# Patient Record
Sex: FEMALE | Race: WHITE | Employment: OTHER | URBAN - NONMETROPOLITAN AREA
[De-identification: names, ages, dates, MRNs, and addresses within clinical notes are randomized per-mention and may not be internally consistent; named-entity substitution may affect disease eponyms.]

---

## 2017-01-24 ENCOUNTER — HOSPITAL ENCOUNTER (OUTPATIENT)
Dept: CT IMAGING | Facility: MEDICAL CENTER | Age: 82
Discharge: HOME | End: 2017-01-24
Attending: FAMILY MEDICINE | Admitting: FAMILY MEDICINE

## 2017-01-24 DIAGNOSIS — R10.9 ABDOMINAL PAIN: ICD-10-CM

## 2017-01-24 DIAGNOSIS — R10.9 ABDOMINAL PAIN, UNSPECIFIED SITE: ICD-10-CM

## 2017-07-12 ENCOUNTER — HOSPITAL ENCOUNTER (OUTPATIENT)
Dept: SURGERY | Age: 82
Discharge: HOME | End: 2017-07-12
Attending: INTERNAL MEDICINE | Admitting: INTERNAL MEDICINE

## 2017-07-12 VITALS
RESPIRATION RATE: 16 BRPM | TEMPERATURE: 97 F | HEART RATE: 70 BPM | OXYGEN SATURATION: 99 % | DIASTOLIC BLOOD PRESSURE: 94 MMHG | SYSTOLIC BLOOD PRESSURE: 128 MMHG

## 2017-07-12 LAB — C DIFFICILE TOXIN, EIA: NORMAL

## 2017-07-12 RX ORDER — LIDOCAINE HYDROCHLORIDE 10 MG/ML
1 INJECTION, SOLUTION EPIDURAL; INFILTRATION; INTRACAUDAL; PERINEURAL ONCE
Status: DISCONTINUED | OUTPATIENT
Start: 2017-07-12 | End: 2017-07-13 | Stop reason: HOSPADM

## 2017-07-12 RX ORDER — OMEPRAZOLE 20 MG/1
20 CAPSULE, DELAYED RELEASE ORAL DAILY
COMMUNITY
End: 2020-02-19

## 2017-07-12 RX ORDER — SODIUM CHLORIDE, SODIUM LACTATE, POTASSIUM CHLORIDE, CALCIUM CHLORIDE 600; 310; 30; 20 MG/100ML; MG/100ML; MG/100ML; MG/100ML
INJECTION, SOLUTION INTRAVENOUS CONTINUOUS
Status: DISCONTINUED | OUTPATIENT
Start: 2017-07-12 | End: 2017-07-13 | Stop reason: HOSPADM

## 2017-07-12 RX ADMIN — SODIUM CHLORIDE, SODIUM LACTATE, POTASSIUM CHLORIDE, CALCIUM CHLORIDE: 600; 310; 30; 20 INJECTION, SOLUTION INTRAVENOUS at 09:03

## 2017-07-12 ASSESSMENT — PAIN SCALES - GENERAL: PAINLEVEL_OUTOF10: 0

## 2017-07-13 LAB
CRYPTOSPORIDIUM ANTIGEN STOOL: NORMAL
GI BACTERIAL PATHOGENS BY PCR: NORMAL
GIARDIA ANTIGEN STOOL: NORMAL

## 2017-08-31 ENCOUNTER — HOSPITAL ENCOUNTER (OUTPATIENT)
Dept: OTHER | Age: 82
Discharge: HOME | End: 2017-08-31
Attending: INTERNAL MEDICINE | Admitting: INTERNAL MEDICINE

## 2017-08-31 DIAGNOSIS — R10.84 GENERALIZED ABDOMINAL PAIN: ICD-10-CM

## 2017-09-15 ENCOUNTER — HOSPITAL ENCOUNTER (OUTPATIENT)
Dept: SURGERY | Age: 82
Discharge: HOME | End: 2017-09-15
Attending: INTERNAL MEDICINE | Admitting: INTERNAL MEDICINE

## 2017-09-15 VITALS
TEMPERATURE: 98 F | HEIGHT: 68 IN | HEART RATE: 69 BPM | WEIGHT: 175 LBS | SYSTOLIC BLOOD PRESSURE: 158 MMHG | DIASTOLIC BLOOD PRESSURE: 74 MMHG | BODY MASS INDEX: 26.52 KG/M2 | RESPIRATION RATE: 18 BRPM | OXYGEN SATURATION: 99 %

## 2017-09-15 RX ORDER — LIDOCAINE HYDROCHLORIDE 10 MG/ML
0.1 INJECTION, SOLUTION EPIDURAL; INFILTRATION; INTRACAUDAL; PERINEURAL ONCE
Status: DISCONTINUED | OUTPATIENT
Start: 2017-09-15 | End: 2017-09-16 | Stop reason: HOSPADM

## 2017-09-15 RX ORDER — SODIUM CHLORIDE, SODIUM LACTATE, POTASSIUM CHLORIDE, CALCIUM CHLORIDE 600; 310; 30; 20 MG/100ML; MG/100ML; MG/100ML; MG/100ML
INJECTION, SOLUTION INTRAVENOUS CONTINUOUS
Status: DISCONTINUED | OUTPATIENT
Start: 2017-09-15 | End: 2017-09-16 | Stop reason: HOSPADM

## 2017-09-15 RX ADMIN — SODIUM CHLORIDE, SODIUM LACTATE, POTASSIUM CHLORIDE, CALCIUM CHLORIDE: 600; 310; 30; 20 INJECTION, SOLUTION INTRAVENOUS at 09:05

## 2017-09-15 ASSESSMENT — PAIN - FUNCTIONAL ASSESSMENT: PAIN_FUNCTIONAL_ASSESSMENT: 0-10

## 2019-04-01 ENCOUNTER — APPOINTMENT (OUTPATIENT)
Dept: CT IMAGING | Age: 84
End: 2019-04-01
Payer: MEDICARE

## 2019-04-01 ENCOUNTER — APPOINTMENT (OUTPATIENT)
Dept: GENERAL RADIOLOGY | Age: 84
End: 2019-04-01
Payer: MEDICARE

## 2019-04-01 ENCOUNTER — HOSPITAL ENCOUNTER (EMERGENCY)
Age: 84
Discharge: HOME | End: 2019-04-01
Payer: MEDICARE

## 2019-04-01 VITALS
HEART RATE: 72 BPM | SYSTOLIC BLOOD PRESSURE: 150 MMHG | BODY MASS INDEX: 27.15 KG/M2 | HEIGHT: 67 IN | RESPIRATION RATE: 14 BRPM | DIASTOLIC BLOOD PRESSURE: 90 MMHG | OXYGEN SATURATION: 99 % | TEMPERATURE: 98.9 F | WEIGHT: 173 LBS

## 2019-04-01 DIAGNOSIS — S16.1XXA CERVICAL STRAIN, ACUTE, INITIAL ENCOUNTER: ICD-10-CM
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
## 2019-04-01 DIAGNOSIS — S09.90XA CLOSED HEAD INJURY, INITIAL ENCOUNTER: ICD-10-CM

## 2019-04-01 DIAGNOSIS — S43.492A OTHER SPRAIN OF LEFT SHOULDER JOINT, INITIAL ENCOUNTER: ICD-10-CM

## 2019-04-01 PROCEDURE — 99284 EMERGENCY DEPT VISIT MOD MDM: CPT

## 2019-04-01 PROCEDURE — 72125 CT NECK SPINE W/O DYE: CPT

## 2019-04-01 PROCEDURE — 70450 CT HEAD/BRAIN W/O DYE: CPT

## 2019-04-01 PROCEDURE — 6370000000 HC RX 637 (ALT 250 FOR IP): Performed by: PHYSICIAN ASSISTANT

## 2019-04-01 PROCEDURE — 73030 X-RAY EXAM OF SHOULDER: CPT

## 2019-04-01 RX ORDER — ACETAMINOPHEN 500 MG
500 TABLET ORAL ONCE
Status: COMPLETED | OUTPATIENT
Start: 2019-04-01 | End: 2019-04-01

## 2019-04-01 RX ORDER — CITALOPRAM 10 MG/1
10 TABLET ORAL DAILY
COMMUNITY

## 2019-04-01 RX ADMIN — ACETAMINOPHEN 500 MG: 500 TABLET ORAL at 19:14

## 2019-04-01 ASSESSMENT — PAIN SCALES - GENERAL
PAINLEVEL_OUTOF10: 8
PAINLEVEL_OUTOF10: 8

## 2019-04-01 ASSESSMENT — ENCOUNTER SYMPTOMS
ABDOMINAL PAIN: 0
BACK PAIN: 0
SHORTNESS OF BREATH: 0
VOMITING: 0
VISUAL CHANGE: 0

## 2019-04-01 NOTE — ED PROVIDER NOTES
Evaluated by SAMIR supervising physician available for consult    Patient states she was bent over and lost her balance and fell in the floor at [facility] on thursday, 4 days ago. She hit her left shoulder and having pain and some pain at her neck. She also hit her head in the fall states had a headache but not bothering her now. No loss of consciousness. No vomiting. The history is provided by the patient and a relative (here with daughter). Fall   The fall occurred while standing. She landed on a hard floor. There was no blood loss. The point of impact was the head and left shoulder. The pain is present in the left shoulder and neck. She was ambulatory at the scene. There was no entrapment after the fall. Pertinent negatives include no visual change, no fever, no numbness, no abdominal pain, no vomiting and no loss of consciousness. Review of Systems   Constitutional: Negative for fever. Respiratory: Negative for shortness of breath. Cardiovascular: Negative for chest pain. Gastrointestinal: Negative for abdominal pain and vomiting. Musculoskeletal: Positive for neck pain. Negative for back pain. Left shoulder pain   Skin: Negative for wound. Neurological: Negative for dizziness, loss of consciousness, syncope, weakness and numbness. Psychiatric/Behavioral: Negative for confusion. PAST MEDICAL HISTORY   has a past medical history of A-fib ([address] Acid reflux, and Hypertension. PAST SURGICAL HISTORY   has a past surgical history that includes Vulva surgery; Appendectomy; Gallbladder surgery; Ankle fracture surgery; Hysterectomy; other surgical history (Left, 4/20/2016); Colonoscopy (07/12/2017); and Upper gastrointestinal endoscopy (09/15/2017). FAMILY HISTORY  family history is not on file. SOCIAL HISTORY   reports that she has quit smoking. She has never used smokeless tobacco. She reports that she does not drink alcohol or use drugs.     HOME MEDICATIONS Prior to Admission medications    Medication Sig Start Date End Date Taking? Authorizing Provider   citalopram (CELEXA) 10 MG tablet Take 10 mg by mouth daily   Yes Historical Provider, MD   omeprazole (PRILOSEC) 20 MG delayed release capsule Take 20 mg by mouth daily    Historical Provider, MD   polyethylene glycol (GLYCOLAX) powder Take 17 g by mouth daily    Historical Provider, MD   ondansetron (ZOFRAN) 4 MG tablet Take 4 mg by mouth every 8 hours as needed for Nausea or Vomiting    Historical Provider, MD   warfarin (COUMADIN) 5 MG tablet Take 5 mg by mouth    Historical Provider, MD   b complex vitamins capsule Take 1 capsule by mouth daily    Historical Provider, MD   metoprolol (LOPRESSOR) 25 MG tablet Take 25 mg by mouth 2 times daily    Historical Provider, MD   docusate sodium (COLACE) 100 MG capsule Take 100 mg by mouth 2 times daily    Historical Provider, MD   multivitamin plus iron (ANIMAL SHAPES) 15 MG chewable tablet Take 1 tablet by mouth daily. Historical Provider, MD        ALLERGIES  is allergic to morphine. BP (!) 150/90   Pulse 72   Temp 98.9 °F (37.2 °C) (Oral)   Resp 14   Ht 5' 7\" (1.702 m)   Wt 173 lb (78.5 kg)   SpO2 99%   BMI 27.10 kg/m²     Physical Exam   Constitutional: She is oriented to person, place, and time. She appears well-developed and well-nourished. Non-toxic appearance. She does not have a sickly appearance. HENT:   Head: Normocephalic and atraumatic. Right Ear: Tympanic membrane and ear canal normal. No hemotympanum. Left Ear: Tympanic membrane and ear canal normal. No hemotympanum. Mouth/Throat: Uvula is midline, oropharynx is clear and moist and mucous membranes are normal. No posterior oropharyngeal edema or posterior oropharyngeal erythema. Eyes: Pupils are equal, round, and reactive to light. Conjunctivae and EOM are normal.   Neck: Normal range of motion. Neck supple. Cardiovascular: Normal rate, regular rhythm and intact distal pulses. reviewed    Patient Progress  Patient progress: stable           Ct Head Wo Contrast    Result Date: 4/1/2019  EXAMINATION: CT OF THE HEAD WITHOUT CONTRAST  4/1/2019 3:46 pm TECHNIQUE: CT of the head was performed without the administration of intravenous contrast. Dose modulation, iterative reconstruction, and/or weight based adjustment of the mA/kV was utilized to reduce the radiation dose to as low as reasonably achievable. COMPARISON: 08/24/2015 HISTORY: ORDERING SYSTEM PROVIDED HISTORY: HEAD INJURY MILD OR MODERATE ACUTE, NO NEUROLOGICAL DEFICIT TECHNOLOGIST PROVIDED HISTORY: Has a \"code stroke\" or \"stroke alert\" been called? ->No Ordering Physician Provided Reason for Exam: Fall (Pt states she fell trying to put a leash on her dog last Thursday; c/o pain in left shoulder, neck, reports hitting her head during fall but denies LOC. ) Acuity: Acute Type of Exam: Initial FINDINGS: BRAIN/VENTRICLES: No acute loss of the gray-white matter differentiation is identified to suggest acute or subacute infarct. No masses or hemorrhages within the brain parenchyma are found. The ventricles are midline. Mild periventricular low-attenuation is identified, compatible with chronic small vessel ischemic disease. Intracranial vasculature is unremarkable. ORBITS: The visualized portion of the orbits demonstrate no acute abnormality. SINUSES: The visualized paranasal sinuses and mastoid air cells demonstrate no acute abnormality. SOFT TISSUES/SKULL:  No acute abnormality of the visualized skull or soft tissues. No acute intracranial abnormality. Ct Cervical Spine Wo Contrast    Result Date: 4/1/2019  EXAMINATION: CT OF THE CERVICAL SPINE WITHOUT CONTRAST 4/1/2019 6:46 pm TECHNIQUE: CT of the cervical spine was performed without the administration of intravenous contrast. Multiplanar reformatted images are provided for review.  Dose modulation, iterative reconstruction, and/or weight based adjustment of the mA/kV was utilized to reduce the radiation dose to as low as reasonably achievable. COMPARISON: None. HISTORY: ORDERING SYSTEM PROVIDED HISTORY: NECK PAIN, CERVICAL SPINE TECHNOLOGIST PROVIDED HISTORY: Ordering Physician Provided Reason for Exam: Fall (Pt states she fell trying to put a leash on her dog last Thursday; c/o pain in left shoulder, neck, reports hitting her head during fall but denies LOC. ) Acuity: Acute Type of Exam: Initial FINDINGS: BONES/ALIGNMENT: There is no evidence of an acute cervical spine fracture. There is normal alignment of the cervical spine. DEGENERATIVE CHANGES: Multilevel degenerative changes. SOFT TISSUES: There is no prevertebral soft tissue swelling. No acute abnormality of the cervical spine. Xr Shoulder Left (min 2 Views)    Result Date: 4/1/2019  EXAMINATION: 3 XRAY VIEWS OF THE LEFT SHOULDER 4/1/2019 3:46 pm COMPARISON: None. HISTORY: ORDERING SYSTEM PROVIDED HISTORY: Trauma, R/O fx TECHNOLOGIST PROVIDED HISTORY: Reason for exam:->Trauma, R/O fx Ordering Physician Provided Reason for Exam: Fall (Pt states she fell trying to put a leash on her dog last Thursday; c/o pain in left shoulder, neck, reports hitting her head during fall but denies LOC. ) Acuity: Acute Type of Exam: Initial FINDINGS: Moderate acromioclavicular degenerative hypertrophy noted. No acute fracture or dislocation is seen involving the left shoulder. Visualized left lung is clear. No acute abnormality detected. 7:37 PM  Imaging was obtained. CT scan brain no acute intracranial abnormality no hemorrhage or fracture. Cervical spine multilevel degenerative changes no fracture. left shoulder degenerative changes no fracture or dislocation. We discussed Tylenol as needed for pain. Ice pack. To follow-up with her doctor within 1 week for evaluation also referred to orthopedics as needed. Head injury instructions given. Discussed having her use her walker.   Advised returning to the ER for any worsening symptoms patient and daughter understand and agree. I estimate there is LOW risk for CAUDA EQUINA or CENTRAL CORD SYNDROME, COMPARTMENT SYNDROME, CORD COMPRESSION,  INTRACRANIAL HEMORRHAGE OR EDEMA, INTRA-ABDOMINAL INJURY, PERFORATED BOWEL, SUBDURAL OR EPIDURAL HEMATOMA, TENDON or NEUROVASCULAR INJURY, PNEUMOTHORAX, HEMOTHORAX, PERICARDIAL TAMPONADE, CARDIAC CONTUSION, or a THORACIC AORTIC DISSECTION, thus I consider the discharge disposition reasonable. Also, there is no evidence or peritonitis, sepsis, or toxicity. Please note that this chart was generated using Dragon dictation software.  Although every effort was made to ensure the accuracy of this automated transcription, some errors in transcription may have occurred       Madalyn Samuels PA-C  04/01/19 1952

## 2019-04-01 NOTE — ED NOTES
Pt ambulated out with steady gait. VSS. Discharge instructions reviewed. No further needs at this time.         Joss Galicia RN  04/01/19 [id number]

## 2020-02-19 ENCOUNTER — HOSPITAL ENCOUNTER (OUTPATIENT)
Dept: ENDOSCOPY | Age: 85
Setting detail: OUTPATIENT SURGERY
Discharge: HOME | End: 2020-02-19
Payer: MEDICARE

## 2020-02-19 VITALS
DIASTOLIC BLOOD PRESSURE: 80 MMHG | BODY MASS INDEX: 27.28 KG/M2 | HEIGHT: 68 IN | RESPIRATION RATE: 16 BRPM | WEIGHT: 180 LBS | SYSTOLIC BLOOD PRESSURE: 174 MMHG | HEART RATE: 57 BPM

## 2020-02-19 PROCEDURE — 6370000000 HC RX 637 (ALT 250 FOR IP): Performed by: OPHTHALMOLOGY

## 2020-02-19 PROCEDURE — 66821 AFTER CATARACT LASER SURGERY: CPT

## 2020-02-19 PROCEDURE — 2500000003 HC RX 250 WO HCPCS: Performed by: OPHTHALMOLOGY

## 2020-02-19 RX ORDER — APRACLONIDINE HYDROCHLORIDE 5 MG/ML
1 SOLUTION/ DROPS OPHTHALMIC 2 TIMES DAILY PRN
Status: COMPLETED | OUTPATIENT
Start: 2020-02-19 | End: 2020-02-19

## 2020-02-19 RX ORDER — PROPARACAINE HYDROCHLORIDE 5 MG/ML
1 SOLUTION/ DROPS OPHTHALMIC ONCE
Status: COMPLETED | OUTPATIENT
Start: 2020-02-19 | End: 2020-02-19

## 2020-02-19 RX ORDER — TROPICAMIDE 10 MG/ML
1 SOLUTION/ DROPS OPHTHALMIC ONCE
Status: COMPLETED | OUTPATIENT
Start: 2020-02-19 | End: 2020-02-19

## 2020-02-19 RX ORDER — SOFT LENS RINSE,STORE SOLUTION
SOLUTION, NON-ORAL MISCELLANEOUS ONCE
Status: COMPLETED | OUTPATIENT
Start: 2020-02-19 | End: 2020-02-19

## 2020-02-19 RX ORDER — PHENYLEPHRINE HCL 2.5 %
1 DROPS OPHTHALMIC (EYE) ONCE
Status: COMPLETED | OUTPATIENT
Start: 2020-02-19 | End: 2020-02-19

## 2020-02-19 RX ADMIN — PHENYLEPHRINE HYDROCHLORIDE 1 DROP: 25 SOLUTION/ DROPS OPHTHALMIC at 11:33

## 2020-02-19 RX ADMIN — APRACLONIDINE 1 DROP: 5.75 SOLUTION OPHTHALMIC at 11:40

## 2020-02-19 RX ADMIN — Medication: at 12:18

## 2020-02-19 RX ADMIN — TROPICAMIDE 1 DROP: 10 SOLUTION/ DROPS OPHTHALMIC at 11:27

## 2020-02-19 RX ADMIN — APRACLONIDINE 1 DROP: 5.75 SOLUTION OPHTHALMIC at 12:21

## 2020-02-19 RX ADMIN — PROPARACAINE HYDROCHLORIDE 1 DROP: 5 SOLUTION/ DROPS OPHTHALMIC at 12:07

## 2020-02-19 ASSESSMENT — PAIN - FUNCTIONAL ASSESSMENT
PAIN_FUNCTIONAL_ASSESSMENT: 0-10
PAIN_FUNCTIONAL_ASSESSMENT: 0-10

## 2020-02-19 NOTE — PROGRESS NOTES
Pt here for laser eye procedure with Dr. Licha Conte  Procedure explained to pt and consent obtained  Laser eye procedure completed  See Dr. Efe Horan procedural note for details  Pt dorothy well  No c/o's voiced   Discharge instructions reviewed with pt and copy given  Understanding verbalized  Pt discharged to home in stable condition

## 2020-02-26 ENCOUNTER — HOSPITAL ENCOUNTER (OUTPATIENT)
Dept: ENDOSCOPY | Age: 85
Setting detail: OUTPATIENT SURGERY
Discharge: HOME | End: 2020-02-26
Payer: MEDICARE

## 2020-02-26 VITALS
SYSTOLIC BLOOD PRESSURE: 138 MMHG | DIASTOLIC BLOOD PRESSURE: 85 MMHG | WEIGHT: 180 LBS | BODY MASS INDEX: 27.28 KG/M2 | HEIGHT: 68 IN | HEART RATE: 58 BPM | RESPIRATION RATE: 16 BRPM

## 2020-02-26 PROCEDURE — 66821 AFTER CATARACT LASER SURGERY: CPT

## 2020-02-26 PROCEDURE — 2500000003 HC RX 250 WO HCPCS: Performed by: OPHTHALMOLOGY

## 2020-02-26 PROCEDURE — 6370000000 HC RX 637 (ALT 250 FOR IP): Performed by: OPHTHALMOLOGY

## 2020-02-26 RX ORDER — APRACLONIDINE HYDROCHLORIDE 5 MG/ML
1 SOLUTION/ DROPS OPHTHALMIC 2 TIMES DAILY PRN
Status: DISCONTINUED | OUTPATIENT
Start: 2020-02-26 | End: 2020-02-27 | Stop reason: HOSPADM

## 2020-02-26 RX ORDER — TROPICAMIDE 10 MG/ML
1 SOLUTION/ DROPS OPHTHALMIC ONCE
Status: COMPLETED | OUTPATIENT
Start: 2020-02-26 | End: 2020-02-26

## 2020-02-26 RX ORDER — SOFT LENS RINSE,STORE SOLUTION
SOLUTION, NON-ORAL MISCELLANEOUS ONCE
Status: COMPLETED | OUTPATIENT
Start: 2020-02-26 | End: 2020-02-26

## 2020-02-26 RX ORDER — PHENYLEPHRINE HCL 2.5 %
1 DROPS OPHTHALMIC (EYE) ONCE
Status: COMPLETED | OUTPATIENT
Start: 2020-02-26 | End: 2020-02-26

## 2020-02-26 RX ORDER — PROPARACAINE HYDROCHLORIDE 5 MG/ML
1 SOLUTION/ DROPS OPHTHALMIC ONCE
Status: COMPLETED | OUTPATIENT
Start: 2020-02-26 | End: 2020-02-26

## 2020-02-26 RX ADMIN — TROPICAMIDE 1 DROP: 10 SOLUTION/ DROPS OPHTHALMIC at 11:48

## 2020-02-26 RX ADMIN — Medication: at 12:40

## 2020-02-26 RX ADMIN — PROPARACAINE HYDROCHLORIDE 1 DROP: 5 SOLUTION/ DROPS OPHTHALMIC at 12:25

## 2020-02-26 RX ADMIN — APRACLONIDINE 1 DROP: 5.75 SOLUTION OPHTHALMIC at 11:53

## 2020-02-26 RX ADMIN — APRACLONIDINE 1 DROP: 5.75 SOLUTION OPHTHALMIC at 12:44

## 2020-02-26 RX ADMIN — PHENYLEPHRINE HYDROCHLORIDE 1 DROP: 25 SOLUTION/ DROPS OPHTHALMIC at 11:51

## 2020-02-26 ASSESSMENT — PAIN - FUNCTIONAL ASSESSMENT
PAIN_FUNCTIONAL_ASSESSMENT: 0-10
PAIN_FUNCTIONAL_ASSESSMENT: 0-10

## 2020-02-26 NOTE — OP NOTE
Eli Brizuela    Pre-operative diagnosis:  Opacified posterior capsule of the the left eye    Post-operative diagnosis:  Same    Procedure Performed:  YAG laser capsulotomy the left eye                                      Anesthesia:  Topical anesthesia     Complications:  None    Procedure:  Informed consent was obtained from the patient. Dilation drops were then applied to induce adequate mydriasis. The patient was then taken to the laser room and  positioned in front of the YAG laser. Topical Proparacaine drops were then applied. The patient then received 31 applications of 1.8 millijoules to the densely opacified posterior capsule. Once this was accomplished, a nice capsulotomy was performed. The eye was then rinsed using sterile saline. Iopidine 0.5% was then applied. The patient's pressure will be measured closely and conservatively as an outpatient in my office. Patient tolerated the procedure well without any complications.

## 2020-07-28 ENCOUNTER — INITIAL CONSULT (OUTPATIENT)
Dept: SURGERY | Age: 85
End: 2020-07-28
Payer: MEDICARE

## 2020-07-28 VITALS
HEIGHT: 68 IN | WEIGHT: 183 LBS | SYSTOLIC BLOOD PRESSURE: 122 MMHG | TEMPERATURE: 98.2 F | BODY MASS INDEX: 27.74 KG/M2 | DIASTOLIC BLOOD PRESSURE: 74 MMHG

## 2020-07-28 PROCEDURE — 99202 OFFICE O/P NEW SF 15 MIN: CPT | Performed by: SURGERY

## 2020-07-28 RX ORDER — SODIUM CHLORIDE 0.9 % (FLUSH) 0.9 %
10 SYRINGE (ML) INJECTION PRN
Status: CANCELLED | OUTPATIENT
Start: 2020-07-28

## 2020-07-28 RX ORDER — SODIUM CHLORIDE 0.9 % (FLUSH) 0.9 %
10 SYRINGE (ML) INJECTION EVERY 12 HOURS SCHEDULED
Status: CANCELLED | OUTPATIENT
Start: 2020-07-28

## 2020-07-28 NOTE — PROGRESS NOTES
New Patient Via Maverick Falcon MD    [address], [address] [facility]  [place], [facility]  Emilia Da Silva   YOB: 1932    Date of Visit:  7/28/2020      Shey Dubon MD    Chief Complaint: Right arm skin lesion    HPI: Patient presents for evaluation of a skin lesion on her right arm. She states she has had it for a few months. It has slowly grown and now has a pointy hard tip. It has not been infected or drained. It does itch occasionally. Allergies   Allergen Reactions    Morphine Nausea And Vomiting     Outpatient Medications Marked as Taking for the 7/28/20 encounter (Initial consult) with Saturnino Hdz MD   Medication Sig Dispense Refill    citalopram (CELEXA) 10 MG tablet Take 10 mg by mouth daily      warfarin (COUMADIN) 5 MG tablet Take 5 mg by mouth every evening       metoprolol (LOPRESSOR) 25 MG tablet Take 25 mg by mouth once       multivitamin plus iron (ANIMAL SHAPES) 15 MG chewable tablet Take 1 tablet by mouth daily. Past Medical History:   Diagnosis Date    A-fib ([address]     Acid reflux     Hypertension      Past Surgical History:   Procedure Laterality Date    ANKLE FRACTURE SURGERY      LEFT ANKLE    APPENDECTOMY      COLONOSCOPY  07/12/2017    cecal polyp; random colon biopsies    GALLBLADDER SURGERY      HYSTERECTOMY      OTHER SURGICAL HISTORY Left 4/20/2016    EXCISION LESION LEFT UPPER EXTREMITY LEFT BACK AND RIGHT SHOULDER    UPPER GASTROINTESTINAL ENDOSCOPY  09/15/2017    dilated, biopsies    VULVA SURGERY      x 2      History reviewed. No pertinent family history.   Social History     Socioeconomic History    Marital status: Single     Spouse name: Not on file    Number of children: Not on file    Years of education: Not on file    Highest education level: Not on file   Occupational History    Not on file   Social Needs    Financial resource strain: Not on file    Food insecurity     Worry: Not on file     Inability: Not on file    Transportation needs     Medical: Not on file     Non-medical: Not on file   Tobacco Use    Smoking status: Former Smoker    Smokeless tobacco: Never Used    Tobacco comment: smoked 7 yrs 1ppd x 10 years   Substance and Sexual Activity    Alcohol use: No    Drug use: No    Sexual activity: Not on file   Lifestyle    Physical activity     Days per week: Not on file     Minutes per session: Not on file    Stress: Not on file   Relationships    Social connections     Talks on phone: Not on file     Gets together: Not on file     Attends Mandaeism service: Not on file     Active member of club or organization: Not on file     Attends meetings of clubs or organizations: Not on file     Relationship status: Not on file    Intimate partner violence     Fear of current or ex partner: Not on file     Emotionally abused: Not on file     Physically abused: Not on file     Forced sexual activity: Not on file   Other Topics Concern    Not on file   Social History Narrative    Not on file          Vitals:    07/28/20 0909   BP: 122/74   Site: Left Upper Arm   Position: Sitting   Cuff Size: Large Adult   Temp: 98.2 °F (36.8 °C)   TempSrc: Temporal   Weight: 183 lb (83 kg)   Height: 5' 8\" (1.727 m)     Body mass index is 27.83 kg/m².      Wt Readings from Last 3 Encounters:   07/28/20 183 lb (83 kg)   02/26/20 180 lb (81.6 kg)   02/19/20 180 lb (81.6 kg)     BP Readings from Last 3 Encounters:   07/28/20 122/74   02/26/20 138/85   02/19/20 (!) 174/80        ROS:  As per HPI, otherwise reviewed ×10 systems and negative    PE:  Constitutional:  Well developed, well nourished, no acute distress, non-toxic appearance   Eyes:  PERRL, conjunctiva normal   HENT:  Atraumatic, external ears normal, nose normal. Neck- normal range of motion, no tenderness, supple   Respiratory:  No respiratory distress, normal breath sounds, no rales, no wheezing   Cardiovascular:  Normal rate, normal rhythm  Integument: Right forearm with an indurated 1.6 mm lesion that is raised to several centimeters with a central keratin horn  Lymphatic:  No lymphadenopathy noted   Neurologic:  Alert & oriented x 3, no focal deficits noted   Psychiatric:  Speech and behavior appropriate       DATA:  N/A      Assessment:  1. Neoplasm of uncertain behavior of skin        Plan: The lesion is suspicious for skin cancer. We will plan for excision of right arm skin lesion. I explained the procedure including risks, benefits, and alternatives. Questions were answered and the patient agrees to proceed. The patient was counseled at length about the risks of dmitriy Covid-19 during their perioperative period and any recovery window from their procedure. The patient was made aware that dmitriy Covid-19  may worsen their prognosis for recovering from their procedure  and lend to a higher morbidity and/or mortality risk. All material risks, benefits, and reasonable alternatives including postponing the procedure were discussed. The patient does wish to proceed with the procedure at this time.

## 2020-07-29 ENCOUNTER — ANESTHESIA EVENT (OUTPATIENT)
Dept: OPERATING ROOM | Age: 85
End: 2020-07-29
Payer: MEDICARE

## 2020-07-30 NOTE — PROGRESS NOTES
1.  Do not eat or drink anything after 12 midnight prior to surgery. This includes no water, chewing gum or mints. 2.  Take the following pills with a small sip of water on the morning of surgery   3. Aspirin, Ibuprofen, Advil, Naproxen, Vitamin E and other Anti-inflammatory products should be stopped for 5 days before surgery or as directed by your physician. 4.  Check with your doctor regarding stopping Plavix, Coumadin, Lovenox, Fragmin or other blood thinners. 5.  Do not smoke and do not drink alcoholic beverages 24 hours prior to surgery. This includes NA Beer. 6.  You may brush your teeth and gargle the morning of surgery. DO NOT SWALLOW WATER.  7.  You MUST make arrangements for a responsible adult to take you home after your surgery. You will not be allowed to leave alone or drive yourself home. It is strongly suggested someone stay with you the first 24 hours. Your surgery will be cancelled if you do not have a ride home. 8.  A parent/legal guardian must accompany a child scheduled for surgery and plan to stay at the hospital until the child is discharged. Please do not bring other children with you. 9.  Please wear simple, loose fitting clothing to the hospital.  Daniella Greenhouse not bring valuables ( money, credit cards, checkbooks, etc.)  Do not wear any makeup (including no eye makeup) or nail polish on your fingers or toes. 10.  Do not wear any jewelry or piercing on the day of surgery. All body piercing jewelry must be removed. 11.  If you have dentures, they will be removed before going to the OR; we will provide you a container. If you wear contact lenses or glasses, they will be removed; please bring a case for them. 12.  Please see your family doctor/pediatrician for a history & physical and/or concerning medications. Bring any test results/reports from your physician's office the day of surgery. 15.  Remember to bring Blood Bank Bracelet to the hospital on the day of surgery.   14.  If you have a Living Will and Durable Power of  for Healthcare, please bring in a copy. 13.  Notify your Surgeon if you develop any illness between now and surgery time; cough, cold, fever, sore throat, nausea, vomiting, etc.  Please notify your surgeon if you experience dizziness, shortness of breath or blurred vision between now and the time of your surgery. 16.  DO NOT shave your operative site 96 hours (4 days) prior to surgery. For face and neck surgery, men may use an electric razor 48 hours (2 days) prior to surgery. 17. Shower the night before surgery and the morning of surgery with  an antibacterial soap   or  Chlorhexidine gluconate (for total joint replacement). To provide excellent care, visitors will be limited to two in a room at any given time. Please no children under the age of 15 in the surgical department.

## 2020-07-31 ENCOUNTER — HOSPITAL ENCOUNTER (OUTPATIENT)
Age: 85
Discharge: HOME | End: 2020-07-31
Payer: MEDICARE

## 2020-07-31 PROCEDURE — U0003 INFECTIOUS AGENT DETECTION BY NUCLEIC ACID (DNA OR RNA); SEVERE ACUTE RESPIRATORY SYNDROME CORONAVIRUS 2 (SARS-COV-2) (CORONAVIRUS DISEASE [COVID-19]), AMPLIFIED PROBE TECHNIQUE, MAKING USE OF HIGH THROUGHPUT TECHNOLOGIES AS DESCRIBED BY CMS-2020-01-R: HCPCS

## 2020-08-02 LAB — SARS-COV-2, NAA: NOT DETECTED

## 2020-08-04 ENCOUNTER — ANESTHESIA (OUTPATIENT)
Dept: OPERATING ROOM | Age: 85
End: 2020-08-04
Payer: MEDICARE

## 2020-08-04 ENCOUNTER — HOSPITAL ENCOUNTER (OUTPATIENT)
Age: 85
Setting detail: OUTPATIENT SURGERY
Discharge: HOME | End: 2020-08-04
Attending: SURGERY | Admitting: SURGERY
Payer: MEDICARE

## 2020-08-04 VITALS — SYSTOLIC BLOOD PRESSURE: 103 MMHG | DIASTOLIC BLOOD PRESSURE: 55 MMHG | OXYGEN SATURATION: 96 %

## 2020-08-04 VITALS
RESPIRATION RATE: 14 BRPM | HEART RATE: 60 BPM | SYSTOLIC BLOOD PRESSURE: 128 MMHG | OXYGEN SATURATION: 96 % | TEMPERATURE: 98.4 F | WEIGHT: 179 LBS | DIASTOLIC BLOOD PRESSURE: 74 MMHG | BODY MASS INDEX: 28.09 KG/M2 | HEIGHT: 67 IN

## 2020-08-04 LAB
ANION GAP SERPL CALCULATED.3IONS-SCNC: 11 MMOL/L (ref 3–16)
APTT: 31.9 SEC (ref 24.2–36.2)
BUN BLDV-MCNC: 12 MG/DL (ref 7–20)
CALCIUM SERPL-MCNC: 9.9 MG/DL (ref 8.3–10.6)
CHLORIDE BLD-SCNC: 105 MMOL/L (ref 99–110)
CO2: 25 MMOL/L (ref 21–32)
CREAT SERPL-MCNC: 0.8 MG/DL (ref 0.6–1.2)
EKG ATRIAL RATE: 76 BPM
EKG DIAGNOSIS: NORMAL
EKG Q-T INTERVAL: 422 MS
EKG QRS DURATION: 84 MS
EKG QTC CALCULATION (BAZETT): 452 MS
EKG R AXIS: -38 DEGREES
EKG T AXIS: 23 DEGREES
EKG VENTRICULAR RATE: 69 BPM
GFR AFRICAN AMERICAN: >60
GFR NON-AFRICAN AMERICAN: >60
GLUCOSE BLD-MCNC: 105 MG/DL (ref 70–99)
HCT VFR BLD CALC: 42 % (ref 36–48)
HEMOGLOBIN: 13.3 G/DL (ref 12–16)
INR BLD: 1.05 (ref 0.86–1.14)
MCH RBC QN AUTO: 26.4 PG (ref 26–34)
MCHC RBC AUTO-ENTMCNC: 31.8 G/DL (ref 31–36)
MCV RBC AUTO: 83 FL (ref 80–100)
PDW BLD-RTO: 14.7 % (ref 12.4–15.4)
PLATELET # BLD: 242 K/UL (ref 135–450)
PMV BLD AUTO: 6.7 FL (ref 5–10.5)
POTASSIUM REFLEX MAGNESIUM: 4.2 MMOL/L (ref 3.5–5.1)
PROTHROMBIN TIME: 12.2 SEC (ref 10–13.2)
RBC # BLD: 5.05 M/UL (ref 4–5.2)
SODIUM BLD-SCNC: 141 MMOL/L (ref 136–145)
WBC # BLD: 4.3 K/UL (ref 4–11)

## 2020-08-04 PROCEDURE — 6360000002 HC RX W HCPCS: Performed by: SURGERY

## 2020-08-04 PROCEDURE — 2500000003 HC RX 250 WO HCPCS: Performed by: SURGERY

## 2020-08-04 PROCEDURE — 7100000010 HC PHASE II RECOVERY - FIRST 15 MIN: Performed by: SURGERY

## 2020-08-04 PROCEDURE — 93010 ELECTROCARDIOGRAM REPORT: CPT | Performed by: INTERNAL MEDICINE

## 2020-08-04 PROCEDURE — 7100000011 HC PHASE II RECOVERY - ADDTL 15 MIN: Performed by: SURGERY

## 2020-08-04 PROCEDURE — 2580000003 HC RX 258: Performed by: SURGERY

## 2020-08-04 PROCEDURE — 12032 INTMD RPR S/A/T/EXT 2.6-7.5: CPT | Performed by: SURGERY

## 2020-08-04 PROCEDURE — 93005 ELECTROCARDIOGRAM TRACING: CPT | Performed by: ANESTHESIOLOGY

## 2020-08-04 PROCEDURE — 3700000001 HC ADD 15 MINUTES (ANESTHESIA): Performed by: SURGERY

## 2020-08-04 PROCEDURE — 2580000003 HC RX 258: Performed by: ANESTHESIOLOGY

## 2020-08-04 PROCEDURE — 88305 TISSUE EXAM BY PATHOLOGIST: CPT

## 2020-08-04 PROCEDURE — 3700000000 HC ANESTHESIA ATTENDED CARE: Performed by: SURGERY

## 2020-08-04 PROCEDURE — 11602 EXC TR-EXT MAL+MARG 1.1-2 CM: CPT | Performed by: SURGERY

## 2020-08-04 PROCEDURE — 2500000003 HC RX 250 WO HCPCS: Performed by: ANESTHESIOLOGY

## 2020-08-04 PROCEDURE — 85027 COMPLETE CBC AUTOMATED: CPT

## 2020-08-04 PROCEDURE — 3600000002 HC SURGERY LEVEL 2 BASE: Performed by: SURGERY

## 2020-08-04 PROCEDURE — 85730 THROMBOPLASTIN TIME PARTIAL: CPT

## 2020-08-04 PROCEDURE — 80048 BASIC METABOLIC PNL TOTAL CA: CPT

## 2020-08-04 PROCEDURE — 2500000003 HC RX 250 WO HCPCS: Performed by: NURSE ANESTHETIST, CERTIFIED REGISTERED

## 2020-08-04 PROCEDURE — 85610 PROTHROMBIN TIME: CPT

## 2020-08-04 PROCEDURE — 3600000012 HC SURGERY LEVEL 2 ADDTL 15MIN: Performed by: SURGERY

## 2020-08-04 PROCEDURE — 6360000002 HC RX W HCPCS: Performed by: NURSE ANESTHETIST, CERTIFIED REGISTERED

## 2020-08-04 PROCEDURE — 2709999900 HC NON-CHARGEABLE SUPPLY: Performed by: SURGERY

## 2020-08-04 PROCEDURE — 36415 COLL VENOUS BLD VENIPUNCTURE: CPT

## 2020-08-04 RX ORDER — SODIUM CHLORIDE 0.9 % (FLUSH) 0.9 %
10 SYRINGE (ML) INJECTION PRN
Status: DISCONTINUED | OUTPATIENT
Start: 2020-08-04 | End: 2020-08-04 | Stop reason: HOSPADM

## 2020-08-04 RX ORDER — HYDROCODONE BITARTRATE AND ACETAMINOPHEN 5; 325 MG/1; MG/1
1 TABLET ORAL EVERY 6 HOURS PRN
Qty: 12 TABLET | Refills: 0 | Status: SHIPPED | OUTPATIENT
Start: 2020-08-04 | End: 2020-08-07

## 2020-08-04 RX ORDER — SODIUM CHLORIDE, SODIUM LACTATE, POTASSIUM CHLORIDE, CALCIUM CHLORIDE 600; 310; 30; 20 MG/100ML; MG/100ML; MG/100ML; MG/100ML
INJECTION, SOLUTION INTRAVENOUS CONTINUOUS
Status: DISCONTINUED | OUTPATIENT
Start: 2020-08-04 | End: 2020-08-04 | Stop reason: HOSPADM

## 2020-08-04 RX ORDER — PROPOFOL 10 MG/ML
INJECTION, EMULSION INTRAVENOUS PRN
Status: DISCONTINUED | OUTPATIENT
Start: 2020-08-04 | End: 2020-08-04 | Stop reason: SDUPTHER

## 2020-08-04 RX ORDER — MAGNESIUM HYDROXIDE 1200 MG/15ML
LIQUID ORAL CONTINUOUS PRN
Status: COMPLETED | OUTPATIENT
Start: 2020-08-04 | End: 2020-08-04

## 2020-08-04 RX ORDER — SODIUM CHLORIDE 0.9 % (FLUSH) 0.9 %
10 SYRINGE (ML) INJECTION EVERY 12 HOURS SCHEDULED
Status: DISCONTINUED | OUTPATIENT
Start: 2020-08-04 | End: 2020-08-04 | Stop reason: HOSPADM

## 2020-08-04 RX ORDER — LIDOCAINE HYDROCHLORIDE 20 MG/ML
INJECTION, SOLUTION EPIDURAL; INFILTRATION; INTRACAUDAL; PERINEURAL PRN
Status: DISCONTINUED | OUTPATIENT
Start: 2020-08-04 | End: 2020-08-04 | Stop reason: SDUPTHER

## 2020-08-04 RX ADMIN — PROPOFOL 50 MG: 10 INJECTION, EMULSION INTRAVENOUS at 13:15

## 2020-08-04 RX ADMIN — Medication 1.5 G: at 12:19

## 2020-08-04 RX ADMIN — FAMOTIDINE 20 MG: 10 INJECTION, SOLUTION INTRAVENOUS at 12:19

## 2020-08-04 RX ADMIN — SODIUM CHLORIDE, POTASSIUM CHLORIDE, SODIUM LACTATE AND CALCIUM CHLORIDE: 600; 310; 30; 20 INJECTION, SOLUTION INTRAVENOUS at 12:19

## 2020-08-04 RX ADMIN — SODIUM CHLORIDE, POTASSIUM CHLORIDE, SODIUM LACTATE AND CALCIUM CHLORIDE: 600; 310; 30; 20 INJECTION, SOLUTION INTRAVENOUS at 13:11

## 2020-08-04 RX ADMIN — LIDOCAINE HYDROCHLORIDE 60 MG: 20 INJECTION, SOLUTION EPIDURAL; INFILTRATION; INTRACAUDAL; PERINEURAL at 13:15

## 2020-08-04 ASSESSMENT — PAIN SCALES - GENERAL
PAINLEVEL_OUTOF10: 0
PAINLEVEL_OUTOF10: 0

## 2020-08-04 ASSESSMENT — PULMONARY FUNCTION TESTS
PIF_VALUE: 0

## 2020-08-04 ASSESSMENT — PAIN - FUNCTIONAL ASSESSMENT: PAIN_FUNCTIONAL_ASSESSMENT: 0-10

## 2020-08-04 NOTE — ANESTHESIA POSTPROCEDURE EVALUATION
Department of Anesthesiology  Postprocedure Note    Patient: Clementina Mathews  MRN: [id number]  YOB: 1932  Date of evaluation: 8/4/2020    Procedure Summary     Date:  08/04/20 Room / Location:  [place] [address] / [facility]    Anesthesia Start:  1311 Anesthesia Stop:  1339    Procedure:  EXCISION OF SKIN CANCER RIGHT ARM (Right Arm Lower) Diagnosis:  (SKIN CANCER RIGHT ARM)    Surgeon:  Gail Romero MD Responsible Provider:  Dorota Schilling MD    Anesthesia Type:  general, MAC, TIVA ASA Status:  2          Anesthesia Type: general, MAC, TIVA    Tiffanie Phase I: Tiffanie Score: 10    Tiffanie Phase II: Tiffanie Score: 10    Last vitals: Reviewed and per EMR flowsheets.        Anesthesia Post Evaluation   Anesthetic Problems: no   Cardiovascular System Stable: yes  Respiratory Function: Airway Patent yes  ETT no  Ventilator no  Level of consciousness: awake, alert and oriented  Post-op pain: adequate analgesia  Hydration Adequate: yes  Nausea/Vomiting:no  Other Issues:     Elva Hager MD

## 2020-08-04 NOTE — BRIEF OP NOTE
Brief Postoperative Note      Patient: Elizabeth Messina  YOB: 1932  MRN: [id number]    Date of Procedure: 8/4/2020    Pre-Op Diagnosis: SKIN CANCER RIGHT ARM    Post-Op Diagnosis: Same       Procedure(s):  EXCISION OF SKIN CANCER RIGHT ARM    Surgeon(s):  Nathaniel Julio MD    Assistant:  Surgical Assistant: Heri Martínez    Anesthesia: Monitor Anesthesia Care    Estimated Blood Loss (mL): Minimal    Complications: None    Specimens:   * No specimens in log *    Implants:  * No implants in log *      Drains: * No LDAs found *    Findings: as above    Electronically signed by Gretchen Hoffmann MD on 8/4/2020 at 1:28 PM

## 2020-08-04 NOTE — ANESTHESIA PRE PROCEDURE
Department of Anesthesiology  Preprocedure Note       Name:  Antoine Cole   Age:  80 y.o.  :  4/3/1932                                          MRN:  [id number]         Date:  2020      Surgeon:  Steven Erwin MD    Procedure:  EXCISION OF SKIN CANCER RIGHT ARM    HPI:  This is an 79 y/o female with a skin lesion on her right arm. She states she has had it for a few months. It has slowly grown and now has a pointy hard tip. It has not been infected or drained. It does itch occasionally. Medications prior to admission:    citalopram (CELEXA) 10 MG tablet Take 10 mg by mouth daily   metoprolol (LOPRESSOR) 25 MG tablet Take 25 mg by mouth once    warfarin (COUMADIN) 5 MG tablet  LD: 5 days ago Take 5 mg by mouth every evening    multivitamin plus iron (ANIMAL SHAPES) 15 MG  Take 1 tablet by mouth daily. 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    Chronic diarrhea of unknown origin K52.9    Abdominal pain, RLQ R10.31    Abdominal fullness in right lower quadrant R19.8    Neoplasm of uncertain behavior of skin D48.5     Past Medical History:     A-fib ([address]     Acid reflux     Hypertension      Past Surgical History:     ANKLE FRACTURE SURGERY      LEFT ANKLE    APPENDECTOMY      COLONOSCOPY  2017    cecal polyp; random colon biopsies    GALLBLADDER SURGERY      HYSTERECTOMY      OTHER SURGICAL HISTORY Left 2016    EXCISION LESION LEFT UPPER EXTREMITY LEFT BACK AND RIGHT SHOULDER    UPPER GASTROINTESTINAL ENDOSCOPY  09/15/2017    dilated, biopsies    VULVA SURGERY      x 2      Social History:    Tobacco Use    Smoking status: Former Smoker    Smokeless tobacco: Never Used    Tobacco comment: smoked 7 yrs 1ppd x 10 years   Substance Use Topics    Alcohol use:  No                                Counseling given: Not Answered  Comment: smoked 7 yrs 1ppd x 10 intravenous. MIPS: Prophylactic antiemetics administered. Anesthetic plan and risks discussed with patient. Plan discussed with CRNA.             Robert Short MD

## 2020-08-04 NOTE — OP NOTE
[address]                 [address] [place] [address]                                OPERATIVE REPORT    PATIENT NAME: Thom Gross                   :        1932  MED REC NO:   [id number]                          ROOM:  ACCOUNT NO:   [de-identified]                           ADMIT DATE: 2020  PROVIDER:     Viviane White. MD Shereen    DATE OF PROCEDURE:  2020    PREOPERATIVE DIAGNOSIS:  Skin cancer, right arm. POSTOPERATIVE DIAGNOSIS:  Skin cancer, right arm. PROCEDURE:  Excision of skin cancer, right arm. SURGEON:  Thelma Regan MD    ANESTHESIA:  Local with MAC. ESTIMATED BLOOD LOSS:  Less than 50 mL. INDICATIONS:  The patient is an 25-year-old woman who presented with an  enlarging skin lesion on her right arm consistent with a squamous cell  carcinoma. It has rolled edges and a keratin horn. OPERATIVE SUMMARY:  After preoperative evaluation, the patient was  brought in the operating suite and placed in a comfortable supine  position on the operating room table. Monitoring equipment was attached  and she was given intravenous sedation per Anesthesia. Her right arm  was sterilely prepped and anesthetized with local anesthetic. The 1.4  cm lesion was excised via a 2 x 5 cm ellipse of skin. Bleeding was  controlled with electrocautery and the incisions were closed with  interrupted subcutaneous sutures of 3-0 Vicryl and a running  subcuticular suture of 4-0 Vicryl. Benzoin, Steri-Strips, and dry  sterile dressings. All sponge, needle, and instrument counts were  correct at the end of the case. The patient tolerated the procedure  well and was taken to the recovery area in stable condition.         Cesar Villatoro MD    D: 2020 13:43:11       T: 2020 13:48:40     [email or web address]  Job#: [id number]     Doc#: [id number]    CC:  Wong Roa DO

## 2020-08-06 ENCOUNTER — TELEPHONE (OUTPATIENT)
Dept: SURGERY | Age: 85
End: 2020-08-06

## 2020-08-06 NOTE — TELEPHONE ENCOUNTER
Called, and spoke to pt's daughter Mariel Gómez, who is on pt's Lucy Robles for results, advised this was a skin cancer but this was completely removed, may have sutures that need to be removed in 10 to 14 days, I will check and get her scheduled.

## 2020-08-06 NOTE — TELEPHONE ENCOUNTER
----- Message from Ivory Castillo MD sent at 8/6/2020 10:03 AM EDT -----  Please call with path-squamous cell carcinoma, as expected.   The lesion was completely excised

## 2020-08-18 ENCOUNTER — OFFICE VISIT (OUTPATIENT)
Dept: SURGERY | Age: 85
End: 2020-08-18

## 2020-08-18 VITALS
HEIGHT: 67 IN | DIASTOLIC BLOOD PRESSURE: 70 MMHG | SYSTOLIC BLOOD PRESSURE: 124 MMHG | WEIGHT: 179 LBS | TEMPERATURE: 97.3 F | BODY MASS INDEX: 28.09 KG/M2

## 2020-08-18 PROCEDURE — 99024 POSTOP FOLLOW-UP VISIT: CPT | Performed by: SURGERY

## 2020-08-18 NOTE — PROGRESS NOTES
[facility] GENERAL SURGERY      S:   Patient presents s/p excision of skin cancer right arm. She reports doing well. O:   Comfortable         Incision site healing well. Stitches removed       Pathology showed a squamous cell carcinoma, completely excised       A:   S/P excision of skin cancer right arm    P:   Follow up as needed.

## 2022-11-08 NOTE — PROGRESS NOTES
1.  Do not eat or drink anything after 12 midnight prior to surgery. This includes no water, chewing gum or mints. 2.  Take the following pills with a small sip of water on the morning of surgery metoprolo. 3.  Aspirin, Ibuprofen, Advil, Naproxen, Vitamin E and other Anti-inflammatory products should be stopped for 5 days before surgery or as directed by your physician. 4.  Check with your doctor regarding stopping Plavix, Coumadin, Lovenox, Fragmin or other blood thinners. 5.  Do not smoke and do not drink alcoholic beverages 24 hours prior to surgery. This includes NA Beer. 6.  You may brush your teeth and gargle the morning of surgery. DO NOT SWALLOW WATER.  7.  You MUST make arrangements for a responsible adult to take you home after your surgery. You will not be allowed to leave alone or drive yourself home. It is strongly suggested someone stay with you the first 24 hours. Your surgery will be cancelled if you do not have a ride home. 8.  A parent/legal guardian must accompany a child scheduled for surgery and plan to stay at the hospital until the child is discharged. Please do not bring other children with you. 9.  Please wear simple, loose fitting clothing to the hospital.  [employer] D[employer] not bring valuables ( money, credit cards, checkbooks, etc.)  Do not wear any makeup (including no eye makeup) or nail polish on your fingers or toes. 10.  Do not wear any jewelry or piercing on the day of surgery. All body piercing jewelry must be removed. 11.  If you have dentures, they will be removed before going to the OR; we will provide you a container. If you wear contact lenses or glasses, they will be removed; please bring a case for them. 12.  Please see your family doctor/pediatrician for a history & physical and/or concerning medications. Bring any test results/reports from your physician's office the day of surgery. PCP   Phone     H&P appt date   15.   Remember to bring Blood Bank Bracelet to the hospital on the day of surgery. 14.  If you have a Living Will and Durable Power of  for Healthcare, please bring in a copy. 13.  Notify your Surgeon if you develop any illness between now and surgery time; cough, cold, fever, sore throat, nausea, vomiting, etc.  Please notify your surgeon if you experience dizziness, shortness of breath or blurred vision between now and the time of your surgery. 16.  DO NOT shave your operative site 96 hours (4 days) prior to surgery. For face and neck surgery, men may use an electric razor 48 hours (2 days) prior to surgery. 17. Shower the night before surgery and the morning of surgery with   Antibacterial soap   Hibiclens or  Chlorhexidine gluconate. To provide excellent care, visitors will be limited to two in a room at any given time. Please no children under the age of 15 in the surgical department. metopr

## 2022-11-09 ENCOUNTER — HOSPITAL ENCOUNTER (OUTPATIENT)
Age: 87
Setting detail: OUTPATIENT SURGERY
Discharge: HOME | End: 2022-11-09
Attending: PODIATRIST | Admitting: PODIATRIST
Payer: MEDICARE

## 2022-11-09 ENCOUNTER — ANESTHESIA (OUTPATIENT)
Dept: OPERATING ROOM | Age: 87
End: 2022-11-09
Payer: MEDICARE

## 2022-11-09 ENCOUNTER — ANESTHESIA EVENT (OUTPATIENT)
Dept: OPERATING ROOM | Age: 87
End: 2022-11-09
Payer: MEDICARE

## 2022-11-09 VITALS
TEMPERATURE: 98 F | RESPIRATION RATE: 16 BRPM | BODY MASS INDEX: 26.68 KG/M2 | HEART RATE: 59 BPM | WEIGHT: 170 LBS | HEIGHT: 67 IN | SYSTOLIC BLOOD PRESSURE: 155 MMHG | DIASTOLIC BLOOD PRESSURE: 91 MMHG | OXYGEN SATURATION: 100 %

## 2022-11-09 DIAGNOSIS — C43.71 MELANOMA OF FOOT, RIGHT (HCC): ICD-10-CM

## 2022-11-09 PROCEDURE — 6360000002 HC RX W HCPCS: Performed by: PODIATRIST

## 2022-11-09 PROCEDURE — 3600000002 HC SURGERY LEVEL 2 BASE: Performed by: PODIATRIST

## 2022-11-09 PROCEDURE — 2500000003 HC RX 250 WO HCPCS: Performed by: NURSE ANESTHETIST, CERTIFIED REGISTERED

## 2022-11-09 PROCEDURE — 2500000003 HC RX 250 WO HCPCS: Performed by: PODIATRIST

## 2022-11-09 PROCEDURE — 88305 TISSUE EXAM BY PATHOLOGIST: CPT

## 2022-11-09 PROCEDURE — 7100000011 HC PHASE II RECOVERY - ADDTL 15 MIN: Performed by: PODIATRIST

## 2022-11-09 PROCEDURE — 2709999900 HC NON-CHARGEABLE SUPPLY: Performed by: PODIATRIST

## 2022-11-09 PROCEDURE — 2580000003 HC RX 258: Performed by: ANESTHESIOLOGY

## 2022-11-09 PROCEDURE — 3600000012 HC SURGERY LEVEL 2 ADDTL 15MIN: Performed by: PODIATRIST

## 2022-11-09 PROCEDURE — 6360000002 HC RX W HCPCS: Performed by: NURSE ANESTHETIST, CERTIFIED REGISTERED

## 2022-11-09 PROCEDURE — 3700000000 HC ANESTHESIA ATTENDED CARE: Performed by: PODIATRIST

## 2022-11-09 PROCEDURE — 3700000001 HC ADD 15 MINUTES (ANESTHESIA): Performed by: PODIATRIST

## 2022-11-09 PROCEDURE — 7100000010 HC PHASE II RECOVERY - FIRST 15 MIN: Performed by: PODIATRIST

## 2022-11-09 PROCEDURE — 2580000003 HC RX 258: Performed by: PODIATRIST

## 2022-11-09 RX ORDER — LIDOCAINE HYDROCHLORIDE 20 MG/ML
INJECTION, SOLUTION INFILTRATION; PERINEURAL PRN
Status: DISCONTINUED | OUTPATIENT
Start: 2022-11-09 | End: 2022-11-09 | Stop reason: SDUPTHER

## 2022-11-09 RX ORDER — WARFARIN SODIUM 5 MG/1
1 TABLET ORAL
COMMUNITY
Start: 2020-04-23

## 2022-11-09 RX ORDER — SODIUM CHLORIDE 0.9 % (FLUSH) 0.9 %
5-40 SYRINGE (ML) INJECTION PRN
Status: DISCONTINUED | OUTPATIENT
Start: 2022-11-09 | End: 2022-11-09 | Stop reason: HOSPADM

## 2022-11-09 RX ORDER — MEPERIDINE HYDROCHLORIDE 25 MG/ML
12.5 INJECTION INTRAMUSCULAR; INTRAVENOUS; SUBCUTANEOUS EVERY 5 MIN PRN
Status: DISCONTINUED | OUTPATIENT
Start: 2022-11-09 | End: 2022-11-09 | Stop reason: HOSPADM

## 2022-11-09 RX ORDER — DIPHENHYDRAMINE HYDROCHLORIDE 50 MG/ML
12.5 INJECTION INTRAMUSCULAR; INTRAVENOUS
Status: DISCONTINUED | OUTPATIENT
Start: 2022-11-09 | End: 2022-11-09 | Stop reason: HOSPADM

## 2022-11-09 RX ORDER — LABETALOL HYDROCHLORIDE 5 MG/ML
5 INJECTION, SOLUTION INTRAVENOUS EVERY 10 MIN PRN
Status: DISCONTINUED | OUTPATIENT
Start: 2022-11-09 | End: 2022-11-09 | Stop reason: HOSPADM

## 2022-11-09 RX ORDER — OXYCODONE HYDROCHLORIDE 5 MG/1
10 TABLET ORAL PRN
Status: DISCONTINUED | OUTPATIENT
Start: 2022-11-09 | End: 2022-11-09 | Stop reason: HOSPADM

## 2022-11-09 RX ORDER — VANCOMYCIN HYDROCHLORIDE 1 G/20ML
INJECTION, POWDER, LYOPHILIZED, FOR SOLUTION INTRAVENOUS
Status: DISCONTINUED
Start: 2022-11-09 | End: 2022-11-09 | Stop reason: HOSPADM

## 2022-11-09 RX ORDER — LIDOCAINE HYDROCHLORIDE 10 MG/ML
INJECTION, SOLUTION INFILTRATION; PERINEURAL
Status: DISCONTINUED
Start: 2022-11-09 | End: 2022-11-09 | Stop reason: HOSPADM

## 2022-11-09 RX ORDER — BUPIVACAINE HYDROCHLORIDE 5 MG/ML
INJECTION, SOLUTION EPIDURAL; INTRACAUDAL
Status: DISCONTINUED
Start: 2022-11-09 | End: 2022-11-09 | Stop reason: HOSPADM

## 2022-11-09 RX ORDER — PROPOFOL 10 MG/ML
INJECTION, EMULSION INTRAVENOUS PRN
Status: DISCONTINUED | OUTPATIENT
Start: 2022-11-09 | End: 2022-11-09 | Stop reason: SDUPTHER

## 2022-11-09 RX ORDER — SODIUM CHLORIDE, SODIUM LACTATE, POTASSIUM CHLORIDE, CALCIUM CHLORIDE 600; 310; 30; 20 MG/100ML; MG/100ML; MG/100ML; MG/100ML
INJECTION, SOLUTION INTRAVENOUS CONTINUOUS
Status: DISCONTINUED | OUTPATIENT
Start: 2022-11-09 | End: 2022-11-09 | Stop reason: HOSPADM

## 2022-11-09 RX ORDER — SODIUM CHLORIDE 9 MG/ML
INJECTION, SOLUTION INTRAVENOUS PRN
Status: DISCONTINUED | OUTPATIENT
Start: 2022-11-09 | End: 2022-11-09 | Stop reason: HOSPADM

## 2022-11-09 RX ORDER — OXYCODONE HYDROCHLORIDE 5 MG/1
5 TABLET ORAL PRN
Status: DISCONTINUED | OUTPATIENT
Start: 2022-11-09 | End: 2022-11-09 | Stop reason: HOSPADM

## 2022-11-09 RX ORDER — ACETAMINOPHEN 325 MG/1
TABLET ORAL
COMMUNITY
Start: 2022-11-02

## 2022-11-09 RX ORDER — SODIUM CHLORIDE 0.9 % (FLUSH) 0.9 %
5-40 SYRINGE (ML) INJECTION EVERY 12 HOURS SCHEDULED
Status: DISCONTINUED | OUTPATIENT
Start: 2022-11-09 | End: 2022-11-09 | Stop reason: HOSPADM

## 2022-11-09 RX ORDER — ONDANSETRON 2 MG/ML
4 INJECTION INTRAMUSCULAR; INTRAVENOUS
Status: DISCONTINUED | OUTPATIENT
Start: 2022-11-09 | End: 2022-11-09 | Stop reason: HOSPADM

## 2022-11-09 RX ORDER — LIDOCAINE HYDROCHLORIDE 10 MG/ML
0.3 INJECTION, SOLUTION EPIDURAL; INFILTRATION; INTRACAUDAL; PERINEURAL
Status: DISCONTINUED | OUTPATIENT
Start: 2022-11-09 | End: 2022-11-09 | Stop reason: HOSPADM

## 2022-11-09 RX ADMIN — SODIUM CHLORIDE, POTASSIUM CHLORIDE, SODIUM LACTATE AND CALCIUM CHLORIDE: 600; 310; 30; 20 INJECTION, SOLUTION INTRAVENOUS at 07:30

## 2022-11-09 RX ADMIN — VANCOMYCIN HYDROCHLORIDE 1000 MG: 1 INJECTION, POWDER, LYOPHILIZED, FOR SOLUTION INTRAVENOUS at 07:55

## 2022-11-09 RX ADMIN — PROPOFOL 25 MG: 10 INJECTION, EMULSION INTRAVENOUS at 08:23

## 2022-11-09 RX ADMIN — PROPOFOL 25 MG: 10 INJECTION, EMULSION INTRAVENOUS at 08:25

## 2022-11-09 RX ADMIN — LIDOCAINE HYDROCHLORIDE 100 MG: 20 INJECTION, SOLUTION INFILTRATION; PERINEURAL at 08:22

## 2022-11-09 RX ADMIN — PROPOFOL 25 MG: 10 INJECTION, EMULSION INTRAVENOUS at 08:32

## 2022-11-09 RX ADMIN — SODIUM CHLORIDE, POTASSIUM CHLORIDE, SODIUM LACTATE AND CALCIUM CHLORIDE: 600; 310; 30; 20 INJECTION, SOLUTION INTRAVENOUS at 08:16

## 2022-11-09 RX ADMIN — PROPOFOL 25 MG: 10 INJECTION, EMULSION INTRAVENOUS at 08:29

## 2022-11-09 ASSESSMENT — PAIN - FUNCTIONAL ASSESSMENT: PAIN_FUNCTIONAL_ASSESSMENT: NONE - DENIES PAIN

## 2022-11-09 NOTE — OP NOTE
amounts of sterile saline. Then using the suture materials previously described, the site was closed in anatomic layers and the skin was well approximated under minimal tension. The surgical site was then dressed with Xeroform 4 x 4 Taurus and Ace bandage. The ankle pneumatic tourniquet was then deflated after 14 minutes with immediate vascular return noted to digits of the operative foot. The patient tolerated both the procedure and anesthesia well with vital signs stable throughout. The patient was transferred from the OR to recovery under the discretion of anesthesia with vital signs stable and neurovascular status intact to the operative limb.   Electronically signed by Andrew Sahu DPM on 11/9/2022 at 6:47 PM

## 2022-11-09 NOTE — PROGRESS NOTES
Pt. checked in for procedure. Assessment complete. IV started. Safety check list complete. Family at patient's side. POC fingerstick completed in preop.   PT 12.8  INR 1.1

## 2022-11-09 NOTE — PROGRESS NOTES
Pt resting in bed, A&O, no pain, vancomycin is infusing, daughter at bedside Date Of Previous Biopsy (Optional): 03/06/2019

## 2022-11-09 NOTE — ANESTHESIA POSTPROCEDURE EVALUATION
Department of Anesthesiology  Postprocedure Note    Patient: Dakota Oliveira  MRN: [id number]  YOB: 1932  Date of evaluation: 11/9/2022      Procedure Summary     Date: 11/09/22 Room / Location: [facility] EG OR 03 / [facility]    Anesthesia Start: 5679 Anesthesia Stop: 7309    Procedure: AMPUTATION OF SECOND TOE RIGHT FOOT (Right: Foot) Diagnosis:       Melanoma of foot, right ([address]      (Melanoma of foot, right ([address] [C43.71])    Surgeons: Dianna Jorge DPM Responsible Provider: Carmen Miranda MD    Anesthesia Type: general ASA Status: 3          Anesthesia Type: No value filed.     Tiffanie Phase I: Tiffanie Score: 10    Tiffanie Phase II: Tiffanie Score: 10      Anesthesia Post Evaluation    Comments: Postoperative Anesthesia Note    Name:    Dakota Oliveira  MRN:      [id number]    Patient Vitals in the past 12 hrs:  11/09/22 0855, BP:122/62, Temp:98 °F (36.7 °C), Temp src:Temporal, Pulse:60, Resp:12, SpO2:99 %  11/09/22 0653, BP:(!) 152/88, Temp:97 °F (36.1 °C), Temp src:Temporal, Pulse:72, Resp:17, SpO2:92 %, Height:5' 7\" (1.702 m), Weight:170 lb (77.1 kg)     LABS:    CBC  Lab Results       Component                Value               Date/Time                  WBC                      4.3                 08/04/2020 12:22 PM        HGB                      13.3                08/04/2020 12:22 PM        HCT                      42.0                08/04/2020 12:22 PM        PLT                      242                 08/04/2020 12:22 PM   RENAL  Lab Results       Component                Value               Date/Time                  NA                       141                 08/04/2020 12:22 PM        K                        4.2                 08/04/2020 12:22 PM        CL                       105                 08/04/2020 12:22 PM        CO2                      25                  08/04/2020 12:22 PM        BUN                      12                  08/04/2020 12:22 PM CREATININE               0.8                 08/04/2020 12:22 PM        GLUCOSE                  105 (H)             08/04/2020 12:22 PM   COAGS  Lab Results       Component                Value               Date/Time                  PROTIME                  12.2                08/04/2020 12:22 PM        INR                      1.05                08/04/2020 12:22 PM        APTT                     31.9                08/04/2020 12:22 PM     Intake & Output:  @23LYND@    Nausea & Vomiting:  No    Level of Consciousness:  Awake    Pain Assessment:  Adequate analgesia    Anesthesia Complications:  No apparent anesthetic complications    SUMMARY      Vital signs stable  OK to discharge from Stage I post anesthesia care.   Care transferred from Anesthesiology department on discharge from perioperative area

## 2022-11-09 NOTE — ANESTHESIA PRE PROCEDURE
Department of Anesthesiology  Preprocedure Note       Name:  Lalitha Cramer   Age:  80 y.o.  :  4/3/1932                                          MRN:  [id number]         Date:  2022      Surgeon: Alis Pena):  Briana Fraga DPM    Procedure: Procedure(s):  AMPUTATION OF SECOND TOE RIGHT FOOT    Medications prior to admission:   Prior to Admission medications    Medication Sig Start Date End Date Taking? Authorizing Provider   warfarin (COUMADIN) 5 MG tablet Take 1 tablet by mouth 20  Yes Historical Provider, MD   acetaminophen (TYLENOL) 325 MG tablet  22   Historical Provider, MD   metoprolol (LOPRESSOR) 25 MG tablet Take 25 mg by mouth once     Historical Provider, MD       Current medications:    Current Facility-Administered Medications   Medication Dose Route Frequency Provider Last Rate Last Admin    vancomycin 1000 mg IVPB in 250 mL D5W addavial  1,000 mg IntraVENous Once Briana Fraga DPM        lidocaine PF 1 % injection 0.3 mL  0.3 mL IntraDERmal Once PRN [place] Patient, MD        lactated ringers infusion   IntraVENous Continuous [place] Patient, MD        sodium chloride flush 0.9 % injection 5-40 mL  5-40 mL IntraVENous 2 times per day [place] Patient, MD        sodium chloride flush 0.9 % injection 5-40 mL  5-40 mL IntraVENous PRN [place] Patient, MD        0.9 % sodium chloride infusion   IntraVENous PRN Taran Gupta MD           Allergies:     Allergies   Allergen Reactions    Morphine Nausea And Vomiting       Problem List:    Patient Active Problem List   Diagnosis Code    Pelvic mass R19.00    Uterine fibroid D25.9    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    Chronic diarrhea of unknown origin K52.9    Abdominal pain, RLQ R10.31    Abdominal fullness in right lower quadrant R19.8    Neoplasm of uncertain behavior of skin D48.5       Past Medical History:        Diagnosis Date    [facility] [address]     Acid reflux     Hypertension        Past Surgical History:        Procedure Laterality Date    ANKLE FRACTURE SURGERY      LEFT ANKLE    APPENDECTOMY      ARM SURGERY Right 8/4/2020    EXCISION OF SKIN CANCER RIGHT ARM performed by Callie Gutiérrez MD at [address]  07/12/2017    cecal polyp; random colon biopsies    GALLBLADDER SURGERY      HYSTERECTOMY (CERVIX STATUS UNKNOWN)      OTHER SURGICAL HISTORY Left 4/20/2016    EXCISION LESION LEFT UPPER EXTREMITY LEFT BACK AND RIGHT SHOULDER    UPPER GASTROINTESTINAL ENDOSCOPY  09/15/2017    dilated, biopsies    VULVA SURGERY      x 2        Social History:    Social History     Tobacco Use    Smoking status: Former    Smokeless tobacco: Never    Tobacco comments:     smoked 7 yrs 1ppd x 10 years   Substance Use Topics    Alcohol use: No                                Counseling given: Not Answered  Tobacco comments: smoked 7 yrs 1ppd x 10 years      Vital Signs (Current):   Vitals:    11/08/22 1253 11/09/22 0653   BP:  (!) 152/88   Pulse:  72   Resp:  17   Temp:  97 °F (36.1 °C)   TempSrc:  Temporal   SpO2:  92%   Weight: 170 lb (77.1 kg) 170 lb (77.1 kg)   Height: 5' 7\" (1.702 m) 5' 7\" (1.702 m)                                              BP Readings from Last 3 Encounters:   11/09/22 (!) 152/88   08/18/20 124/70   08/04/20 (!) 103/55       NPO Status: Time of last liquid consumption: 2000 (sip of water with med this AM)                        Time of last solid consumption: 2000                        Date of last liquid consumption: 11/08/22                        Date of last solid food consumption: 11/08/22    BMI:   Wt Readings from Last 3 Encounters:   11/09/22 170 lb (77.1 kg)   08/18/20 179 lb (81.2 kg)   08/04/20 179 lb (81.2 kg)     Body mass index is 26.63 kg/m².     CBC:   Lab Results   Component Value Date/Time    WBC 4.3 08/04/2020 12:22 PM    RBC 5.05 08/04/2020 12:22 PM    HGB 13.3 08/04/2020 12:22 PM    HCT 42.0 08/04/2020 12:22 PM    MCV 83.0 08/04/2020 12:22 PM    RDW 14.7 08/04/2020 12:22 PM     08/04/2020 12:22 PM       CMP:   Lab Results   Component Value Date/Time     08/04/2020 12:22 PM    K 4.2 08/04/2020 12:22 PM     08/04/2020 12:22 PM    CO2 25 08/04/2020 12:22 PM    BUN 12 08/04/2020 12:22 PM    CREATININE 0.8 08/04/2020 12:22 PM    GFRAA >60 08/04/2020 12:22 PM    GFRAA >60 01/20/2013 12:26 PM    AGRATIO 1.2 07/01/2017 11:00 PM    LABGLOM >60 08/04/2020 12:22 PM    GLUCOSE 105 08/04/2020 12:22 PM    PROT 6.6 07/01/2017 11:00 PM    PROT 7.5 01/20/2013 12:26 PM    CALCIUM 9.9 08/04/2020 12:22 PM    BILITOT <0.2 07/01/2017 11:00 PM    ALKPHOS 85 07/01/2017 11:00 PM    AST 22 07/01/2017 11:00 PM    ALT 20 07/01/2017 11:00 PM       POC Tests: No results for input(s): POCGLU, POCNA, POCK, POCCL, POCBUN, POCHEMO, POCHCT in the last 72 hours.     Coags:   Lab Results   Component Value Date/Time    PROTIME 12.2 08/04/2020 12:22 PM    INR 1.05 08/04/2020 12:22 PM    APTT 31.9 08/04/2020 12:22 PM       HCG (If Applicable): No results found for: PREGTESTUR, PREGSERUM, HCG, HCGQUANT     ABGs: No results found for: PHART, PO2ART, KKW4HWZ, TIJ4GFO, BEART, Y3HBGNVC     Type & Screen (If Applicable):  Lab Results   Component Value Date    LABABO A 07/06/2010    [address] Positive 07/06/2010       Drug/Infectious Status (If Applicable):  No results found for: HIV, HEPCAB    COVID-19 Screening (If Applicable):   Lab Results   Component Value Date/Time    COVID19 NOT DETECTED 07/31/2020 08:24 AM           Anesthesia Evaluation  Patient summary reviewed no history of anesthetic complications:   Airway: Mallampati: II  TM distance: >3 FB   Neck ROM: full  Mouth opening: > = 3 FB   Dental: normal exam         Pulmonary:normal exam  breath sounds clear to auscultation      (-) COPD, asthma and sleep apnea                           Cardiovascular:  Exercise tolerance: good (>4 METS),   (+) hypertension:, dysrhythmias: atrial fibrillation,     (-) CAD,  angina and  DAUGHERTY      Rhythm: regular  Rate: normal Neuro/Psych:      (-) seizures and TIA           GI/Hepatic/Renal:   (+) GERD:,      (-) liver disease and no renal disease       Endo/Other:        (-) diabetes mellitus               Abdominal:             Vascular: negative vascular ROS. Other Findings:           Anesthesia Plan      general     ASA 3     (I discussed with the patient the risks and benefits of PIV, anesthesia, IV Narcotics, PACU. All questions were answered the patient agrees with the plan and wishes to proceed)  Induction: intravenous. Pt had recent fall with LOC. No bleeding or spine injury on w/u. Metoprolol was reduced and has not had any further LOC or presyncopal episodes. Was seen by PCP prior to surgery and found to be low risk.         Randee Reyes MD   11/9/2022

## 2022-11-09 NOTE — DISCHARGE INSTRUCTIONS
Podiatry D/C:   - No Medication   - Pt is to stay WBAT in post op shoe until F/U with Dr. Magaly Cuba in clinic in about 5 days   - Pt should call  office to make sure his appointment is set for the correct date about 5 days after today date. - Keep dressing clean dry and intact. - Pt is to elevate for 48 hours as much as possible   - Ice behind knee to help with pain and swelling   - Place pillow under calf muscle to take pressure off the heel to prevent pressure wound.   - Ok to remove ace bandage if pain is not being controlled. Apply the ace bandage back on looser. ANESTHESIA DISCHARGE INSTRUCTIONS    You are under the influence of drugs- do not drink alcohol, drive a car, operate machinery(such as power tools, kitchen appliances, etc), sign legal documents, or make any important decisions for 24 hours (or while on pain medications). Children should not ride bikes or [place] or play on gym sets  for 24 hours after surgery. A responsible adult should be with you for 24 hours. Rest at home today- increase activity as tolerated. Progress slowly to a regular diet unless your physician has instructed you otherwise. Drink plenty of water. CALL YOUR DOCTOR IF YOU:  Have moderate to severe nausea or vomiting AND are unable to hold down fluids or prescribed medications. Have bright red bloody drainage from your dressing that won't stop oozing.   Do not get relief with your pain medication    NORMAL (POSSIBLE) SIDE EFFECTS FROM ANESTHESIA:     Confusion, temporary memory loss, delayed reaction times in the first 24 hours  Lightheadedness, dizziness, difficulty focusing, blurred vision  Nausea/vomiting can happen  Shivering, feeling cold, sore throat, cough and muscle aches should stop within 24-48 hours  Trouble urinating - call your surgeon if it has been more than 8 hrs  Bruising or soreness at the IV site - call if it remains red, firm or there is drainage             FEMALES OF CHILDBEARING AGE WHO ARE TAKING BIRTH CONTROL PILLS:  You may have received a medication during your procedure that interferes with the   actions of birth control pills (Bridion or Emend). Use some other kind of birth control in addition to your pills, like a condom, for 1 month after your procedure to prevent unwanted pregnancy. The following instructions are to be followed if you have a known history or diagnosis of sleep apnea: For all sleep apnea patients:  ? Sleep on your side or sitting up in a chair whenever possible, especially the first 24 hours after surgery. ? Use only medicines prescribed by your doctor. ? Do not drink alcohol. ? If you have a dental device to assist you while at rest, use it at all times for the first 24 hours. For patients using CPAP machines:  ? Use your CPAP machine during all periods of sleep as usual.  ? Use your CPAP machine during all periods of daytime rest while on pain medicines. ** Follow up with your primary care doctor for continued care. IF YOU DO NOT TAKE ALL OF YOUR NARCOTIC PAIN MEDICATION, please dispose of them responsibly. There are drop off boxes in the Emergency Departments 24/7 at [facility]. If these locations are not convenient, other options for discarding them can be found at:  [email or web address]    Hospital or office staff may NOT accept any medications to drop off in the cabinet for you.

## 2022-11-16 ENCOUNTER — OFFICE VISIT (OUTPATIENT)
Dept: ENT CLINIC | Age: 87
End: 2022-11-16
Payer: MEDICARE

## 2022-11-16 VITALS — HEIGHT: 67 IN | WEIGHT: 165 LBS | BODY MASS INDEX: 25.9 KG/M2 | TEMPERATURE: 97.1 F

## 2022-11-16 DIAGNOSIS — S02.85XD CLOSED FRACTURE OF ORBITAL WALL WITH ROUTINE HEALING, SUBSEQUENT ENCOUNTER: ICD-10-CM

## 2022-11-16 DIAGNOSIS — H53.8 BLURRY VISION: ICD-10-CM

## 2022-11-16 DIAGNOSIS — S02.401D CLOSED FRACTURE OF MAXILLARY SINUS WITH ROUTINE HEALING, SUBSEQUENT ENCOUNTER: ICD-10-CM

## 2022-11-16 DIAGNOSIS — S02.2XXD CLOSED FRACTURE OF NASAL BONE WITH ROUTINE HEALING, SUBSEQUENT ENCOUNTER: Primary | ICD-10-CM

## 2022-11-16 PROCEDURE — 99203 OFFICE O/P NEW LOW 30 MIN: CPT | Performed by: OTOLARYNGOLOGY

## 2022-11-16 PROCEDURE — 1123F ACP DISCUSS/DSCN MKR DOCD: CPT | Performed by: OTOLARYNGOLOGY

## 2022-11-16 RX ORDER — CITALOPRAM 20 MG/1
TABLET ORAL
COMMUNITY
Start: 2022-09-26

## 2022-11-16 ASSESSMENT — ENCOUNTER SYMPTOMS
COUGH: 0
FACIAL SWELLING: 1
APNEA: 0
VOICE CHANGE: 0
SHORTNESS OF BREATH: 0
EYE ITCHING: 0
SORE THROAT: 0
SINUS PRESSURE: 0
TROUBLE SWALLOWING: 0

## 2022-11-16 NOTE — PROGRESS NOTES
[facility], [address], [address], [address] [place] [address]  P: [phone]       Patient     Sami Raymond  4/3/1932    ChiefComplaint     Chief Complaint   Patient presents with    Kandy Abbott on 10/31/2022, had imaging (CT) completed [facility] in [place]. History of Present Illness     Roselyn Escobedo is a 49-year-old female here today for follow-up regarding facial fractures. On 10/31/2022 fell striking her face on the hardwood floor on [place]. Was air flighted to [facility] diagnosed with comminuted fracture of the lateral left orbital wall, nasal bones, left maxillary sinus and left inferior orbital floor. Was evaluated by OMFS and plastic surgery. No surgical intervention performed. 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 Daughter states she has been following all precautions here today for routine follow-up. Does not note significant change in the appearance of her mom's nose. Patient is concerned about blurry vision of left eye.     Past Medical History     Past Medical History:   Diagnosis Date    A-fib ([address]     Acid reflux     Hypertension        Past Surgical History     Past Surgical History:   Procedure Laterality Date    ANKLE FRACTURE SURGERY      LEFT ANKLE    APPENDECTOMY      ARM SURGERY Right 8/4/2020    EXCISION OF SKIN CANCER RIGHT ARM performed by Genevieve Centeno MD at [id number] [place] [address]  07/12/2017    cecal polyp; random colon biopsies    FOOT AMPUTATION Right 11/9/2022    AMPUTATION OF SECOND TOE RIGHT FOOT performed by Rowan Treviño DPM at [address] [place] [address] (CERVIX STATUS UNKNOWN)      OTHER SURGICAL HISTORY Left 4/20/2016    EXCISION LESION LEFT UPPER EXTREMITY LEFT BACK AND RIGHT SHOULDER    UPPER GASTROINTESTINAL ENDOSCOPY  09/15/2017    dilated, biopsies    VULVA SURGERY      x 2        Family History History reviewed. No pertinent family history. Social History     Social History     Tobacco Use    Smoking status: Former     Types: Cigarettes     Quit date:      Years since quittin.8    Smokeless tobacco: Never    Tobacco comments:     smoked 7 yrs 1ppd x 10 years   Vaping Use    Vaping Use: Never used   Substance Use Topics    Alcohol use: Yes     Comment: Special occasions    Drug use: No        Allergies     Allergies   Allergen Reactions    Morphine Nausea And Vomiting       Medications     Current Outpatient Medications   Medication Sig Dispense Refill    citalopram (CELEXA) 20 MG tablet TAKE 1 AND 1/2 TABLETS BY MOUTH EVERY DAY      warfarin (COUMADIN) 5 MG tablet Take 1 tablet by mouth      metoprolol (LOPRESSOR) 25 MG tablet Take 25 mg by mouth once       acetaminophen (TYLENOL) 325 MG tablet  (Patient not taking: Reported on 2022)       No current facility-administered medications for this visit. Review of Systems     Review of Systems   Constitutional:  Negative for appetite change, chills, fatigue, fever and unexpected weight change. HENT:  Positive for facial swelling. Negative for congestion, ear discharge, ear pain, hearing loss, nosebleeds, postnasal drip, sinus pressure, sneezing, sore throat, tinnitus, trouble swallowing and voice change. Eyes:  Negative for itching. Respiratory:  Negative for apnea, cough and shortness of breath. Endocrine: Negative for cold intolerance and heat intolerance. Musculoskeletal:  Negative for myalgias and neck pain. Skin:  Negative for rash. Allergic/Immunologic: Negative for environmental allergies. Neurological:  Negative for dizziness and headaches. Psychiatric/Behavioral:  Negative for confusion, decreased concentration and sleep disturbance.         PhysicalExam     Vitals:    22 0907   Temp: 97.1 °F (36.2 °C)   TempSrc: Infrared   Weight: 165 lb (74.8 kg)   Height: 5' 7\" (1.702 m)       Physical Exam  Constitutional:       General: She is not in acute distress. Appearance: She is well-developed. HENT:      Head: Normocephalic and atraumatic. Comments: Bilateral facial ecchymosis onto anterior neck     Right Ear: Tympanic membrane, ear canal and external ear normal. No drainage. No middle ear effusion. Tympanic membrane is not bulging. Tympanic membrane has normal mobility. Left Ear: Tympanic membrane, ear canal and external ear normal. No drainage. No middle ear effusion. Tympanic membrane is not bulging. Tympanic membrane has normal mobility. Nose: Signs of injury (Palpable fracture lines bilaterally) present. No mucosal edema or rhinorrhea. Right Nostril: No septal hematoma. Left Nostril: No septal hematoma. Mouth/Throat:      Lips: Pink. Mouth: Mucous membranes are moist.      Tongue: No lesions. Palate: No mass. Pharynx: Uvula midline. Eyes:      Extraocular Movements: Extraocular movements intact. Pupils: Pupils are equal, round, and reactive to light. Comments: No double vision on superior gaze   Neck:      Thyroid: No thyroid mass or thyromegaly. Trachea: Trachea and phonation normal.   Cardiovascular:      Pulses: Normal pulses. Pulmonary:      Effort: Pulmonary effort is normal. No accessory muscle usage or respiratory distress. Breath sounds: No stridor. Musculoskeletal:      Cervical back: Full passive range of motion without pain. Lymphadenopathy:      Head:      Right side of head: No submental or submandibular adenopathy. Left side of head: No submental or submandibular adenopathy. Cervical: No cervical adenopathy. Right cervical: No superficial, deep or posterior cervical adenopathy. Left cervical: No superficial, deep or posterior cervical adenopathy. Skin:     General: Skin is warm and dry. Neurological:      Mental Status: She is alert and oriented to person, place, and time.       Cranial Nerves: No cranial nerve deficit. Coordination: Coordination normal.      Gait: Gait normal.   Psychiatric:         Thought Content: Thought content normal.       Assessment and Plan     1. Closed fracture of nasal bone with routine healing, subsequent encounter    2. Closed fracture of orbital wall with routine healing, subsequent encounter    3. Closed fracture of maxillary sinus with routine healing, subsequent encounter      S/p fall 10/31/2022. Initially evaluated by plastic surgery and OMFS at [facility]. No surgical intervention performed. Healing routine no issues or complaints. No indications for surgical intervention. 4. Blurry vision  Diagnosed with traumatic optic neuropathy per ophthalmology at [facility], has follow-up with retinal specialist through [facility]      Return as needed      Rocco Vazquez,   11/16/22      Portions of this note were dictated using Dragon.  There may be linguistic errors secondary to the use of this program.

## 2022-12-03 ENCOUNTER — APPOINTMENT (OUTPATIENT)
Dept: CT IMAGING | Age: 87
End: 2022-12-03
Payer: MEDICARE

## 2022-12-03 ENCOUNTER — HOSPITAL ENCOUNTER (EMERGENCY)
Age: 87
Discharge: HOME | End: 2022-12-03
Attending: EMERGENCY MEDICINE
Payer: MEDICARE

## 2022-12-03 ENCOUNTER — APPOINTMENT (OUTPATIENT)
Dept: GENERAL RADIOLOGY | Age: 87
End: 2022-12-03
Payer: MEDICARE

## 2022-12-03 VITALS
WEIGHT: 165 LBS | DIASTOLIC BLOOD PRESSURE: 71 MMHG | SYSTOLIC BLOOD PRESSURE: 144 MMHG | OXYGEN SATURATION: 97 % | RESPIRATION RATE: 16 BRPM | HEART RATE: 60 BPM | HEIGHT: 67 IN | TEMPERATURE: 98.5 F | BODY MASS INDEX: 25.9 KG/M2

## 2022-12-03 DIAGNOSIS — R44.3 HALLUCINATIONS: Primary | ICD-10-CM

## 2022-12-03 LAB
A/G RATIO: 1.3 (ref 1.1–2.2)
ALBUMIN SERPL-MCNC: 3.5 G/DL (ref 3.4–5)
ALP BLD-CCNC: 106 U/L (ref 40–129)
ALT SERPL-CCNC: 12 U/L (ref 10–40)
ANION GAP SERPL CALCULATED.3IONS-SCNC: 7 MMOL/L (ref 3–16)
AST SERPL-CCNC: 19 U/L (ref 15–37)
BACTERIA: ABNORMAL /HPF
BASOPHILS ABSOLUTE: 0 K/UL (ref 0–0.2)
BASOPHILS RELATIVE PERCENT: 0.5 %
BILIRUB SERPL-MCNC: 0.4 MG/DL (ref 0–1)
BILIRUBIN URINE: NEGATIVE
BLOOD, URINE: ABNORMAL
BUN BLDV-MCNC: 14 MG/DL (ref 7–20)
CALCIUM SERPL-MCNC: 9.7 MG/DL (ref 8.3–10.6)
CHLORIDE BLD-SCNC: 107 MMOL/L (ref 99–110)
CLARITY: CLEAR
CO2: 27 MMOL/L (ref 21–32)
COLOR: YELLOW
CREAT SERPL-MCNC: 0.9 MG/DL (ref 0.6–1.2)
EKG ATRIAL RATE: 62 BPM
EKG DIAGNOSIS: NORMAL
EKG P AXIS: 89 DEGREES
EKG P-R INTERVAL: 174 MS
EKG Q-T INTERVAL: 404 MS
EKG QRS DURATION: 88 MS
EKG QTC CALCULATION (BAZETT): 410 MS
EKG R AXIS: -13 DEGREES
EKG T AXIS: 0 DEGREES
EKG VENTRICULAR RATE: 62 BPM
EOSINOPHILS ABSOLUTE: 0.1 K/UL (ref 0–0.6)
EOSINOPHILS RELATIVE PERCENT: 3.1 %
EPITHELIAL CELLS, UA: ABNORMAL /HPF (ref 0–5)
GFR SERPL CREATININE-BSD FRML MDRD: >60 ML/MIN/{1.73_M2}
GLUCOSE BLD-MCNC: 84 MG/DL (ref 70–99)
GLUCOSE BLD-MCNC: 94 MG/DL
GLUCOSE BLD-MCNC: 94 MG/DL (ref 70–99)
GLUCOSE URINE: NEGATIVE MG/DL
HCT VFR BLD CALC: 33.5 % (ref 36–48)
HEMOGLOBIN: 10.9 G/DL (ref 12–16)
INR BLD: 1.21 (ref 0.87–1.14)
KETONES, URINE: NEGATIVE MG/DL
LACTIC ACID: 0.8 MMOL/L (ref 0.4–2)
LEUKOCYTE ESTERASE, URINE: ABNORMAL
LYMPHOCYTES ABSOLUTE: 0.5 K/UL (ref 1–5.1)
LYMPHOCYTES RELATIVE PERCENT: 9.9 %
MCH RBC QN AUTO: 27.8 PG (ref 26–34)
MCHC RBC AUTO-ENTMCNC: 32.6 G/DL (ref 31–36)
MCV RBC AUTO: 85.3 FL (ref 80–100)
MICROSCOPIC EXAMINATION: YES
MONOCYTES ABSOLUTE: 0.4 K/UL (ref 0–1.3)
MONOCYTES RELATIVE PERCENT: 9.4 %
NEUTROPHILS ABSOLUTE: 3.7 K/UL (ref 1.7–7.7)
NEUTROPHILS RELATIVE PERCENT: 77.1 %
NITRITE, URINE: NEGATIVE
PDW BLD-RTO: 15.2 % (ref 12.4–15.4)
PERFORMED ON: NORMAL
PH UA: 7 (ref 5–8)
PLATELET # BLD: 212 K/UL (ref 135–450)
PMV BLD AUTO: 7 FL (ref 5–10.5)
POTASSIUM REFLEX MAGNESIUM: 4.3 MMOL/L (ref 3.5–5.1)
PROTEIN UA: NEGATIVE MG/DL
PROTHROMBIN TIME: 15.2 SEC (ref 11.7–14.5)
RBC # BLD: 3.93 M/UL (ref 4–5.2)
RBC UA: ABNORMAL /HPF (ref 0–4)
SODIUM BLD-SCNC: 141 MMOL/L (ref 136–145)
SPECIFIC GRAVITY UA: <=1.005 (ref 1–1.03)
TOTAL PROTEIN: 6.1 G/DL (ref 6.4–8.2)
URINE TYPE: ABNORMAL
UROBILINOGEN, URINE: 0.2 E.U./DL
WBC # BLD: 4.8 K/UL (ref 4–11)
WBC UA: ABNORMAL /HPF (ref 0–5)

## 2022-12-03 PROCEDURE — 99285 EMERGENCY DEPT VISIT HI MDM: CPT

## 2022-12-03 PROCEDURE — 36415 COLL VENOUS BLD VENIPUNCTURE: CPT

## 2022-12-03 PROCEDURE — 93010 ELECTROCARDIOGRAM REPORT: CPT | Performed by: INTERNAL MEDICINE

## 2022-12-03 PROCEDURE — 85610 PROTHROMBIN TIME: CPT

## 2022-12-03 PROCEDURE — 70450 CT HEAD/BRAIN W/O DYE: CPT

## 2022-12-03 PROCEDURE — 83605 ASSAY OF LACTIC ACID: CPT

## 2022-12-03 PROCEDURE — 93005 ELECTROCARDIOGRAM TRACING: CPT | Performed by: EMERGENCY MEDICINE

## 2022-12-03 PROCEDURE — 71045 X-RAY EXAM CHEST 1 VIEW: CPT

## 2022-12-03 PROCEDURE — 85025 COMPLETE CBC W/AUTO DIFF WBC: CPT

## 2022-12-03 PROCEDURE — 81001 URINALYSIS AUTO W/SCOPE: CPT

## 2022-12-03 PROCEDURE — 80053 COMPREHEN METABOLIC PANEL: CPT

## 2022-12-03 RX ORDER — PHYTONADIONE 5 MG/1
TABLET ORAL
COMMUNITY
Start: 2022-11-29

## 2022-12-03 ASSESSMENT — PAIN - FUNCTIONAL ASSESSMENT: PAIN_FUNCTIONAL_ASSESSMENT: NONE - DENIES PAIN

## 2022-12-03 NOTE — ED NOTES
Discharge instructions and medications reviewed with family. Family verbalized understanding of medications and follow up. All questions answered at this time. IV removed, dressing applied, and bleeding controlled. Skin warm, pink, and dry. Patient alert and oriented x4. Pt assisted to lobby via wheelchair by dtr. Patient discharged home with 0 prescriptions.        Addie Storm RN  12/03/22 0828

## 2022-12-03 NOTE — ED PROVIDER NOTES
CHIEF COMPLAINT  Altered Mental Status (Pt dtr reports patient seeing things and hearing things starting 3 days ago, denies any event that could be causing symptoms. Dtr states she did have a bad fall on Oct causing her to loose vision in left eye, home health nurse requested pt to have another scan on her head. )      [facility]  Brando Zimmer is a 80 y.o. female presents to the ED with periods of confusion. The patient's history is provided by the patient's daughter. She is concerned that the patient is having hallucinations. She seems to be hearing things, worse at night. She did have a fall and is on Coumadin although the fall was about 5 weeks ago. She has had no vomiting, has been eating normally. She has not been coughing and has not had any fevers. She has had only changes to her Coumadin, no other medications recently. No diarrhea or constipation. She did have a recent amputation of her toe, and the sutures were removed just yesterday. She has had no red streaks to her foot. No other complaints, modifying factors or associated symptoms. I have reviewed the following from the nursing documentation.     Past Medical History:   Diagnosis Date    A-fib ([facility] [address]     Acid reflux     Hypertension      Past Surgical History:   Procedure Laterality Date    ANKLE FRACTURE SURGERY      LEFT ANKLE    APPENDECTOMY      ARM SURGERY Right 8/4/2020    EXCISION OF SKIN CANCER RIGHT ARM performed by Erika Patricia MD at [place] [address]  07/12/2017    cecal polyp; random colon biopsies    FOOT AMPUTATION Right 11/9/2022    AMPUTATION OF SECOND TOE RIGHT FOOT performed by Cherelle Celaya DPM at [address] [place] [address] (CERVIX STATUS UNKNOWN)      OTHER SURGICAL HISTORY Left 4/20/2016    EXCISION LESION LEFT UPPER EXTREMITY LEFT BACK AND RIGHT SHOULDER    UPPER GASTROINTESTINAL ENDOSCOPY  09/15/2017    dilated, biopsies    VULVA SURGERY      x 2      History reviewed. No pertinent family history. Social History     Socioeconomic History    Marital status: Single     Spouse name: Not on file    Number of children: Not on file    Years of education: Not on file    Highest education level: Not on file   Occupational History    Not on file   Tobacco Use    Smoking status: Former     Types: Cigarettes     Quit date:      Years since quittin.9    Smokeless tobacco: Never    Tobacco comments:     smoked 7 yrs 1ppd x 10 years   Vaping Use    Vaping Use: Never used   Substance and Sexual Activity    Alcohol use: Yes     Comment: Special occasions    Drug use: No    Sexual activity: Not on file   Other Topics Concern    Not on file   Social History Narrative    Not on file     Social Determinants of Health     Financial Resource Strain: Not on file   Food Insecurity: Not on file   Transportation Needs: Not on file   Physical Activity: Not on file   Stress: Not on file   Social Connections: Not on file   Intimate Partner Violence: Not on file   Housing Stability: Not on file     No current facility-administered medications for this encounter. Current Outpatient Medications   Medication Sig Dispense Refill    phytonadione (VITAMIN K) 5 MG tablet TAKE 1 TABLET BY ORAL ROUTE X1 DOSE MAY BE REPEATED IN 12 TO 48 HOURS AFTER THE FIRST DOSE      citalopram (CELEXA) 20 MG tablet TAKE 1 AND 1/2 TABLETS BY MOUTH EVERY DAY      warfarin (COUMADIN) 5 MG tablet Take 1 tablet by mouth      metoprolol (LOPRESSOR) 25 MG tablet Take 25 mg by mouth once        Allergies   Allergen Reactions    Morphine Nausea And Vomiting       REVIEW OF SYSTEMS  10 systems reviewed, pertinent positives per HPI otherwise noted to be negative. PHYSICAL EXAM  BP (!) 142/80   Pulse 73   Temp 98.5 °F (36.9 °C) (Oral)   Resp 18   Ht 5' 7\" (1.702 m)   Wt 165 lb (74.8 kg)   SpO2 98%   BMI 25.84 kg/m²   GENERAL APPEARANCE: Awake and alert. Cooperative. No distress  HEAD: Normocephalic. Atraumatic. EYES: PERRL. EOM's grossly intact. ENT: Mucous membranes are moist.   NECK: Supple. No JVD  HEART: RRR. No murmurs  LUNGS: Respirations unlabored. Lungs are clear. ABDOMEN: Soft. Non-distended. Non-tender. No guarding or rebound. Normal bowel sounds. EXTREMITIES: No peripheral edema. Moves all extremities equally. All extremities neurovascularly intact. Scar from right second toe amputation is healing good with no evidence of infection. SKIN: Warm and dry. No acute rashes. NEUROLOGICAL: Alert and oriented to person and place, not as much to situation. Cranial nerves II through XII are intact. NIHHS is 0, TOS negative. Strength 5/5, sensation intact. No truncal ataxia. PSYCHIATRIC: Normal mood and affect. LABS  I have reviewed all labs for this visit.    Results for orders placed or performed during the hospital encounter of 12/03/22   CBC with Auto Differential   Result Value Ref Range    WBC 4.8 4.0 - 11.0 K/uL    RBC 3.93 (L) 4.00 - 5.20 M/uL    Hemoglobin 10.9 (L) 12.0 - 16.0 g/dL    Hematocrit 33.5 (L) 36.0 - 48.0 %    MCV 85.3 80.0 - 100.0 fL    MCH 27.8 26.0 - 34.0 pg    MCHC 32.6 31.0 - 36.0 g/dL    RDW 15.2 12.4 - 15.4 %    Platelets 016 515 - 521 K/uL    MPV 7.0 5.0 - 10.5 fL    Neutrophils % 77.1 %    Lymphocytes % 9.9 %    Monocytes % 9.4 %    Eosinophils % 3.1 %    Basophils % 0.5 %    Neutrophils Absolute 3.7 1.7 - 7.7 K/uL    Lymphocytes Absolute 0.5 (L) 1.0 - 5.1 K/uL    Monocytes Absolute 0.4 0.0 - 1.3 K/uL    Eosinophils Absolute 0.1 0.0 - 0.6 K/uL    Basophils Absolute 0.0 0.0 - 0.2 K/uL   CMP w/ Reflex to MG   Result Value Ref Range    Sodium 141 136 - 145 mmol/L    Potassium reflex Magnesium 4.3 3.5 - 5.1 mmol/L    Chloride 107 99 - 110 mmol/L    CO2 27 21 - 32 mmol/L    Anion Gap 7 3 - 16    Glucose 84 70 - 99 mg/dL    BUN 14 7 - 20 mg/dL    Creatinine 0.9 0.6 - 1.2 mg/dL    Est, Glom Filt Rate >60 >60    Calcium 9.7 8.3 - 10.6 mg/dL    Total Protein 6.1 (L) 6.4 - 8.2 g/dL    Albumin 3.5 3.4 - 5.0 g/dL    Albumin/Globulin Ratio 1.3 1.1 - 2.2    Total Bilirubin 0.4 0.0 - 1.0 mg/dL    Alkaline Phosphatase 106 40 - 129 U/L    ALT 12 10 - 40 U/L    AST 19 15 - 37 U/L   Lactic Acid Now and in 2 Hours   Result Value Ref Range    Lactic Acid 0.8 0.4 - 2.0 mmol/L   Urinalysis   Result Value Ref Range    Color, UA Yellow Straw/Yellow    Clarity, UA Clear Clear    Glucose, Ur Negative Negative mg/dL    Bilirubin Urine Negative Negative    Ketones, Urine Negative Negative mg/dL    Specific Gravity, UA <=1.005 1.005 - 1.030    Blood, Urine TRACE-INTACT (A) Negative    pH, UA 7.0 5.0 - 8.0    Protein, UA Negative Negative mg/dL    Urobilinogen, Urine 0.2 <2.0 E.U./dL    Nitrite, Urine Negative Negative    Leukocyte Esterase, Urine TRACE (A) Negative    Microscopic Examination YES     Urine Type NotGiven    Protime-INR   Result Value Ref Range    Protime 15.2 (H) 11.7 - 14.5 sec    INR 1.21 (H) 0.87 - 1.14   Microscopic Urinalysis   Result Value Ref Range    WBC, UA 0-2 0 - 5 /HPF    RBC, UA 3-4 0 - 4 /HPF    Epithelial Cells, UA 0-1 0 - 5 /HPF    Bacteria, UA Rare (A) None Seen /HPF   POCT Glucose   Result Value Ref Range    POC Glucose 94 70 - 99 mg/dl    Performed on ACCU-CHEK    POCT Glucose   Result Value Ref Range    Glucose 94 mg/dL   EKG 12 Lead   Result Value Ref Range    Ventricular Rate 62 BPM    Atrial Rate 62 BPM    P-R Interval 174 ms    QRS Duration 88 ms    Q-T Interval 404 ms    QTc Calculation (Bazett) 410 ms    P Axis 89 degrees    R Axis -13 degrees    T Axis 0 degrees    Diagnosis       Sinus rhythm with Premature atrial complexesNonspecific ST and T wave abnormalityAbnormal ECGWhen compared with ECG of 04-AUG-2020 11:39,Previous ECG has undetermined rhythm, needs review       EKG  The Ekg interpreted by me shows  normal sinus rhythm with a rate of 90  Axis is   Normal  QTc is  normal  Intervals and Durations are unremarkable.       ST Segments: normal  No prior [facility]'s        RADIOLOGY  CT HEAD WO CONTRAST    Result Date: 12/3/2022  EXAMINATION: CT OF THE HEAD WITHOUT CONTRAST  12/3/2022 11:41 am TECHNIQUE: CT of the head was performed without the administration of intravenous contrast. Automated exposure control, iterative reconstruction, and/or weight based adjustment of the mA/kV was utilized to reduce the radiation dose to as low as reasonably achievable. COMPARISON: 04/01/2019 HISTORY: ORDERING SYSTEM PROVIDED HISTORY: confusion TECHNOLOGIST PROVIDED HISTORY: Reason for exam:->confusion Has a \"code stroke\" or \"stroke alert\" been called? ->No Decision Support Exception - unselect if not a suspected or confirmed emergency medical condition->Emergency Medical Condition (MA) Reason for Exam: ams FINDINGS: BRAIN/VENTRICLES: There is no acute intracranial hemorrhage, mass effect or midline shift. No abnormal extra-axial fluid collection. The gray-white differentiation is maintained without evidence of an acute infarct. There is no evidence of hydrocephalus. Chronic atrophic changes are consistent with the patient's age. ORBITS: The visualized portion of the orbits demonstrate no acute abnormality. SINUSES: There is maxillary sinus mucosal thickening. There are no air-fluid levels. Irregularity of the left orbital floor may indicate previous injury. SOFT TISSUES/SKULL:  No acute abnormality of the visualized skull or soft tissues. No acute intracranial abnormality. XR CHEST PORTABLE    Result Date: 12/3/2022  EXAMINATION: ONE XRAY VIEW OF THE CHEST 12/3/2022 11:41 am COMPARISON: None. HISTORY: ORDERING SYSTEM PROVIDED HISTORY: Altered Mental Status TECHNOLOGIST PROVIDED HISTORY: Reason for exam:->Altered Mental Status Reason for Exam: ams FINDINGS: A few patchy opacities in the lower lung zone bilaterally. Normal cardiomediastinal silhouette. No pleural effusion or pneumothorax. No acute osseous abnormality. Aortic knob calcifications.      Patchy opacity overlying the lower lung zones bilaterally which likely represents atelectasis with infection not excluded. ED COURSE/MDM  Patient seen and evaluated. Old records reviewed. Patient has been having some hallucinations, she lives at home with her daughter, no changes in medication, we evaluated for intracranial bleed, infection and did not find any clear evidence of infection. There is atelectasis on her chest x-ray, but she has a 99% oxygenation on room air, no cough, no fever and normal white blood cell count. Unlikely to be pneumonia. Her urinalysis is clear as well. I talked at length with the daughter reviewed all of her labs and x-rays, and she will be discharged home, follow-up with her primary care early next week for recheck. CLINICAL IMPRESSION  1. Hallucinations        Blood pressure (!) 142/80, pulse 73, temperature 98.5 °F (36.9 °C), temperature source Oral, resp. rate 18, height 5' 7\" (1.702 m), weight 165 lb (74.8 kg), SpO2 98 %, not currently breastfeeding. DISPOSITION  Marcela Sweet was discharged to home in stable condition.                   Huy Andre MD  12/03/22 [id number]

## 2022-12-22 ENCOUNTER — HOSPITAL ENCOUNTER (OUTPATIENT)
Dept: PET IMAGING | Age: 87
Discharge: HOME | End: 2022-12-22
Payer: MEDICARE

## 2022-12-22 DIAGNOSIS — C43.9 MALIGNANT MELANOMA, UNSPECIFIED SITE (HCC): ICD-10-CM

## 2022-12-22 PROCEDURE — 78816 PET IMAGE W/CT FULL BODY: CPT

## 2022-12-22 PROCEDURE — 3430000000 HC RX DIAGNOSTIC RADIOPHARMACEUTICAL: Performed by: INTERNAL MEDICINE

## 2022-12-22 PROCEDURE — A9552 F18 FDG: HCPCS | Performed by: INTERNAL MEDICINE

## 2022-12-22 RX ORDER — FLUDEOXYGLUCOSE F 18 200 MCI/ML
11.01 INJECTION, SOLUTION INTRAVENOUS
Status: COMPLETED | OUTPATIENT
Start: 2022-12-22 | End: 2022-12-22

## 2022-12-22 RX ADMIN — FLUDEOXYGLUCOSE F 18 11.01 MILLICURIE: 200 INJECTION, SOLUTION INTRAVENOUS at 09:59

## 2022-12-27 ENCOUNTER — HOSPITAL ENCOUNTER (OUTPATIENT)
Dept: GENERAL RADIOLOGY | Age: 87
Discharge: HOME | End: 2022-12-27
Payer: MEDICARE

## 2022-12-27 ENCOUNTER — HOSPITAL ENCOUNTER (OUTPATIENT)
Age: 87
Discharge: HOME | End: 2022-12-27
Payer: MEDICARE

## 2022-12-27 DIAGNOSIS — C43.71 MALIGNANT MELANOMA OF SKIN OF LOWER LIMB, INCLUDING HIP, RIGHT (HCC): ICD-10-CM

## 2022-12-27 PROCEDURE — 71046 X-RAY EXAM CHEST 2 VIEWS: CPT

## 2023-01-06 ENCOUNTER — HOSPITAL ENCOUNTER (INPATIENT)
Age: 88
LOS: 19 days | Discharge: NURSING, REHAB OR LONG-TERM CARE | DRG: 853 | End: 2023-01-26
Attending: EMERGENCY MEDICINE | Admitting: INTERNAL MEDICINE
Payer: MEDICARE

## 2023-01-06 ENCOUNTER — APPOINTMENT (OUTPATIENT)
Dept: CT IMAGING | Age: 88
DRG: 853 | End: 2023-01-06
Payer: MEDICARE

## 2023-01-06 DIAGNOSIS — R19.8 PERFORATED VISCUS: ICD-10-CM

## 2023-01-06 DIAGNOSIS — K63.1 BOWEL PERFORATION (HCC): ICD-10-CM

## 2023-01-06 DIAGNOSIS — R94.31 PROLONGED Q-T INTERVAL ON ECG: ICD-10-CM

## 2023-01-06 DIAGNOSIS — K57.32 DIVERTICULITIS OF COLON: Primary | ICD-10-CM

## 2023-01-06 LAB
A/G RATIO: 1.1 (ref 1.1–2.2)
ALBUMIN SERPL-MCNC: 3.6 G/DL (ref 3.4–5)
ALP BLD-CCNC: 148 U/L (ref 40–129)
ALT SERPL-CCNC: 13 U/L (ref 10–40)
ANION GAP SERPL CALCULATED.3IONS-SCNC: 12 MMOL/L (ref 3–16)
APTT: 40.2 SEC (ref 23–34.3)
AST SERPL-CCNC: 22 U/L (ref 15–37)
BACTERIA: ABNORMAL /HPF
BASOPHILS ABSOLUTE: 0 K/UL (ref 0–0.2)
BASOPHILS RELATIVE PERCENT: 0.8 %
BILIRUB SERPL-MCNC: <0.2 MG/DL (ref 0–1)
BILIRUBIN URINE: ABNORMAL
BLOOD, URINE: ABNORMAL
BUN BLDV-MCNC: 15 MG/DL (ref 7–20)
CALCIUM SERPL-MCNC: 10.3 MG/DL (ref 8.3–10.6)
CHLORIDE BLD-SCNC: 106 MMOL/L (ref 99–110)
CLARITY: ABNORMAL
CO2: 25 MMOL/L (ref 21–32)
COLOR: ABNORMAL
CREAT SERPL-MCNC: 0.9 MG/DL (ref 0.6–1.2)
CRYSTALS, UA: ABNORMAL /HPF
EOSINOPHILS ABSOLUTE: 0.1 K/UL (ref 0–0.6)
EOSINOPHILS RELATIVE PERCENT: 2.5 %
EPITHELIAL CELLS, UA: ABNORMAL /HPF (ref 0–5)
GFR SERPL CREATININE-BSD FRML MDRD: >60 ML/MIN/{1.73_M2}
GLUCOSE BLD-MCNC: 121 MG/DL (ref 70–99)
GLUCOSE URINE: NEGATIVE MG/DL
HCT VFR BLD CALC: 39 % (ref 36–48)
HEMOGLOBIN: 12.9 G/DL (ref 12–16)
INR BLD: 2.92 (ref 0.87–1.14)
KETONES, URINE: ABNORMAL MG/DL
LACTIC ACID, SEPSIS: 2.3 MMOL/L (ref 0.4–1.9)
LEUKOCYTE ESTERASE, URINE: ABNORMAL
LIPASE: 33 U/L (ref 13–60)
LYMPHOCYTES ABSOLUTE: 0.8 K/UL (ref 1–5.1)
LYMPHOCYTES RELATIVE PERCENT: 31.4 %
MCH RBC QN AUTO: 27.4 PG (ref 26–34)
MCHC RBC AUTO-ENTMCNC: 33.1 G/DL (ref 31–36)
MCV RBC AUTO: 82.6 FL (ref 80–100)
MICROSCOPIC EXAMINATION: YES
MONOCYTES ABSOLUTE: 0.3 K/UL (ref 0–1.3)
MONOCYTES RELATIVE PERCENT: 10.5 %
NEUTROPHILS ABSOLUTE: 1.4 K/UL (ref 1.7–7.7)
NEUTROPHILS RELATIVE PERCENT: 54.8 %
NITRITE, URINE: NEGATIVE
PDW BLD-RTO: 15 % (ref 12.4–15.4)
PH UA: 5.5 (ref 5–8)
PLATELET # BLD: 338 K/UL (ref 135–450)
PMV BLD AUTO: 6.5 FL (ref 5–10.5)
POTASSIUM REFLEX MAGNESIUM: 3.8 MMOL/L (ref 3.5–5.1)
PROTEIN UA: NEGATIVE MG/DL
PROTHROMBIN TIME: 30.6 SEC (ref 11.7–14.5)
RBC # BLD: 4.73 M/UL (ref 4–5.2)
RBC UA: ABNORMAL /HPF (ref 0–4)
SARS-COV-2, NAAT: DETECTED
SODIUM BLD-SCNC: 143 MMOL/L (ref 136–145)
SPECIFIC GRAVITY UA: >=1.03 (ref 1–1.03)
TOTAL PROTEIN: 6.8 G/DL (ref 6.4–8.2)
URINE REFLEX TO CULTURE: YES
URINE TYPE: ABNORMAL
UROBILINOGEN, URINE: 0.2 E.U./DL
WBC # BLD: 2.6 K/UL (ref 4–11)
WBC UA: ABNORMAL /HPF (ref 0–5)

## 2023-01-06 PROCEDURE — 93005 ELECTROCARDIOGRAM TRACING: CPT | Performed by: EMERGENCY MEDICINE

## 2023-01-06 PROCEDURE — 87150 DNA/RNA AMPLIFIED PROBE: CPT

## 2023-01-06 PROCEDURE — 96365 THER/PROPH/DIAG IV INF INIT: CPT

## 2023-01-06 PROCEDURE — 85730 THROMBOPLASTIN TIME PARTIAL: CPT

## 2023-01-06 PROCEDURE — 96375 TX/PRO/DX INJ NEW DRUG ADDON: CPT

## 2023-01-06 PROCEDURE — 36556 INSERT NON-TUNNEL CV CATH: CPT

## 2023-01-06 PROCEDURE — 83690 ASSAY OF LIPASE: CPT

## 2023-01-06 PROCEDURE — 2580000003 HC RX 258: Performed by: EMERGENCY MEDICINE

## 2023-01-06 PROCEDURE — 74177 CT ABD & PELVIS W/CONTRAST: CPT

## 2023-01-06 PROCEDURE — 87077 CULTURE AEROBIC IDENTIFY: CPT

## 2023-01-06 PROCEDURE — 99285 EMERGENCY DEPT VISIT HI MDM: CPT

## 2023-01-06 PROCEDURE — 81001 URINALYSIS AUTO W/SCOPE: CPT

## 2023-01-06 PROCEDURE — 84145 PROCALCITONIN (PCT): CPT

## 2023-01-06 PROCEDURE — 87076 CULTURE ANAEROBE IDENT EACH: CPT

## 2023-01-06 PROCEDURE — 85025 COMPLETE CBC W/AUTO DIFF WBC: CPT

## 2023-01-06 PROCEDURE — 96367 TX/PROPH/DG ADDL SEQ IV INF: CPT

## 2023-01-06 PROCEDURE — 36415 COLL VENOUS BLD VENIPUNCTURE: CPT

## 2023-01-06 PROCEDURE — 6360000004 HC RX CONTRAST MEDICATION: Performed by: EMERGENCY MEDICINE

## 2023-01-06 PROCEDURE — 83605 ASSAY OF LACTIC ACID: CPT

## 2023-01-06 PROCEDURE — 80053 COMPREHEN METABOLIC PANEL: CPT

## 2023-01-06 PROCEDURE — 87040 BLOOD CULTURE FOR BACTERIA: CPT

## 2023-01-06 PROCEDURE — 87086 URINE CULTURE/COLONY COUNT: CPT

## 2023-01-06 PROCEDURE — 6360000002 HC RX W HCPCS

## 2023-01-06 PROCEDURE — 87635 SARS-COV-2 COVID-19 AMP PRB: CPT

## 2023-01-06 PROCEDURE — 6360000002 HC RX W HCPCS: Performed by: EMERGENCY MEDICINE

## 2023-01-06 PROCEDURE — 87186 SC STD MICRODIL/AGAR DIL: CPT

## 2023-01-06 PROCEDURE — 85610 PROTHROMBIN TIME: CPT

## 2023-01-06 RX ORDER — SODIUM CHLORIDE, SODIUM LACTATE, POTASSIUM CHLORIDE, CALCIUM CHLORIDE 600; 310; 30; 20 MG/100ML; MG/100ML; MG/100ML; MG/100ML
INJECTION, SOLUTION INTRAVENOUS CONTINUOUS
Status: DISCONTINUED | OUTPATIENT
Start: 2023-01-06 | End: 2023-01-11

## 2023-01-06 RX ORDER — FENTANYL CITRATE 50 UG/ML
25 INJECTION, SOLUTION INTRAMUSCULAR; INTRAVENOUS ONCE
Status: COMPLETED | OUTPATIENT
Start: 2023-01-06 | End: 2023-01-06

## 2023-01-06 RX ORDER — SODIUM CHLORIDE 9 MG/ML
INJECTION, SOLUTION INTRAVENOUS PRN
Status: DISCONTINUED | OUTPATIENT
Start: 2023-01-06 | End: 2023-01-16

## 2023-01-06 RX ORDER — MAGNESIUM SULFATE IN WATER 40 MG/ML
2000 INJECTION, SOLUTION INTRAVENOUS ONCE
Status: COMPLETED | OUTPATIENT
Start: 2023-01-06 | End: 2023-01-07

## 2023-01-06 RX ORDER — ONDANSETRON 2 MG/ML
INJECTION INTRAMUSCULAR; INTRAVENOUS
Status: COMPLETED
Start: 2023-01-06 | End: 2023-01-06

## 2023-01-06 RX ORDER — ONDANSETRON 2 MG/ML
4 INJECTION INTRAMUSCULAR; INTRAVENOUS ONCE
Status: COMPLETED | OUTPATIENT
Start: 2023-01-06 | End: 2023-01-06

## 2023-01-06 RX ADMIN — IOPAMIDOL 75 ML: 755 INJECTION, SOLUTION INTRAVENOUS at 21:50

## 2023-01-06 RX ADMIN — MAGNESIUM SULFATE HEPTAHYDRATE 2000 MG: 40 INJECTION, SOLUTION INTRAVENOUS at 23:01

## 2023-01-06 RX ADMIN — ONDANSETRON 4 MG: 2 INJECTION INTRAMUSCULAR; INTRAVENOUS at 21:05

## 2023-01-06 RX ADMIN — ONDANSETRON HYDROCHLORIDE 4 MG: 2 INJECTION, SOLUTION INTRAMUSCULAR; INTRAVENOUS at 21:05

## 2023-01-06 RX ADMIN — SODIUM CHLORIDE, POTASSIUM CHLORIDE, SODIUM LACTATE AND CALCIUM CHLORIDE: 600; 310; 30; 20 INJECTION, SOLUTION INTRAVENOUS at 23:28

## 2023-01-06 RX ADMIN — PIPERACILLIN AND TAZOBACTAM 3375 MG: 3; .375 INJECTION, POWDER, FOR SOLUTION INTRAVENOUS at 22:47

## 2023-01-06 RX ADMIN — FENTANYL CITRATE 25 MCG: 50 INJECTION, SOLUTION INTRAMUSCULAR; INTRAVENOUS at 21:46

## 2023-01-06 ASSESSMENT — PAIN SCALES - GENERAL
PAINLEVEL_OUTOF10: 8
PAINLEVEL_OUTOF10: 7

## 2023-01-06 ASSESSMENT — PAIN DESCRIPTION - ORIENTATION: ORIENTATION: RIGHT;LOWER

## 2023-01-06 ASSESSMENT — PAIN DESCRIPTION - FREQUENCY: FREQUENCY: CONTINUOUS

## 2023-01-06 ASSESSMENT — PAIN DESCRIPTION - LOCATION: LOCATION: ABDOMEN

## 2023-01-06 ASSESSMENT — PAIN DESCRIPTION - DESCRIPTORS: DESCRIPTORS: DISCOMFORT

## 2023-01-06 ASSESSMENT — PAIN DESCRIPTION - PAIN TYPE: TYPE: ACUTE PAIN

## 2023-01-06 ASSESSMENT — PAIN - FUNCTIONAL ASSESSMENT: PAIN_FUNCTIONAL_ASSESSMENT: 0-10

## 2023-01-06 NOTE — Clinical Note
Discharge Plan[de-identified] Other/[facility])   Telemetry/Cardiac Monitoring Required?: Yes

## 2023-01-07 ENCOUNTER — APPOINTMENT (OUTPATIENT)
Dept: GENERAL RADIOLOGY | Age: 88
DRG: 853 | End: 2023-01-07
Payer: MEDICARE

## 2023-01-07 ENCOUNTER — ANESTHESIA EVENT (OUTPATIENT)
Dept: OPERATING ROOM | Age: 88
End: 2023-01-07
Payer: MEDICARE

## 2023-01-07 ENCOUNTER — ANESTHESIA (OUTPATIENT)
Dept: OPERATING ROOM | Age: 88
End: 2023-01-07
Payer: MEDICARE

## 2023-01-07 PROBLEM — J96.01 ACUTE HYPOXEMIC RESPIRATORY FAILURE (HCC): Status: ACTIVE | Noted: 2023-01-07

## 2023-01-07 PROBLEM — K57.32 DIVERTICULITIS OF COLON: Status: ACTIVE | Noted: 2023-01-07

## 2023-01-07 PROBLEM — D68.9 COAGULOPATHY (HCC): Status: ACTIVE | Noted: 2023-01-07

## 2023-01-07 PROBLEM — R65.21 SEPTIC SHOCK (HCC): Status: ACTIVE | Noted: 2023-01-07

## 2023-01-07 PROBLEM — A41.9 SEPTIC SHOCK (HCC): Status: ACTIVE | Noted: 2023-01-07

## 2023-01-07 PROBLEM — U07.1 COVID-19: Status: ACTIVE | Noted: 2023-01-07

## 2023-01-07 PROBLEM — K57.80 PERFORATED DIVERTICULUM: Status: ACTIVE | Noted: 2023-01-07

## 2023-01-07 LAB
A/G RATIO: 1.3 (ref 1.1–2.2)
ABO/RH: NORMAL
ALBUMIN SERPL-MCNC: 2 G/DL (ref 3.4–5)
ALBUMIN SERPL-MCNC: 2.4 G/DL (ref 3.4–5)
ALP BLD-CCNC: 72 U/L (ref 40–129)
ALP BLD-CCNC: 77 U/L (ref 40–129)
ALT SERPL-CCNC: 14 U/L (ref 10–40)
ALT SERPL-CCNC: 15 U/L (ref 10–40)
ANION GAP SERPL CALCULATED.3IONS-SCNC: 10 MMOL/L (ref 3–16)
ANION GAP SERPL CALCULATED.3IONS-SCNC: 15 MMOL/L (ref 3–16)
ANTIBODY SCREEN: NORMAL
ANTIBODY SCREEN: NORMAL
APTT: 30.6 SEC (ref 23–34.3)
AST SERPL-CCNC: 30 U/L (ref 15–37)
AST SERPL-CCNC: 36 U/L (ref 15–37)
ATYPICAL LYMPHOCYTE RELATIVE PERCENT: 3 % (ref 0–6)
BANDED NEUTROPHILS RELATIVE PERCENT: 10 % (ref 0–7)
BASE EXCESS ARTERIAL: -5.9 MMOL/L (ref -3–3)
BASE EXCESS ARTERIAL: -9 (ref -3–3)
BASOPHILS ABSOLUTE: 0 K/UL (ref 0–0.2)
BASOPHILS ABSOLUTE: 0 K/UL (ref 0–0.2)
BASOPHILS RELATIVE PERCENT: 0.2 %
BASOPHILS RELATIVE PERCENT: 0.3 %
BILIRUB SERPL-MCNC: 0.6 MG/DL (ref 0–1)
BILIRUB SERPL-MCNC: 0.7 MG/DL (ref 0–1)
BILIRUBIN DIRECT: 0.4 MG/DL (ref 0–0.3)
BILIRUBIN URINE: NEGATIVE
BILIRUBIN, INDIRECT: 0.3 MG/DL (ref 0–1)
BLOOD BANK DISPENSE STATUS: NORMAL
BLOOD BANK PRODUCT CODE: NORMAL
BLOOD, URINE: ABNORMAL
BPU ID: NORMAL
BUN BLDV-MCNC: 14 MG/DL (ref 7–20)
BUN BLDV-MCNC: 15 MG/DL (ref 7–20)
BURR CELLS: ABNORMAL
CALCIUM SERPL-MCNC: 7.3 MG/DL (ref 8.3–10.6)
CALCIUM SERPL-MCNC: 7.8 MG/DL (ref 8.3–10.6)
CARBOXYHEMOGLOBIN ARTERIAL: 0.4 % (ref 0–1.5)
CHLORIDE BLD-SCNC: 110 MMOL/L (ref 99–110)
CHLORIDE BLD-SCNC: 111 MMOL/L (ref 99–110)
CLARITY: CLEAR
CO2: 17 MMOL/L (ref 21–32)
CO2: 17 MMOL/L (ref 21–32)
COLOR: YELLOW
CREAT SERPL-MCNC: 0.7 MG/DL (ref 0.6–1.2)
CREAT SERPL-MCNC: 0.8 MG/DL (ref 0.6–1.2)
DESCRIPTION BLOOD BANK: NORMAL
EKG ATRIAL RATE: 73 BPM
EKG DIAGNOSIS: NORMAL
EKG P AXIS: 76 DEGREES
EKG Q-T INTERVAL: 432 MS
EKG QRS DURATION: 88 MS
EKG QTC CALCULATION (BAZETT): 525 MS
EKG R AXIS: -41 DEGREES
EKG T AXIS: 122 DEGREES
EKG VENTRICULAR RATE: 89 BPM
EOSINOPHILS ABSOLUTE: 0 K/UL (ref 0–0.6)
EOSINOPHILS ABSOLUTE: 0 K/UL (ref 0–0.6)
EOSINOPHILS RELATIVE PERCENT: 0.2 %
EOSINOPHILS RELATIVE PERCENT: 0.3 %
EPITHELIAL CELLS, UA: NORMAL /HPF (ref 0–5)
GFR SERPL CREATININE-BSD FRML MDRD: >60 ML/MIN/{1.73_M2}
GFR SERPL CREATININE-BSD FRML MDRD: >60 ML/MIN/{1.73_M2}
GLUCOSE BLD-MCNC: 181 MG/DL (ref 70–99)
GLUCOSE BLD-MCNC: 197 MG/DL (ref 70–99)
GLUCOSE BLD-MCNC: 243 MG/DL (ref 70–99)
GLUCOSE URINE: NEGATIVE MG/DL
HCO3 ARTERIAL: 16.6 MMOL/L (ref 21–29)
HCO3 ARTERIAL: 17.6 MMOL/L (ref 21–29)
HCT VFR BLD CALC: 33.1 % (ref 36–48)
HCT VFR BLD CALC: 38.5 % (ref 36–48)
HCT VFR BLD CALC: 41.5 % (ref 36–48)
HEMATOLOGY PATH CONSULT: YES
HEMOGLOBIN, ART, EXTENDED: 12.8 G/DL (ref 12–16)
HEMOGLOBIN: 10.7 G/DL (ref 12–16)
HEMOGLOBIN: 12.4 G/DL (ref 12–16)
HEMOGLOBIN: 13.2 G/DL (ref 12–16)
INR BLD: 1.32 (ref 0.87–1.14)
INR BLD: 1.45 (ref 0.87–1.14)
INR BLD: 1.95 (ref 0.87–1.14)
KETONES, URINE: NEGATIVE MG/DL
LACTATE: 5.76 MMOL/L (ref 0.4–2)
LACTIC ACID: 7.3 MMOL/L (ref 0.4–2)
LACTIC ACID: 8.1 MMOL/L (ref 0.4–2)
LEUKOCYTE ESTERASE, URINE: NEGATIVE
LYMPHOCYTES ABSOLUTE: 0.3 K/UL (ref 1–5.1)
LYMPHOCYTES ABSOLUTE: 0.4 K/UL (ref 1–5.1)
LYMPHOCYTES RELATIVE PERCENT: 13.1 %
LYMPHOCYTES RELATIVE PERCENT: 36.5 %
MAGNESIUM: 1.8 MG/DL (ref 1.8–2.4)
MCH RBC QN AUTO: 27.2 PG (ref 26–34)
MCH RBC QN AUTO: 27.5 PG (ref 26–34)
MCH RBC QN AUTO: 27.6 PG (ref 26–34)
MCHC RBC AUTO-ENTMCNC: 31.9 G/DL (ref 31–36)
MCHC RBC AUTO-ENTMCNC: 32.1 G/DL (ref 31–36)
MCHC RBC AUTO-ENTMCNC: 32.3 G/DL (ref 31–36)
MCV RBC AUTO: 84.1 FL (ref 80–100)
MCV RBC AUTO: 85.5 FL (ref 80–100)
MCV RBC AUTO: 86.4 FL (ref 80–100)
METHEMOGLOBIN ARTERIAL: 0.3 %
MICROSCOPIC EXAMINATION: YES
MONOCYTES ABSOLUTE: 0.1 K/UL (ref 0–1.3)
MONOCYTES ABSOLUTE: 0.1 K/UL (ref 0–1.3)
MONOCYTES RELATIVE PERCENT: 3.7 %
MONOCYTES RELATIVE PERCENT: 9.1 %
NEUTROPHILS ABSOLUTE: 0.6 K/UL (ref 1.7–7.7)
NEUTROPHILS ABSOLUTE: 1.9 K/UL (ref 1.7–7.7)
NEUTROPHILS RELATIVE PERCENT: 53.9 %
NEUTROPHILS RELATIVE PERCENT: 82.7 %
NITRITE, URINE: NEGATIVE
O2 SAT, ARTERIAL: 96 % (ref 93–100)
O2 SAT, ARTERIAL: 97.8 %
O2 THERAPY: ABNORMAL
OVALOCYTES: ABNORMAL
PCO2 ARTERIAL: 29.1 MMHG (ref 35–45)
PCO2 ARTERIAL: 30.7 MM HG (ref 35–45)
PDW BLD-RTO: 15.2 % (ref 12.4–15.4)
PDW BLD-RTO: 15.3 % (ref 12.4–15.4)
PDW BLD-RTO: 15.4 % (ref 12.4–15.4)
PERFORMED ON: ABNORMAL
PERFORMED ON: ABNORMAL
PH ARTERIAL: 7.34 (ref 7.35–7.45)
PH ARTERIAL: 7.4 (ref 7.35–7.45)
PH UA: 6 (ref 5–8)
PHOSPHORUS: 3 MG/DL (ref 2.5–4.9)
PLATELET # BLD: 265 K/UL (ref 135–450)
PLATELET # BLD: 295 K/UL (ref 135–450)
PLATELET # BLD: 317 K/UL (ref 135–450)
PLATELET SLIDE REVIEW: ADEQUATE
PMV BLD AUTO: 6.7 FL (ref 5–10.5)
PMV BLD AUTO: 6.8 FL (ref 5–10.5)
PMV BLD AUTO: 7.1 FL (ref 5–10.5)
PO2 ARTERIAL: 103.2 MMHG (ref 75–108)
PO2 ARTERIAL: 83 MM HG (ref 75–108)
POC SAMPLE TYPE: ABNORMAL
POIKILOCYTES: ABNORMAL
POTASSIUM REFLEX MAGNESIUM: 4.7 MMOL/L (ref 3.5–5.1)
POTASSIUM SERPL-SCNC: 3.4 MMOL/L (ref 3.5–5.1)
PROCALCITONIN: 0.04 NG/ML (ref 0–0.15)
PROTEIN UA: NEGATIVE MG/DL
PROTHROMBIN TIME: 16.2 SEC (ref 11.7–14.5)
PROTHROMBIN TIME: 17.5 SEC (ref 11.7–14.5)
PROTHROMBIN TIME: 22.1 SEC (ref 11.7–14.5)
RBC # BLD: 3.94 M/UL (ref 4–5.2)
RBC # BLD: 4.5 M/UL (ref 4–5.2)
RBC # BLD: 4.8 M/UL (ref 4–5.2)
RBC UA: NORMAL /HPF (ref 0–4)
REPORT: NORMAL
SLIDE REVIEW: ABNORMAL
SODIUM BLD-SCNC: 138 MMOL/L (ref 136–145)
SODIUM BLD-SCNC: 142 MMOL/L (ref 136–145)
SPECIFIC GRAVITY UA: 1.01 (ref 1–1.03)
TCO2 ARTERIAL: 18.5 MMOL/L
TEAR DROP CELLS: ABNORMAL
TOTAL PROTEIN: 3.9 G/DL (ref 6.4–8.2)
TOTAL PROTEIN: 4.3 G/DL (ref 6.4–8.2)
URINE REFLEX TO CULTURE: ABNORMAL
URINE TYPE: ABNORMAL
UROBILINOGEN, URINE: 0.2 E.U./DL
WBC # BLD: 0.4 K/UL (ref 4–11)
WBC # BLD: 1 K/UL (ref 4–11)
WBC # BLD: 2.3 K/UL (ref 4–11)
WBC UA: NORMAL /HPF (ref 0–5)

## 2023-01-07 PROCEDURE — 85025 COMPLETE CBC W/AUTO DIFF WBC: CPT

## 2023-01-07 PROCEDURE — P9017 PLASMA 1 DONOR FRZ W/IN 8 HR: HCPCS

## 2023-01-07 PROCEDURE — 6360000002 HC RX W HCPCS

## 2023-01-07 PROCEDURE — 0BH17EZ INSERTION OF ENDOTRACHEAL AIRWAY INTO TRACHEA, VIA NATURAL OR ARTIFICIAL OPENING: ICD-10-PCS | Performed by: INTERNAL MEDICINE

## 2023-01-07 PROCEDURE — 6360000002 HC RX W HCPCS: Performed by: INTERNAL MEDICINE

## 2023-01-07 PROCEDURE — 96375 TX/PRO/DX INJ NEW DRUG ADDON: CPT

## 2023-01-07 PROCEDURE — 2000000000 HC ICU R&B

## 2023-01-07 PROCEDURE — 36430 TRANSFUSION BLD/BLD COMPNT: CPT

## 2023-01-07 PROCEDURE — 36620 INSERTION CATHETER ARTERY: CPT

## 2023-01-07 PROCEDURE — 36415 COLL VENOUS BLD VENIPUNCTURE: CPT

## 2023-01-07 PROCEDURE — 2700000000 HC OXYGEN THERAPY PER DAY

## 2023-01-07 PROCEDURE — 2720000010 HC SURG SUPPLY STERILE: Performed by: SURGERY

## 2023-01-07 PROCEDURE — 3700000001 HC ADD 15 MINUTES (ANESTHESIA): Performed by: SURGERY

## 2023-01-07 PROCEDURE — 2580000003 HC RX 258: Performed by: INTERNAL MEDICINE

## 2023-01-07 PROCEDURE — 0W9G0ZZ DRAINAGE OF PERITONEAL CAVITY, OPEN APPROACH: ICD-10-PCS | Performed by: SURGERY

## 2023-01-07 PROCEDURE — 93010 ELECTROCARDIOGRAM REPORT: CPT | Performed by: INTERNAL MEDICINE

## 2023-01-07 PROCEDURE — 0DB80ZZ EXCISION OF SMALL INTESTINE, OPEN APPROACH: ICD-10-PCS | Performed by: SURGERY

## 2023-01-07 PROCEDURE — 94761 N-INVAS EAR/PLS OXIMETRY MLT: CPT

## 2023-01-07 PROCEDURE — C2626 INFUSION PUMP, NON-PROG,TEMP: HCPCS | Performed by: SURGERY

## 2023-01-07 PROCEDURE — 0D1N0Z4 BYPASS SIGMOID COLON TO CUTANEOUS, OPEN APPROACH: ICD-10-PCS | Performed by: SURGERY

## 2023-01-07 PROCEDURE — 2580000003 HC RX 258: Performed by: SURGERY

## 2023-01-07 PROCEDURE — 6360000002 HC RX W HCPCS: Performed by: SURGERY

## 2023-01-07 PROCEDURE — 02HV33Z INSERTION OF INFUSION DEVICE INTO SUPERIOR VENA CAVA, PERCUTANEOUS APPROACH: ICD-10-PCS | Performed by: INTERNAL MEDICINE

## 2023-01-07 PROCEDURE — 0DTN0ZZ RESECTION OF SIGMOID COLON, OPEN APPROACH: ICD-10-PCS | Performed by: SURGERY

## 2023-01-07 PROCEDURE — 99291 CRITICAL CARE FIRST HOUR: CPT | Performed by: INTERNAL MEDICINE

## 2023-01-07 PROCEDURE — 2500000003 HC RX 250 WO HCPCS: Performed by: ANESTHESIOLOGY

## 2023-01-07 PROCEDURE — 86901 BLOOD TYPING SEROLOGIC RH(D): CPT

## 2023-01-07 PROCEDURE — 6370000000 HC RX 637 (ALT 250 FOR IP): Performed by: INTERNAL MEDICINE

## 2023-01-07 PROCEDURE — 6360000002 HC RX W HCPCS: Performed by: ANESTHESIOLOGY

## 2023-01-07 PROCEDURE — 83605 ASSAY OF LACTIC ACID: CPT

## 2023-01-07 PROCEDURE — 36556 INSERT NON-TUNNEL CV CATH: CPT

## 2023-01-07 PROCEDURE — 6360000002 HC RX W HCPCS: Performed by: EMERGENCY MEDICINE

## 2023-01-07 PROCEDURE — 85730 THROMBOPLASTIN TIME PARTIAL: CPT

## 2023-01-07 PROCEDURE — 2580000003 HC RX 258: Performed by: ANESTHESIOLOGY

## 2023-01-07 PROCEDURE — 3600000014 HC SURGERY LEVEL 4 ADDTL 15MIN: Performed by: SURGERY

## 2023-01-07 PROCEDURE — 81001 URINALYSIS AUTO W/SCOPE: CPT

## 2023-01-07 PROCEDURE — 51702 INSERT TEMP BLADDER CATH: CPT

## 2023-01-07 PROCEDURE — 88307 TISSUE EXAM BY PATHOLOGIST: CPT

## 2023-01-07 PROCEDURE — 84478 ASSAY OF TRIGLYCERIDES: CPT

## 2023-01-07 PROCEDURE — 5A1955Z RESPIRATORY VENTILATION, GREATER THAN 96 CONSECUTIVE HOURS: ICD-10-PCS | Performed by: INTERNAL MEDICINE

## 2023-01-07 PROCEDURE — 86850 RBC ANTIBODY SCREEN: CPT

## 2023-01-07 PROCEDURE — C1892 INTRO/SHEATH,FIXED,PEEL-AWAY: HCPCS | Performed by: SURGERY

## 2023-01-07 PROCEDURE — 83735 ASSAY OF MAGNESIUM: CPT

## 2023-01-07 PROCEDURE — 80053 COMPREHEN METABOLIC PANEL: CPT

## 2023-01-07 PROCEDURE — 3700000000 HC ANESTHESIA ATTENDED CARE: Performed by: SURGERY

## 2023-01-07 PROCEDURE — 2500000003 HC RX 250 WO HCPCS: Performed by: SURGERY

## 2023-01-07 PROCEDURE — 86900 BLOOD TYPING SEROLOGIC ABO: CPT

## 2023-01-07 PROCEDURE — C9113 INJ PANTOPRAZOLE SODIUM, VIA: HCPCS | Performed by: SURGERY

## 2023-01-07 PROCEDURE — 2500000003 HC RX 250 WO HCPCS: Performed by: INTERNAL MEDICINE

## 2023-01-07 PROCEDURE — 94002 VENT MGMT INPAT INIT DAY: CPT

## 2023-01-07 PROCEDURE — 6370000000 HC RX 637 (ALT 250 FOR IP): Performed by: SURGERY

## 2023-01-07 PROCEDURE — 71045 X-RAY EXAM CHEST 1 VIEW: CPT

## 2023-01-07 PROCEDURE — 82803 BLOOD GASES ANY COMBINATION: CPT

## 2023-01-07 PROCEDURE — 85610 PROTHROMBIN TIME: CPT

## 2023-01-07 PROCEDURE — 3600000004 HC SURGERY LEVEL 4 BASE: Performed by: SURGERY

## 2023-01-07 PROCEDURE — 2709999900 HC NON-CHARGEABLE SUPPLY: Performed by: SURGERY

## 2023-01-07 RX ORDER — SODIUM CHLORIDE 9 MG/ML
INJECTION, SOLUTION INTRAVENOUS CONTINUOUS
Status: ACTIVE | OUTPATIENT
Start: 2023-01-07 | End: 2023-01-07

## 2023-01-07 RX ORDER — SODIUM CHLORIDE 9 MG/ML
INJECTION, SOLUTION INTRAVENOUS CONTINUOUS
Status: DISCONTINUED | OUTPATIENT
Start: 2023-01-07 | End: 2023-01-07

## 2023-01-07 RX ORDER — MIDAZOLAM HYDROCHLORIDE 1 MG/ML
2 INJECTION INTRAMUSCULAR; INTRAVENOUS
Status: DISCONTINUED | OUTPATIENT
Start: 2023-01-07 | End: 2023-01-16

## 2023-01-07 RX ORDER — ACETAMINOPHEN 650 MG/1
650 SUPPOSITORY RECTAL EVERY 6 HOURS PRN
Status: DISCONTINUED | OUTPATIENT
Start: 2023-01-07 | End: 2023-01-26 | Stop reason: HOSPADM

## 2023-01-07 RX ORDER — ACETAMINOPHEN 325 MG/1
650 TABLET ORAL EVERY 6 HOURS PRN
Status: DISCONTINUED | OUTPATIENT
Start: 2023-01-07 | End: 2023-01-26 | Stop reason: HOSPADM

## 2023-01-07 RX ORDER — SODIUM CHLORIDE 0.9 % (FLUSH) 0.9 %
5-40 SYRINGE (ML) INJECTION PRN
Status: DISCONTINUED | OUTPATIENT
Start: 2023-01-07 | End: 2023-01-26 | Stop reason: HOSPADM

## 2023-01-07 RX ORDER — 0.9 % SODIUM CHLORIDE 0.9 %
1000 INTRAVENOUS SOLUTION INTRAVENOUS ONCE
Status: COMPLETED | OUTPATIENT
Start: 2023-01-07 | End: 2023-01-07

## 2023-01-07 RX ORDER — ENOXAPARIN SODIUM 100 MG/ML
40 INJECTION SUBCUTANEOUS DAILY
Status: DISCONTINUED | OUTPATIENT
Start: 2023-01-07 | End: 2023-01-07

## 2023-01-07 RX ORDER — SODIUM CHLORIDE 9 MG/ML
50 INJECTION, SOLUTION INTRAVENOUS ONCE
Status: COMPLETED | OUTPATIENT
Start: 2023-01-07 | End: 2023-01-07

## 2023-01-07 RX ORDER — FENTANYL CITRATE 50 UG/ML
25 INJECTION, SOLUTION INTRAMUSCULAR; INTRAVENOUS ONCE
Status: COMPLETED | OUTPATIENT
Start: 2023-01-07 | End: 2023-01-07

## 2023-01-07 RX ORDER — SODIUM CHLORIDE 9 MG/ML
INJECTION, SOLUTION INTRAVENOUS ONCE
Status: COMPLETED | OUTPATIENT
Start: 2023-01-07 | End: 2023-01-07

## 2023-01-07 RX ORDER — ONDANSETRON 2 MG/ML
INJECTION INTRAMUSCULAR; INTRAVENOUS PRN
Status: DISCONTINUED | OUTPATIENT
Start: 2023-01-07 | End: 2023-01-07 | Stop reason: SDUPTHER

## 2023-01-07 RX ORDER — SODIUM CHLORIDE 0.9 % (FLUSH) 0.9 %
5-40 SYRINGE (ML) INJECTION EVERY 12 HOURS SCHEDULED
Status: DISCONTINUED | OUTPATIENT
Start: 2023-01-07 | End: 2023-01-26 | Stop reason: HOSPADM

## 2023-01-07 RX ORDER — POTASSIUM CHLORIDE 7.45 MG/ML
10 INJECTION INTRAVENOUS ONCE
Status: COMPLETED | OUTPATIENT
Start: 2023-01-07 | End: 2023-01-07

## 2023-01-07 RX ORDER — PHYTONADIONE 10 MG/ML
10 INJECTION, EMULSION INTRAMUSCULAR; INTRAVENOUS; SUBCUTANEOUS ONCE
Status: COMPLETED | OUTPATIENT
Start: 2023-01-07 | End: 2023-01-07

## 2023-01-07 RX ORDER — PROCHLORPERAZINE EDISYLATE 5 MG/ML
10 INJECTION INTRAMUSCULAR; INTRAVENOUS EVERY 6 HOURS PRN
Status: DISCONTINUED | OUTPATIENT
Start: 2023-01-07 | End: 2023-01-26 | Stop reason: HOSPADM

## 2023-01-07 RX ORDER — ALBUTEROL SULFATE 90 UG/1
4 AEROSOL, METERED RESPIRATORY (INHALATION) EVERY 4 HOURS PRN
Status: DISCONTINUED | OUTPATIENT
Start: 2023-01-07 | End: 2023-01-23

## 2023-01-07 RX ORDER — DEXAMETHASONE SODIUM PHOSPHATE 4 MG/ML
INJECTION, SOLUTION INTRA-ARTICULAR; INTRALESIONAL; INTRAMUSCULAR; INTRAVENOUS; SOFT TISSUE PRN
Status: DISCONTINUED | OUTPATIENT
Start: 2023-01-07 | End: 2023-01-07 | Stop reason: SDUPTHER

## 2023-01-07 RX ORDER — PROCHLORPERAZINE EDISYLATE 5 MG/ML
10 INJECTION INTRAMUSCULAR; INTRAVENOUS ONCE
Status: DISCONTINUED | OUTPATIENT
Start: 2023-01-07 | End: 2023-01-18

## 2023-01-07 RX ORDER — SODIUM CHLORIDE, SODIUM LACTATE, POTASSIUM CHLORIDE, CALCIUM CHLORIDE 600; 310; 30; 20 MG/100ML; MG/100ML; MG/100ML; MG/100ML
INJECTION, SOLUTION INTRAVENOUS CONTINUOUS PRN
Status: DISCONTINUED | OUTPATIENT
Start: 2023-01-07 | End: 2023-01-07 | Stop reason: SDUPTHER

## 2023-01-07 RX ORDER — PROPOFOL 10 MG/ML
5-50 INJECTION, EMULSION INTRAVENOUS CONTINUOUS
Status: DISCONTINUED | OUTPATIENT
Start: 2023-01-07 | End: 2023-01-10

## 2023-01-07 RX ORDER — ENOXAPARIN SODIUM 100 MG/ML
40 INJECTION SUBCUTANEOUS DAILY
Status: DISCONTINUED | OUTPATIENT
Start: 2023-01-08 | End: 2023-01-18

## 2023-01-07 RX ORDER — POTASSIUM CHLORIDE 750 MG/1
40 TABLET, EXTENDED RELEASE ORAL PRN
Status: DISCONTINUED | OUTPATIENT
Start: 2023-01-07 | End: 2023-01-11

## 2023-01-07 RX ORDER — SODIUM CHLORIDE 9 MG/ML
INJECTION, SOLUTION INTRAVENOUS PRN
Status: DISCONTINUED | OUTPATIENT
Start: 2023-01-07 | End: 2023-01-26 | Stop reason: HOSPADM

## 2023-01-07 RX ORDER — CHLORHEXIDINE GLUCONATE 0.12 MG/ML
15 RINSE ORAL 2 TIMES DAILY
Status: DISCONTINUED | OUTPATIENT
Start: 2023-01-07 | End: 2023-01-12

## 2023-01-07 RX ORDER — FENTANYL CITRATE 50 UG/ML
INJECTION, SOLUTION INTRAMUSCULAR; INTRAVENOUS
Status: COMPLETED
Start: 2023-01-07 | End: 2023-01-07

## 2023-01-07 RX ORDER — FENTANYL CITRATE 50 UG/ML
25 INJECTION, SOLUTION INTRAMUSCULAR; INTRAVENOUS
Status: DISCONTINUED | OUTPATIENT
Start: 2023-01-07 | End: 2023-01-16

## 2023-01-07 RX ORDER — PROPOFOL 10 MG/ML
INJECTION, EMULSION INTRAVENOUS
Status: COMPLETED
Start: 2023-01-07 | End: 2023-01-07

## 2023-01-07 RX ORDER — FLUCONAZOLE 2 MG/ML
200 INJECTION, SOLUTION INTRAVENOUS EVERY 24 HOURS
Status: DISCONTINUED | OUTPATIENT
Start: 2023-01-07 | End: 2023-01-17

## 2023-01-07 RX ORDER — POTASSIUM CHLORIDE 7.45 MG/ML
10 INJECTION INTRAVENOUS PRN
Status: DISCONTINUED | OUTPATIENT
Start: 2023-01-07 | End: 2023-01-11

## 2023-01-07 RX ORDER — FENTANYL CITRATE 50 UG/ML
25 INJECTION, SOLUTION INTRAMUSCULAR; INTRAVENOUS
Status: DISCONTINUED | OUTPATIENT
Start: 2023-01-07 | End: 2023-01-07

## 2023-01-07 RX ORDER — PANTOPRAZOLE SODIUM 40 MG/10ML
40 INJECTION, POWDER, LYOPHILIZED, FOR SOLUTION INTRAVENOUS DAILY
Status: DISCONTINUED | OUTPATIENT
Start: 2023-01-07 | End: 2023-01-26 | Stop reason: HOSPADM

## 2023-01-07 RX ORDER — ROCURONIUM BROMIDE 10 MG/ML
INJECTION, SOLUTION INTRAVENOUS PRN
Status: DISCONTINUED | OUTPATIENT
Start: 2023-01-07 | End: 2023-01-07 | Stop reason: SDUPTHER

## 2023-01-07 RX ADMIN — SODIUM CHLORIDE, POTASSIUM CHLORIDE, SODIUM LACTATE AND CALCIUM CHLORIDE: 600; 310; 30; 20 INJECTION, SOLUTION INTRAVENOUS at 07:30

## 2023-01-07 RX ADMIN — MUPIROCIN: 20 OINTMENT TOPICAL at 11:37

## 2023-01-07 RX ADMIN — PROPOFOL 30 MCG/KG/MIN: 10 INJECTION, EMULSION INTRAVENOUS at 23:32

## 2023-01-07 RX ADMIN — NOREPINEPHRINE BITARTRATE 10 MCG/MIN: 1 INJECTION, SOLUTION, CONCENTRATE INTRAVENOUS at 03:08

## 2023-01-07 RX ADMIN — PIPERACILLIN SODIUM,TAZOBACTAM SODIUM 3375 MG: 3; .375 INJECTION, POWDER, FOR SOLUTION INTRAVENOUS at 23:27

## 2023-01-07 RX ADMIN — SODIUM CHLORIDE 50 ML: 0.9 INJECTION, SOLUTION INTRAVENOUS at 10:00

## 2023-01-07 RX ADMIN — SODIUM CHLORIDE, POTASSIUM CHLORIDE, SODIUM LACTATE AND CALCIUM CHLORIDE: 600; 310; 30; 20 INJECTION, SOLUTION INTRAVENOUS at 23:36

## 2023-01-07 RX ADMIN — PROPOFOL 45 MCG/KG/MIN: 10 INJECTION, EMULSION INTRAVENOUS at 13:33

## 2023-01-07 RX ADMIN — ROCURONIUM BROMIDE 50 MG: 10 INJECTION, SOLUTION INTRAVENOUS at 07:36

## 2023-01-07 RX ADMIN — POTASSIUM CHLORIDE 10 MEQ: 7.46 INJECTION, SOLUTION INTRAVENOUS at 14:18

## 2023-01-07 RX ADMIN — FLUCONAZOLE 200 MG: 2 INJECTION, SOLUTION INTRAVENOUS at 11:36

## 2023-01-07 RX ADMIN — MIDAZOLAM 2 MG: 1 INJECTION INTRAMUSCULAR; INTRAVENOUS at 04:31

## 2023-01-07 RX ADMIN — CHLORHEXIDINE GLUCONATE 0.12% ORAL RINSE 15 ML: 1.2 LIQUID ORAL at 20:54

## 2023-01-07 RX ADMIN — PHYTONADIONE 10 MG: 10 INJECTION, EMULSION INTRAMUSCULAR; INTRAVENOUS; SUBCUTANEOUS at 00:45

## 2023-01-07 RX ADMIN — NOREPINEPHRINE BITARTRATE 15 MCG/MIN: 1 INJECTION, SOLUTION, CONCENTRATE INTRAVENOUS at 23:30

## 2023-01-07 RX ADMIN — SODIUM CHLORIDE: 9 INJECTION, SOLUTION INTRAVENOUS at 21:15

## 2023-01-07 RX ADMIN — ROCURONIUM BROMIDE 25 MG: 10 INJECTION, SOLUTION INTRAVENOUS at 08:38

## 2023-01-07 RX ADMIN — FENTANYL CITRATE 25 MCG: 50 INJECTION, SOLUTION INTRAMUSCULAR; INTRAVENOUS at 00:18

## 2023-01-07 RX ADMIN — SODIUM CHLORIDE: 9 INJECTION, SOLUTION INTRAVENOUS at 03:29

## 2023-01-07 RX ADMIN — PIPERACILLIN SODIUM,TAZOBACTAM SODIUM 3375 MG: 3; .375 INJECTION, POWDER, FOR SOLUTION INTRAVENOUS at 15:22

## 2023-01-07 RX ADMIN — PANTOPRAZOLE SODIUM 40 MG: 40 INJECTION, POWDER, FOR SOLUTION INTRAVENOUS at 11:37

## 2023-01-07 RX ADMIN — VASOPRESSIN 0.03 UNITS/MIN: 20 INJECTION PARENTERAL at 08:22

## 2023-01-07 RX ADMIN — SODIUM CHLORIDE, POTASSIUM CHLORIDE, SODIUM LACTATE AND CALCIUM CHLORIDE: 600; 310; 30; 20 INJECTION, SOLUTION INTRAVENOUS at 17:03

## 2023-01-07 RX ADMIN — SODIUM CHLORIDE 1000 ML: 9 INJECTION, SOLUTION INTRAVENOUS at 13:42

## 2023-01-07 RX ADMIN — PROPOFOL 10 MCG/KG/MIN: 10 INJECTION, EMULSION INTRAVENOUS at 03:32

## 2023-01-07 RX ADMIN — DEXAMETHASONE SODIUM PHOSPHATE 8 MG: 4 INJECTION, SOLUTION INTRAMUSCULAR; INTRAVENOUS at 07:36

## 2023-01-07 RX ADMIN — PIPERACILLIN SODIUM,TAZOBACTAM SODIUM 3375 MG: 3; .375 INJECTION, POWDER, FOR SOLUTION INTRAVENOUS at 07:14

## 2023-01-07 RX ADMIN — FENTANYL CITRATE 25 MCG: 50 INJECTION, SOLUTION INTRAMUSCULAR; INTRAVENOUS at 01:22

## 2023-01-07 RX ADMIN — SODIUM CHLORIDE: 9 INJECTION, SOLUTION INTRAVENOUS at 11:18

## 2023-01-07 RX ADMIN — PROCHLORPERAZINE EDISYLATE 10 MG: 5 INJECTION INTRAMUSCULAR; INTRAVENOUS at 01:02

## 2023-01-07 RX ADMIN — ONDANSETRON HYDROCHLORIDE 4 MG: 2 INJECTION, SOLUTION INTRAMUSCULAR; INTRAVENOUS at 07:36

## 2023-01-07 RX ADMIN — MUPIROCIN: 20 OINTMENT TOPICAL at 20:53

## 2023-01-07 RX ADMIN — SODIUM CHLORIDE 1000 ML: 9 INJECTION, SOLUTION INTRAVENOUS at 17:03

## 2023-01-07 RX ADMIN — SODIUM CHLORIDE, PRESERVATIVE FREE 10 ML: 5 INJECTION INTRAVENOUS at 20:54

## 2023-01-07 ASSESSMENT — PULMONARY FUNCTION TESTS
PIF_VALUE: 20
PIF_VALUE: 16
PIF_VALUE: 17
PIF_VALUE: 17
PIF_VALUE: 20

## 2023-01-07 ASSESSMENT — PAIN SCALES - GENERAL
PAINLEVEL_OUTOF10: 0
PAINLEVEL_OUTOF10: 10
PAINLEVEL_OUTOF10: 10

## 2023-01-07 NOTE — ED NOTES
B/P hypotensive 88/73, readjusted cuff, repositioned pt and monitored fluids recheck 77/66, moved pt from 7 to room 2 and MD present to discuss POC with caregiver at bedside. Fluid bolus increased B/P 95/66 at this time. Pt had one episode of emesis and was given compazine.       Blas Islas RN  01/07/23 [id number]

## 2023-01-07 NOTE — H&P
Hospital Medicine History & Physical      PCP: Veda Gutierrez MD    Date of Service: Pt seen/examined on 1/7/23 and admitted on 1/7/23 to Inpatient    Chief Complaint   Patient presents with    Abdominal Pain     RLQ pain starting approx 2 hrs ago, denies n/v/d. Pt took 1g tylenol for pain approx 1 hr ago. History Of Present Illness: The patient is a 80 y.o. female with PMH below, presented to [facility] w/ lower abd pain. Pt arrived via helicopter. She is unable to give me any Hx during the brief time that I saw her priior to sedating her so a central line could be placed. She appeared to be very uncomfortable despite multiple doses of pain medicine including 100 mg She was subsequently preemptively intubated 2/2 hypoxia and increased WOB. She was found to have a perforated diverticulitis w/ small extraluminal stool at [facility]. She is on Coumadin and INR is ~3. She was given 10 SQ Vit K at [facility]. Dr. Les Tobino is planning on OR as soon as we can get her INR down. She is now on levophed. She was also found to be COVID +.       Past Medical History:        Diagnosis Date    A-fib ([address]     Acid reflux     Hypertension        Past Surgical History:        Procedure Laterality Date    ANKLE FRACTURE SURGERY      LEFT ANKLE    APPENDECTOMY      ARM SURGERY Right 08/04/2020    EXCISION OF SKIN CANCER RIGHT ARM performed by Danielito North MD at [address] [place] [address]  07/12/2017    cecal polyp; random colon biopsies    FOOT AMPUTATION Right 11/09/2022    AMPUTATION OF SECOND TOE RIGHT FOOT performed by Alicia Hanna DPM at [address] [place] [address] (CERVIX STATUS UNKNOWN)      OTHER SURGICAL HISTORY Left 04/20/2016    EXCISION LESION LEFT UPPER EXTREMITY LEFT BACK AND RIGHT SHOULDER    UPPER GASTROINTESTINAL ENDOSCOPY  09/15/2017    dilated, biopsies    VULVA SURGERY      x 2        Medications Prior to Admission:    Prior to Admission medications Medication Sig Start Date End Date Taking? Authorizing Provider   citalopram (CELEXA) 20 MG tablet TAKE 1 AND 1/2 TABLETS BY MOUTH EVERY DAY 9/26/22   Historical Provider, MD   warfarin (COUMADIN) 5 MG tablet Take 1 tablet by mouth 4/23/20   Historical Provider, MD   metoprolol (LOPRESSOR) 25 MG tablet Take 25 mg by mouth once     Historical Provider, MD       Allergies:  Morphine    Social History:    TOBACCO:   reports that she quit smoking about 21 years ago. Her smoking use included cigarettes. She has never used smokeless tobacco.  ETOH:   reports current alcohol use. Family History:  Reviewed in detail and negative for DM, Early CAD, Cancer (except as below). Positive as follows:    History reviewed. No pertinent family history. REVIEW OF SYSTEMS:   Unable to obtain 2/2 pt condition. PHYSICAL EXAM PERFORMED:  /74   Pulse 88   Temp 97.7 °F (36.5 °C) (Oral)   Resp 18   Ht 5' 7\" (1.702 m)   Wt 165 lb (74.8 kg)   SpO2 100%   BMI 25.84 kg/m²   GEN:  Awake and alert, appears very uncomfortable and in significant distress. Repeatedly grunting. HEENT:  NC/AT,EOMI, dry MM, no erythema/exudates or visible masses. CVS:  Normal S1,S2. RRR. Without M/G/R.   LUNG:   CTA-B. No wheezes, rales or rhonchi. ABD:  Soft, ND, marked ttp lower abd, BS+ x4. Without G/R.  EXT: 2+ pulses, no c/c/e. Brisk cap refill. PSY:  Thought process confused, affect confused. JENNA:  Not following commands. CN III-XII grossly intact except pt is very [place]. Moves all 4 spontaneously. Sensory grossly intact. SKIN: No rash or lesions on visible skin.     Chart review shows recent radiographs:  XR CHEST (2 VW)    Result Date: 12/28/2022  EXAMINATION: TWO XRAY VIEWS OF THE CHEST 12/27/2022 1:16 pm COMPARISON: 12/03/2022 HISTORY: ORDERING SYSTEM PROVIDED HISTORY: Malignant melanoma of skin of lower limb, including hip, right [facility]) TECHNOLOGIST PROVIDED HISTORY: Reason for Exam: FALL ON 12/25/22, RIGHT LOWER RIB AND RIGHT SIDED BACK PAIN FINDINGS: The lungs are hyperexpanded without acute focal process. There is no effusion or pneumothorax. The cardiomediastinal silhouette is stable. The osseous structures are stable. Stable COPD. CT ABDOMEN PELVIS W IV CONTRAST Additional Contrast? None    Result Date: 1/6/2023  EXAMINATION: CT OF THE ABDOMEN AND PELVIS WITH CONTRAST 1/6/2023 9:39 pm TECHNIQUE: CT of the abdomen and pelvis was performed with the administration of intravenous contrast. Multiplanar reformatted images are provided for review. Automated exposure control, iterative reconstruction, and/or weight based adjustment of the mA/kV was utilized to reduce the radiation dose to as low as reasonably achievable. COMPARISON: PET-CT 12/22/2022 HISTORY: ORDERING SYSTEM PROVIDED HISTORY: abd pain TECHNOLOGIST PROVIDED HISTORY: Reason for exam:->abd pain Additional Contrast?->None Decision Support Exception - unselect if not a suspected or confirmed emergency medical condition->Emergency Medical Condition ([place]) Reason for Exam: abd pain FINDINGS: Lower Chest: No acute findings. Subsegmental atelectasis or scarring in the medial right lung base. Organs: Cholecystectomy. The liver, pancreas, spleen, adrenals and kidneys reveal no acute findings. Benign-appearing subcentimeter hypoattenuating liver lesions are noted, favoring cysts. GI/Bowel: Mild inflammatory change in the root of the mesentery is noted with extraluminal gas and fecal material noted. There is also a moderate amount of gas and fecal material immediately adjacent to the sigmoid colon, which is not clearly contain by the bowel wall. Diverticulosis is present in this area however there is no discrete wall thickening or definable mass identified. No evidence for bowel obstruction. Small volume abdominopelvic free fluid without loculated fluid collection identified. Normal appendix. Pelvis: Contracted bladder.  Peritoneum/Retroperitoneum: Trace amount of free air in the anterior abdomen and central mesentery, as described above. Small volume abdominopelvic ascites without loculated fluid collection. No enlarged or suspicious-appearing lymph nodes. The aorta is normal in caliber. The visceral branches are appropriately opacified. Bones/Soft Tissues: No abnormality identified. *Unless otherwise specified, incidental findings do not require dedicated imaging follow-up. 1.  Small volume pneumoperitoneum and extraluminal fecal material appears to arise from the sigmoid colon, presumably as a complication of diverticulitis. 2.  Small volume abdominopelvic ascites. No loculated fluid collection identified. Findings were discussed with Patricia Collins at 10:25 pm on 1/6/2023. PET CT WHOLE BODY    Result Date: 12/23/2022  EXAMINATION: WHOLE BODY PET/CT WITH LOWER EXTREMITIES. 12/22/2022 TECHNIQUE: Following intravenous administration of 11.0 mCi of F18-FDG, PET imaging was acquired from the top of the head to the toes. Computed tomography was used for purposes of attenuation correction and anatomic localization. Fusion imaging was utilized for interpretation. Uptake time 50 mins. Glucose level 100 mg/dl. COMPARISON: No relevant recent imaging available. HISTORY: Melanoma of the right 2nd toe diagnosed last month. Prior history of right arm squamous cell skin cancer. FINDINGS: HEAD/NECK: No suspicious FDG uptake. CHEST: No abnormal uptake within the lungs and breasts. No hypermetabolic intrathoracic lymph nodes. ABDOMEN/PELVIS: No abnormal uptake within the solid abdominal organs. No hypermetabolic lymph nodes. No abnormal pelvic uptake. BONES/SOFT TISSUE: No suspicious FDG uptake. INCIDENTAL CT FINDINGS: And coronary atherosclerotic disease. Cholecystectomy. Left renal cyst.  Colonic diverticulosis. Hysterectomy. Postsurgical changes of the left ankle. No metabolically active disease.        EKG:    EKG 12 Lead [[id number]]    Collected: 01/06/23 1232 Updated: 01/06/23 2117     Ventricular Rate 89 BPM    Atrial Rate 73 BPM    QRS Duration 88 ms    Q-T Interval 432 ms    QTc Calculation (Bazett) 525 ms    P Axis 76 degrees    R Axis -41 degrees    T Axis 122 degrees    Diagnosis --    Undetermined rhythmLeft axis deviationNonspecific ST and T wave abnormalityAbnormal ECGWhen compared with ECG of 03-DEC-2022 12:17,Current undetermined rhythm precludes rhythm comparison, needs reviewNonspecific T wave abnormality no longer evident in Inferior leadsQT has lengthened       CBC:  Recent Labs     01/06/23 2108 01/07/23  0210   WBC 2.6* 2.3*   HGB 12.9 13.2   HCT 39.0 41.5    317      RENAL  Recent Labs     01/06/23 2108      K 3.8      CO2 25   BUN 15   CREATININE 0.9   GLUCOSE 121*     LFT'S:  Recent Labs     01/06/23 2108   AST 22   ALT 13   BILITOT <0.2   ALKPHOS 148*     COAG:  Recent Labs     01/06/23 2108   INR 2.92*     LACTIC ACID:  Recent Labs     01/06/23 2245 01/07/23 0210   LACTA  --  8.1*   LACTSEPSIS 2.3*  --      U/A:  Recent Labs     01/06/23 2137   LEUKOCYTESUR TRACE*   BACTERIA 2+*   WBCUA 10-20*   COLORU DARK YELLOW*   RBCUA 5-10*   CLARITYU SL CLOUDY*   SPECGRAV >=1.030   BLOODU TRACE-INTACT*   GLUCOSEU Negative     PHYSICIAN CERTIFICATION  I certify that [facility] is expected to be hospitalized for 2 midnights based on the following assessment and plan:    ASSESSMENT/PLAN:  Septic Shock (HR, low WBC; lact)  - IV Zosyn  - IV levophed  - IVF  - f/u Cx. Acute resp fail w/ hypoxia  - Intubated 2/2 increased WOB and preemptively for OR.    - Vent ans sedation per my orders until seen by intensivist.      Perforated diverticulitis w/ small extraluminal stool and pneumoperitoneum  - Dr. Randal Garcia on OR once we get INR reversed. - PRN Dilaudid, compazine.  - Placed OG, NPO  - Gs c/s    UTI  - ABX as above, f/u Cx.       Leukopenia w/ mild neutropenia  - likely 2/2 COVID, may blunt her ability to fight infection  - WBC 2.6, ANC 1400    Lactic Acidosis  - worsening, 2.3, 8.1 at arrival.    - IVF and repeats ordered. COVID +, Dx  1/6, ASX per ED  - droplet +. Prolonged QTc, 525 ms.   - Avoid QT prolonging agents as able. Anticoagulated on Coumadin  - INR 2.92.    - 3 u FFP when arrives (no FFP at [facility]). Ordered 10 mg vit K sq to be given at [facility]. Pior to arrival:  ~0100. BP dropping. In 70's at ~1am.  Changed to ICU admission. Dr. Javier Caceres to give IVF and possibly place CVC. Think it is imperative to get pt on site to get FFP so she can go to OR ASAP. [facility] to arrange for emergent air transport. Transfer center, ICU charge and clinical updated. 0120: after ~3/4 L IVF bp 122/82. [employer] ETA arrival to [facility] ~10 min. Do not want to delay her arrival so I will place CVC once she gets here. DVT Prophylaxis: SCD  Diet: NPO  Code Status: Full Code   PT/OT Eval Status: Will order if needed and as patient condition allows  Dispo - Admit to inpatient ICU    Total critical care time caring for this patient with life threatening, unstable organ failure, including direct patient contact, management of life support systems, review of data including imaging and labs, discussions with other team members and physicians is 50 minutes so far today, excluding procedures. Kana Armstrong MD    Thank you Sher Vázquez MD for the opportunity to be involved in this patient's care. If you have any questions or concerns please feel free to contact me via the [employer] at [phone]. This chart was generated using the [address] [place] [address] [employer] system. I created this record but it may contain dictation errors given the limitations of this technology.

## 2023-01-07 NOTE — PROGRESS NOTES
Pt remains sedated, pupils 3-4 brisk and reactive. Pt will not awaken at this time, suctioned gag present. Will slowly titrate propofol down.

## 2023-01-07 NOTE — PROGRESS NOTES
Comprehensive Nutrition Assessment    Type and Reason for Visit:  Initial, Consult (TF recs)    Nutrition Recommendations/Plan:   Continue NPO   RD will review status in rounds on Monday to determine nutritional interventions     Malnutrition Assessment:  Malnutrition Status: At risk for malnutrition (Comment) (01/07/23 6034)    Context:  Acute Illness     Findings of the 6 clinical characteristics of malnutrition:  Energy Intake:  Unable to assess  Weight Loss:  No significant weight loss     Body Fat Loss:  Unable to assess     Muscle Mass Loss:  Unable to assess    Fluid Accumulation:  Unable to assess     Strength:  Not Performed    Nutrition Assessment:    Pt. nutritionally compromised AEB pt was admitted for abdominal pain last > 2 week and pain became more severe in 8 hours prior to admission found to have a perforated  diverticula and required surgery was intubated prior d./t ARF and dx with COVID 19; consult was for TF recs and management at time of intubation but at his time TF is not appropriate  At risk for further nutrition compromise r/t NPO and post op for sigmoid colectomy . Will continue to NPO and monitor GI function . Nutrition Related Findings:    pt postosp forLAPAROTOMY EXPLORATORY, SIGMOID COLECTOMY, SMALL BOWEL RESECTION,OSTOMY ealier today ; was intubated prior to emergent surgery Wound Type: Surgical Incision       Current Nutrition Intake & Therapies:    Average Meal Intake: NPO  Average Supplements Intake: NPO  Diet NPO    Anthropometric Measures:  Height: 5' 7\" (170.2 cm)  Ideal Body Weight (IBW): 135 lbs (61 kg)    Admission Body Weight: 165 lb (74.8 kg)  Current Body Weight: 165 lb (74.8 kg), 122.2 % IBW. Weight Source: Bed Scale  Current BMI (kg/m2): 25.8  Usual Body Weight: 170 lb (77.1 kg)  % Weight Change (Calculated): -2.9                    BMI Categories: Overweight (BMI 25.0-29. 9)    Estimated Daily Nutrient Needs:  Energy Requirements Based On: Kcal/kg  Weight Used for Energy Requirements: Current  Energy (kcal/day): 3529-2645 based ~ 20-23 kcal/kg cbw  Weight Used for Protein Requirements: Ideal  Protein (g/day): 73-85 based ~ 1.2-1.4 gr/kg  Method Used for Fluid Requirements: 1 ml/kcal  Fluid (ml/day): 7351-6740    Nutrition Diagnosis:   Inadequate oral intake related to catabolic illness, altered GI structure, altered GI function, impaired respiratory function, inadequate protein-energy intake as evidenced by NPO or clear liquid status due to medical condition, GI abnormality    Nutrition Interventions:   Food and/or Nutrient Delivery: Continue NPO  Nutrition Education/Counseling: No recommendation at this time          Goals:     Goals: Initiate nutrition support, by next RD assessment       Nutrition Monitoring and Evaluation:   Behavioral-Environmental Outcomes: None Identified  Food/Nutrient Intake Outcomes: Diet Advancement/Tolerance  Physical Signs/Symptoms Outcomes: Biochemical Data, Nutrition Focused Physical Findings, Weight, GI Status    Discharge Planning:     Too soon to determine     Alvino Nissen, [address],   Contact: [phone]

## 2023-01-07 NOTE — ED NOTES
Pt beginning to dry heave, per Dr. Linda Pollen verbal order for 4mg zofran IV     Jazmyne Holland, RENE  01/06/23 2111       Jazmyne Holland RN  01/06/23 2111

## 2023-01-07 NOTE — PROGRESS NOTES
Seen post operatively. Now on Levo 16 and vaopressin. Will challenge with additional 1 L saline bolus to see if fluid responsive. F/U ABG for vent.

## 2023-01-07 NOTE — PROCEDURES
A time-out was completed verifying correct patient, procedure, site, positioning, and special equipment if applicable. Pt was given ketamine 100 mg IV prior to this procedure as she was in a lot af pain and could not hold still. This seemed to help significantly. The patient was placed in a dependent position appropriate for central line placement based on the vein to be cannulated. The patients right neck was prepped and draped in sterile fashion. 1% Lidocaine was used to anesthetize the surrounding skin area. A 16 cm triple lumen catheter was introduced into the the internal jugular using the Seldinger technique with ultrasound guidance. The catheter was threaded smoothly over the guide wire and appropriate dark blood return was obtained. Each lumen of the catheter was evacuated of air and flushed with sterile saline. The catheter was then sutured in place to the skin and a sterile dressing applied. I was present for the entire procedure. Estimated Blood Loss: <10cc    The patient tolerated the procedure well and there were no complications. Post procedure CXR was ordered and is pending. Image will be reviewed prior to use of the line.         Julisa Elias M.D.

## 2023-01-07 NOTE — ANESTHESIA PROCEDURE NOTES
Arterial Line:    An arterial line was placed using surface landmarks, in the OR for the following indication(s): continuous blood pressure monitoring and blood sampling needed. A 20 gauge (size), 1 and 3/8 inch (length), Angiocath (type) catheter was placed, Seldinger technique not used, into the left radial artery, secured by tape and Tegaderm. Anesthesia type: Local    Events:  patient tolerated procedure well with no complications and EBL 0mL. 1/7/2023 7:58 AM1/7/2023 7:59 AM  Anesthesiologist: Areli Khan MD  Performed: Anesthesiologist   Preanesthetic Checklist  Completed: patient identified, IV checked, site marked, risks and benefits discussed, surgical/procedural consents, equipment checked, pre-op evaluation, timeout performed, anesthesia consent given, oxygen available and monitors applied/VS acknowledged

## 2023-01-07 NOTE — PROGRESS NOTES
01/07/23 0502   RT Protocol   History Pulmonary Disease 1   Respiratory pattern 0   Breath sounds 2   Cough 0   Indications for Bronchodilator Therapy None   Bronchodilator Assessment Score 3   RT Inhaler-Nebulizer Bronchodilator Protocol Note    There is a bronchodilator order in the chart from a provider indicating to follow the RT Bronchodilator Protocol and there is an Initiate RT Inhaler-Nebulizer Bronchodilator Protocol order as well (see protocol at bottom of note). CXR Findings:  XR CHEST PORTABLE    Result Date: 1/7/2023  1. Endotracheal tube terminates in appropriate position above the kori. 2.  The enteric tube courses off the field of view in the upper abdomen. 3.  Right internal jugular line terminates at the level of the mid SVC. The findings from the last RT Protocol Assessment were as follows:   History Pulmonary Disease: Smoker 15 pack years or more  Respiratory Pattern: Regular pattern and RR 12-20 bpm  Breath Sounds: Slightly diminished and/or crackles  Cough: Strong, spontaneous, non-productive  Indication for Bronchodilator Therapy: None  Bronchodilator Assessment Score: 3    Aerosolized bronchodilator medication orders have been revised according to the RT Inhaler-Nebulizer Bronchodilator Protocol below. Respiratory Therapist to perform RT Therapy Protocol Assessment initially then follow the protocol. Repeat RT Therapy Protocol Assessment PRN for score 0-3 or on second treatment, BID, and PRN for scores above 3. No Indications - adjust the frequency to every 6 hours PRN wheezing or bronchospasm, if no treatments needed after 48 hours then discontinue using Per Protocol order mode. If indication present, adjust the RT bronchodilator orders based on the Bronchodilator Assessment Score as indicated below.   Use Inhaler orders unless patient has one or more of the following: on home nebulizer, not able to hold breath for 10 seconds, is not alert and oriented, cannot activate and use MDI correctly, or respiratory rate 25 breaths per minute or more, then use the equivalent nebulizer order(s) with same Frequency and PRN reasons based on the score. If a patient is on this medication at home then do not decrease Frequency below that used at home. 0-3 - enter or revise RT bronchodilator order(s) to equivalent RT Bronchodilator order with Frequency of every 4 hours PRN for wheezing or increased work of breathing using Per Protocol order mode. 4-6 - enter or revise RT Bronchodilator order(s) to two equivalent RT bronchodilator orders with one order with BID Frequency and one order with Frequency of every 4 hours PRN wheezing or increased work of breathing using Per Protocol order mode. 7-10 - enter or revise RT Bronchodilator order(s) to two equivalent RT bronchodilator orders with one order with TID Frequency and one order with Frequency of every 4 hours PRN wheezing or increased work of breathing using Per Protocol order mode. 11-13 - enter or revise RT Bronchodilator order(s) to one equivalent RT bronchodilator order with QID Frequency and an Albuterol order with Frequency of every 4 hours PRN wheezing or increased work of breathing using Per Protocol order mode. Greater than 13 - enter or revise RT Bronchodilator order(s) to one equivalent RT bronchodilator order with every 4 hours Frequency and an Albuterol order with Frequency of every 2 hours PRN wheezing or increased work of breathing using Per Protocol order mode.      Electronically signed by Marisol Johnson RCP on 1/7/2023 at 5:02 AM

## 2023-01-07 NOTE — PROGRESS NOTES
01/07/23 1600   NICU Vent Information   Vent Type 980   Vent Mode AC/VC   Vt (Set, mL) 370 mL   Vt (Measured) 376 mL   Resp Rate (Set) 18 bmp   Rate Measured 18 br/min   Minute Volume (L/min) 6.8 Liters   Peak Flow 60 L/min   FiO2  55 %   POTTS Level 5 cmH2O/uV   Peak Inspiratory Pressure (cmH2O) 17 cmH2O   I:E Ratio 1:3   Sensitivity 3   PEEP/CPAP (cmH2O) 5   Mean Airway Pressure (cmH2O) 8 cmH20   Cough/Sputum   Sputum How Obtained Endotracheal   Breath Sounds   Right Upper Lobe Diminished   Right Middle Lobe Diminished   Right Lower Lobe Diminished   Left Upper Lobe Diminished   Left Lower Lobe Diminished   Additional Respiratory Assessments   Heart Rate 100   Resp 18   SpO2 96 %   Vent Alarm Settings   High Pressure (cmH2O) 40 cmH2O   Low Minute Volume (lpm) 3 L/min   Low Exhaled Vt (ml) 250 mL   RR High (bpm) 40 br/min   Apnea (secs) 20 secs   ETT    Placement Date/Time: 01/07/23 7916   Placed By: Licensed provider  Placement Verified By: Chest X-ray;Colorimetric ETCO2 device  Preoxygenation: Yes  Airway Tube Size: 8 mm  Location: Oral  Secured At: 24 cm  Measured From: Lips   Secured At 24 cm   Measured From Lips   Secured By Commercial tube arteaga   Site Assessment Dry

## 2023-01-07 NOTE — PROGRESS NOTES
D:    Latest Reference Range & Units 1/7/23 11:07   Potassium 3.5 - 5.1 mmol/L 3.4 (L)   (L): Data is abnormally low  A: MD notified   R: awaiting sliding scale K order.

## 2023-01-07 NOTE — CONSULTS
Pulmonary & Critical Care Medicine ICU Consultation Note  Patient is being seen at the request of Dr. Halina Hernandez for a consultation for Acute Respiratory Failure     HISTORY OF PRESENT ILLNESS: This is a 17-year-old female who presented to the [address] emergency department on 1/6/2023 with a 2-hour history of severe lower abdominal pain, preceded by approximately 2 weeks of more mild lower abdominal complaints. She was found to have a perforated diverticulum. Femoral IV access could not be obtained by the emergency department physician. A central line was placed and she was intubated in the ICU for work of breathing. She has received 3 units of FFP, 2 prior to the repeat value of 1.95. PAST MEDICAL HISTORY:  Past Medical History:   Diagnosis Date    A-fib ([facility] [address]     Acid reflux     Hypertension      PAST SURGICAL HISTORY:  Past Surgical History:   Procedure Laterality Date    ANKLE FRACTURE SURGERY      LEFT ANKLE    APPENDECTOMY      ARM SURGERY Right 08/04/2020    EXCISION OF SKIN CANCER RIGHT ARM performed by Kalyani Bonner MD at [address] [place] [address]  07/12/2017    cecal polyp; random colon biopsies    FOOT AMPUTATION Right 11/09/2022    AMPUTATION OF SECOND TOE RIGHT FOOT performed by Ezequiel Gaspar DPM at [address] [place] [address] (CERVIX STATUS UNKNOWN)      OTHER SURGICAL HISTORY Left 04/20/2016    EXCISION LESION LEFT UPPER EXTREMITY LEFT BACK AND RIGHT SHOULDER    UPPER GASTROINTESTINAL ENDOSCOPY  09/15/2017    dilated, biopsies    VULVA SURGERY      x 2        FAMILY HISTORY:  family history is not on file. SOCIAL HISTORY:   reports that she quit smoking about 21 years ago. Her smoking use included cigarettes.  She has never used smokeless tobacco.    Scheduled Meds:   prochlorperazine  10 mg IntraVENous Once    piperacillin-tazobactam  3,375 mg IntraVENous Q8H    sodium chloride flush  5-40 mL IntraVENous 2 times per day    [Held by provider] enoxaparin  40 mg SubCUTAneous Daily    propofol        chlorhexidine  15 mL Mouth/Throat BID    pantoprazole  40 mg IntraVENous Daily     Continuous Infusions:   sodium chloride      sodium chloride 150 mL/hr at 01/07/23 0329    norepinephrine 10 mcg/min (01/07/23 0548)    propofol 35 mcg/kg/min (01/07/23 0445)    lactated ringers 100 mL/hr at 01/06/23 2328    sodium chloride         REVIEW OF SYSTEMS:  Unable to obtain because the patient is non-communicative     Vascular access: RIJ CVC 1/7/23    MV:  1/7/23  Vent Mode: AC/VC Resp Rate (Set): 18 bmp/Vt (Set, mL): 370 mL/ /FiO2 : 60 %  No results for input(s): PHART, UMC5ODG, PO2ART in the last 72 hours. Blood pressure 91/79, pulse (!) 112, temperature 96.9 °F (36.1 °C), resp. rate 24, height 5' 7\" (1.702 m), weight 165 lb (74.8 kg), SpO2 99 %, not currently breastfeeding.'      PHYSICAL EXAM:  General: intubated, ill appearing    ENT: Pharynx with ETT. Resp: No crackles. No wheezing. CV: S1, S2. No edema  GI: Diffusely tender, distended, +BS  Skin: Warm and dry. Neuro: PERRL. Sedated, not following commands.  Patellar reflexes are symmetric      LABS:  CBC:   Recent Labs     01/06/23 2108 01/07/23  0210   WBC 2.6* 2.3*   HGB 12.9 13.2   HCT 39.0 41.5   MCV 82.6 86.4    317     BMP:   Recent Labs     01/06/23 2108      K 3.8      CO2 25   BUN 15   CREATININE 0.9     LIVER PROFILE:   Recent Labs     01/06/23 2108   AST 22   ALT 13   LIPASE 33.0   BILITOT <0.2   ALKPHOS 148*     PT/INR:   Recent Labs     01/06/23 2108 01/07/23  0404   PROTIME 30.6* 22.1*   INR 2.92* 1.95*     APTT:   Recent Labs     01/06/23 2108 01/07/23  0404   APTT 40.2* 30.6     UA:  Recent Labs     01/06/23 2137 01/07/23  0420   COLORU DARK YELLOW* Yellow   PHUR 5.5 6.0   WBCUA 10-20* 0-2   RBCUA 5-10* 0-2   BACTERIA 2+*  --    CLARITYU SL CLOUDY* Clear   SPECGRAV >=1.030 1.010   LEUKOCYTESUR TRACE* Negative   UROBILINOGEN 0.2 0.2   BILIRUBINUR SMALL* Negative BLOODU TRACE-INTACT* TRACE-INTACT*   GLUCOSEU Negative Negative     No results for input(s): PHART, FVJ6UFW, PO2ART in the last 72 hours. Cultures:  1/6/2023 SARS-CoV-2 positive     Imaging:  Abdominal CT 1/6/2023  Impression   1. Small volume pneumoperitoneum and extraluminal fecal material appears to   arise from the sigmoid colon, presumably as a complication of diverticulitis. 2.  Small volume abdominopelvic ascites. No loculated fluid collection   identified. ASSESSMENT:  Septic shock  Acute hypoxemic respiratory failure  Diverticulitis with perforated viscus in the sigmoid colon area   COVID-19 infection, incidental finding  Leukuria   Left kidney/adrenal imaging abnormality is a large left kidney cyst (simple) which does not require any additional f/u per radiologist   Paroxysmal atrial fibrillation  H/O esophageal stricture   Coagulopathy 2/2 warfarin    PLAN:  Mechanical ventilation as per my orders.  The ventilator was adjusted by me at the bedside for unstable, life threatening respiratory failure  IV Propofol for sedation, target RASS -2, with daily SAT  Fentanyl and Versed PRN, gtt as needed  Daily SBT per protocol   Inhaled bronchodilators  IV fluid resuscitation with LR  Zosyn D#2  IV levophed to maintain MAP of 65  Follow up on cultures  Follow electrolytes  Blood sugar control, with goal 140-180   S/P 3 U FFP & vitamin K; give K-centra X 1 now given need for emergent surgery  Plan is to proceed to the operating room for emergent repair of perforated viscus per General Surgery   F/U repeat electrolytes  Consider remdesivir if renal function stable and LFTs not markedly increased postoperatively   Prophylaxis: SCD, bactroban, protonix   D/W Dr. Duncan Postal, bedside RN and patient's daughter     Total critical care time caring for this patient with life threatening, unstable organ failure, including direct patient contact, management of life support systems, review of data including imaging and labs, discussions with other team members and physicians at least 35 minutes so far today, excluding procedures.

## 2023-01-07 NOTE — BRIEF OP NOTE
Brief Postoperative Note      Patient: Teofilo Snider  YOB: 1932  MRN: [id number]    Date of Procedure: 1/7/2023    Pre-Op Diagnosis: Perforated Colon    Post-Op Diagnosis: Same       Procedure(s):  LAPAROTOMY EXPLORATORY, SIGMOID COLECTOMY, SMALL BOWEL RESECTION,OSTOMY    Surgeon(s):  Karen Hernandez MD    Assistant:  Surgical Assistant: Magdi Baugh    Anesthesia: General    Estimated Blood Loss (mL): 50 mL    Complications: None    Specimens:   ID Type Source Tests Collected by Time Destination   A : RECTO SIGMOID Tissue Tissue SURGICAL PATHOLOGY Karen Hernandez MD 1/7/2023 0252    B : SMALL BOWEL Tissue Tissue SURGICAL PATHOLOGY Karen Hernandez MD 1/7/2023 0900        Implants:  * No implants in log *      Drains:   Closed/Suction Drain Medial RLQ Bulb (Active)       NG/OG/NJ/NE Tube Orogastric Center mouth (Active)   Securement device Tape 01/07/23 0730   Status Suction-low intermittent 01/07/23 0730   Placement Verified X-Ray (Initial) 01/07/23 0730   NG/OG/NJ/NE External Measurement (cm) 57 cm 01/07/23 0730   Drainage Appearance Nicole Gess; Coffee Ground 01/07/23 0730   Output (mL) 500 ml 01/07/23 0730       Colostomy LLQ (Active)       Urinary Catheter 01/07/23 Rick-Temperature (Active)   $ Urethral catheter insertion $ Not inserted for procedure 01/07/23 0730   Catheter Indications Need for fluid volume management of the critically ill patient in a critical care setting 01/07/23 0730   Site Assessment Pink 01/07/23 0730   Urine Color Yellow 01/07/23 0730   Urine Appearance Clear 01/07/23 0730   Collection Container Standard 01/07/23 0730   Securement Method Leg strap 01/07/23 0730   Catheter Care Completed Yes 01/07/23 0730   Status Draining 01/07/23 0730       Findings: As above    Electronically signed by Chauncey Coreas MD on 1/7/2023 at 9:50 AM

## 2023-01-07 NOTE — PROGRESS NOTES
Micro lab called to report positive blood Cx for eColi. Patient is on Zosyn which will provide coverage for this micro organism. Continue to monitor for C&S data and the potential for De-escalation as appropriate.    NINO Ulloa [facility] PharmD 1/7/2023 4:26 PM

## 2023-01-07 NOTE — ED PROVIDER NOTES
Emergency Department Provider Note  [facility] EMERGENCY DEPARTMENT  1/6/2023     Patient Identification  Shameka Hawkins is a 80 y.o. female    Chief Complaint  Abdominal Pain (RLQ pain starting approx 2 hrs ago, denies n/v/d. Pt took 1g tylenol for pain approx 1 hr ago. )          HPI  (History provided by patient and family member patient and daughter)  59-year-old female, full code, presents with constant lower abd pain since 8pm today. Here with daughter who reports that she complained of some mild symptoms in the lower abdomen since around [place]. Daughter reports she did have a mechanical fall around [place] but denied any injury to pelvis or hips. She has been ambulatory with no issues since then. No exacerbating relieving factors. Denies any urinary symptoms. Denies any stool changes or blood. Denies any back pain. No upper abdominal or chest pain. I have reviewed the following nursing documentation:  Allergies: Allergies   Allergen Reactions    Morphine Nausea And Vomiting       Past medical history:  has a past medical history of A-fib ([address] Acid reflux, and Hypertension. 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 Home medications:   Prior to Admission medications    Medication Sig Start Date End Date Taking?  Authorizing Provider   citalopram (CELEXA) 20 MG tablet TAKE 1 AND 1/2 TABLETS BY MOUTH EVERY DAY 9/26/22   Historical Provider, MD   warfarin (COUMADIN) 5 MG tablet Take 1 tablet by mouth 4/23/20   Historical Provider, MD   metoprolol (LOPRESSOR) 25 MG tablet Take 25 mg by mouth once     Historical Provider, MD       Social history:  reports that she quit smoking about 21 years ago. Her smoking use included cigarettes. She has never used smokeless tobacco. She reports current alcohol use. She reports that she does not use drugs. Family history:  History reviewed. No pertinent family history. Exam  ED Triage Vitals   BP Temp Temp src Pulse Resp SpO2 Height Weight   -- -- -- -- -- -- -- --       Physical Exam  Vitals and nursing note reviewed. Constitutional:       General: She is not in acute distress. Appearance: She is well-developed. HENT:      Head: Normocephalic and atraumatic. Nose: Nose normal. No congestion. Eyes:      General: No scleral icterus. Extraocular Movements: Extraocular movements intact. Cardiovascular:      Rate and Rhythm: Normal rate and regular rhythm. Heart sounds: No murmur heard. Pulmonary:      Effort: Pulmonary effort is normal.      Breath sounds: Normal breath sounds. Abdominal:      General: There is no distension. Palpations: Abdomen is soft. Tenderness: There is abdominal tenderness. There is guarding. There is no right CVA tenderness or left CVA tenderness. Musculoskeletal:         General: No deformity. Normal range of motion. Cervical back: Normal range of motion and neck supple. Skin:     General: Skin is warm. Findings: No rash. Neurological:      Mental Status: She is alert and oriented to person, place, and time. Motor: No abnormal muscle tone.       Coordination: Coordination normal.   Psychiatric:         Mood and Affect: Mood normal.         Behavior: Behavior normal.         MDM/ED Course    ED Medication Orders (From admission, onward)      Start Ordered     Status Ordering Provider    01/06/23 2313 01/06/23 2254  magnesium sulfate 2000 mg in 50 mL IVPB premix  ONCE         Last MAR action: New Bag - by Radha Hawkins on 01/06/23 at [facility]    01/06/23 2245 01/06/23 2227  lactated ringers infusion  CONTINUOUS Acknowledged KATHI ZEE    01/06/23 2245 01/06/23 2227  piperacillin-tazobactam (ZOSYN) 3,375 mg in sodium chloride 0.9 % 100 mL IVPB (mini-bag)  ONCE        Question:  Antimicrobial Indications  Answer:  Intra-Abdominal Infection    Last MAR action: New Bag - by Chun Roblero on 01/06/23 at 2247 KATHI ZEE    01/06/23 2215 01/06/23 2145  fentaNYL (SUBLIMAZE) injection 25 mcg  ONCE         Last MAR action: Given - by Chun Roblero on 01/06/23 at 2146 KATHI ZEE    01/06/23 2139 01/06/23 2140  iopamidol (ISOVUE-370) 76 % injection 75 mL  IMG ONCE PRN         Last MAR action: Given - by Lianne Hyde on 01/06/23 at 2150 KATHI ZEE    01/06/23 2130 01/06/23 2112  ondansetron (ZOFRAN) injection 4 mg  ONCE         Last MAR action: Given - by Chun Roblero on 01/06/23 at 2105 KATHI ZEE            EKG  Rhythm is A. fib  Rate is approximately 90  Axis is normal  Conduction Abnormalities A. fib  No STEMI criteria  Qtc is prolonged 525      Radiology  CT ABDOMEN PELVIS W IV CONTRAST Additional Contrast? None    Result Date: 1/6/2023  EXAMINATION: CT OF THE ABDOMEN AND PELVIS WITH CONTRAST 1/6/2023 9:39 pm TECHNIQUE: CT of the abdomen and pelvis was performed with the administration of intravenous contrast. Multiplanar reformatted images are provided for review. Automated exposure control, iterative reconstruction, and/or weight based adjustment of the mA/kV was utilized to reduce the radiation dose to as low as reasonably achievable. COMPARISON: PET-CT 12/22/2022 HISTORY: ORDERING SYSTEM PROVIDED HISTORY: abd pain TECHNOLOGIST PROVIDED HISTORY: Reason for exam:->abd pain Additional Contrast?->None Decision Support Exception - unselect if not a suspected or confirmed emergency medical condition->Emergency Medical Condition (MA) Reason for Exam: abd pain FINDINGS: Lower Chest: No acute findings. Subsegmental atelectasis or scarring in the medial right lung base.  Organs: Cholecystectomy. The liver, pancreas, spleen, adrenals and kidneys reveal no acute findings. Benign-appearing subcentimeter hypoattenuating liver lesions are noted, favoring cysts. GI/Bowel: Mild inflammatory change in the root of the mesentery is noted with extraluminal gas and fecal material noted. There is also a moderate amount of gas and fecal material immediately adjacent to the sigmoid colon, which is not clearly contain by the bowel wall. Diverticulosis is present in this area however there is no discrete wall thickening or definable mass identified. No evidence for bowel obstruction. Small volume abdominopelvic free fluid without loculated fluid collection identified. Normal appendix. Pelvis: Contracted bladder. Peritoneum/Retroperitoneum: Trace amount of free air in the anterior abdomen and central mesentery, as described above. Small volume abdominopelvic ascites without loculated fluid collection. No enlarged or suspicious-appearing lymph nodes. The aorta is normal in caliber. The visceral branches are appropriately opacified. Bones/Soft Tissues: No abnormality identified. *Unless otherwise specified, incidental findings do not require dedicated imaging follow-up. 1.  Small volume pneumoperitoneum and extraluminal fecal material appears to arise from the sigmoid colon, presumably as a complication of diverticulitis. 2.  Small volume abdominopelvic ascites. No loculated fluid collection identified. Findings were discussed with Jaren Espinoza at 10:25 pm on 1/6/2023. Bedside Ultrasound  No results found.        Labs  Results for orders placed or performed during the hospital encounter of 01/06/23   CBC with Auto Differential   Result Value Ref Range    WBC 2.6 (L) 4.0 - 11.0 K/uL    RBC 4.73 4.00 - 5.20 M/uL    Hemoglobin 12.9 12.0 - 16.0 g/dL    Hematocrit 39.0 36.0 - 48.0 %    MCV 82.6 80.0 - 100.0 fL    MCH 27.4 26.0 - 34.0 pg    MCHC 33.1 31.0 - 36.0 g/dL    RDW 15.0 12.4 - 15.4 % Platelets 509 242 - 287 K/uL    MPV 6.5 5.0 - 10.5 fL    Neutrophils % 54.8 %    Lymphocytes % 31.4 %    Monocytes % 10.5 %    Eosinophils % 2.5 %    Basophils % 0.8 %    Neutrophils Absolute 1.4 (L) 1.7 - 7.7 K/uL    Lymphocytes Absolute 0.8 (L) 1.0 - 5.1 K/uL    Monocytes Absolute 0.3 0.0 - 1.3 K/uL    Eosinophils Absolute 0.1 0.0 - 0.6 K/uL    Basophils Absolute 0.0 0.0 - 0.2 K/uL   CMP w/ Reflex to MG   Result Value Ref Range    Sodium 143 136 - 145 mmol/L    Potassium reflex Magnesium 3.8 3.5 - 5.1 mmol/L    Chloride 106 99 - 110 mmol/L    CO2 25 21 - 32 mmol/L    Anion Gap 12 3 - 16    Glucose 121 (H) 70 - 99 mg/dL    BUN 15 7 - 20 mg/dL    Creatinine 0.9 0.6 - 1.2 mg/dL    Est, Glom Filt Rate >60 >60    Calcium 10.3 8.3 - 10.6 mg/dL    Total Protein 6.8 6.4 - 8.2 g/dL    Albumin 3.6 3.4 - 5.0 g/dL    Albumin/Globulin Ratio 1.1 1.1 - 2.2    Total Bilirubin <0.2 0.0 - 1.0 mg/dL    Alkaline Phosphatase 148 (H) 40 - 129 U/L    ALT 13 10 - 40 U/L    AST 22 15 - 37 U/L   Lipase   Result Value Ref Range    Lipase 33.0 13.0 - 60.0 U/L   Urinalysis with Reflex to Culture    Specimen: Urine   Result Value Ref Range    Color, UA DARK YELLOW (A) Straw/Yellow    Clarity, UA SL CLOUDY (A) Clear    Glucose, Ur Negative Negative mg/dL    Bilirubin Urine SMALL (A) Negative    Ketones, Urine TRACE (A) Negative mg/dL    Specific Gravity, UA >=1.030 1.005 - 1.030    Blood, Urine TRACE-INTACT (A) Negative    pH, UA 5.5 5.0 - 8.0    Protein, UA Negative Negative mg/dL    Urobilinogen, Urine 0.2 <2.0 E.U./dL    Nitrite, Urine Negative Negative    Leukocyte Esterase, Urine TRACE (A) Negative    Microscopic Examination YES     Urine Type NotGiven     Urine Reflex to Culture Yes    Protime-INR   Result Value Ref Range    Protime 30.6 (H) 11.7 - 14.5 sec    INR 2.92 (H) 0.87 - 1.14   Microscopic Urinalysis   Result Value Ref Range    WBC, UA 10-20 (A) 0 - 5 /HPF    RBC, UA 5-10 (A) 0 - 4 /HPF    Epithelial Cells, UA 2-5 0 - 5 /HPF Bacteria, UA 2+ (A) None Seen /HPF    Crystals, UA 2+ Ca. Oxalate (A) None Seen /HPF   EKG 12 Lead   Result Value Ref Range    Ventricular Rate 89 BPM    Atrial Rate 73 BPM    QRS Duration 88 ms    Q-T Interval 432 ms    QTc Calculation (Bazett) 525 ms    P Axis 76 degrees    R Axis -41 degrees    T Axis 122 degrees    Diagnosis       Undetermined rhythmLeft axis deviationNonspecific ST and T wave abnormalityAbnormal ECGWhen compared with ECG of 03-DEC-2022 12:17,Current undetermined rhythm precludes rhythm comparison, needs reviewNonspecific T wave abnormality no longer evident in Inferior leadsQT has lengthened           Procedures  Central Line    Date/Time: 1/7/2023 2:04 AM  Performed by: Nathanael Woods MD  Authorized by: Nathanael Woods MD     Consent:     Consent obtained:  Verbal    Consent given by:  Patient and guardian    Risks discussed:  Arterial puncture, incorrect placement, nerve damage, infection and bleeding    Alternatives discussed:  Delayed treatment  Universal protocol:     Procedure explained and questions answered to patient or proxy's satisfaction: yes      Relevant documents present and verified: yes    Pre-procedure details:     Indication(s): central venous access    Procedure details:     Location:  L femoral    Site selection rationale:  INR 2.9    Patient position:  Supine    Procedural supplies:  Triple lumen    Ultrasound guidance: yes      Number of attempts:  1    Successful placement: no    Comments:      Attempted to place a left femoral central line. Wire was introduced and confirmed by ultrasound. Unfortunately while dilating the guidewire did kink. I was unable to successfully place the catheter over the guidewire. Decision was made to abort the procedure and pressure was held constantly for 2 to 3 minutes and appears hemostatic. Transport is currently here to transport the patient to [facility] so procedure was not reattempted.       Patient seen and evaluated. Relevant records reviewed, specifically any notes available regarding A. fib. - Patient is 80 y.o. female presented for abdominal pain and severe acute in setting of multiple chronic medical conditions. - Exam showed tender abdomen with guarding. She is remained hemodynamically stable. Her EKG shows A. fib with no RVR. There is also QT prolongation at 530. I have ordered 2 g of IV magnesium for this. Her lab work does not show any significant derangements. She does have evidence of perforated viscus secondary to sigmoid diverticulitis. I discussed the case with general surgery Dr. Rayray Magallon who recommends admitting to [facility] hospitalist service. We discussed reversal of INR which resulted at 2.9. Decision was made to hold until patient arrives at [facility] for possible FFP. We do not have this available at [facility]. I did consider it however. She is required multiple reassessments for pain. Thus far has taken only fentanyl small dose. I have paged hospitalist service for admission.  - Patient was placed on telemetry during his/her ED stay and no malignant dysrhythmia observed.         - Patient was given    ED Medication Orders (From admission, onward)      Start Ordered     Status Ordering Provider    01/06/23 2315 01/06/23 2254  magnesium sulfate 2000 mg in 50 mL IVPB premix  ONCE         Last MAR action: New Bag - by Milan Landry on 01/06/23 at KATHI [place]    01/06/23 2245 01/06/23 2227  lactated ringers infusion  CONTINUOUS         Acknowledged KATHI ZEE    01/06/23 2245 01/06/23 2227  piperacillin-tazobactam (ZOSYN) 3,375 mg in sodium chloride 0.9 % 100 mL IVPB (mini-bag)  ONCE        Question:  Antimicrobial Indications  Answer:  Intra-Abdominal Infection    Last MAR action: New Bag - by Milan Landry on 01/06/23 at 2247 KATHI ZEE    01/06/23 2215 01/06/23 2145  fentaNYL (SUBLIMAZE) injection 25 mcg  ONCE         Last MAR action: Given - by Milan Landry on 01/06/23 at 2146 KATHI ZEE    01/06/23 2139 01/06/23 2140  iopamidol (ISOVUE-370) 76 % injection 75 mL  IMG ONCE PRN         Last MAR action: Given - by Edgar Diehl on 01/06/23 at 2150 KATHI ZEE    01/06/23 2130 01/06/23 2112  ondansetron (ZOFRAN) injection 4 mg  ONCE         Last MAR action: Given - by Milan Landry on 01/06/23 at 2105 KATHI ZEE            Upon reassessment, Brittni Pearl said modest improvement of pain. Social Determinants of Health: Elderly    - I discussed the results with patient and family member patient and daughter. We agreed to admit  - Incidental findings of  also discussed. - At this point I do not see indication for further work-up in the ER, as it is unlikely  and poses more risk than benefit. - Return precautions also discussed. patient verbalized understanding of care daniel. Clinical Impression:  1. Diverticulitis of colon    2. Perforated viscus    3. Prolonged Q-T interval on ECG          Disposition:  Admit to PCU in critical condition. Blood pressure 129/89, pulse 80, temperature 97.7 °F (36.5 °C), temperature source Oral, resp. rate 15, height 5' 7\" (1.702 m), weight 165 lb (74.8 kg), SpO2 95 %, not currently breastfeeding. Patient was given scripts for the following medications. I counseled patient how to take these medications. New Prescriptions    No medications on file     Modified Medications    No medications on file       Disposition referral (if applicable):  No follow-up provider specified. [place], am the primary attending of record and contributed the majority of evaluation and treatment of emergent care for this encounter. Total critical care time is 35 minutes, which excludes separately billable procedures and updating family.  Time spent is specifically for management of the presenting complaint and symptoms initially, direct bedside care, reevaluation, review of records, and consultation. There was a high probability of clinically significant life-threatening deterioration in the patient's condition, which required my urgent intervention. This chart was generated in part by using Dragon Dictation system and may contain errors related to that system including errors in grammar, punctuation, and spelling, as well as words and phrases that may be inappropriate. If there are any questions or concerns please feel free to contact the dictating provider for clarification.      Gini Busch MD  [id number] MD  01/06/23 [address] Prashant Delcid MD  01/07/23 [id number]

## 2023-01-07 NOTE — ED NOTES
Placed pt on bedside commode, daughter remained at side in room. Call for help from daughter immediately after leaving. Upon return pt was still sitting on commode but leaned to left side, non-verbal, and vision not focused. 2 staff at sides of pt, waiting less than one minute pt returned verbal greeting and was able to stand with 2 person assist to pivot back to bed. Pt completely undressed, placed into gown, and pure wick inserted. Pt and daughter educated to [facility] and that we would not have pt ambulate again for a while. Understanding and okay with POC at this time, MD updated.       Chantel Schmitt, RN  01/06/23 2888

## 2023-01-07 NOTE — ED NOTES
Verbal handoff report given to Mary Kay Espinoza RN, POC transferred at this time. All question answered, patient stable without distress.       Joseph Carranza RN  01/06/23 [id number]

## 2023-01-07 NOTE — PROGRESS NOTES
Dr. Halina Hernandez at bedside for intubation. 0255: 20mg Etomidate administered. 0257: 40mg Rocuronium administered. 0257: Coffee ground emesis noted at airway, suctioned. 0258: #8.0 ETT passed through vocal cords to the 23 cm lip line by Dr. Halina Hernandez. Good color change noted, bilateral breath sounds auscultated. 0259: OG passed to 60cm line by Dr. Halina Hernandez and secured to the ET tube.

## 2023-01-07 NOTE — PROGRESS NOTES
Critical results called for positive blood cultures, see results review. Md notified. Pt  currently on flagyl and zosyn.

## 2023-01-07 NOTE — PROCEDURES
Pt was emergently intubated secondary resp distress, resp fail, preemptive for OR. Pt was sedated with 20 mg etomidate and 40 mg rocuronium. Pt was intubated in a single attempt using a Glide scope with a size 3 MAC in place. The tip of a size 8 ETT was inserted between the cords and then advanced off of the stylette into the air way to 23 cm from the lip. Cuff was inflated and secured. Good CO2 color change and bilateral breath sounds auscultated. Vent settings provided to respiratory staff. OG was passed to 60 cm from the lip. CXR ordered and pending to verify tube placement.

## 2023-01-07 NOTE — ANESTHESIA PRE PROCEDURE
Department of Anesthesiology  Preprocedure Note       Name:  Kathe Qureshi   Age:  80 y.o.  :  4/3/1932                                          MRN:  [id number]         Date:  2023      Surgeon: Manasa Martin):  William Andrews MD    Procedure: Procedure(s):  LAPAROTOMY EXPLORATORY,BOWEL RESECTION,POSSIBLE OSTOMY    Medications prior to admission:   Prior to Admission medications    Medication Sig Start Date End Date Taking?  Authorizing Provider   citalopram (CELEXA) 20 MG tablet TAKE 1 AND 1/2 TABLETS BY MOUTH EVERY DAY 22   Historical Provider, MD   warfarin (COUMADIN) 5 MG tablet Take 1 tablet by mouth 20   Historical Provider, MD   metoprolol (LOPRESSOR) 25 MG tablet Take 25 mg by mouth once     Historical Provider, MD       Current medications:    Current Facility-Administered Medications   Medication Dose Route Frequency Provider Last Rate Last Admin    HYDROmorphone (DILAUDID) injection 0.25 mg  0.25 mg IntraVENous Q4H PRN Kana Armstrong MD        Or    HYDROmorphone (DILAUDID) injection 0.5 mg  0.5 mg IntraVENous Q4H PRN Kana Armstrong MD        prochlorperazine (COMPAZINE) injection 10 mg  10 mg IntraVENous Q6H PRN Kana Armstrong MD   10 mg at 23 0102    prochlorperazine (COMPAZINE) injection 10 mg  10 mg IntraVENous Once Cassidy Aguilar MD        piperacillin-tazobactam (ZOSYN) 3,375 mg in sodium chloride 0.9 % 100 mL IVPB extended infusion (mini-bag)  3,375 mg IntraVENous Q8H Kana Armstrong MD        sodium chloride flush 0.9 % injection 5-40 mL  5-40 mL IntraVENous 2 times per day Kana Armstrong MD        sodium chloride flush 0.9 % injection 5-40 mL  5-40 mL IntraVENous PRN Kana Armstrong MD        0.9 % sodium chloride infusion   IntraVENous PRN Kana Armstrong MD        [Held by provider] enoxaparin (LOVENOX) injection 40 mg  40 mg SubCUTAneous Daily Kana Armstrong MD        acetaminophen (TYLENOL) tablet 650 mg  650 mg Oral Q6H PRN Miguel Gross MD        Or   Geoffrey Marsh acetaminophen (TYLENOL) suppository 650 mg  650 mg Rectal Q6H PRN Miguel Gross MD        norepinephrine (LEVOPHED) 16 mg in dextrose 5 % 250 mL infusion  2-100 mcg/min IntraVENous Continuous Miguel Gross MD 9.4 mL/hr at 01/07/23 0638 10 mcg/min at 01/07/23 5283    propofol 1000 MG/100ML injection             propofol injection  5-50 mcg/kg/min IntraVENous Continuous Miguel Gross MD 18 mL/hr at 01/07/23 0638 40 mcg/kg/min at 01/07/23 0638    chlorhexidine (PERIDEX) 0.12 % solution 15 mL  15 mL Mouth/Throat BID Miguel Gross MD        midazolam (VERSED) injection 2 mg  2 mg IntraVENous Q1H PRN Miguel Gross MD   2 mg at 01/07/23 0431    fentaNYL (SUBLIMAZE) injection 25 mcg  25 mcg IntraVENous Q1H PRN Miguel Gross MD        albuterol sulfate HFA (PROVENTIL;VENTOLIN;PROAIR) 108 (90 Base) MCG/ACT inhaler 4 puff  4 puff Inhalation Q4H PRN Miguel Gross MD        pantoprazole (PROTONIX) injection 40 mg  40 mg IntraVENous Daily Miguel Gross MD        prothrombin complex concentrate (human) [facility]) infusion 1,870 Units  25 Units/kg IntraVENous Once Joe Reyes MD        Followed by   Geoffrey Marsh 0.9 % sodium chloride infusion  50 mL IntraVENous Once Joe Reyes MD        mupirocin [facility]) 2 % ointment   Nasal BID Joe Reyes MD        lactated ringers infusion   IntraVENous Continuous Joe Reyes MD   Stopped at 01/07/23 0141    0.9 % sodium chloride infusion   IntraVENous PRN Agata Aguilar MD           Allergies:     Allergies   Allergen Reactions    Morphine Nausea And Vomiting       Problem List:    Patient Active Problem List   Diagnosis Code    Pelvic mass R19.00    Uterine fibroid D25.9    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    Chronic diarrhea of unknown origin K52.9    Abdominal pain, RLQ R10.31    Abdominal fullness in right lower quadrant R19.8    Neoplasm of uncertain behavior of skin D48.5    Perforated diverticulum K57.80    Septic shock (HCC) A41.9, R65.21    Acute hypoxemic respiratory failure (HCC) J96.01    COVID-19 U07.1    Coagulopathy (HCC) D68.9       Past Medical History:        Diagnosis Date    A-fib ([address]     Acid reflux     Hypertension        Past Surgical History:        Procedure Laterality Date    ANKLE FRACTURE SURGERY      LEFT ANKLE    APPENDECTOMY      ARM SURGERY Right 2020    EXCISION OF SKIN CANCER RIGHT ARM performed by Shilpi Rowell MD at [address] [place] [address] Drive COLONOSCOPY  2017    cecal polyp; random colon biopsies    FOOT AMPUTATION Right 2022    AMPUTATION OF SECOND TOE RIGHT FOOT performed by Syed Wiggins DPM at [facility] (CERVIX STATUS UNKNOWN)      OTHER SURGICAL HISTORY Left 2016    EXCISION LESION LEFT UPPER EXTREMITY LEFT BACK AND RIGHT SHOULDER    UPPER GASTROINTESTINAL ENDOSCOPY  09/15/2017    dilated, biopsies    VULVA SURGERY      x 2        Social History:    Social History     Tobacco Use    Smoking status: Former     Types: Cigarettes     Quit date:      Years since quittin.0    Smokeless tobacco: Never    Tobacco comments:     smoked 7 yrs 1ppd x 10 years   Substance Use Topics    Alcohol use: Yes     Comment: Special occasions                                Counseling given: Not Answered  Tobacco comments: smoked 7 yrs 1ppd x 10 years      Vital Signs (Current):   Vitals:    23 0549 23 0557 23 0600 23 0615   BP: (!) 79/48 98/67 97/81 (!) 88/58   Pulse: (!) 103 (!) 114 (!) 123 (!) 120   Resp:    Temp:       TempSrc:       SpO2:  98% 97% 97%   Weight:       Height:                                                  BP Readings from Last 3 Encounters:   23 (!) 88/58   22 (!) 144/71   22 (!) 155/91       NPO Status:                                                                                 BMI:   Wt Readings from Last 3 Encounters:   01/06/23 165 lb (74.8 kg)   12/03/22 165 lb (74.8 kg)   11/16/22 165 lb (74.8 kg)     Body mass index is 25.84 kg/m². CBC:   Lab Results   Component Value Date/Time    WBC 2.3 01/07/2023 02:10 AM    RBC 4.80 01/07/2023 02:10 AM    HGB 13.2 01/07/2023 02:10 AM    HCT 41.5 01/07/2023 02:10 AM    MCV 86.4 01/07/2023 02:10 AM    RDW 15.3 01/07/2023 02:10 AM     01/07/2023 02:10 AM       CMP:   Lab Results   Component Value Date/Time     01/06/2023 09:08 PM    K 3.8 01/06/2023 09:08 PM     01/06/2023 09:08 PM    CO2 25 01/06/2023 09:08 PM    BUN 15 01/06/2023 09:08 PM    CREATININE 0.9 01/06/2023 09:08 PM    GFRAA >60 08/04/2020 12:22 PM    GFRAA >60 01/20/2013 12:26 PM    AGRATIO 1.1 01/06/2023 09:08 PM    LABGLOM >60 01/06/2023 09:08 PM    GLUCOSE 121 01/06/2023 09:08 PM    PROT 6.8 01/06/2023 09:08 PM    PROT 7.5 01/20/2013 12:26 PM    CALCIUM 10.3 01/06/2023 09:08 PM    BILITOT <0.2 01/06/2023 09:08 PM    ALKPHOS 148 01/06/2023 09:08 PM    AST 22 01/06/2023 09:08 PM    ALT 13 01/06/2023 09:08 PM       POC Tests: No results for input(s): POCGLU, POCNA, POCK, POCCL, POCBUN, POCHEMO, POCHCT in the last 72 hours.     Coags:   Lab Results   Component Value Date/Time    PROTIME 22.1 01/07/2023 04:04 AM    INR 1.95 01/07/2023 04:04 AM    APTT 30.6 01/07/2023 04:04 AM       HCG (If Applicable): No results found for: PREGTESTUR, PREGSERUM, HCG, HCGQUANT     ABGs:   Lab Results   Component Value Date/Time    PHART 7.400 01/07/2023 06:36 AM    PO2ART 103.2 01/07/2023 06:36 AM    LKA2APL 29.1 01/07/2023 06:36 AM    ZVX9EMT 17.6 01/07/2023 06:36 AM    BEART -5.9 01/07/2023 06:36 AM    V0YSHBEU 97.8 01/07/2023 06:36 AM        Type & Screen (If Applicable):  Lab Results   Component Value Date    LABABO A 07/06/2010    LABRH Positive 07/06/2010       Drug/Infectious Status (If Applicable):  No results found for: HIV, HEPCAB    COVID-19 Screening (If Applicable):   Lab Results Component Value Date/Time    COVID19 DETECTED 2023 10:44 PM    COVID19 NOT DETECTED 2020 08:24 AM           Anesthesia Evaluation  Patient summary reviewed and Nursing notes reviewed no history of anesthetic complications:   Airway: Mallampati: Unable to assess / NA     Neck ROM: full    Intubated Dental:          Pulmonary:Negative Pulmonary ROS and normal exam                              ROS comment: Resp failure on vent   Cardiovascular:Negative CV ROS    (+) hypertension:, dysrhythmias: atrial fibrillation,                ROS comment: sepsis     Neuro/Psych:   Negative Neuro/Psych ROS              GI/Hepatic/Renal: Neg GI/Hepatic/Renal ROS  (+) GERD:,      (-) hiatal hernia       Endo/Other: Negative Endo/Other ROS                    Abdominal:             Vascular: Other Findings:           Anesthesia Plan      general     ASA 4 - emergent     (PIV and central line, general anesthesia, IV Narcotics, return to ICU.  )  Induction: intravenous. Pre-Operative Diagnosis: Diverticulitis of colon [K57.32]; Prolonged Q-T interval on ECG [R94.31]; Perforated viscus [R19.8]; Perforated diverticulum [K57.80]    80 y.o.   BMI:  Body mass index is 25.84 kg/m².      Vitals:    23 0549 23 0557 23 0600 23 0615   BP: (!) 79/48 98/67 97/81 (!) 88/58   Pulse: (!) 103 (!) 114 (!) 123 (!) 120   Resp: 25 22 22 26   Temp:       TempSrc:       SpO2:  98% 97% 97%   Weight:       Height:           Allergies   Allergen Reactions    Morphine Nausea And Vomiting       Social History     Tobacco Use    Smoking status: Former     Types: Cigarettes     Quit date:      Years since quittin.0    Smokeless tobacco: Never    Tobacco comments:     smoked 7 yrs 1ppd x 10 years   Substance Use Topics    Alcohol use: Yes     Comment: Special occasions       LABS:    CBC  Lab Results   Component Value Date/Time    WBC 2.3 (L) 2023 02:10 AM    HGB 13.2 2023 02:10 AM    HCT 41.5 01/07/2023 02:10 AM     01/07/2023 02:10 AM     RENAL  Lab Results   Component Value Date/Time     01/06/2023 09:08 PM    K 3.8 01/06/2023 09:08 PM     01/06/2023 09:08 PM    CO2 25 01/06/2023 09:08 PM    BUN 15 01/06/2023 09:08 PM    CREATININE 0.9 01/06/2023 09:08 PM    GLUCOSE 121 (H) 01/06/2023 09:08 PM     COAGS  Lab Results   Component Value Date/Time    PROTIME 22.1 (H) 01/07/2023 04:04 AM    INR 1.95 (H) 01/07/2023 04:04 AM    APTT 30.6 01/07/2023 04:04 AM         Rose Mary Macedo MD   1/7/2023

## 2023-01-07 NOTE — CONSULTS
[facility]      Department of General Surgery Consult    PATIENT NAME: Lauren Brady OF BIRTH: 4/3/1932    ADMISSION DATE: 1/6/2023  8:46 PM      TODAY'S DATE: 1/7/2023    Reason for Consult:  Septic shock    Requesting Physician:  Hospitalist     HISTORY OF PRESENT ILLNESS:              The patient is a 80 y.o. female who presented with severe, sharp lower abdominal pain that started yesterday. She is intubated in the ICU on pressors. History obtained from her daughter with whom the patient lives. The patient fell on [place] and was thought to be having some residual pain from the fall. Things worsened yesterday with no alleviating or modifying factors. She had associated nausea. Last colonoscopy was a few years ago with benign polyps removed.      Past Medical History:        Diagnosis Date    A-fib ([facility] [address]     Acid reflux     Hypertension        Past Surgical History:        Procedure Laterality Date    ANKLE FRACTURE SURGERY      LEFT ANKLE    APPENDECTOMY      ARM SURGERY Right 08/04/2020    EXCISION OF SKIN CANCER RIGHT ARM performed by Daisha Moreira MD at [address] [place] [address]  07/12/2017    cecal polyp; random colon biopsies    FOOT AMPUTATION Right 11/09/2022    AMPUTATION OF SECOND TOE RIGHT FOOT performed by Jocelyne Gamble DPM at [facility] (CERVIX STATUS UNKNOWN)      OTHER SURGICAL HISTORY Left 04/20/2016    EXCISION LESION LEFT UPPER EXTREMITY LEFT BACK AND RIGHT SHOULDER    UPPER GASTROINTESTINAL ENDOSCOPY  09/15/2017    dilated, biopsies    VULVA SURGERY      x 2        Current Medications:   Current Facility-Administered Medications: HYDROmorphone (DILAUDID) injection 0.25 mg, 0.25 mg, IntraVENous, Q4H PRN **OR** HYDROmorphone (DILAUDID) injection 0.5 mg, 0.5 mg, IntraVENous, Q4H PRN  prochlorperazine (COMPAZINE) injection 10 mg, 10 mg, IntraVENous, Q6H PRN  prochlorperazine (COMPAZINE) injection 10 mg, 10 mg, IntraVENous, Once  piperacillin-tazobactam (ZOSYN) 3,375 mg in sodium chloride 0.9 % 100 mL IVPB extended infusion (mini-bag), 3,375 mg, IntraVENous, Q8H  sodium chloride flush 0.9 % injection 5-40 mL, 5-40 mL, IntraVENous, 2 times per day  sodium chloride flush 0.9 % injection 5-40 mL, 5-40 mL, IntraVENous, PRN  0.9 % sodium chloride infusion, , IntraVENous, PRN  [Held by provider] enoxaparin (LOVENOX) injection 40 mg, 40 mg, SubCUTAneous, Daily  acetaminophen (TYLENOL) tablet 650 mg, 650 mg, Oral, Q6H PRN **OR** acetaminophen (TYLENOL) suppository 650 mg, 650 mg, Rectal, Q6H PRN  norepinephrine (LEVOPHED) 16 mg in dextrose 5 % 250 mL infusion, 2-100 mcg/min, IntraVENous, Continuous  propofol 1000 MG/100ML injection, , ,   propofol injection, 5-50 mcg/kg/min, IntraVENous, Continuous  chlorhexidine (PERIDEX) 0.12 % solution 15 mL, 15 mL, Mouth/Throat, BID  midazolam (VERSED) injection 2 mg, 2 mg, IntraVENous, Q1H PRN  fentaNYL (SUBLIMAZE) injection 25 mcg, 25 mcg, IntraVENous, Q1H PRN  albuterol sulfate HFA (PROVENTIL;VENTOLIN;PROAIR) 108 (90 Base) MCG/ACT inhaler 4 puff, 4 puff, Inhalation, Q4H PRN  pantoprazole (PROTONIX) injection 40 mg, 40 mg, IntraVENous, Daily  prothrombin complex concentrate (human) ([facility]ENTRA) infusion 1,870 Units, 25 Units/kg, IntraVENous, Once **FOLLOWED BY** 0.9 % sodium chloride infusion, 50 mL, IntraVENous, Once  mupirocin (BACTROBAN) 2 % ointment, , Nasal, BID  lactated ringers infusion, , IntraVENous, Continuous  0.9 % sodium chloride infusion, , IntraVENous, PRN  Prior to Admission medications    Medication Sig Start Date End Date Taking?  Authorizing Provider   citalopram (CELEXA) 20 MG tablet TAKE 1 AND 1/2 TABLETS BY MOUTH EVERY DAY 9/26/22   Historical Provider, MD   warfarin (COUMADIN) 5 MG tablet Take 1 tablet by mouth 4/23/20   Historical Provider, MD   metoprolol (LOPRESSOR) 25 MG tablet Take 25 mg by mouth once     Historical Provider, MD        Allergies: Morphine    Social History:   TOBACCO:   reports that she quit smoking about 21 years ago. Her smoking use included cigarettes. She has never used smokeless tobacco.  ETOH:   reports current alcohol use. DRUGS:   reports no history of drug use. Family History:    History reviewed. No pertinent family history. REVIEW OF SYSTEMS:  Not obtainable due to patient's condition. PHYSICAL EXAM:  VITALS:  BP (!) 88/58   Pulse (!) 120   Temp 96.9 °F (36.1 °C)   Resp 26   Ht 5' 7\" (1.702 m)   Wt 165 lb (74.8 kg)   SpO2 97%   BMI 25.84 kg/m²     CONSTITUTIONAL:  Intubated, no apparent distress and normal weight  EYES:  sclera clear  ENT:  normocepalic, without obvious abnormality  NECK:  supple, symmetrical, trachea midline  LUNGS:  clear to auscultation  CARDIOVASCULAR:  irregularly irregular rhythm   ABDOMEN:    distended, tenderness noted diffusely,  and firm  MUSCULOSKELETAL:  Some pitting edema lower extremities  NEUROLOGIC:  Mental Status Exam:  Level of Alertness:   Intubated and sedated. SKIN:  no rashes    DATA:    CBC:   Recent Labs     01/06/23 2108 01/07/23  0210   WBC 2.6* 2.3*   HGB 12.9 13.2   HCT 39.0 41.5    317     BMP:    Recent Labs     01/06/23 2108      K 3.8      CO2 25   BUN 15   CREATININE 0.9   GLUCOSE 121*     Hepatic:   Recent Labs     01/06/23 2108   AST 22   ALT 13   BILITOT <0.2   ALKPHOS 148*       Radiology Review:  CT with extraluminal air and suspected  stool in the pelvis. ASSESSMENT:  Perforated colon  Septic shock  Lactic acidosis  Neutropenia  Covid positive  Anticoagulated for atrial fibrillation    PLAN:  Coagulopathy being reversed  IVF resuscitation has occurred  She is as optimized as possible for surgery. Explained to the daughter the magnitude and life threatening nature of her illness. Explained that she will have an ostomy. She understands that she may not survive surgery or this acute illness.    The diagnosis and recommended procedure were explained. Questions answered. Prepare for surgery. 44 minutes spent at the bedside, reviewing the chart, reviewing and interpreting labs and imaging and discussing with the treatment team to create a plan of care for this patient with life threatening illness.        Carmelo Cary MD     [facility]

## 2023-01-07 NOTE — PROGRESS NOTES
4 Eyes Skin Assessment     The patient is being assess for   Admission    I agree that 2 RN's have performed a thorough Head to Toe Skin Assessment on the patient. ALL assessment sites listed below have been assessed. Areas assessed for pressure by both nurses:   [x]   Head, Face, and Ears   [x]   Shoulders, Back, and Chest, Abdomen  [x]   Arms, Elbows, and Hands   [x]   Coccyx, Sacrum, and Ischium  [x]   Legs, Feet, and Heels        Skin Assessed Under all Medical Devices by both nurses:  ETT, de jesus, and OG              Pt has scabbed over scrpe on right knee and missing 2 nd toe on right foot      **SHARE this note so that the co-signing nurse is able to place an eSignature**    Co-signer eSignature: Electronically signed by Susannah Maldonado RN on 1/7/23 at 6:27 AM EST    Does the Patient have Skin Breakdown related to pressure?   No              Sarwat Prevention initiated:  Yes   Wound Care Orders initiated:  NA      [facility] nurse consulted for Pressure Injury (Stage 3,4, Unstageable, DTI, NWPT, Complex wounds)and New or Established Ostomies:  NA      Primary Nurse eSignature: Electronically signed by Beto Pulido RN on 1/7/23 at 6:21 AM EST

## 2023-01-07 NOTE — PROGRESS NOTES
Skin assessment complete, noted redness to gluteal folds, and coccyx, abrasion to right shin, bilateral red heels and elbows. 2 digit removed from right foot. Arms and legs floated with pillows, sacral heart applied, pt turned q 2 hours. Will monitor.

## 2023-01-07 NOTE — PROGRESS NOTES
01/07/23 0311   Patient Observation   Heart Rate 88   Resp 18   SpO2 100 %   Vent Information   Vent Mode AC/VC   $Ventilation $Initial Day   Ventilator Settings   FiO2  60 %   Vt (Set, mL) 370 mL   Resp Rate (Set) 18 bmp   PEEP/CPAP (cmH2O) 5   Vent Patient Data (Readings)   Vt (Measured) 365 mL   Peak Inspiratory Pressure (cmH2O) 16 cmH2O   Rate Measured 18 br/min   Minute Volume (L/min) 6.54 Liters   Peak Inspiratory Flow (lpm) 60 L/sec   Mean Airway Pressure (cmH2O) 7.7 cmH20   Plateau Pressure (cm H2O) 13 cm H2O   I:E Ratio 1:3.9   Flow Sensitivity 3 L/min   Vent Alarm Settings   High Pressure (cmH2O) 40 cmH2O   Low Minute Volume (lpm) 2.5 L/min   High Minute Volume (lpm) 20 L/min   Low Exhaled Vt (ml) 250 mL   High Exhaled Vt (ml) 1000 mL   RR High (bpm) 45 br/min   Apnea (secs) 20 secs   Additional Respiratoray Assessments   Humidification Source Heated wire   Humidification Temp 37   Skin Assessment   Skin Assessment Clean, dry, & intact

## 2023-01-07 NOTE — ANESTHESIA POSTPROCEDURE EVALUATION
Department of Anesthesiology  Postprocedure Note    Patient: aRdha Small  MRN: [id number]  YOB: 1932  Date of evaluation: 1/7/2023      Procedure Summary     Date: 01/07/23 Room / Location: [facility]"" / [facility]    Anesthesia Start: 0730 Anesthesia Stop: 1008    Procedure: LAPAROTOMY EXPLORATORY, SIGMOID COLECTOMY, SMALL BOWEL RESECTION,OSTOMY (Abdomen) Diagnosis:       Bowel perforation ([address]      (Perforated Bowel)    Surgeons: Gustabo Savage MD Responsible Provider: Job Vail MD    Anesthesia Type: general ASA Status: 4 - Emergent          Anesthesia Type: No value filed.     Tiffanie Phase I:      Tiffanie Phase II:        Anesthesia Post Evaluation    Comments: Postoperative Anesthesia Note    Name:    Radha Small  MRN:      [id number]    Patient Vitals in the past 12 hrs:  01/07/23 0730, BP:104/76, Pulse:(!) 109, Resp:20, SpO2:100 %  01/07/23 0720, BP:108/63, Pulse:(!) 137, Resp:30, SpO2:98 %  01/07/23 0715, BP:108/63, Pulse:(!) 135, Resp:27, SpO2:98 %  01/07/23 0700, BP:95/72, Pulse:(!) 137, Resp:26, SpO2:97 %  01/07/23 0655, BP:(!) 73/56, Pulse:(!) 136, Resp:28, SpO2:97 %  01/07/23 0645, BP:(!) 69/53, Pulse:(!) 128, Resp:23, SpO2:98 %  01/07/23 0630, BP:93/69, Pulse:(!) 118, Resp:26, SpO2:98 %  01/07/23 0615, BP:(!) 88/58, Pulse:(!) 120, Resp:26, SpO2:97 %  01/07/23 0600, BP:97/81, Pulse:(!) 123, Resp:22, SpO2:97 %  01/07/23 0557, BP:98/67, Pulse:(!) 114, Resp:22, SpO2:98 %  01/07/23 0549, BP:(!) 79/48, Pulse:(!) 103, Resp:25  01/07/23 0547, BP:(!) 66/53, Pulse:(!) 104, Resp:24  01/07/23 0530, BP:91/79, Pulse:(!) 112, Resp:24, SpO2:99 %  01/07/23 0515, BP:94/63, Pulse:(!) 114, Resp:22, SpO2:97 %  01/07/23 0500, BP:112/79, Pulse:(!) 109, Resp:22, SpO2:97 %  01/07/23 0445, BP:119/72, Temp:96.9 °F (36.1 °C), Pulse:(!) 111, Resp:20, SpO2:98 %  01/07/23 0436, BP:(!) 139/97, Pulse:(!) 111, Resp:18, SpO2:100 %  01/07/23 0430, BP:(!) 139/97, Pulse:(!) 110, Resp:30, SpO2:99 %  01/07/23 0427, BP:(!) 143/85, Pulse:(!) 110, Resp:26, SpO2:100 %  01/07/23 0422, BP:(!) 141/104, Pulse:(!) 102, Resp:(!) 31, SpO2:100 %  01/07/23 0415, BP:(!) 147/85, Pulse:99, Resp:27, SpO2:100 %  01/07/23 0408, BP:(!) 135/94, Temp:(!) 96.7 °F (35.9 °C), Pulse:96, Resp:28, SpO2:100 %  01/07/23 0402, BP:(!) 158/87, Pulse:(!) 102, Resp:24, SpO2:100 %  01/07/23 0357, BP:(!) 175/105, Pulse:(!) 123, Resp:23, SpO2:100 %  01/07/23 0352, BP:(!) 168/88, Pulse:(!) 123, Resp:20, SpO2:100 %  01/07/23 0347, BP:(!) 171/109, Temp:(!) 95.1 °F (35.1 °C), Temp src:Bladder, Pulse:(!) 124, Resp:18, SpO2:100 %  01/07/23 0342, BP:(!) 157/105, Pulse:(!) 116, Resp:19, SpO2:100 %  01/07/23 0337, BP:(!) 163/106, Pulse:(!) 107, Resp:18, SpO2:100 %  01/07/23 0333, BP:(!) 161/104, Temp:98.1 °F (36.7 °C), Pulse:(!) 116, Resp:19, SpO2:100 %  01/07/23 0326, BP:(!) 170/98, Pulse:(!) 118, Resp:18, SpO2:100 %  01/07/23 0311, BP:105/74, Pulse:88, Resp:18, SpO2:100 %  01/07/23 0300, BP:95/61, Pulse:90, Resp:21, SpO2:99 %  01/07/23 0115, BP:122/82, Pulse:(!) 103, Resp:20  01/07/23 0100, BP:95/66, Pulse:94, Resp:23, SpO2:97 %  01/07/23 0030, BP:(!) 77/66  01/07/23 0010, BP:118/81, Pulse:94, Resp:16, SpO2:98 %  01/06/23 2256, BP:129/89, Pulse:80, Resp:15, SpO2:95 %     LABS:    CBC  Lab Results       Component                Value               Date/Time                  WBC                      0.4 (LL)            01/07/2023 07:50 AM        HGB                      10.7 (L)            01/07/2023 07:50 AM        HCT                      33.1 (L)            01/07/2023 07:50 AM        PLT                      265                 01/07/2023 07:50 AM   RENAL  Lab Results       Component                Value               Date/Time                  NA                       142                 01/07/2023 07:50 AM        K                        4.7                 01/07/2023 07:50 AM        CL                       110                 01/07/2023 07:50 AM        CO2                      17 (L)              01/07/2023 07:50 AM        BUN                      15                  01/07/2023 07:50 AM        CREATININE               0.8                 01/07/2023 07:50 AM        GLUCOSE                  197 (H)             01/07/2023 07:50 AM   COAGS  Lab Results       Component                Value               Date/Time                  PROTIME                  16.2 (H)            01/07/2023 07:50 AM        INR                      1.32 (H)            01/07/2023 07:50 AM        APTT                     30.6                01/07/2023 04:04 AM     Intake & Output:  @[id number]@    Nausea & Vomiting:  No    Level of Consciousness:  Sedated on vent    Pain Assessment:  Adequate analgesia    Anesthesia Complications:  No apparent anesthetic complications    SUMMARY      Vital signs stable  OK to discharge from Stage I post anesthesia care.   Care transferred from Anesthesiology department on discharge from perioperative area

## 2023-01-07 NOTE — PROGRESS NOTES
Pt sedation titrated down still only withdrawing from pain. When pt received fluid boluses HR remains NSR, without fluid she can be 120-130's, able to titrate levo down slightly. Total of 3 liters NS given today, urine output has dropped off last two hours. Still making 45-50 ml/hr. Abdominal site clean and intact, stoma dark red, bleeding noted. MICHELLE drain has moderate output of bloody drainage. See I&O for accuracy. Pt remains on 2 pressers see MAR. Levophed between 8-10 mcg/hr.

## 2023-01-07 NOTE — PROGRESS NOTES
0225 Pt was given 100 mg of Ketamine By Dr Linda Kincaid to help with relaxing from pain    0230 Pt's BP 59/81'N systolic, 1620 ml bolus of NS and 1000 ml bolus of LR started for pressure support while CVC was being placed. 0248 Pt was grunting in severe pain that pt's O2 saturation was effected, the decision to intubate was made to protect airway    See intubation note from [address] [place] [address] Street    0430  3 units of FFP complete APPT/INR  taken after 2 units completed. 2693 INR of 1.95 relay to Dr Linda Kincaid by phone. Stated he would call Dr Gil Daly    0425 pt was rolled and cleaned up from all procedures since admission and skin was assessed. Pt's BP then began to slowly drop and Levophed needed increase in rate    0640 Dr Gil Daly in to see pt and talk to pt's daughter    26 Report given to [address] RN from [address]. Care transferred at this time. Kcentra gtt given to them, due to the need for a timed lab draw after infusion.     0728 Pt now off unit and on way to OR

## 2023-01-08 ENCOUNTER — APPOINTMENT (OUTPATIENT)
Dept: GENERAL RADIOLOGY | Age: 88
DRG: 853 | End: 2023-01-08
Payer: MEDICARE

## 2023-01-08 PROBLEM — K63.1 BOWEL PERFORATION (HCC): Status: ACTIVE | Noted: 2023-01-07

## 2023-01-08 LAB
ANION GAP SERPL CALCULATED.3IONS-SCNC: 12 MMOL/L (ref 3–16)
ATYPICAL LYMPHOCYTE RELATIVE PERCENT: 1 % (ref 0–6)
BANDED NEUTROPHILS RELATIVE PERCENT: 6 % (ref 0–7)
BASE EXCESS ARTERIAL: -11.9 MMOL/L (ref -3–3)
BASOPHILS ABSOLUTE: 0 K/UL (ref 0–0.2)
BASOPHILS RELATIVE PERCENT: 0 %
BUN BLDV-MCNC: 20 MG/DL (ref 7–20)
BURR CELLS: ABNORMAL
CALCIUM SERPL-MCNC: 7.2 MG/DL (ref 8.3–10.6)
CARBOXYHEMOGLOBIN ARTERIAL: 0.3 % (ref 0–1.5)
CHLORIDE BLD-SCNC: 109 MMOL/L (ref 99–110)
CO2: 13 MMOL/L (ref 21–32)
CREAT SERPL-MCNC: 1 MG/DL (ref 0.6–1.2)
EOSINOPHILS ABSOLUTE: 0 K/UL (ref 0–0.6)
EOSINOPHILS RELATIVE PERCENT: 0 %
GFR SERPL CREATININE-BSD FRML MDRD: 53 ML/MIN/{1.73_M2}
GLUCOSE BLD-MCNC: 144 MG/DL (ref 70–99)
GLUCOSE BLD-MCNC: 155 MG/DL (ref 70–99)
GLUCOSE BLD-MCNC: 158 MG/DL (ref 70–99)
GLUCOSE BLD-MCNC: 169 MG/DL (ref 70–99)
GLUCOSE BLD-MCNC: 178 MG/DL (ref 70–99)
GLUCOSE BLD-MCNC: 218 MG/DL (ref 70–99)
HCO3 ARTERIAL: 12.1 MMOL/L (ref 21–29)
HCT VFR BLD CALC: 35.6 % (ref 36–48)
HEMOGLOBIN, ART, EXTENDED: 13 G/DL (ref 12–16)
HEMOGLOBIN: 11.3 G/DL (ref 12–16)
INR BLD: 2.19 (ref 0.87–1.14)
LACTIC ACID: 5.9 MMOL/L (ref 0.4–2)
LYMPHOCYTES ABSOLUTE: 0.1 K/UL (ref 1–5.1)
LYMPHOCYTES RELATIVE PERCENT: 2 %
MCH RBC QN AUTO: 27.3 PG (ref 26–34)
MCHC RBC AUTO-ENTMCNC: 31.7 G/DL (ref 31–36)
MCV RBC AUTO: 85.9 FL (ref 80–100)
METAMYELOCYTES RELATIVE PERCENT: 5 %
METHEMOGLOBIN ARTERIAL: 0.3 %
MONOCYTES ABSOLUTE: 0.2 K/UL (ref 0–1.3)
MONOCYTES RELATIVE PERCENT: 5 %
NEUTROPHILS ABSOLUTE: 4.3 K/UL (ref 1.7–7.7)
NEUTROPHILS RELATIVE PERCENT: 81 %
O2 SAT, ARTERIAL: 95.4 %
O2 THERAPY: ABNORMAL
OVALOCYTES: ABNORMAL
PCO2 ARTERIAL: 23.5 MMHG (ref 35–45)
PDW BLD-RTO: 15.3 % (ref 12.4–15.4)
PERFORMED ON: ABNORMAL
PH ARTERIAL: 7.33 (ref 7.35–7.45)
PLATELET # BLD: 240 K/UL (ref 135–450)
PLATELET SLIDE REVIEW: ADEQUATE
PMV BLD AUTO: 7.6 FL (ref 5–10.5)
PO2 ARTERIAL: 80.1 MMHG (ref 75–108)
POTASSIUM SERPL-SCNC: 3.8 MMOL/L (ref 3.5–5.1)
PROTHROMBIN TIME: 24.3 SEC (ref 11.7–14.5)
RBC # BLD: 4.15 M/UL (ref 4–5.2)
SLIDE REVIEW: ABNORMAL
SODIUM BLD-SCNC: 134 MMOL/L (ref 136–145)
TCO2 ARTERIAL: 12.8 MMOL/L
TRIGL SERPL-MCNC: 177 MG/DL (ref 0–150)
URINE CULTURE, ROUTINE: NORMAL
WBC # BLD: 4.7 K/UL (ref 4–11)

## 2023-01-08 PROCEDURE — 99233 SBSQ HOSP IP/OBS HIGH 50: CPT | Performed by: INTERNAL MEDICINE

## 2023-01-08 PROCEDURE — 6360000002 HC RX W HCPCS: Performed by: INTERNAL MEDICINE

## 2023-01-08 PROCEDURE — 2500000003 HC RX 250 WO HCPCS: Performed by: SURGERY

## 2023-01-08 PROCEDURE — 2580000003 HC RX 258: Performed by: INTERNAL MEDICINE

## 2023-01-08 PROCEDURE — 2580000003 HC RX 258: Performed by: SURGERY

## 2023-01-08 PROCEDURE — 2000000000 HC ICU R&B

## 2023-01-08 PROCEDURE — 83036 HEMOGLOBIN GLYCOSYLATED A1C: CPT

## 2023-01-08 PROCEDURE — 71045 X-RAY EXAM CHEST 1 VIEW: CPT

## 2023-01-08 PROCEDURE — 85025 COMPLETE CBC W/AUTO DIFF WBC: CPT

## 2023-01-08 PROCEDURE — 80048 BASIC METABOLIC PNL TOTAL CA: CPT

## 2023-01-08 PROCEDURE — 6370000000 HC RX 637 (ALT 250 FOR IP): Performed by: SURGERY

## 2023-01-08 PROCEDURE — 94003 VENT MGMT INPAT SUBQ DAY: CPT

## 2023-01-08 PROCEDURE — 83605 ASSAY OF LACTIC ACID: CPT

## 2023-01-08 PROCEDURE — 2700000000 HC OXYGEN THERAPY PER DAY

## 2023-01-08 PROCEDURE — 6360000002 HC RX W HCPCS: Performed by: SURGERY

## 2023-01-08 PROCEDURE — 82803 BLOOD GASES ANY COMBINATION: CPT

## 2023-01-08 PROCEDURE — 94761 N-INVAS EAR/PLS OXIMETRY MLT: CPT

## 2023-01-08 PROCEDURE — C9113 INJ PANTOPRAZOLE SODIUM, VIA: HCPCS | Performed by: SURGERY

## 2023-01-08 PROCEDURE — 85610 PROTHROMBIN TIME: CPT

## 2023-01-08 PROCEDURE — 99291 CRITICAL CARE FIRST HOUR: CPT | Performed by: INTERNAL MEDICINE

## 2023-01-08 RX ORDER — SODIUM CHLORIDE, SODIUM LACTATE, POTASSIUM CHLORIDE, CALCIUM CHLORIDE 600; 310; 30; 20 MG/100ML; MG/100ML; MG/100ML; MG/100ML
INJECTION, SOLUTION INTRAVENOUS CONTINUOUS
Status: ACTIVE | OUTPATIENT
Start: 2023-01-08 | End: 2023-01-08

## 2023-01-08 RX ORDER — SODIUM CHLORIDE 9 MG/ML
INJECTION, SOLUTION INTRAVENOUS ONCE
Status: COMPLETED | OUTPATIENT
Start: 2023-01-08 | End: 2023-01-08

## 2023-01-08 RX ORDER — INSULIN LISPRO 100 [IU]/ML
0-4 INJECTION, SOLUTION INTRAVENOUS; SUBCUTANEOUS EVERY 4 HOURS
Status: DISCONTINUED | OUTPATIENT
Start: 2023-01-08 | End: 2023-01-26 | Stop reason: HOSPADM

## 2023-01-08 RX ORDER — SODIUM CHLORIDE 9 MG/ML
INJECTION, SOLUTION INTRAVENOUS CONTINUOUS
Status: DISCONTINUED | OUTPATIENT
Start: 2023-01-08 | End: 2023-01-08

## 2023-01-08 RX ORDER — DEXTROSE MONOHYDRATE 100 MG/ML
INJECTION, SOLUTION INTRAVENOUS CONTINUOUS PRN
Status: DISCONTINUED | OUTPATIENT
Start: 2023-01-08 | End: 2023-01-11 | Stop reason: ALTCHOICE

## 2023-01-08 RX ADMIN — AMIODARONE HYDROCHLORIDE 150 MG: 50 INJECTION, SOLUTION INTRAVENOUS at 06:34

## 2023-01-08 RX ADMIN — SODIUM CHLORIDE, POTASSIUM CHLORIDE, SODIUM LACTATE AND CALCIUM CHLORIDE: 600; 310; 30; 20 INJECTION, SOLUTION INTRAVENOUS at 12:21

## 2023-01-08 RX ADMIN — PROPOFOL 30 MCG/KG/MIN: 10 INJECTION, EMULSION INTRAVENOUS at 05:18

## 2023-01-08 RX ADMIN — MUPIROCIN: 20 OINTMENT TOPICAL at 08:39

## 2023-01-08 RX ADMIN — SODIUM CHLORIDE, PRESERVATIVE FREE 10 ML: 5 INJECTION INTRAVENOUS at 20:26

## 2023-01-08 RX ADMIN — PIPERACILLIN SODIUM,TAZOBACTAM SODIUM 3375 MG: 3; .375 INJECTION, POWDER, FOR SOLUTION INTRAVENOUS at 14:38

## 2023-01-08 RX ADMIN — CHLORHEXIDINE GLUCONATE 0.12% ORAL RINSE 15 ML: 1.2 LIQUID ORAL at 20:28

## 2023-01-08 RX ADMIN — VASOPRESSIN 0.03 UNITS/MIN: 20 INJECTION PARENTERAL at 05:17

## 2023-01-08 RX ADMIN — NOREPINEPHRINE BITARTRATE 16 MCG/MIN: 1 INJECTION, SOLUTION, CONCENTRATE INTRAVENOUS at 15:10

## 2023-01-08 RX ADMIN — AMIODARONE HYDROCHLORIDE 1 MG/MIN: 50 INJECTION, SOLUTION INTRAVENOUS at 06:48

## 2023-01-08 RX ADMIN — PROPOFOL 30 MCG/KG/MIN: 10 INJECTION, EMULSION INTRAVENOUS at 18:16

## 2023-01-08 RX ADMIN — PANTOPRAZOLE SODIUM 40 MG: 40 INJECTION, POWDER, FOR SOLUTION INTRAVENOUS at 08:38

## 2023-01-08 RX ADMIN — FLUCONAZOLE 200 MG: 2 INJECTION, SOLUTION INTRAVENOUS at 11:44

## 2023-01-08 RX ADMIN — SODIUM CHLORIDE 5 ML/HR: 9 INJECTION, SOLUTION INTRAVENOUS at 11:42

## 2023-01-08 RX ADMIN — CHLORHEXIDINE GLUCONATE 0.12% ORAL RINSE 15 ML: 1.2 LIQUID ORAL at 08:38

## 2023-01-08 RX ADMIN — MUPIROCIN: 20 OINTMENT TOPICAL at 20:26

## 2023-01-08 RX ADMIN — VASOPRESSIN 0.03 UNITS/MIN: 20 INJECTION PARENTERAL at 15:08

## 2023-01-08 RX ADMIN — ACETAMINOPHEN 650 MG: 650 SUPPOSITORY RECTAL at 04:46

## 2023-01-08 RX ADMIN — PIPERACILLIN SODIUM,TAZOBACTAM SODIUM 3375 MG: 3; .375 INJECTION, POWDER, FOR SOLUTION INTRAVENOUS at 08:37

## 2023-01-08 RX ADMIN — SODIUM CHLORIDE, POTASSIUM CHLORIDE, SODIUM LACTATE AND CALCIUM CHLORIDE: 600; 310; 30; 20 INJECTION, SOLUTION INTRAVENOUS at 06:29

## 2023-01-08 RX ADMIN — AMIODARONE HYDROCHLORIDE 0.5 MG/MIN: 50 INJECTION, SOLUTION INTRAVENOUS at 16:53

## 2023-01-08 RX ADMIN — SODIUM CHLORIDE, PRESERVATIVE FREE 10 ML: 5 INJECTION INTRAVENOUS at 09:21

## 2023-01-08 RX ADMIN — SODIUM CHLORIDE: 9 INJECTION, SOLUTION INTRAVENOUS at 05:17

## 2023-01-08 RX ADMIN — PIPERACILLIN SODIUM,TAZOBACTAM SODIUM 3375 MG: 3; .375 INJECTION, POWDER, FOR SOLUTION INTRAVENOUS at 23:24

## 2023-01-08 RX ADMIN — SODIUM CHLORIDE, POTASSIUM CHLORIDE, SODIUM LACTATE AND CALCIUM CHLORIDE: 600; 310; 30; 20 INJECTION, SOLUTION INTRAVENOUS at 16:54

## 2023-01-08 RX ADMIN — SODIUM CHLORIDE, POTASSIUM CHLORIDE, SODIUM LACTATE AND CALCIUM CHLORIDE: 600; 310; 30; 20 INJECTION, SOLUTION INTRAVENOUS at 12:45

## 2023-01-08 RX ADMIN — PROPOFOL 30 MCG/KG/MIN: 10 INJECTION, EMULSION INTRAVENOUS at 12:27

## 2023-01-08 RX ADMIN — ENOXAPARIN SODIUM 40 MG: 100 INJECTION SUBCUTANEOUS at 08:39

## 2023-01-08 ASSESSMENT — PULMONARY FUNCTION TESTS
PIF_VALUE: 24
PIF_VALUE: 18
PIF_VALUE: 19
PIF_VALUE: 17
PIF_VALUE: 19
PIF_VALUE: 17

## 2023-01-08 NOTE — PROGRESS NOTES
01/08/23 0303   Patient Observation   Heart Rate (!) 139   Resp 30   SpO2 91 %   Breath Sounds   Right Upper Lobe Diminished   Right Middle Lobe Diminished   Right Lower Lobe Diminished   Left Upper Lobe Diminished   Left Lower Lobe Diminished   Vent Information   $Ventilation $Subsequent Day   Ventilator Settings   FiO2  55 %   Vt (Set, mL) 370 mL   Resp Rate (Set) 18 bmp   PEEP/CPAP (cmH2O) 8   Vent Patient Data (Readings)   Vt (Measured) 377 mL   Peak Inspiratory Pressure (cmH2O) 19 cmH2O   Rate Measured 30 br/min   Minute Volume (L/min) 11 Liters   Peak Inspiratory Flow (lpm) 60 L/sec   Mean Airway Pressure (cmH2O) 12 cmH20   I:E Ratio 1;2.1   Flow Sensitivity 3 L/min   Vent Alarm Settings   High Pressure (cmH2O) 40 cmH2O   Low Minute Volume (lpm) 3 L/min   Low Exhaled Vt (ml) 250 mL   RR High (bpm) 40 br/min   Apnea (secs) 20 secs   Additional Respiratoray Assessments   Humidification Source Heated wire   Humidification Temp 37   Circuit Condensation Drained   Ambu Bag With Mask At Bedside Yes   ETT    Placement Date/Time: 01/07/23 6882   Placed By: Licensed provider  Placement Verified By: Chest X-ray;Colorimetric ETCO2 device  Preoxygenation: Yes  Airway Tube Size: 8 mm  Location: Oral  Secured At: 24 cm  Measured From: Lips   Secured At 24 cm   Measured From Lips   ETT Placement Right   Secured By Commercial tube arteaga   Site Assessment Dry

## 2023-01-08 NOTE — PROGRESS NOTES
Pulmonary & Critical Care Medicine ICU Progress Note    CC: Abdominal pain, perforated diverticulum, septic shock    Events of Last 24 hours:   Went to the OR for perforated diverticular disease  A. fib RVR  Fevers  Positive blood cultures    Invasive Lines: Right IJ CVC 2023    MV: 2023  Vent Mode: AC/VC Resp Rate (Set): 18 bmp/Vt (Set, mL): 370 mL/ /FiO2 : 55 %  Recent Labs     23  1103 23  0530   PHART 7.340* 7.330*   [id number] 30.7* 23.5*   PO2ART 83.0 80.1       IV:   amiodarone 1 mg/min (23 6358)    Followed by    amiodarone      sodium chloride      norepinephrine 15 mcg/min (23 014)    propofol 30 mcg/kg/min (23 05)    vasopressin (Septic Shock) infusion 0.03 Units/min (23 05)    lactated ringers 150 mL/hr at 23 06    sodium chloride         Vitals:  Blood pressure 101/61, pulse (!) 119, temperature 100.4 °F (38 °C), temperature source Bladder, resp. rate 28, height 5' 7\" (1.702 m), weight 165 lb (74.8 kg), SpO2 93 %, not currently breastfeeding. on vent  Temp  Av.2 °F (35.7 °C)  Min: 94.3 °F (34.6 °C)  Max: 101.1 °F (38.4 °C)    Intake/Output Summary (Last 24 hours) at 2023 0758  Last data filed at 2023 3275  Gross per 24 hour   Intake 9878.08 ml   Output 1905 ml   Net 7973.08 ml     EXAM:  General: intubated, ill appearing    ENT: Pharynx with ETT. Resp: No crackles. No wheezing. CV: S1, S2. +edema  GI: NT, ND, +BS. Ostomy looks okay  Skin: Warm and dry. Neuro: PERRL. Sedated, not following commands.  Patellar reflexes are symmetric      Scheduled Meds:   prochlorperazine  10 mg IntraVENous Once    piperacillin-tazobactam  3,375 mg IntraVENous Q8H    sodium chloride flush  5-40 mL IntraVENous 2 times per day    chlorhexidine  15 mL Mouth/Throat BID    pantoprazole  40 mg IntraVENous Daily    mupirocin   Nasal BID    enoxaparin  40 mg SubCUTAneous Daily    fluconazole  200 mg IntraVENous Q24H     PRN Meds:  HYDROmorphone **OR** HYDROmorphone, prochlorperazine, sodium chloride flush, sodium chloride, acetaminophen **OR** acetaminophen, midazolam, fentanNYL, albuterol sulfate HFA, potassium chloride **OR** potassium alternative oral replacement **OR** potassium chloride, sodium chloride    Results:  CBC:   Recent Labs     01/07/23  0750 01/07/23  1107 01/08/23  0648   WBC 0.4* 1.0* 4.7   HGB 10.7* 12.4 11.3*   HCT 33.1* 38.5 35.6*   MCV 84.1 85.5 85.9    295 240     BMP:   Recent Labs     01/07/23  0750 01/07/23  1107 01/08/23  0648    138 134*   K 4.7 3.4* 3.8    111* 109   CO2 17* 17* 13*   PHOS  --  3.0  --    BUN 15 14 20   CREATININE 0.8 0.7 1.0     LIVER PROFILE:   Recent Labs     01/06/23  2108 01/07/23  0750 01/07/23  1107   AST 22 30 36   ALT 13 15 14   LIPASE 33.0  --   --    BILIDIR  --   --  0.4*   BILITOT <0.2 0.6 0.7   ALKPHOS 148* 77 72       Cultures:  1/6/2023 SARS-CoV-2 positive  1/6/2023 blood 2 of 2 GNR-E. coli     Imaging:  Abdominal CT 1/6/2023  Impression   1. Small volume pneumoperitoneum and extraluminal fecal material appears to   arise from the sigmoid colon, presumably as a complication of diverticulitis. 2.  Small volume abdominopelvic ascites. No loculated fluid collection   identified. Left kidney/adrenal imaging abnormality is a large left kidney cyst (simple) which does not require any additional f/u per radiologist     CXR 1/8/2023 increasing volume overload    ASSESSMENT:  Septic shock  E. coli bacteremia  Acute hypoxemic respiratory failure  Diverticulitis with perforated viscus in the sigmoid colon area, post op x-lap with sigmoid colectomy and small bowel resection and ostomy on 1/7/23  COVID-19 infection, incidental finding  Paroxysmal atrial fibrillation, now AFIB RVR   H/O esophageal stricture   Coagulopathy 2/2 warfarin    PLAN:  - COVID-19 isolation, droplet plus  -  Mechanical ventilation as per my orders.  The ventilator was adjusted by me at the bedside for unstable, life threatening respiratory failure  IV Propofol for sedation, target RASS -2, with daily SAT  Fentanyl for CPOT less than 3   Fentanyl and Versed PRN   IV fluid resuscitation is complete    Zosyn D#3,Diflucan D#2  IV levophed & vasopressin to maintain MAP of 65  Amiodarone infusion for A. fib RVR with hypotensive shock  SSI   S/P 3 U FFP & vitamin K; give K-centra X 1 now given need for emergent surgery  Prophylaxis: SCD, bactroban, protonix   D/W Dr. Tomma Merlin, bedside RN and patient's daughter     Total critical care time caring for this patient with life threatening, unstable organ failure, including direct patient contact, management of life support systems, review of data including imaging and labs, discussions with other team members and physicians at least 40 minutes so far today, excluding procedures.

## 2023-01-08 NOTE — PROGRESS NOTES
[facility] GENERAL SURGERY DAILY PROGRESS NOTE    SUBJECTIVE: Intubated, sedated. OBJECTIVE: CURRENT VITALS:  /70   Pulse (!) 111   Temp 99.8 °F (37.7 °C) (Bladder)   Resp 26   Ht 5' 7\" (1.702 m)   Wt 165 lb (74.8 kg)   SpO2 93%   BMI 25.84 kg/m²          ABD: Soft. COLOSTOMY:  Pink; viable. LABS:    CBC:   Recent Labs     01/07/23  0750 01/07/23  1107 01/08/23  0648   WBC 0.4* 1.0* 4.7   RBC 3.94* 4.50 4.15   HGB 10.7* 12.4 11.3*   HCT 33.1* 38.5 35.6*   MCV 84.1 85.5 85.9   RDW 15.2 15.4 15.3    295 240     BMP:   Recent Labs     01/07/23  0750 01/07/23  1107 01/08/23  0648    138 134*   K 4.7 3.4* 3.8    111* 109   CO2 17* 17* 13*   PHOS  --  3.0  --    BUN 15 14 20   CREATININE 0.8 0.7 1.0     Recent Labs     01/07/23  1107   MG 1.80       ASSESSMENT:   POD 1 Judson's  /  SBR      PLAN:   Continue supportive care, antibiotics and observation. Discussed with family and nursing.           Oma Cage MD

## 2023-01-08 NOTE — PROGRESS NOTES
Patients family at room, plan of care explained, all questions answered, Dr Addie Trujillo at bedside also gave them update.

## 2023-01-08 NOTE — PROGRESS NOTES
Pt febrile temp 38.4. Bare hugger turned off. Suppository tylenol give. Provider notified. HR sustaining in 130-140's with increased RR into low 30's. BP continued hypotensive. Urine output <100ml in the past 8 hours. Provider notified as well.

## 2023-01-08 NOTE — PROGRESS NOTES
Internal Medicine ICU Progress Note      Events of Last 24 hours:     Patient currently operating room for per prorated diverticular disease. Febrile. In rapid A. fib. Sedated. Invasive Lines: right IJ CVC    MV: Intubated on 2023    Recent Labs     23  1103 23  0530   PHART 7.340* 7.330*   [id number] 30.7* 23.5*   PO2ART 83.0 80.1       MV Settings:  Vent Mode: AC/VC Resp Rate (Set): 18 bmp/Vt (Set, mL): 370 mL/ /FiO2 : (S) 50 %    IV:   amiodarone 0.5 mg/min (23)    dextrose      lactated ringers 250 mL/hr at 23    sodium chloride 5 mL/hr at 23 141    norepinephrine 18 mcg/min (23)    propofol 30 mcg/kg/min (23)    vasopressin (Septic Shock) infusion 0.03 Units/min (23)    lactated ringers 150 mL/hr at 23 1011    sodium chloride         Vitals:  Temp  Av °F (36.7 °C)  Min: 94.8 °F (34.9 °C)  Max: 101.1 °F (38.4 °C)  Pulse  Av.7  Min: 90  Max: 154  BP  Min: 76/57  Max: 131/71  SpO2  Av.5 %  Min: 84 %  Max: 97 %  FiO2   Av.4 %  Min: 50 %  Max: 55 %  Patient Vitals for the past 4 hrs:   BP Temp Temp src Pulse Resp SpO2   23 1438 123/84 98.7 °F (37.1 °C) Bladder (!) 108 24 94 %       CVP: CVP (Mean): 289 mmHg      Intake/Output Summary (Last 24 hours) at 2023 1458  Last data filed at 2023 1415  Gross per 24 hour   Intake 85363.87 ml   Output 1645 ml   Net 9287.87 ml       EXAM:  General: No distress. Alert. Sedated. Ill-appearing on the ventilator  Eyes: PERRL. No sclera icterus. No conjunctiva injected. ENT: No discharge. ETT. Neck: Trachea midline. Normal thyroid. Resp: No accessory muscle use. No crackles. No wheezing. No rhonchi. No dullness on percussion. CV: Regular rate. Regular rhythm. No mumur or rub. +edema. No JVD. Palpable pedal pulses. GI: Non-tender. Non-distended. No masses. No organmegaly. Absent bowel sounds. No hernia.   Colostomy present    Medications:  Scheduled Meds:   insulin lispro  0-4 Units SubCUTAneous Q4H    prochlorperazine  10 mg IntraVENous Once    piperacillin-tazobactam  3,375 mg IntraVENous Q8H    sodium chloride flush  5-40 mL IntraVENous 2 times per day    chlorhexidine  15 mL Mouth/Throat BID    pantoprazole  40 mg IntraVENous Daily    mupirocin   Nasal BID    enoxaparin  40 mg SubCUTAneous Daily    fluconazole  200 mg IntraVENous Q24H       PRN Meds:  glucose, dextrose bolus **OR** dextrose bolus, glucagon (rDNA), dextrose, HYDROmorphone **OR** HYDROmorphone, prochlorperazine, sodium chloride flush, sodium chloride, acetaminophen **OR** acetaminophen, midazolam, fentanNYL, albuterol sulfate HFA, potassium chloride **OR** potassium alternative oral replacement **OR** potassium chloride, sodium chloride    Results:  CBC:   Recent Labs     01/07/23  0750 01/07/23  1107 01/08/23  0648   WBC 0.4* 1.0* 4.7   HGB 10.7* 12.4 11.3*   HCT 33.1* 38.5 35.6*   MCV 84.1 85.5 85.9    295 240     BMP:   Recent Labs     01/07/23  0750 01/07/23  1107 01/08/23  0648    138 134*   K 4.7 3.4* 3.8    111* 109   CO2 17* 17* 13*   PHOS  --  3.0  --    BUN 15 14 20   CREATININE 0.8 0.7 1.0     LIVER PROFILE:   Recent Labs     01/06/23 2108 01/07/23 0750 01/07/23  1107   AST 22 30 36   ALT 13 15 14   LIPASE 33.0  --   --    BILIDIR  --   --  0.4*   BILITOT <0.2 0.6 0.7   ALKPHOS 148* 77 72     PT/INR:   Recent Labs     01/07/23  0750 01/07/23  1107 01/08/23  0530   PROTIME 16.2* 17.5* 24.3*   INR 1.32* 1.45* 2.19*     APTT:   Recent Labs     01/06/23 2108 01/07/23  0404   APTT 40.2* 30.6     UA:  Recent Labs     01/06/23 2137 01/07/23  0420   COLORU DARK YELLOW* Yellow   PHUR 5.5 6.0   WBCUA 10-20* 0-2   RBCUA 5-10* 0-2   BACTERIA 2+*  --    CLARITYU SL CLOUDY* Clear   SPECGRAV >=1.030 1.010   LEUKOCYTESUR TRACE* Negative   UROBILINOGEN 0.2 0.2   BILIRUBINUR SMALL* Negative   BLOODU TRACE-INTACT* TRACE-INTACT*   GLUCOSEU Negative Negative Cultures:  COVID-19  detected  Culture positive for E. coli    Films:    XR CHEST PORTABLE   Final Result   Increased pleural-parenchymal disease bilaterally         XR CHEST PORTABLE   Final Result   1. Endotracheal tube terminates in appropriate position above the kori. 2.  The enteric tube courses off the field of view in the upper abdomen. 3.  Right internal jugular line terminates at the level of the mid SVC. CT ABDOMEN PELVIS W IV CONTRAST Additional Contrast? None   Final Result   1. Small volume pneumoperitoneum and extraluminal fecal material appears to   arise from the sigmoid colon, presumably as a complication of diverticulitis. 2.  Small volume abdominopelvic ascites. No loculated fluid collection   identified. Findings were discussed with Patricia Collins at 10:25 pm on 1/6/2023. XR CHEST PORTABLE    (Results Pending)          Assessment:    Principal Problem:    Perforated diverticulum  Active Problems:    Septic shock ([address]    Acute hypoxemic respiratory failure (HCC)    COVID-19    Coagulopathy (HCC)    Diverticulitis of colon  Resolved Problems:    * No resolved hospital problems. *       Plan:    #Perforated diverticulum. Went to the OR yesterday. Please look at op notes. On Zosyn and Diflucan. #Septic shock due to perforated diverticulum. She has E. coli bacteremia. On Zosyn and Diflucan. On IV vasopressors. #Acute respiratory failure on the ventilator. Management per pulmonary. #Paroxysmal A. fib. Now having rapid A. fib. On IV amiodarone. #COVID-19 detected. Incidental finding. #Received emergent reversal of anticoagulation on Coumadin. #SCD for DVT prophylaxis. Patient is critically ill. All questions and concerns were addressed to the patient/family. Alternatives to my treatment were discussed. The note was completed using EMR.  Every effort was made to ensure accuracy; however, inadvertent computerized transcription errors may be present.          Ge Carrasquillo MD 2:58 PM 1/8/2023

## 2023-01-08 NOTE — PROGRESS NOTES
01/08/23 1630   NICU Vent Information   Skin Assessment Clean, dry, & intact   Vent Type 980   Vent Mode AC/VC   Vt (Set, mL) 370 mL   Vt (Measured) 426 mL   Resp Rate (Set) 18 bmp   Rate Measured 24 br/min   Minute Volume (L/min) 9.1 Liters   Peak Flow 60 L/min   FiO2  50 %   Peak Inspiratory Pressure (cmH2O) 24 cmH2O   I:E Ratio 1:3   Sensitivity 3   PEEP/CPAP (cmH2O) 8   Mean Airway Pressure (cmH2O) 12 cmH20   Cough/Sputum   Sputum How Obtained Endotracheal   Cough Productive   Sputum Amount Small   Sputum Color Brown   Tenacity Thick   Breath Sounds   Right Upper Lobe Diminished   Right Middle Lobe Diminished   Right Lower Lobe Diminished   Left Upper Lobe Diminished   Left Lower Lobe Diminished   Additional Respiratory Assessments   Heart Rate (!) 110   Resp 24   SpO2 92 %   Position Semi-Wong's   Humidification Source Heated wire   Humidification Temp 37   Circuit Condensation Drained   Vent Alarm Settings   High Pressure (cmH2O) 40 cmH2O   Low Minute Volume (lpm) 3 L/min   Low Exhaled Vt (ml) 250 mL   RR High (bpm) 40 br/min   Apnea (secs) 20 secs   ETT    Placement Date/Time: 01/07/23 0125   Placed By: Licensed provider  Placement Verified By: Chest X-ray;Colorimetric ETCO2 device  Preoxygenation: Yes  Airway Tube Size: 8 mm  Location: Oral  Secured At: 24 cm  Measured From: Lips   Secured At 23 cm   Measured From Lips   ETT Placement Center   Secured By Commercial tube arteaga   Site Assessment Dry

## 2023-01-08 NOTE — PROGRESS NOTES
01/07/23 1957   Patient Observation   Heart Rate (!) 108   Resp 22   SpO2 93 %   Breath Sounds   Right Upper Lobe Diminished   Right Middle Lobe Diminished   Right Lower Lobe Diminished   Left Upper Lobe Diminished   Left Lower Lobe Diminished   Vent Information   Vent Mode AC/VC   Ventilator Settings   FiO2  55 %   Vt (Set, mL) 370 mL   Resp Rate (Set) 18 bmp   PEEP/CPAP (cmH2O) (S)  8   Vent Patient Data (Readings)   Vt (Measured) 398 mL   Peak Inspiratory Pressure (cmH2O) 20 cmH2O   Rate Measured 21 br/min   Minute Volume (L/min) 8.57 Liters   Peak Inspiratory Flow (lpm) 60 L/sec   Mean Airway Pressure (cmH2O) 12 cmH20   Plateau Pressure (cm H2O) 19 cm H2O   I:E Ratio 1:3.9   Flow Sensitivity 3 L/min   Vent Alarm Settings   High Pressure (cmH2O) 40 cmH2O   Low Minute Volume (lpm) 3 L/min   High Minute Volume (lpm) 20 L/min   Low Exhaled Vt (ml) 250 mL   High Exhaled Vt (ml) 1000 mL   RR High (bpm) 40 br/min   Apnea (secs) 20 secs   Additional Respiratoray Assessments   Humidification Source Heated wire   Humidification Temp 37   Circuit Condensation Drained   ETT    Placement Date/Time: 01/07/23 7968   Placed By: Licensed provider  Placement Verified By: Chest X-ray;Colorimetric ETCO2 device  Preoxygenation: Yes  Airway Tube Size: 8 mm  Location: Oral  Secured At: 24 cm  Measured From: Lips   Secured At 24 cm   Measured From Lips   ETT Placement Center   Secured By Commercial tube arteaga   Site Assessment Dry

## 2023-01-08 NOTE — PROGRESS NOTES
Pm assessment completed. Pt non- interactive. Movement to painful stimuli. Bare hugger continued. Lung sounds diminished. Afib with HR in 110's. NS liter ordered. Continued on Levo and Vaso for hypotension. Continued on propofol gtt and LR for fluids. Rick shows tea colored urine with decreased output. MICHELLE drain set to bulb suction, ostomy beefy dark red. Central line as well as PIV WNL. A-line WNL.

## 2023-01-08 NOTE — PROGRESS NOTES
01/07/23 2322   Patient Observation   Heart Rate (!) 127   Resp 29   SpO2 91 %   Observations 8ett 23 at lip   Breath Sounds   Right Upper Lobe Diminished   Right Middle Lobe Diminished   Right Lower Lobe Diminished   Left Upper Lobe Diminished   Left Lower Lobe Diminished   Vent Information   Vent Mode AC/VC   Ventilator Settings   FiO2  55 %   Vt (Set, mL) 370 mL   Resp Rate (Set) 18 bmp   PEEP/CPAP (cmH2O) 8   Vent Patient Data (Readings)   Vt (Measured) 381 mL   Peak Inspiratory Pressure (cmH2O) 20 cmH2O   Rate Measured 29 br/min   Minute Volume (L/min) 11 Liters   Peak Inspiratory Flow (lpm) 60 L/sec   Mean Airway Pressure (cmH2O) 12 cmH20   I:E Ratio 1:2.1   Flow Sensitivity 3 L/min   Vent Alarm Settings   High Pressure (cmH2O) 40 cmH2O   Low Minute Volume (lpm) 3 L/min   Low Exhaled Vt (ml) 250 mL   RR High (bpm) 40 br/min   Additional Respiratoray Assessments   Humidification Source Heated wire   Humidification Temp 37   Circuit Condensation Drained   Ambu Bag With Mask At Bedside Yes   ETT    Placement Date/Time: 01/07/23 5040   Placed By: Licensed provider  Placement Verified By: Chest X-ray;Colorimetric ETCO2 device  Preoxygenation: Yes  Airway Tube Size: 8 mm  Location: Oral  Secured At: 24 cm  Measured From: Lips   Secured At 23 cm   Measured From Lips   ETT Placement Center   Secured By Commercial tube arteaga

## 2023-01-08 NOTE — PROGRESS NOTES
Assessment completed, patient remains on ventilator, with IVF LR at 150/hr, levophed at 18 mcg/min, Vasopressin at 0.03 units/min, Amiodarone at 1 mg/min, Propofol at 30 mcg/kg/min, reposition patient, awaiting care rounds.

## 2023-01-09 ENCOUNTER — APPOINTMENT (OUTPATIENT)
Dept: GENERAL RADIOLOGY | Age: 88
DRG: 853 | End: 2023-01-09
Payer: MEDICARE

## 2023-01-09 LAB
ANION GAP SERPL CALCULATED.3IONS-SCNC: 11 MMOL/L (ref 3–16)
ANISOCYTOSIS: ABNORMAL
BANDED NEUTROPHILS RELATIVE PERCENT: 11 % (ref 0–7)
BASE EXCESS ARTERIAL: -9.1 MMOL/L (ref -3–3)
BASOPHILS ABSOLUTE: 0 K/UL (ref 0–0.2)
BASOPHILS RELATIVE PERCENT: 0 %
BUN BLDV-MCNC: 26 MG/DL (ref 7–20)
CALCIUM SERPL-MCNC: 7.7 MG/DL (ref 8.3–10.6)
CARBOXYHEMOGLOBIN ARTERIAL: 0.5 % (ref 0–1.5)
CHLORIDE BLD-SCNC: 104 MMOL/L (ref 99–110)
CO2: 15 MMOL/L (ref 21–32)
CREAT SERPL-MCNC: 0.9 MG/DL (ref 0.6–1.2)
EOSINOPHILS ABSOLUTE: 0 K/UL (ref 0–0.6)
EOSINOPHILS RELATIVE PERCENT: 0 %
ESTIMATED AVERAGE GLUCOSE: 114 MG/DL
GFR SERPL CREATININE-BSD FRML MDRD: >60 ML/MIN/{1.73_M2}
GLUCOSE BLD-MCNC: 123 MG/DL (ref 70–99)
GLUCOSE BLD-MCNC: 144 MG/DL (ref 70–99)
GLUCOSE BLD-MCNC: 146 MG/DL (ref 70–99)
GLUCOSE BLD-MCNC: 153 MG/DL (ref 70–99)
GLUCOSE BLD-MCNC: 153 MG/DL (ref 70–99)
GLUCOSE BLD-MCNC: 159 MG/DL (ref 70–99)
GLUCOSE BLD-MCNC: 161 MG/DL (ref 70–99)
HBA1C MFR BLD: 5.6 %
HCO3 ARTERIAL: 14.7 MMOL/L (ref 21–29)
HCT VFR BLD CALC: 34.6 % (ref 36–48)
HEMATOLOGY PATH CONSULT: NORMAL
HEMOGLOBIN, ART, EXTENDED: 14.3 G/DL (ref 12–16)
HEMOGLOBIN: 11.2 G/DL (ref 12–16)
INR BLD: 2.23 (ref 0.87–1.14)
LYMPHOCYTES ABSOLUTE: 0 K/UL (ref 1–5.1)
LYMPHOCYTES RELATIVE PERCENT: 0 %
MCH RBC QN AUTO: 27.3 PG (ref 26–34)
MCHC RBC AUTO-ENTMCNC: 32.4 G/DL (ref 31–36)
MCV RBC AUTO: 84.1 FL (ref 80–100)
METHEMOGLOBIN ARTERIAL: 0.3 %
MONOCYTES ABSOLUTE: 0.3 K/UL (ref 0–1.3)
MONOCYTES RELATIVE PERCENT: 3 %
MYELOCYTE PERCENT: 5 %
NEUTROPHILS ABSOLUTE: 11.1 K/UL (ref 1.7–7.7)
NEUTROPHILS RELATIVE PERCENT: 81 %
O2 SAT, ARTERIAL: 97.3 %
O2 THERAPY: ABNORMAL
PCO2 ARTERIAL: 26.8 MMHG (ref 35–45)
PDW BLD-RTO: 16.2 % (ref 12.4–15.4)
PERFORMED ON: ABNORMAL
PH ARTERIAL: 7.36 (ref 7.35–7.45)
PLATELET # BLD: 199 K/UL (ref 135–450)
PLATELET SLIDE REVIEW: ADEQUATE
PMV BLD AUTO: 8.3 FL (ref 5–10.5)
PO2 ARTERIAL: 98.3 MMHG (ref 75–108)
POIKILOCYTES: ABNORMAL
POTASSIUM SERPL-SCNC: 4.4 MMOL/L (ref 3.5–5.1)
PROTHROMBIN TIME: 24.6 SEC (ref 11.7–14.5)
RBC # BLD: 4.11 M/UL (ref 4–5.2)
SLIDE REVIEW: ABNORMAL
SODIUM BLD-SCNC: 130 MMOL/L (ref 136–145)
TCO2 ARTERIAL: 15.5 MMOL/L
TRIGL SERPL-MCNC: 596 MG/DL (ref 0–150)
WBC # BLD: 11.4 K/UL (ref 4–11)

## 2023-01-09 PROCEDURE — 84478 ASSAY OF TRIGLYCERIDES: CPT

## 2023-01-09 PROCEDURE — 99024 POSTOP FOLLOW-UP VISIT: CPT | Performed by: SURGERY

## 2023-01-09 PROCEDURE — 2580000003 HC RX 258: Performed by: SURGERY

## 2023-01-09 PROCEDURE — 2000000000 HC ICU R&B

## 2023-01-09 PROCEDURE — 85610 PROTHROMBIN TIME: CPT

## 2023-01-09 PROCEDURE — 6360000002 HC RX W HCPCS: Performed by: SURGERY

## 2023-01-09 PROCEDURE — 82803 BLOOD GASES ANY COMBINATION: CPT

## 2023-01-09 PROCEDURE — 99233 SBSQ HOSP IP/OBS HIGH 50: CPT | Performed by: INTERNAL MEDICINE

## 2023-01-09 PROCEDURE — 99291 CRITICAL CARE FIRST HOUR: CPT | Performed by: INTERNAL MEDICINE

## 2023-01-09 PROCEDURE — 85025 COMPLETE CBC W/AUTO DIFF WBC: CPT

## 2023-01-09 PROCEDURE — 2580000003 HC RX 258: Performed by: INTERNAL MEDICINE

## 2023-01-09 PROCEDURE — 94761 N-INVAS EAR/PLS OXIMETRY MLT: CPT

## 2023-01-09 PROCEDURE — 94003 VENT MGMT INPAT SUBQ DAY: CPT

## 2023-01-09 PROCEDURE — 80048 BASIC METABOLIC PNL TOTAL CA: CPT

## 2023-01-09 PROCEDURE — 6370000000 HC RX 637 (ALT 250 FOR IP): Performed by: SURGERY

## 2023-01-09 PROCEDURE — 2500000003 HC RX 250 WO HCPCS: Performed by: SURGERY

## 2023-01-09 PROCEDURE — 6360000002 HC RX W HCPCS: Performed by: INTERNAL MEDICINE

## 2023-01-09 PROCEDURE — 2700000000 HC OXYGEN THERAPY PER DAY

## 2023-01-09 PROCEDURE — C9113 INJ PANTOPRAZOLE SODIUM, VIA: HCPCS | Performed by: SURGERY

## 2023-01-09 PROCEDURE — 71045 X-RAY EXAM CHEST 1 VIEW: CPT

## 2023-01-09 RX ADMIN — SODIUM CHLORIDE, POTASSIUM CHLORIDE, SODIUM LACTATE AND CALCIUM CHLORIDE: 600; 310; 30; 20 INJECTION, SOLUTION INTRAVENOUS at 18:15

## 2023-01-09 RX ADMIN — MUPIROCIN: 20 OINTMENT TOPICAL at 08:14

## 2023-01-09 RX ADMIN — SODIUM CHLORIDE, PRESERVATIVE FREE 10 ML: 5 INJECTION INTRAVENOUS at 08:14

## 2023-01-09 RX ADMIN — PROPOFOL 25 MCG/KG/MIN: 10 INJECTION, EMULSION INTRAVENOUS at 20:14

## 2023-01-09 RX ADMIN — VASOPRESSIN 0.03 UNITS/MIN: 20 INJECTION PARENTERAL at 01:17

## 2023-01-09 RX ADMIN — PANTOPRAZOLE SODIUM 40 MG: 40 INJECTION, POWDER, FOR SOLUTION INTRAVENOUS at 08:14

## 2023-01-09 RX ADMIN — CHLORHEXIDINE GLUCONATE 0.12% ORAL RINSE 15 ML: 1.2 LIQUID ORAL at 19:51

## 2023-01-09 RX ADMIN — SODIUM CHLORIDE, POTASSIUM CHLORIDE, SODIUM LACTATE AND CALCIUM CHLORIDE: 600; 310; 30; 20 INJECTION, SOLUTION INTRAVENOUS at 08:47

## 2023-01-09 RX ADMIN — PROPOFOL 30 MCG/KG/MIN: 10 INJECTION, EMULSION INTRAVENOUS at 11:41

## 2023-01-09 RX ADMIN — PROPOFOL 30 MCG/KG/MIN: 10 INJECTION, EMULSION INTRAVENOUS at 06:44

## 2023-01-09 RX ADMIN — PIPERACILLIN SODIUM,TAZOBACTAM SODIUM 3375 MG: 3; .375 INJECTION, POWDER, FOR SOLUTION INTRAVENOUS at 14:17

## 2023-01-09 RX ADMIN — AMIODARONE HYDROCHLORIDE 0.5 MG/MIN: 50 INJECTION, SOLUTION INTRAVENOUS at 08:00

## 2023-01-09 RX ADMIN — VASOPRESSIN 0.03 UNITS/MIN: 20 INJECTION PARENTERAL at 21:45

## 2023-01-09 RX ADMIN — NOREPINEPHRINE BITARTRATE 6 MCG/MIN: 1 INJECTION, SOLUTION, CONCENTRATE INTRAVENOUS at 08:01

## 2023-01-09 RX ADMIN — VASOPRESSIN 0.03 UNITS/MIN: 20 INJECTION PARENTERAL at 08:03

## 2023-01-09 RX ADMIN — CHLORHEXIDINE GLUCONATE 0.12% ORAL RINSE 15 ML: 1.2 LIQUID ORAL at 08:14

## 2023-01-09 RX ADMIN — ENOXAPARIN SODIUM 40 MG: 100 INJECTION SUBCUTANEOUS at 08:13

## 2023-01-09 RX ADMIN — FLUCONAZOLE 200 MG: 2 INJECTION, SOLUTION INTRAVENOUS at 11:41

## 2023-01-09 RX ADMIN — PIPERACILLIN SODIUM,TAZOBACTAM SODIUM 3375 MG: 3; .375 INJECTION, POWDER, FOR SOLUTION INTRAVENOUS at 06:42

## 2023-01-09 RX ADMIN — PROPOFOL 30 MCG/KG/MIN: 10 INJECTION, EMULSION INTRAVENOUS at 01:16

## 2023-01-09 RX ADMIN — PIPERACILLIN SODIUM,TAZOBACTAM SODIUM 3375 MG: 3; .375 INJECTION, POWDER, FOR SOLUTION INTRAVENOUS at 23:01

## 2023-01-09 RX ADMIN — MUPIROCIN: 20 OINTMENT TOPICAL at 19:51

## 2023-01-09 RX ADMIN — AMIODARONE HYDROCHLORIDE 0.5 MG/MIN: 50 INJECTION, SOLUTION INTRAVENOUS at 14:18

## 2023-01-09 RX ADMIN — SODIUM CHLORIDE, POTASSIUM CHLORIDE, SODIUM LACTATE AND CALCIUM CHLORIDE: 600; 310; 30; 20 INJECTION, SOLUTION INTRAVENOUS at 01:17

## 2023-01-09 ASSESSMENT — PULMONARY FUNCTION TESTS
PIF_VALUE: 21
PIF_VALUE: 18
PIF_VALUE: 17

## 2023-01-09 NOTE — PROGRESS NOTES
01/08/23 2243   Patient Observation   Heart Rate (!) 104   Resp 22   SpO2 95 %   Observations 8ett 23 at lip   Breath Sounds   Right Upper Lobe Diminished   Right Middle Lobe Diminished   Right Lower Lobe Diminished   Left Upper Lobe Diminished   Left Lower Lobe Diminished   Vent Information   Vent Mode AC/VC   Ventilator Settings   FiO2  50 %   Vt (Set, mL) 370 mL   Resp Rate (Set) 18 bmp   PEEP/CPAP (cmH2O) 8   Vent Patient Data (Readings)   Vt (Measured) 380 mL   Peak Inspiratory Pressure (cmH2O) 17 cmH2O   Rate Measured 22 br/min   Minute Volume (L/min) 8.1 Liters   Peak Inspiratory Flow (lpm) 60 L/sec   Mean Airway Pressure (cmH2O) 10 cmH20   I:E Ratio 1:3   Flow Sensitivity 3 L/min   Vent Alarm Settings   High Pressure (cmH2O) 40 cmH2O   Low Minute Volume (lpm) 3 L/min   Low Exhaled Vt (ml) 250 mL   RR High (bpm) 40 br/min   Apnea (secs) 20 secs   Additional Respiratoray Assessments   Humidification Source Heated wire   Humidification Temp 37   Circuit Condensation Drained   Ambu Bag With Mask At Bedside Yes   Airway Clearance   Suction ET Tube   Suction Device Inline suction catheter   Sputum Method Obtained Endotracheal   Sputum Amount Scant   ETT    Placement Date/Time: 01/07/23 1094   Placed By: Licensed provider  Placement Verified By: Chest X-ray;Colorimetric ETCO2 device  Preoxygenation: Yes  Airway Tube Size: 8 mm  Location: Oral  Secured At: 24 cm  Measured From: Lips   Secured At 23 cm   Measured From Lips   ETT Placement Center   Secured By Commercial tube arteaga

## 2023-01-09 NOTE — PROGRESS NOTES
01/08/23 2011   Patient Observation   Heart Rate 96   Resp 21   SpO2 93 %   Breath Sounds   Right Upper Lobe Diminished   Right Middle Lobe Diminished   Right Lower Lobe Diminished   Left Upper Lobe Diminished   Left Lower Lobe Diminished   Vent Information   Vent Mode AC/VC   Ventilator Settings   FiO2  50 %   Vt (Set, mL) 370 mL   Resp Rate (Set) 18 bmp   PEEP/CPAP (cmH2O) 8   Vent Patient Data (Readings)   Vt (Measured) 417 mL   Peak Inspiratory Pressure (cmH2O) 19 cmH2O   Rate Measured 20 br/min   Minute Volume (L/min) 7.59 Liters   Peak Inspiratory Flow (lpm) 60 L/sec   Mean Airway Pressure (cmH2O) 11 cmH20   Plateau Pressure (cm H2O) 18 cm H2O   I:E Ratio 1:3.4   Flow Sensitivity 3 L/min   Vent Alarm Settings   High Pressure (cmH2O) 40 cmH2O   Low Minute Volume (lpm) 3 L/min   High Minute Volume (lpm) 20 L/min   Low Exhaled Vt (ml) 250 mL   High Exhaled Vt (ml) 1000 mL   RR High (bpm) 40 br/min   Apnea (secs) 20 secs   Additional Respiratoray Assessments   Humidification Source Heated wire   Humidification Temp 37   Circuit Condensation Drained   Airway Clearance   Suction ET Tube;Oral   Subglottic Suction Done Yes   Suction Device Inline suction catheter   Sputum Method Obtained Endotracheal   ETT    Placement Date/Time: 01/07/23 9854   Placed By: Licensed provider  Placement Verified By: Chest X-ray;Colorimetric ETCO2 device  Preoxygenation: Yes  Airway Tube Size: 8 mm  Location: Oral  Secured At: 24 cm  Measured From: Lips   Secured At 23 cm   Measured From Lips   ETT Placement Left   Secured By Commercial tube arteaga   Site Assessment Dry

## 2023-01-09 NOTE — PROGRESS NOTES
Pulmonary & Critical Care Medicine ICU Progress Note    CC: Abdominal pain, perforated diverticulum, septic shock    Events of Last 24 hours:   Amiodarone gtt  Levophed @ 7  Vasopressin @ 0.03    Invasive Lines: Right IJ CVC 1/7/2023    MV: 1/7/2023  Vent Mode: AC/VC Resp Rate (Set): 18 bmp/Vt (Set, mL): 370 mL/ /FiO2 : 50 %  Recent Labs     01/08/23  0530 01/09/23  0436   PHART 7.330* 7.356   [id number] 23.5* 26.8*   PO2ART 80.1 98.3           Vitals:  Blood pressure (!) 127/97, pulse 94, temperature 98.4 °F (36.9 °C), temperature source Bladder, resp. rate 23, height 5' 7\" (1.702 m), weight 165 lb (74.8 kg), SpO2 94 %, not currently breastfeeding. on vent    Intake/Output Summary (Last 24 hours) at 1/9/2023 0844  Last data filed at 1/9/2023 1197  Gross per 24 hour   Intake 4710.52 ml   Output 1080 ml   Net 3630.52 ml       EXAM:  General: intubated, ill appearing    ENT: Pharynx with ETT. Resp: No crackles. No wheezing. CV: S1, S2. +edema  GI: NT, ND, +BS. Ostomy looks okay  Skin: Warm and dry. Neuro: PERRL. Sedated, not following commands.     Scheduled Meds:   insulin lispro  0-4 Units SubCUTAneous Q4H    prochlorperazine  10 mg IntraVENous Once    piperacillin-tazobactam  3,375 mg IntraVENous Q8H    sodium chloride flush  5-40 mL IntraVENous 2 times per day    chlorhexidine  15 mL Mouth/Throat BID    pantoprazole  40 mg IntraVENous Daily    mupirocin   Nasal BID    enoxaparin  40 mg SubCUTAneous Daily    fluconazole  200 mg IntraVENous Q24H     PRN Meds:  glucose, dextrose bolus **OR** dextrose bolus, glucagon (rDNA), dextrose, HYDROmorphone **OR** HYDROmorphone, prochlorperazine, sodium chloride flush, sodium chloride, acetaminophen **OR** acetaminophen, midazolam, fentanNYL, albuterol sulfate HFA, potassium chloride **OR** potassium alternative oral replacement **OR** potassium chloride, sodium chloride    Results:  CBC:   Recent Labs     01/07/23  1107 01/08/23  0648 01/09/23  0437   WBC 1.0* 4.7 11.4* HGB 12.4 11.3* 11.2*   HCT 38.5 35.6* 34.6*   MCV 85.5 85.9 84.1    240 199       BMP:   Recent Labs     01/07/23  1107 01/08/23  0648 01/09/23  0437    134* 130*   K 3.4* 3.8 4.4   * 109 104   CO2 17* 13* 15*   PHOS 3.0  --   --    BUN 14 20 26*   CREATININE 0.7 1.0 0.9       LIVER PROFILE:   Recent Labs     01/06/23  2108 01/07/23  0750 01/07/23  1107   AST 22 30 36   ALT 13 15 14   LIPASE 33.0  --   --    BILIDIR  --   --  0.4*   BILITOT <0.2 0.6 0.7   ALKPHOS 148* 77 72         Cultures:  1/6/2023 SARS-CoV-2 positive  1/6/2023 blood 2 of 2 GNR-E. coli     Imaging:  Abdominal CT 1/6/2023  Impression   1. Small volume pneumoperitoneum and extraluminal fecal material appears to   arise from the sigmoid colon, presumably as a complication of diverticulitis. 2.  Small volume abdominopelvic ascites. No loculated fluid collection   identified. Left kidney/adrenal imaging abnormality is a large left kidney cyst (simple) which does not require any additional f/u per radiologist     CXR 1/9/23   Impression   1. Stable lines, tubes and support devices. 2. Stable basilar opacities with pleural effusions. ASSESSMENT:  Septic shock  E. coli bacteremia  Acute hypoxemic respiratory failure  Diverticulitis with perforated viscus in the sigmoid colon area, post op x-lap with sigmoid colectomy and small bowel resection and ostomy on 1/7/23  COVID-19 infection, incidental finding  Paroxysmal atrial fibrillation, now AFIB RVR   H/O esophageal stricture   Coagulopathy 2/2 warfarin    PLAN:  COVID-19 isolation, droplet plus   Mechanical ventilation as per my orders.  The ventilator was adjusted by me at the bedside for unstable, life threatening respiratory failure  IV Propofol for sedation, target RASS -2, with daily SAT  Fentanyl for CPOT less than 3   Fentanyl and Versed PRN   IV fluid resuscitation is complete    Zosyn D#4, Diflucan D#3  IV levophed & vasopressin to maintain MAP of 65  LR for MIVF  Amiodarone infusion for A. fib RVR   SSI   S/P 3 U FFP & vitamin K; give K-centra X 1 prior to emergent surgery  Prophylaxis: SCD, bactroban, protonix   Total critical care time caring for this patient with life threatening, unstable organ failure, including direct patient contact, management of life support systems, review of data including imaging and labs, discussions with other team members and physicians at least 35 minutes so far today, excluding procedures.

## 2023-01-09 NOTE — PROGRESS NOTES
Shift assessment was completed (see flow sheet). Pt is Sedated. With minimal response to pain,   Pt currently on ventilator- 7.5 ETT, at 23 Lip Line. Spontaneous breathing trial tomorrow. Propofol Paused Briefly. FiO2 at 50, PEEP 8, SpO2 at 94%, Respirations are even/equal,  with diminished sounds. Infusing:  Propofol, Kvo, LR, Vaso, Levo, Amiodarone (see MAR). Oral Gastric tube to suction. Green/ coffee ground emesis observed. IV access- RIJ X3/ Peripherals and WDL. Art Line intact. Rick in place with STAT lock, Draining Yellow/ clear urine. Scheduled medications to follow. Call light within reach. Bed in lowest position. Bed alarm on. Bilateral Wrist restraints in place and tied, for safety of lines and tubes. Family to bedside and updated on POC and status. Will continue to monitor    Patient is not able to demonstrated the ability to move from a reclining position to an upright position within the recliner. Patient is confused, demented and /or unable to follow instruction.

## 2023-01-09 NOTE — CARE COORDINATION
Case Management Assessment  Initial Evaluation      Patient Name: Shania Garner  YOB: 1932  Diagnosis: Diverticulitis of colon [K57.32]  Prolonged Q-T interval on ECG [R94.31]  Perforated viscus [R19.8]  Perforated diverticulum [K57.80]  Date / Time: 1/6/2023  8:46 PM    Admission status/Date:1/7/2023  Chart Reviewed: Yes      Patient Interviewed: No - pt is currently on the ventilator  Family Interviewed:  Yes - pt's daughter Justice Gutierrez. Hospitalization in the last 30 days:  No    Health Care Decision Maker :   Primary Decision Maker: Marlenepetrona Jason - Child - [phone]    (CM - must 1st enter selection under Navigator - emergency contact- [place] Relationship and pick relationship)   Who do you trust or have selected to make healthcare decisions for you    Met with: pt's Quoc Waite, via telephone as pt is currently on the vent.      Current PCP: Idalia Us MD    Financial  [employer] Medicare  Precert required for SNF : Y       3 night stay required - N    ADLS  Support Systems/Care Needs:    Transportation: family    Meal Preparation: family    Housing  Living Arrangements: pt lives with Quoc Waite, and Geremias's   Steps: 3-4  Intent for return to present living arrangements: Yes  Identified Issues: [id number] [facility] with [address] : No Agency:(Services)     Passport/Waiver : No  :                      Phone Number:    Passport/Waiver Services: n/a          Durable Medical Equiptment   DME Provider: n/a  Equipment:   Walker___Cane___RTS___ BSC_x__Shower Chair__x_Hospital Bed___W/C____Other__rollator______  02 at ____Liter(s)---wears(frequency)_______ HHN ___ CPAP___ BiPap___   N/A____    Home O2 Use :  No    If No for home O2---if presently on O2 during hospitalization:  Yes  if yes CM to follow for potential DC O2 need    Community Service Affiliation  Dialysis:  No      Other Community Services: n/a    DISCHARGE PLAN: Explained Case Management role/services. Chart review completed. CM spoke with pt's daughter, Kevin Sevilla, via telephone as pt is currently on a ventilator. Pt lives with daughter, Kevin Sevilla, and [address]  at home. Pt requires some assistance with ADLs and family available 24/7 to assist as needed at home. Kevin Sevilla states that pt would like to return home upon d/c if appropriate. Kevin Sevilla stated that pt previously used [facility] after previous hospital stay, services are now discontinued, but if needed again they would like to use again. CM will continue to follow for needs.      Ginger Dailey, MSW student

## 2023-01-09 NOTE — CONSULTS
Ostomy Referral Progress Note      NAME:  [address] [place] [address] RECORD NUMBER:  [id number]  AGE: 80 y.o. GENDER:  female  :  4/3/1932  TODAY'S DATE:  2023    Subjective Intubated and sedated in ICU setting     Karl Power is a 80 y.o. female referred by:   [x] Physician  [] Nursing  [] Other:     Pt seen for ostomy care. Pt is s/p Exp Lap, Sigmoid Colectomy, and SBR with creation of end sigmoid colostomy per Dr Jazz Ramos on 22 for perforated diverticulitis. Current appliance with approximately 50 ml's of loose brown stool in pouch. Old appliance removed, skin cleansed with water and patted dry. Pt repouched using small barrier seal and 2 piece 2 1/4\" flat wafer and drainable pouch. Good seal obtained. No family in room. Pt lives at home with her daughter per EMR and was active. Will follow for teaching. PAST MEDICAL HISTORY:        Diagnosis Date    A-fib ([facility] [address]     Acid reflux     Hypertension        MEDICATIONS:    No current facility-administered medications on file prior to encounter.      Current Outpatient Medications on File Prior to Encounter   Medication Sig Dispense Refill    citalopram (CELEXA) 20 MG tablet TAKE 1 AND 1/2 TABLETS BY MOUTH EVERY DAY      warfarin (COUMADIN) 5 MG tablet Take 1 tablet by mouth      metoprolol (LOPRESSOR) 25 MG tablet Take 25 mg by mouth once          ALLERGIES:    Allergies   Allergen Reactions    Morphine Nausea And Vomiting       PAST SURGICAL HISTORY:    Past Surgical History:   Procedure Laterality Date    ANKLE FRACTURE SURGERY      LEFT ANKLE    APPENDECTOMY      ARM SURGERY Right 2020    EXCISION OF SKIN CANCER RIGHT ARM performed by Gallo Souza MD at [address] [facility]  2017    cecal polyp; random colon biopsies    FOOT AMPUTATION Right 2022    AMPUTATION OF SECOND TOE RIGHT FOOT performed by Jocelyn Villavicencio DPM at [address] [facility] SURGERY      HYSTERECTOMY (CERVIX STATUS UNKNOWN)      OTHER SURGICAL HISTORY Left 2016    EXCISION LESION LEFT UPPER EXTREMITY LEFT BACK AND RIGHT SHOULDER    UPPER GASTROINTESTINAL ENDOSCOPY  09/15/2017    dilated, biopsies    VULVA SURGERY      x 2        FAMILY HISTORY:    family history is not on file.     SOCIAL HISTORY:    Social History     Tobacco Use    Smoking status: Former     Types: Cigarettes     Quit date:      Years since quittin.0    Smokeless tobacco: Never    Tobacco comments:     smoked 7 yrs 1ppd x 10 years   Vaping Use    Vaping Use: Never used   Substance Use Topics    Alcohol use: Yes     Comment: Special occasions    Drug use: No       LABS:  WBC:    Lab Results   Component Value Date/Time    WBC 11.4 2023 04:37 AM     H/H:    Lab Results   Component Value Date/Time    HGB 11.2 2023 04:37 AM    HCT 34.6 2023 04:37 AM     BMP:    Lab Results   Component Value Date/Time     2023 04:37 AM    K 4.4 2023 04:37 AM    K 4.7 2023 07:50 AM     2023 04:37 AM    CO2 15 2023 04:37 AM    BUN 26 2023 04:37 AM    LABALBU 2.0 2023 11:07 AM    CREATININE 0.9 2023 04:37 AM    CALCIUM 7.7 2023 04:37 AM    GFRAA >60 2020 12:22 PM    GFRAA >60 2013 12:26 PM    LABGLOM >60 2023 04:37 AM    GLUCOSE 161 2023 04:37 AM     PTT:    Lab Results   Component Value Date/Time    APTT 30.6 2023 04:04 AM   [APTT}  PT/INR:    Lab Results   Component Value Date/Time    PROTIME 24.6 2023 04:37 AM    INR 2.23 2023 04:37 AM       Objective    BP (!) 78/57   Pulse 77   Temp 98.1 °F (36.7 °C) (Bladder)   Resp (!) 37   Ht 5' 7\" (1.702 m)   Wt 165 lb (74.8 kg)   SpO2 96%   BMI 25.84 kg/m²     Sarwat Risk Score Sarwat Scale Score: 9    Patient Active Problem List   Diagnosis Code    Pelvic mass R19.00    Uterine fibroid D25.9    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 Visible intact sutures. Intact mucocutaneous junction and peristomal skin. Small amount of ecchymosis from 10-11 o'clock on peristomal skin. Stoma measures 1 3/4\" x 11/8\" oval and near flush with abdomen. Colostomy LLQ (Active)   Stomal Appliance 1 piece 01/09/23 0821   Stoma  Assessment Red; Other (Comment) 01/09/23 0821   Stool Appearance Bloody; Watery;Mucous 01/09/23 0821   Stool Color Red;Green 01/09/23 0821   Stool Amount Smear 01/09/23 0821   Output (mL) 0 ml 01/09/23 1143   Number of days: 2                  Intake/Output Summary (Last 24 hours) at 1/9/2023 1253  Last data filed at 1/9/2023 1143  Gross per 24 hour   Intake 4241.88 ml   Output 1255 ml   Net 2986.88 ml         Plan Pt placed in 2 piece Coloplast appliance. Will follow for teaching and ostomy care. Pt not teachable at this time           Ostomy Plan of Care  [] Supplies/Instructions left in room  [] Patient using home supplies  [] Brand/supplies at bedside   [x] Current pouching system Coloplast 2 1/4\" 2 piece flat wafer and drainable pouch.       Current Diet: Diet NPO  Dietician consult:  N/A    Discharge Plan:  Placement for patient upon discharge: TBD    Outpatient visit plan No  Supplies given No   Samples requested No    Referrals:  [x]   [] [facility]  [] Supplies  [] Other      Patient/Caregiver Teaching:  Written Instructions given to patient/family  Teaching provided:  [] Reviewed GI and A&P        [] Supplies  [] Pouch emptying      [] Manipulate closure  [] Routine Care         [x] Comment, pt is intubated and sedated, no family present  [] Pouch maintenance           Level of patient/caregiver understanding able to:  [] Indicates understanding       [] Needs reinforcement  [] Unsuccessful      [] Verbal Understanding  [] Demonstrated understanding       [] No evidence of learning  [] Refused teaching         [x] N/A    Electronically signed by João Mcbride RN, Camilo Castillo on 1/9/2023 at 12:53 PM

## 2023-01-09 NOTE — PROGRESS NOTES
[facility] DAILY PROGRESS NOTE    SUBJECTIVE: Intubated, sedated. OBJECTIVE: CURRENT VITALS:  /62   Pulse 88   Temp 98.1 °F (36.7 °C) (Bladder)   Resp (!) 32   Ht 5' 7\" (1.702 m)   Wt 165 lb (74.8 kg)   SpO2 97%   BMI 25.84 kg/m²          ABD: Stoma viable and functional.    LABS:    CBC:   Recent Labs     01/07/23  1107 01/08/23  0648 01/09/23  0437   WBC 1.0* 4.7 11.4*   RBC 4.50 4.15 4.11   HGB 12.4 11.3* 11.2*   HCT 38.5 35.6* 34.6*   MCV 85.5 85.9 84.1   RDW 15.4 15.3 16.2*    240 199     BMP:   Recent Labs     01/07/23  1107 01/08/23  0648 01/09/23  0437    134* 130*   K 3.4* 3.8 4.4   * 109 104   CO2 17* 13* 15*   PHOS 3.0  --   --    BUN 14 20 26*   CREATININE 0.7 1.0 0.9     Recent Labs     01/07/23  1107   MG 1.80       ASSESSMENT:   POD 2 Judson's  /  SBR        PLAN:   Continue supportive care, antibiotics and observation.              Oma Cage MD

## 2023-01-09 NOTE — PROGRESS NOTES
Care rounds completed with Dr. Caroline Smith and multidisciplinary team. Reviewed labs, meds, VS, assessment, & plan of care for today. -Get Vaso off before Levo if Able.  -plan to SBT tomorrow.  -Keep NPO until directed by Surgery.   -Discussed fluid status and holding off of Lasix. See dictated note and new orders for details.         CVC Need:  High risk vesicant drug infusing: YES    Multiple incompatible medications infusing:  YES    CVP Monitoring:  YES    Extremely difficult IV access challenge:  YES    Continued need for central line access:  YES    Addressed with physician:  YES    RIGHT PATIENT, RIGHT TIME, RIGHT LINE

## 2023-01-09 NOTE — PROGRESS NOTES
Reassessment completed (see Flowsheet). All ICU lines remain intact, ICU monitoring continued-   Infusing:  (See MAR). Attempts made to titrate Levophed for SyS >65. (See MAR). pt remains on PEEP 8, 50% FiO2. Lung sounds diminished   pt's blood pressures 100s/80s. Continuing to monitor.

## 2023-01-09 NOTE — ACP (ADVANCE CARE PLANNING)
Advance Care Planning     General Advance Care Planning (ACP) Conversation    Date of Conversation: 1/6/2023  Conducted with:  Healthcare Decision Maker: Next of Kin by law (only applies in absence of above) (name) Brianna Forbes, pt's daughter via telephone. As pt is currently on the vent. Healthcare Decision Maker:    Primary Decision Maker: Aster Guevara  Child - [phone]  Click here to complete Healthcare Decision Makers including selection of the Healthcare Decision Maker Relationship (ie \"Primary\"). Today we documented Decision Maker(s) consistent with Legal Next of Kin hierarchy. Pt has Four children that would be considered as legal next of kin. Content/Action Overview:  Reviewed DNR/DNI and patient elects Full Code (Attempt Resuscitation). Pt is currently on the vent.      Length of Voluntary ACP Conversation in minutes:  <16 minutes (Non-Billable)    BERNABE Fair Student

## 2023-01-09 NOTE — PROGRESS NOTES
Comprehensive Nutrition Assessment    Type and Reason for Visit:  Reassess    Nutrition Recommendations/Plan:   Continue NPO status until patient is medically cleared to receive nutrition therapy. Monitor respiratory/vent status, sedation type/amount (propofol is currently infusing at 30 mcg x 24 hours which = 356 kcals from lipids), TG checks, and plan of care. Monitor GI status and plan of care. Monitor nutrition-related labs, colostomy function + status, and weight trends. Malnutrition Assessment:  Malnutrition Status:   At risk for malnutrition (01/09/23 1511)    Context:  Acute Illness     Findings of the 6 clinical characteristics of malnutrition:  Energy Intake:  Mild decrease in energy intake   Weight Loss:  No significant weight loss     Body Fat Loss:  Unable to assess (COVID-19 +)     Muscle Mass Loss:  Unable to assess (COVID-19 +)    Fluid Accumulation:  No significant fluid accumulation     Strength:  Not Performed    Nutrition Assessment:    patient is declining from a nutritional standpoint AEB patient is s/p ex-lap, sigmoid colectomy, SBR, and ostomy on 1/7/23 and she remains intubated at this time and she remains at risk for further compromise d/t altered nutrition-related labs, GI dysfunction, and NPO status; will continue NPO status and monitor nutrition plan of care    Nutrition Related Findings:    patient is intubated and sedated with 30 mcg propofol at this time; patient is s/p ex-lap, sigmoid colectomy, SBR, and ostomy placement on 1/7/23 d/t perforated diverticular disease; OG is to [facility]; + colostomy with small amount of output noted; + RLQ bulb drain; abdomen is soft, non-tender, and bowel sounds are hypoactive; LR at 100 ml/hr; patient is on low-dose SSI Wound Type: Surgical Incision (abdominal incision from surgery on 1/7/23)       Current Nutrition Intake & Therapies:    Average Meal Intake: NPO  Average Supplements Intake: NPO  Diet NPO    Anthropometric Measures:  Height: 5' 7\" (170.2 cm)  Ideal Body Weight (IBW): 135 lbs (61 kg)    Admission Body Weight: 165 lb (74.8 kg) (obtained on 1/9/23; actual weight)  Current Body Weight: 165 lb (74.8 kg) (obtained on 1/9/23; actual weight), 122.2 % IBW. Weight Source: Bed Scale  Current BMI (kg/m2): 25.8  Usual Body Weight:  (unable to assess d/t lack of actual weight hx in EMR)  % Weight Change (Calculated): -2.9  Weight Adjustment For: No Adjustment                 BMI Categories: Overweight (BMI 25.0-29.9)    Estimated Daily Nutrient Needs:  Energy Requirements Based On: Kcal/kg  Weight Used for Energy Requirements: Current  Energy (kcal/day): 1500 - 1725 kcals based on 20-23 kcals/kg/CBW  Weight Used for Protein Requirements: Ideal  Protein (g/day): 85 - 92 g protein based on 1.4-1.5 g/kg/IBW  Method Used for Fluid Requirements: 1 ml/kcal  Fluid (ml/day): 1500 - 1725 ml    Nutrition Diagnosis:   Inadequate oral intake related to catabolic illness, altered GI structure, altered GI function, impaired respiratory function, inadequate protein-energy intake as evidenced by NPO or clear liquid status due to medical condition, intubation, wounds, lab values, GI abnormality    Nutrition Interventions:   Food and/or Nutrient Delivery: Continue NPO  Nutrition Education/Counseling: No recommendation at this time  Coordination of Nutrition Care: Continue to monitor while inpatient, Interdisciplinary Rounds  Plan of Care discussed with: ICU Team    Goals:  Previous Goal Met: No Progress toward Goal(s)  Goals: Initiate nutrition support, by next RD assessment       Nutrition Monitoring and Evaluation:   Behavioral-Environmental Outcomes: None Identified  Food/Nutrient Intake Outcomes: Diet Advancement/Tolerance, Enteral Nutrition Intake/Tolerance, IVF Intake  Physical Signs/Symptoms Outcomes: Biochemical Data, GI Status, Hemodynamic Status, Skin, Weight    Discharge Planning:    Too soon to determine     Indu Shannon RD, LD  Contact:  [phone]

## 2023-01-09 NOTE — PROGRESS NOTES
Internal Medicine ICU Progress Note      Events of Last 24 hours:     Patient currently operating room for per prorated diverticular disease. Febrile. In rapid A. fib. Sedated. Pt remains on vent support with severe hypotension needing levo and vaso     Afibb on amio gtt    Invasive Lines: right IJ CVC    MV: Intubated on 2023    Recent Labs     23   PHART 7.330* 7.356   APB0LIT 23.5* 26.8*   PO2ART 80.1 98.3         MV Settings:  Vent Mode: AC/VC Resp Rate (Set): 18 bmp/Vt (Set, mL): 370 mL/ /FiO2 : 50 %    IV:   amiodarone 0.5 mg/min (23)    dextrose      sodium chloride 5 mL/hr at 23    norepinephrine 8 mcg/min (23)    propofol 30 mcg/kg/min (23)    vasopressin (Septic Shock) infusion 0.03 Units/min (23)    lactated ringers 100 mL/hr at 23    sodium chloride         Vitals:  Temp  Av.6 °F (37 °C)  Min: 98.1 °F (36.7 °C)  Max: 99.8 °F (37.7 °C)  Pulse  Av.4  Min: 81  Max: 120  BP  Min: 69/20  Max: 137/67  SpO2  Av.7 %  Min: 91 %  Max: 97 %  FiO2   Av.4 %  Min: 50 %  Max: 55 %  Patient Vitals for the past 4 hrs:   BP Temp Temp src Pulse Resp SpO2   23 0700 109/68 98.4 °F (36.9 °C) Bladder 94 22 94 %   23 -- -- -- 81 19 94 %   23 06 -- -- -- 87 27 94 %   23 05 (!) 69/20 -- -- 90 (!) 35 94 %   23 0500 -- -- -- 94 22 91 %   23 0430 92/73 -- -- 95 20 96 %   23 0400 95/67 -- -- 94 20 97 %         CVP: CVP (Mean): 272 mmHg      Intake/Output Summary (Last 24 hours) at 2023 0734  Last data filed at 2023 9018  Gross per 24 hour   Intake 6707.88 ml   Output 1715 ml   Net 4992.88 ml         EXAM:  General: elderly female on vent support   Oral ETT and OG noted  No distress. .   Eyes: PERRL. No sclera icterus. No conjunctiva injected. ENT: No discharge. ETT. Neck: Trachea midline. Normal thyroid. Resp: No accessory muscle use. No crackles. No wheezing. No rhonchi. No dullness on percussion. CV: Regular rate. Regular rhythm. No mumur or rub. +edema. No JVD. Palpable pedal pulses. GI: Non-tender. Mid abd surgical dressing and right LQ Bobby drain in place  Left sided ostomy in place   Non-distended. No masses. No organmegaly. Absent bowel sounds. No hernia.   Ext - no edema   Neuro - sedated    Medications:  Scheduled Meds:   insulin lispro  0-4 Units SubCUTAneous Q4H    prochlorperazine  10 mg IntraVENous Once    piperacillin-tazobactam  3,375 mg IntraVENous Q8H    sodium chloride flush  5-40 mL IntraVENous 2 times per day    chlorhexidine  15 mL Mouth/Throat BID    pantoprazole  40 mg IntraVENous Daily    mupirocin   Nasal BID    enoxaparin  40 mg SubCUTAneous Daily    fluconazole  200 mg IntraVENous Q24H       PRN Meds:  glucose, dextrose bolus **OR** dextrose bolus, glucagon (rDNA), dextrose, HYDROmorphone **OR** HYDROmorphone, prochlorperazine, sodium chloride flush, sodium chloride, acetaminophen **OR** acetaminophen, midazolam, fentanNYL, albuterol sulfate HFA, potassium chloride **OR** potassium alternative oral replacement **OR** potassium chloride, sodium chloride    Results:  CBC:   Recent Labs     01/07/23  1107 01/08/23  0648 01/09/23  0437   WBC 1.0* 4.7 11.4*   HGB 12.4 11.3* 11.2*   HCT 38.5 35.6* 34.6*   MCV 85.5 85.9 84.1    240 199       BMP:   Recent Labs     01/07/23  1107 01/08/23  0648 01/09/23  0437    134* 130*   K 3.4* 3.8 4.4   * 109 104   CO2 17* 13* 15*   PHOS 3.0  --   --    BUN 14 20 26*   CREATININE 0.7 1.0 0.9       LIVER PROFILE:   Recent Labs     01/06/23  2108 01/07/23  0750 01/07/23  1107   AST 22 30 36   ALT 13 15 14   LIPASE 33.0  --   --    BILIDIR  --   --  0.4*   BILITOT <0.2 0.6 0.7   ALKPHOS 148* 77 72       PT/INR:   Recent Labs     01/07/23  1107 01/08/23  0530 01/09/23  0437   PROTIME 17.5* 24.3* 24.6*   INR 1.45* 2.19* 2.23*       APTT:   Recent Labs     01/06/23  2108 01/07/23  0404 APTT 40.2* 30.6       UA:  Recent Labs     01/06/23  2137 01/07/23  0420   COLORU DARK YELLOW* Yellow   PHUR 5.5 6.0   WBCUA 10-20* 0-2   RBCUA 5-10* 0-2   BACTERIA 2+*  --    CLARITYU SL CLOUDY* Clear   SPECGRAV >=1.030 1.010   LEUKOCYTESUR TRACE* Negative   UROBILINOGEN 0.2 0.2   BILIRUBINUR SMALL* Negative   BLOODU TRACE-INTACT* TRACE-INTACT*   GLUCOSEU Negative Negative         Cultures:  COVID-19  detected  Blood Culture positive for E. Coli      Films:    XR CHEST PORTABLE   Preliminary Result   1. Stable lines, tubes and support devices. 2. Stable basilar opacities with pleural effusions. XR CHEST PORTABLE   Final Result   Increased pleural-parenchymal disease bilaterally         XR CHEST PORTABLE   Final Result   1. Endotracheal tube terminates in appropriate position above the kori. 2.  The enteric tube courses off the field of view in the upper abdomen. 3.  Right internal jugular line terminates at the level of the mid SVC. CT ABDOMEN PELVIS W IV CONTRAST Additional Contrast? None   Final Result   1. Small volume pneumoperitoneum and extraluminal fecal material appears to   arise from the sigmoid colon, presumably as a complication of diverticulitis. 2.  Small volume abdominopelvic ascites. No loculated fluid collection   identified. Findings were discussed with Manuel Gurrola at 10:25 pm on 1/6/2023. XR CHEST PORTABLE    (Results Pending)            Assessment:    Principal Problem:    Perforated diverticulum  Active Problems:    Septic shock ([address]    Acute hypoxemic respiratory failure (HCC)    COVID-19    Coagulopathy (HCC)    Diverticulitis of colon  Resolved Problems:    * No resolved hospital problems. *       Plan:    #Perforated diverticulum. S.p emergency ex lap with sigmoid colectomy , small bowel resection and ostomy placement   Surgery managing    On Zosyn and Diflucan. IV for now    #Septic shock due to perforated diverticulum.   Conor Emperor bacteremia. On Zosyn and Diflucan. Severe hypotension , no improvement with IVF boluses  Now   On IV vasopressors - vaso and levophed    #Acute respiratory failure on the ventilator. Sec to perforated viscus - remains on vent post surgery   Management per pulmonary. #Paroxysmal A. fib. With RVR, started . On IV amiodarone. Coumadin on hold, INR reversed perioperatively   Can resume when safe     #COVID-19 detected. Incidental finding. No covid related issues      #SCD for DVT prophylaxis. Patient is critically ill. All questions and concerns were addressed to the patient/family. Alternatives to my treatment were discussed. The note was completed using EMR. Every effort was made to ensure accuracy; however, inadvertent computerized transcription errors may be present.          Maria T Kruse MD 7:34 AM 1/9/2023

## 2023-01-09 NOTE — PROGRESS NOTES
Reassessment completed. No changes to note. PEEP 5, FiO2 45%    Propofol, Levo, Vaso, Amio, LR (see MAR) continues.

## 2023-01-09 NOTE — PROGRESS NOTES
Pm assessment completed. Pt non- interactive. Movement to painful stimuli. Afib  Continued on Levo and Vaso for hypotension. Continued on propofol gtt and LR for fluids. Rick shows tea colored urine  MICHELLE drain set to bulb suction, ostomy beefy dark red. Central line as well as PIV WNL.  A-line WNL

## 2023-01-10 ENCOUNTER — APPOINTMENT (OUTPATIENT)
Dept: GENERAL RADIOLOGY | Age: 88
DRG: 853 | End: 2023-01-10
Payer: MEDICARE

## 2023-01-10 LAB
A/G RATIO: 0.5 (ref 1.1–2.2)
ALBUMIN SERPL-MCNC: 1.5 G/DL (ref 3.4–5)
ALP BLD-CCNC: 84 U/L (ref 40–129)
ALT SERPL-CCNC: 11 U/L (ref 10–40)
ANION GAP SERPL CALCULATED.3IONS-SCNC: 7 MMOL/L (ref 3–16)
AST SERPL-CCNC: 21 U/L (ref 15–37)
BASE EXCESS ARTERIAL: -6.1 MMOL/L (ref -3–3)
BASE EXCESS ARTERIAL: -6.4 MMOL/L (ref -3–3)
BILIRUB SERPL-MCNC: 0.7 MG/DL (ref 0–1)
BLOOD CULTURE, ROUTINE: ABNORMAL
BLOOD CULTURE, ROUTINE: ABNORMAL
BUN BLDV-MCNC: 32 MG/DL (ref 7–20)
CALCIUM SERPL-MCNC: 8.4 MG/DL (ref 8.3–10.6)
CARBOXYHEMOGLOBIN ARTERIAL: 0 % (ref 0–1.5)
CARBOXYHEMOGLOBIN ARTERIAL: 0.3 % (ref 0–1.5)
CHLORIDE BLD-SCNC: 104 MMOL/L (ref 99–110)
CO2: 18 MMOL/L (ref 21–32)
CREAT SERPL-MCNC: 0.8 MG/DL (ref 0.6–1.2)
CULTURE, BLOOD 2: ABNORMAL
GFR SERPL CREATININE-BSD FRML MDRD: >60 ML/MIN/{1.73_M2}
GLUCOSE BLD-MCNC: 115 MG/DL (ref 70–99)
GLUCOSE BLD-MCNC: 135 MG/DL (ref 70–99)
GLUCOSE BLD-MCNC: 66 MG/DL (ref 70–99)
GLUCOSE BLD-MCNC: 72 MG/DL (ref 70–99)
GLUCOSE BLD-MCNC: 91 MG/DL (ref 70–99)
GLUCOSE BLD-MCNC: 94 MG/DL (ref 70–99)
HCO3 ARTERIAL: 17.6 MMOL/L (ref 21–29)
HCO3 ARTERIAL: 17.6 MMOL/L (ref 21–29)
HCT VFR BLD CALC: 29.3 % (ref 36–48)
HEMOGLOBIN, ART, EXTENDED: 11 G/DL (ref 12–16)
HEMOGLOBIN, ART, EXTENDED: 11.8 G/DL (ref 12–16)
HEMOGLOBIN: 9.9 G/DL (ref 12–16)
MCH RBC QN AUTO: 27.6 PG (ref 26–34)
MCHC RBC AUTO-ENTMCNC: 33.7 G/DL (ref 31–36)
MCV RBC AUTO: 81.9 FL (ref 80–100)
METHEMOGLOBIN ARTERIAL: 0.3 %
METHEMOGLOBIN ARTERIAL: 0.3 %
O2 SAT, ARTERIAL: 96.7 %
O2 SAT, ARTERIAL: 96.7 %
O2 THERAPY: ABNORMAL
O2 THERAPY: ABNORMAL
ORGANISM: ABNORMAL
PCO2 ARTERIAL: 29.2 MMHG (ref 35–45)
PCO2 ARTERIAL: 30.3 MMHG (ref 35–45)
PDW BLD-RTO: 15.7 % (ref 12.4–15.4)
PERFORMED ON: ABNORMAL
PERFORMED ON: ABNORMAL
PERFORMED ON: NORMAL
PH ARTERIAL: 7.38 (ref 7.35–7.45)
PH ARTERIAL: 7.4 (ref 7.35–7.45)
PLATELET # BLD: 139 K/UL (ref 135–450)
PMV BLD AUTO: 8.6 FL (ref 5–10.5)
PO2 ARTERIAL: 86.7 MMHG (ref 75–108)
PO2 ARTERIAL: 87.9 MMHG (ref 75–108)
POTASSIUM REFLEX MAGNESIUM: 4.1 MMOL/L (ref 3.5–5.1)
RBC # BLD: 3.57 M/UL (ref 4–5.2)
SODIUM BLD-SCNC: 129 MMOL/L (ref 136–145)
TCO2 ARTERIAL: 18.5 MMOL/L
TCO2 ARTERIAL: 18.5 MMOL/L
TOTAL PROTEIN: 4.3 G/DL (ref 6.4–8.2)
WBC # BLD: 11.9 K/UL (ref 4–11)

## 2023-01-10 PROCEDURE — 2500000003 HC RX 250 WO HCPCS: Performed by: SURGERY

## 2023-01-10 PROCEDURE — 6370000000 HC RX 637 (ALT 250 FOR IP): Performed by: SURGERY

## 2023-01-10 PROCEDURE — 80053 COMPREHEN METABOLIC PANEL: CPT

## 2023-01-10 PROCEDURE — 99291 CRITICAL CARE FIRST HOUR: CPT | Performed by: INTERNAL MEDICINE

## 2023-01-10 PROCEDURE — 71045 X-RAY EXAM CHEST 1 VIEW: CPT

## 2023-01-10 PROCEDURE — 2580000003 HC RX 258: Performed by: SURGERY

## 2023-01-10 PROCEDURE — 2580000003 HC RX 258: Performed by: INTERNAL MEDICINE

## 2023-01-10 PROCEDURE — 6360000002 HC RX W HCPCS: Performed by: SURGERY

## 2023-01-10 PROCEDURE — 2700000000 HC OXYGEN THERAPY PER DAY

## 2023-01-10 PROCEDURE — 94003 VENT MGMT INPAT SUBQ DAY: CPT

## 2023-01-10 PROCEDURE — 6360000002 HC RX W HCPCS: Performed by: INTERNAL MEDICINE

## 2023-01-10 PROCEDURE — 85027 COMPLETE CBC AUTOMATED: CPT

## 2023-01-10 PROCEDURE — C9113 INJ PANTOPRAZOLE SODIUM, VIA: HCPCS | Performed by: SURGERY

## 2023-01-10 PROCEDURE — 36600 WITHDRAWAL OF ARTERIAL BLOOD: CPT

## 2023-01-10 PROCEDURE — 2000000000 HC ICU R&B

## 2023-01-10 PROCEDURE — 94761 N-INVAS EAR/PLS OXIMETRY MLT: CPT

## 2023-01-10 PROCEDURE — 82803 BLOOD GASES ANY COMBINATION: CPT

## 2023-01-10 PROCEDURE — 99233 SBSQ HOSP IP/OBS HIGH 50: CPT | Performed by: INTERNAL MEDICINE

## 2023-01-10 RX ADMIN — PIPERACILLIN SODIUM,TAZOBACTAM SODIUM 3375 MG: 3; .375 INJECTION, POWDER, FOR SOLUTION INTRAVENOUS at 23:00

## 2023-01-10 RX ADMIN — DEXTROSE MONOHYDRATE 125 ML: 10 INJECTION, SOLUTION INTRAVENOUS at 21:16

## 2023-01-10 RX ADMIN — ENOXAPARIN SODIUM 40 MG: 100 INJECTION SUBCUTANEOUS at 10:34

## 2023-01-10 RX ADMIN — PROPOFOL 25 MCG/KG/MIN: 10 INJECTION, EMULSION INTRAVENOUS at 03:26

## 2023-01-10 RX ADMIN — SODIUM CHLORIDE, POTASSIUM CHLORIDE, SODIUM LACTATE AND CALCIUM CHLORIDE: 600; 310; 30; 20 INJECTION, SOLUTION INTRAVENOUS at 04:28

## 2023-01-10 RX ADMIN — CHLORHEXIDINE GLUCONATE 0.12% ORAL RINSE 15 ML: 1.2 LIQUID ORAL at 10:34

## 2023-01-10 RX ADMIN — VASOPRESSIN 0.03 UNITS/MIN: 20 INJECTION PARENTERAL at 08:17

## 2023-01-10 RX ADMIN — NOREPINEPHRINE BITARTRATE 2 MCG/MIN: 1 INJECTION, SOLUTION, CONCENTRATE INTRAVENOUS at 22:43

## 2023-01-10 RX ADMIN — PIPERACILLIN SODIUM,TAZOBACTAM SODIUM 3375 MG: 3; .375 INJECTION, POWDER, FOR SOLUTION INTRAVENOUS at 13:58

## 2023-01-10 RX ADMIN — SODIUM CHLORIDE, PRESERVATIVE FREE 10 ML: 5 INJECTION INTRAVENOUS at 20:54

## 2023-01-10 RX ADMIN — MUPIROCIN: 20 OINTMENT TOPICAL at 20:54

## 2023-01-10 RX ADMIN — PANTOPRAZOLE SODIUM 40 MG: 40 INJECTION, POWDER, FOR SOLUTION INTRAVENOUS at 12:31

## 2023-01-10 RX ADMIN — FLUCONAZOLE 200 MG: 2 INJECTION, SOLUTION INTRAVENOUS at 10:34

## 2023-01-10 RX ADMIN — SODIUM CHLORIDE, PRESERVATIVE FREE 10 ML: 5 INJECTION INTRAVENOUS at 10:34

## 2023-01-10 RX ADMIN — CHLORHEXIDINE GLUCONATE 0.12% ORAL RINSE 15 ML: 1.2 LIQUID ORAL at 20:54

## 2023-01-10 RX ADMIN — PIPERACILLIN SODIUM,TAZOBACTAM SODIUM 3375 MG: 3; .375 INJECTION, POWDER, FOR SOLUTION INTRAVENOUS at 06:09

## 2023-01-10 RX ADMIN — SODIUM CHLORIDE, POTASSIUM CHLORIDE, SODIUM LACTATE AND CALCIUM CHLORIDE: 600; 310; 30; 20 INJECTION, SOLUTION INTRAVENOUS at 15:46

## 2023-01-10 RX ADMIN — AMIODARONE HYDROCHLORIDE 0.5 MG/MIN: 50 INJECTION, SOLUTION INTRAVENOUS at 05:53

## 2023-01-10 ASSESSMENT — PULMONARY FUNCTION TESTS
PIF_VALUE: 15
PIF_VALUE: 16
PIF_VALUE: 25
PIF_VALUE: 23

## 2023-01-10 NOTE — PROGRESS NOTES
01/10/23 0007   Patient Observation   Heart Rate 81   Resp 19   SpO2 97 %   Breath Sounds   Right Upper Lobe Diminished   Right Middle Lobe Diminished   Right Lower Lobe Diminished   Left Upper Lobe Diminished   Left Lower Lobe Diminished   Vent Information   Vent Mode AC/VC   Ventilator Settings   FiO2  40 %   Vt (Set, mL) 370 mL   Resp Rate (Set) 18 bmp   PEEP/CPAP (cmH2O) 5   Vent Patient Data (Readings)   Vt (Measured) 377 mL   Peak Inspiratory Pressure (cmH2O) 16 cmH2O   Rate Measured 21 br/min   Minute Volume (L/min) 8.15 Liters   Mean Airway Pressure (cmH2O) 8.1 cmH20   I:E Ratio 1:3.1   Additional Respiratoray Assessments   Circuit Condensation Drained   Airway Clearance   Suction ET Tube   Suction Device Inline suction catheter   Sputum Method Obtained Endotracheal   Sputum Amount Other (comment)  (none)   ETT    Placement Date/Time: 01/07/23 8302   Placed By: Licensed provider  Placement Verified By: Chest X-ray;Colorimetric ETCO2 device  Preoxygenation: Yes  Airway Tube Size: 8 mm  Location: Oral  Secured At: 24 cm  Measured From: Lips   ETT Placement Center

## 2023-01-10 NOTE — PROGRESS NOTES
Internal Medicine ICU Progress Note      Events of Last 24 hours:     Patient currently operating room for per prorated diverticular disease. Febrile. In rapid A. fib. Sedated. Pt remains on vent support with severe hypotension needing levo and now off vaso       Invasive Lines: right IJ CVC    MV: Intubated on 2023    Recent Labs     23  0436 01/10/23  0606   PHART 7.356 7.382   ABG2QXU 26.8* 30.3*   PO2ART 98.3 87.9         MV Settings:  Vent Mode: AC/VC Resp Rate (Set): 18 bmp/Vt (Set, mL): 370 mL/ /FiO2 : 40 %    IV:   amiodarone 0.5 mg/min (01/10/23 0612)    dextrose      sodium chloride Stopped (23)    norepinephrine 4 mcg/min (01/10/23 0612)    propofol 25 mcg/kg/min (01/10/23 0612)    vasopressin (Septic Shock) infusion 0.03 Units/min (01/10/23 0612)    lactated ringers 100 mL/hr at 01/10/23 0612    sodium chloride         Vitals:  Temp  Av.1 °F (36.7 °C)  Min: 97.9 °F (36.6 °C)  Max: 98.2 °F (36.8 °C)  Pulse  Av.7  Min: 72  Max: 94  BP  Min: 72/48  Max: 148/83  SpO2  Av.3 %  Min: 94 %  Max: 98 %  FiO2   Av.4 %  Min: 40 %  Max: 50 %  Patient Vitals for the past 4 hrs:   BP Pulse Resp SpO2 Weight   01/10/23 0630 138/80 73 18 97 % --   01/10/23 0600 (!) 147/81 76 18 -- 221 lb 1.9 oz (100.3 kg)   01/10/23 0530 (!) 143/83 77 19 98 % --   01/10/23 0500 (!) 148/83 80 18 98 % --   01/10/23 0430 138/82 83 19 98 % --   01/10/23 0400 124/88 80 18 -- --   01/10/23 0330 110/82 79 19 -- --         CVP: CVP (Mean): 312 mmHg      Intake/Output Summary (Last 24 hours) at 1/10/2023 0728  Last data filed at 1/10/2023 3959  Gross per 24 hour   Intake 4184.46 ml   Output 880 ml   Net 3304.46 ml         EXAM:  General: elderly female on vent support   Oral ETT and OG noted  No distress. .   Eyes: PERRL. No sclera icterus. No conjunctiva injected. ENT: No discharge. ETT. Neck: Trachea midline. Normal thyroid. Resp: No accessory muscle use. No crackles. No wheezing. No rhonchi. No dullness on percussion. CV: Regular rate. Regular rhythm. No mumur or rub. +edema. No JVD. Palpable pedal pulses. GI: Non-tender. Mid abd surgical dressing and right LQ Bobby drain in place  Left sided ostomy in place with black liquid stool   Non-distended. No masses. No organmegaly. sluggish bowel sounds. No hernia.   Ext - no edema   Neuro - sedated    Medications:  Scheduled Meds:   insulin lispro  0-4 Units SubCUTAneous Q4H    prochlorperazine  10 mg IntraVENous Once    piperacillin-tazobactam  3,375 mg IntraVENous Q8H    sodium chloride flush  5-40 mL IntraVENous 2 times per day    chlorhexidine  15 mL Mouth/Throat BID    pantoprazole  40 mg IntraVENous Daily    mupirocin   Nasal BID    enoxaparin  40 mg SubCUTAneous Daily    fluconazole  200 mg IntraVENous Q24H       PRN Meds:  glucose, dextrose bolus **OR** dextrose bolus, glucagon (rDNA), dextrose, HYDROmorphone **OR** HYDROmorphone, prochlorperazine, sodium chloride flush, sodium chloride, acetaminophen **OR** acetaminophen, midazolam, fentanNYL, albuterol sulfate HFA, potassium chloride **OR** potassium alternative oral replacement **OR** potassium chloride, sodium chloride    Results:  CBC:   Recent Labs     01/08/23  0648 01/09/23  0437 01/10/23  0607   WBC 4.7 11.4* 11.9*   HGB 11.3* 11.2* 9.9*   HCT 35.6* 34.6* 29.3*   MCV 85.9 84.1 81.9    199 139       BMP:   Recent Labs     01/07/23  1107 01/08/23  0648 01/09/23  0437 01/10/23  0607    134* 130* 129*   K 3.4* 3.8 4.4 4.1   * 109 104 104   CO2 17* 13* 15* 18*   PHOS 3.0  --   --   --    BUN 14 20 26* 32*   CREATININE 0.7 1.0 0.9 0.8       LIVER PROFILE:   Recent Labs     01/07/23  0750 01/07/23  1107 01/10/23  0607   AST 30 36 21   ALT 15 14 11   BILIDIR  --  0.4*  --    BILITOT 0.6 0.7 0.7   ALKPHOS 77 72 84       PT/INR:   Recent Labs     01/07/23  1107 01/08/23  0530 01/09/23  0437   PROTIME 17.5* 24.3* 24.6*   INR 1.45* 2.19* 2.23*       APTT:   No results for input(s): APTT in the last 72 hours. UA:  No results for input(s): NITRITE, COLORU, PHUR, LABCAST, WBCUA, RBCUA, MUCUS, TRICHOMONAS, YEAST, BACTERIA, CLARITYU, SPECGRAV, LEUKOCYTESUR, UROBILINOGEN, BILIRUBINUR, BLOODU, GLUCOSEU, AMORPHOUS in the last 72 hours. Invalid input(s): Adrienne Navarrete      Cultures:  [id number]  detected  Blood Culture positive for E. Coli      Films:    XR CHEST PORTABLE   Final Result   Bilateral pleural effusions and adjacent lung consolidation, similar to prior         XR CHEST PORTABLE   Final Result   1. Stable lines, tubes and support devices. 2. Stable basilar opacities with pleural effusions. XR CHEST PORTABLE   Final Result   Increased pleural-parenchymal disease bilaterally         XR CHEST PORTABLE   Final Result   1. Endotracheal tube terminates in appropriate position above the kori. 2.  The enteric tube courses off the field of view in the upper abdomen. 3.  Right internal jugular line terminates at the level of the mid SVC. CT ABDOMEN PELVIS W IV CONTRAST Additional Contrast? None   Final Result   1. Small volume pneumoperitoneum and extraluminal fecal material appears to   arise from the sigmoid colon, presumably as a complication of diverticulitis. 2.  Small volume abdominopelvic ascites. No loculated fluid collection   identified. Findings were discussed with Shanna Maria at 10:25 pm on 1/6/2023. XR CHEST PORTABLE    (Results Pending)            Assessment:    Principal Problem: Bowel perforation ([address]  Active Problems:    Septic shock (HCC)    Acute hypoxemic respiratory failure (HCC)    COVID-19    Coagulopathy (HCC)    Diverticulitis of colon  Resolved Problems:    * No resolved hospital problems. *       Plan:    #Perforated diverticulum. S.p emergency ex lap with sigmoid colectomy , small bowel resection and ostomy placement   Surgery managing    On Zosyn and Diflucan. IV for now    #Septic shock due to perforated diverticulum. Ecoli  bacteremia. On Zosyn and Diflucan. Severe hypotension , no improvement with IVF boluses  Now   On IV vasopressors - vaso and levophed  Wean as tolerated    #Acute respiratory failure on the ventilator. Sec to perforated viscus - remains on vent post surgery   Management per pulmonary. #Paroxysmal A. fib. With RVR, started . On IV amiodarone. Coumadin on hold, INR reversed perioperatively   Can resume when safe     #COVID-19 detected. Incidental finding. No covid related issues      #SCD for DVT prophylaxis. Patient is critically ill. All questions and concerns were addressed to the patient/family. Alternatives to my treatment were discussed. The note was completed using EMR. Every effort was made to ensure accuracy; however, inadvertent computerized transcription errors may be present.          Carolann Enriquez MD 7:28 AM 1/10/2023

## 2023-01-10 NOTE — PROGRESS NOTES
CLARISA discussed with MD. Pt not really waking would like to DC propofol and monitor overnight for improvement and SBT tomorrow.   Okay to place back on vent support

## 2023-01-10 NOTE — PROGRESS NOTES
RT Inhaler-Nebulizer Bronchodilator Protocol Note    There is a bronchodilator order in the chart from a provider indicating to follow the RT Bronchodilator Protocol and there is an “Initiate RT Inhaler-Nebulizer Bronchodilator Protocol” order as well (see protocol at bottom of note).    CXR Findings:  XR CHEST PORTABLE    Result Date: 1/9/2023  1. Stable lines, tubes and support devices. 2. Stable basilar opacities with pleural effusions.     XR CHEST PORTABLE    Result Date: 1/8/2023  Increased pleural-parenchymal disease bilaterally       The findings from the last RT Protocol Assessment were as follows:   History Pulmonary Disease: Smoker 15 pack years or more  Respiratory Pattern: Regular pattern and RR 12-20 bpm  Breath Sounds: Slightly diminished and/or crackles  Cough: Strong, productive  Indication for Bronchodilator Therapy: Decreased or absent breath sounds  Bronchodilator Assessment Score: 4    Aerosolized bronchodilator medication orders have been revised according to the RT Inhaler-Nebulizer Bronchodilator Protocol below.    Respiratory Therapist to perform RT Therapy Protocol Assessment initially then follow the protocol.  Repeat RT Therapy Protocol Assessment PRN for score 0-3 or on second treatment, BID, and PRN for scores above 3.    No Indications - adjust the frequency to every 6 hours PRN wheezing or bronchospasm, if no treatments needed after 48 hours then discontinue using Per Protocol order mode.     If indication present, adjust the RT bronchodilator orders based on the Bronchodilator Assessment Score as indicated below.  Use Inhaler orders unless patient has one or more of the following: on home nebulizer, not able to hold breath for 10 seconds, is not alert and oriented, cannot activate and use MDI correctly, or respiratory rate 25 breaths per minute or more, then use the equivalent nebulizer order(s) with same Frequency and PRN reasons based on the score.  If a patient is on this  medication at home then do not decrease Frequency below that used at home. 0-3 - enter or revise RT bronchodilator order(s) to equivalent RT Bronchodilator order with Frequency of every 4 hours PRN for wheezing or increased work of breathing using Per Protocol order mode. 4-6 - enter or revise RT Bronchodilator order(s) to two equivalent RT bronchodilator orders with one order with BID Frequency and one order with Frequency of every 4 hours PRN wheezing or increased work of breathing using Per Protocol order mode. 7-10 - enter or revise RT Bronchodilator order(s) to two equivalent RT bronchodilator orders with one order with TID Frequency and one order with Frequency of every 4 hours PRN wheezing or increased work of breathing using Per Protocol order mode. 11-13 - enter or revise RT Bronchodilator order(s) to one equivalent RT bronchodilator order with QID Frequency and an Albuterol order with Frequency of every 4 hours PRN wheezing or increased work of breathing using Per Protocol order mode. Greater than 13 - enter or revise RT Bronchodilator order(s) to one equivalent RT bronchodilator order with every 4 hours Frequency and an Albuterol order with Frequency of every 2 hours PRN wheezing or increased work of breathing using Per Protocol order mode.        Electronically signed by Shawna South RCP on 1/9/2023 at 8:48 PM

## 2023-01-10 NOTE — CONSULTS
Pt remains in ICU intubated and sedated, SBT per RT. Current ostomy appliance intact, moderate amount of watery brown stool in pouch. Stoma appears viable through pouch. Will continue to follow. No family in room.

## 2023-01-10 NOTE — PROGRESS NOTES
Pulmonary & Critical Care Medicine ICU Progress Note    CC: Abdominal pain, perforated diverticulum, septic shock    Events of Last 24 hours:   Amiodarone gtt  Levophed @ 4  Vasopressin @ 0.03    Invasive Lines: Right IJ CVC 1/7/2023    MV: 1/7/2023  Vent Mode: AC/VC Resp Rate (Set): 18 bmp/Vt (Set, mL): 37 mL/ /FiO2 : 40 %  Recent Labs     01/09/23  0436 01/10/23  0606   PHART 7.356 7.382   WHB1XBP 26.8* 30.3*   PO2ART 98.3 87.9           Vitals:  Blood pressure (!) 140/68, pulse 65, temperature 97.1 °F (36.2 °C), temperature source Bladder, resp. rate 18, height 5' 7\" (1.702 m), weight 221 lb 1.9 oz (100.3 kg), SpO2 97 %, not currently breastfeeding. on vent    Intake/Output Summary (Last 24 hours) at 1/10/2023 0908  Last data filed at 1/10/2023 5601  Gross per 24 hour   Intake 3480.64 ml   Output 815 ml   Net 2665.64 ml       EXAM:  General: intubated, ill appearing    ENT: Pharynx with ETT. Resp: No crackles. No wheezing. CV: S1, S2. +edema  GI: NT, ND, +BS. Ostomy looks okay  Skin: Warm and dry. Neuro: PERRL. Sedated, not following commands.     Scheduled Meds:   insulin lispro  0-4 Units SubCUTAneous Q4H    prochlorperazine  10 mg IntraVENous Once    piperacillin-tazobactam  3,375 mg IntraVENous Q8H    sodium chloride flush  5-40 mL IntraVENous 2 times per day    chlorhexidine  15 mL Mouth/Throat BID    pantoprazole  40 mg IntraVENous Daily    mupirocin   Nasal BID    enoxaparin  40 mg SubCUTAneous Daily    fluconazole  200 mg IntraVENous Q24H     PRN Meds:  glucose, dextrose bolus **OR** dextrose bolus, glucagon (rDNA), dextrose, HYDROmorphone **OR** HYDROmorphone, prochlorperazine, sodium chloride flush, sodium chloride, acetaminophen **OR** acetaminophen, midazolam, fentanNYL, albuterol sulfate HFA, potassium chloride **OR** potassium alternative oral replacement **OR** potassium chloride, sodium chloride    Results:  CBC:   Recent Labs     01/08/23  0648 01/09/23  0437 01/10/23  0607   WBC 4.7 11.4* 11.9*   HGB 11.3* 11.2* 9.9*   HCT 35.6* 34.6* 29.3*   MCV 85.9 84.1 81.9    199 139       BMP:   Recent Labs     01/07/23  1107 01/08/23  0648 01/09/23  0437 01/10/23  0607    134* 130* 129*   K 3.4* 3.8 4.4 4.1   * 109 104 104   CO2 17* 13* 15* 18*   PHOS 3.0  --   --   --    BUN 14 20 26* 32*   CREATININE 0.7 1.0 0.9 0.8       LIVER PROFILE:   Recent Labs     01/07/23  1107 01/10/23  0607   AST 36 21   ALT 14 11   BILIDIR 0.4*  --    BILITOT 0.7 0.7   ALKPHOS 72 84         Cultures:  1/6/2023 SARS-CoV-2 positive  1/6/2023 blood 2 of 2 E. coli     Imaging:  Abdominal CT 1/6/2023  Impression   1. Small volume pneumoperitoneum and extraluminal fecal material appears to   arise from the sigmoid colon, presumably as a complication of diverticulitis. 2.  Small volume abdominopelvic ascites. No loculated fluid collection   identified. Left kidney/adrenal imaging abnormality is a large left kidney cyst (simple) which does not require any additional f/u per radiologist     CXR 1/9/23   Impression   1. Stable lines, tubes and support devices. 2. Stable basilar opacities with pleural effusions. ASSESSMENT:  Septic shock  E. coli bacteremia  Acute hypoxemic respiratory failure  Diverticulitis with perforated viscus in the sigmoid colon area, post op x-lap with sigmoid colectomy and small bowel resection and ostomy on 1/7/23  COVID-19 infection, incidental finding  Paroxysmal atrial fibrillation, now AFIB RVR   H/O esophageal stricture   Coagulopathy 2/2 warfarin    PLAN:  COVID-19 isolation, droplet plus   Mechanical ventilation as per my orders.  The ventilator was adjusted by me at the bedside for unstable, life threatening respiratory failure  IV Propofol for sedation, target RASS -2, with daily SAT  Fentanyl for CPOT less than 3   Fentanyl and Versed PRN   IV fluid resuscitation is complete    Zosyn D#4, Diflucan D#4  IV levophed & vasopressin to maintain MAP of 65  LR for MIVF  Amiodarone infusion for A. fib RVR - trial off today  SSI   S/P 3 U FFP & vitamin K; give K-centra X 1 prior to emergent surgery  Prophylaxis: SCD, bactroban, protonix   Total critical care time caring for this patient with life threatening, unstable organ failure, including direct patient contact, management of life support systems, review of data including imaging and labs, discussions with other team members and physicians at least 34 minutes so far today, excluding procedures.

## 2023-01-10 NOTE — PROGRESS NOTES
[facility] GENERAL SURGERY DAILY PROGRESS NOTE    SUBJECTIVE: Sedation off. SBT in process. OBJECTIVE: CURRENT VITALS:  BP (!) 140/73   Pulse 77   Temp 97.1 °F (36.2 °C) (Bladder)   Resp 19   Ht 5' 7\" (1.702 m)   Wt 221 lb 1.9 oz (100.3 kg)   SpO2 97%   BMI 34.63 kg/m²          COLOSTOMY:  Functional; Viable. LABS:    CBC:   Recent Labs     01/08/23  0648 01/09/23  0437 01/10/23  0607   WBC 4.7 11.4* 11.9*   RBC 4.15 4.11 3.57*   HGB 11.3* 11.2* 9.9*   HCT 35.6* 34.6* 29.3*   MCV 85.9 84.1 81.9   RDW 15.3 16.2* 15.7*    199 139     BMP:   Recent Labs     01/08/23  0648 01/09/23  0437 01/10/23  0607   * 130* 129*   K 3.8 4.4 4.1    104 104   CO2 13* 15* 18*   BUN 20 26* 32*   CREATININE 1.0 0.9 0.8         ASSESSMENT:   POD 3 [facility]'s  /  SBR        PLAN:   Continue supportive care, antibiotics and observation. Pressor requirement is lower and making slow improvement.          Renee Frost MD unknown

## 2023-01-10 NOTE — PROGRESS NOTES
01/09/23 2045   Patient Observation   Heart Rate 77   Resp (!) 34   SpO2 97 %   Breath Sounds   Right Upper Lobe Diminished   Right Middle Lobe Diminished   Right Lower Lobe Diminished   Left Upper Lobe Diminished   Left Lower Lobe Diminished   Vent Information   Vent Mode AC/VC   Ventilator Settings   FiO2  40 %   Vt (Set, mL) 370 mL   Resp Rate (Set) 18 bmp   PEEP/CPAP (cmH2O) 5   Vent Patient Data (Readings)   Vt (Measured) 375 mL   Peak Inspiratory Pressure (cmH2O) 21 cmH2O   Rate Measured 18 br/min   Minute Volume (L/min) 6.76 Liters   Mean Airway Pressure (cmH2O) 8.7 cmH20   Plateau Pressure (cm H2O) 15 cm H2O   I:E Ratio 1:3.9   Static Compliance (L/cm H2O) 38   Dynamic Compliance (L/cm H2O) 23.44 L/cm H2O   Additional Respiratoray Assessments   Humidification Source Heated wire   Humidification Temp 37   Circuit Condensation Drained   Ambu Bag With Mask At Bedside Yes   Airway Clearance   Suction ET Tube   Suction Device Inline suction catheter   Sputum Method Obtained Endotracheal   Sputum Amount Other (comment)  (none)   ETT    Placement Date/Time: 01/07/23 9476   Placed By: Licensed provider  Placement Verified By: Chest X-ray;Colorimetric ETCO2 device  Preoxygenation: Yes  Airway Tube Size: 8 mm  Location: Oral  Secured At: 24 cm  Measured From: Lips   Secured At 23 cm   Measured From Lips   ETT Placement Right  (from [address], [address])   Secured By Commercial tube arteaga   Site Assessment Cool;Dry

## 2023-01-10 NOTE — FLOWSHEET NOTE
Shift assessment complete, see flow sheet. Midline abdominal dressing dry and intact with small amount of drainage. MICHELLE dressing clean, dry, and intact with 50mL serosanguinous drainage. CVP leveled and zeroed, reading 9mmHg. Patient intubated and sedated. 7.5mm ETT secured 23cm at lip line. Ventilator settings as follows RR 18 / Vt 370 / FiO2 45 / PEEP 5. Right triple lumen IJ CVC, left radial arterial ine, and PIV in place. Dressings clean, dry, and intact. Amiodarone infusing at 0.5mg/min  Propofol infusing at 25mcg/kg/min  Vaso infusing at 0.03units/min  Levophed infusing at 4mcg/min    OG tube secured 56cm at lip line attached to continuous low wall suction. Rick in place draining clear yellow urine. No further needs assessed at this time. Bed locked and in lowest position. Bilateral wrist restraints remain in place for patient safety.

## 2023-01-10 NOTE — FLOWSHEET NOTE
Re-assessment complete; no changes from previous. Attempted to titrate levo down, patient did not tolerate. Levophed remains a 4mcg/min. All ICU lines and monitoring remain in place. No further needs assessed at this time. Bed locked and in lowest position. Bilateral wrist restraints remain in place for patient safety.

## 2023-01-10 NOTE — PROGRESS NOTES
Shift handoff report given to Alaina/Dick RN at bedside. Pt is sedated on vent  IV handoff completed. Call light within reach, bed in lowest position, bed alarm on. End of shift checks completed. Pt has been free of falls for duration of shift. Millicent Scherer

## 2023-01-10 NOTE — PROGRESS NOTES
Assessment is completed see flowsheet   RASS -3. Pt is able to blink eyes and wiggle toes. Pt responds to pain. Pt currently on SBT w/propofol holding. Intubated and sedated on SPONT/ps # 8 ETT, at 23 LL. 40 %/ +5. SpO2 100%. Respirations are easy, even, and unlabored. Bilateral lung sounds diminished w/rhonchi. VSS  NSR on the monitor  OG in place at 57, to LCWS    CVC/PIV, WNL   All lines and monitoring devices in place. Rick is patent and secured. Bilateral soft wrist restraints in place for patient safety. Bed in lowest position with wheels locked. No needs expressed at this time. Will continue to monitor.

## 2023-01-11 ENCOUNTER — APPOINTMENT (OUTPATIENT)
Dept: GENERAL RADIOLOGY | Age: 88
DRG: 853 | End: 2023-01-11
Payer: MEDICARE

## 2023-01-11 LAB
A/G RATIO: 0.5 (ref 1.1–2.2)
ALBUMIN SERPL-MCNC: 1.3 G/DL (ref 3.4–5)
ALP BLD-CCNC: 84 U/L (ref 40–129)
ALT SERPL-CCNC: 10 U/L (ref 10–40)
ANION GAP SERPL CALCULATED.3IONS-SCNC: 8 MMOL/L (ref 3–16)
AST SERPL-CCNC: 18 U/L (ref 15–37)
BASE EXCESS ARTERIAL: -12.1 MMOL/L (ref -3–3)
BILIRUB SERPL-MCNC: 1 MG/DL (ref 0–1)
BUN BLDV-MCNC: 29 MG/DL (ref 7–20)
CALCIUM SERPL-MCNC: 8.2 MG/DL (ref 8.3–10.6)
CARBOXYHEMOGLOBIN ARTERIAL: 1 % (ref 0–1.5)
CHLORIDE BLD-SCNC: 109 MMOL/L (ref 99–110)
CO2: 20 MMOL/L (ref 21–32)
CREAT SERPL-MCNC: 0.9 MG/DL (ref 0.6–1.2)
GFR SERPL CREATININE-BSD FRML MDRD: >60 ML/MIN/{1.73_M2}
GLUCOSE BLD-MCNC: 102 MG/DL (ref 70–99)
GLUCOSE BLD-MCNC: 107 MG/DL (ref 70–99)
GLUCOSE BLD-MCNC: 107 MG/DL (ref 70–99)
GLUCOSE BLD-MCNC: 109 MG/DL (ref 70–99)
GLUCOSE BLD-MCNC: 121 MG/DL (ref 70–99)
GLUCOSE BLD-MCNC: 41 MG/DL (ref 70–99)
GLUCOSE BLD-MCNC: 46 MG/DL (ref 70–99)
GLUCOSE BLD-MCNC: 48 MG/DL (ref 70–99)
GLUCOSE BLD-MCNC: 50 MG/DL (ref 70–99)
GLUCOSE BLD-MCNC: 65 MG/DL (ref 70–99)
GLUCOSE BLD-MCNC: 66 MG/DL (ref 70–99)
GLUCOSE BLD-MCNC: 71 MG/DL (ref 70–99)
GLUCOSE BLD-MCNC: 73 MG/DL (ref 70–99)
GLUCOSE BLD-MCNC: 84 MG/DL (ref 70–99)
GLUCOSE BLD-MCNC: 84 MG/DL (ref 70–99)
HCO3 ARTERIAL: 11.5 MMOL/L (ref 21–29)
HCT VFR BLD CALC: 25.6 % (ref 36–48)
HEMOGLOBIN, ART, EXTENDED: 5.3 G/DL (ref 12–16)
HEMOGLOBIN: 8.4 G/DL (ref 12–16)
MAGNESIUM: 1.5 MG/DL (ref 1.8–2.4)
MCH RBC QN AUTO: 27.4 PG (ref 26–34)
MCHC RBC AUTO-ENTMCNC: 33 G/DL (ref 31–36)
MCV RBC AUTO: 83.1 FL (ref 80–100)
METHEMOGLOBIN ARTERIAL: 0.1 %
O2 SAT, ARTERIAL: 97.6 %
O2 THERAPY: ABNORMAL
PCO2 ARTERIAL: 18.4 MMHG (ref 35–45)
PDW BLD-RTO: 15.8 % (ref 12.4–15.4)
PERFORMED ON: ABNORMAL
PERFORMED ON: NORMAL
PH ARTERIAL: 7.41 (ref 7.35–7.45)
PLATELET # BLD: 121 K/UL (ref 135–450)
PMV BLD AUTO: 8.4 FL (ref 5–10.5)
PO2 ARTERIAL: 96.4 MMHG (ref 75–108)
POTASSIUM REFLEX MAGNESIUM: 3.5 MMOL/L (ref 3.5–5.1)
RBC # BLD: 3.08 M/UL (ref 4–5.2)
SODIUM BLD-SCNC: 137 MMOL/L (ref 136–145)
TCO2 ARTERIAL: 12 MMOL/L
TOTAL PROTEIN: 3.9 G/DL (ref 6.4–8.2)
TRIGL SERPL-MCNC: 390 MG/DL (ref 0–150)
WBC # BLD: 10.7 K/UL (ref 4–11)

## 2023-01-11 PROCEDURE — 99024 POSTOP FOLLOW-UP VISIT: CPT | Performed by: SURGERY

## 2023-01-11 PROCEDURE — 2580000003 HC RX 258: Performed by: SURGERY

## 2023-01-11 PROCEDURE — 82803 BLOOD GASES ANY COMBINATION: CPT

## 2023-01-11 PROCEDURE — 2580000003 HC RX 258: Performed by: INTERNAL MEDICINE

## 2023-01-11 PROCEDURE — 36415 COLL VENOUS BLD VENIPUNCTURE: CPT

## 2023-01-11 PROCEDURE — 6360000002 HC RX W HCPCS: Performed by: SURGERY

## 2023-01-11 PROCEDURE — 94003 VENT MGMT INPAT SUBQ DAY: CPT

## 2023-01-11 PROCEDURE — 99233 SBSQ HOSP IP/OBS HIGH 50: CPT | Performed by: INTERNAL MEDICINE

## 2023-01-11 PROCEDURE — 85027 COMPLETE CBC AUTOMATED: CPT

## 2023-01-11 PROCEDURE — 6370000000 HC RX 637 (ALT 250 FOR IP): Performed by: SURGERY

## 2023-01-11 PROCEDURE — 99291 CRITICAL CARE FIRST HOUR: CPT | Performed by: INTERNAL MEDICINE

## 2023-01-11 PROCEDURE — 83735 ASSAY OF MAGNESIUM: CPT

## 2023-01-11 PROCEDURE — 2700000000 HC OXYGEN THERAPY PER DAY

## 2023-01-11 PROCEDURE — 94761 N-INVAS EAR/PLS OXIMETRY MLT: CPT

## 2023-01-11 PROCEDURE — C9113 INJ PANTOPRAZOLE SODIUM, VIA: HCPCS | Performed by: SURGERY

## 2023-01-11 PROCEDURE — 84478 ASSAY OF TRIGLYCERIDES: CPT

## 2023-01-11 PROCEDURE — 6360000002 HC RX W HCPCS: Performed by: INTERNAL MEDICINE

## 2023-01-11 PROCEDURE — 80053 COMPREHEN METABOLIC PANEL: CPT

## 2023-01-11 PROCEDURE — 71045 X-RAY EXAM CHEST 1 VIEW: CPT

## 2023-01-11 PROCEDURE — 2000000000 HC ICU R&B

## 2023-01-11 RX ORDER — MAGNESIUM SULFATE IN WATER 40 MG/ML
2000 INJECTION, SOLUTION INTRAVENOUS PRN
Status: DISCONTINUED | OUTPATIENT
Start: 2023-01-11 | End: 2023-01-14

## 2023-01-11 RX ORDER — DEXTROSE MONOHYDRATE 100 MG/ML
INJECTION, SOLUTION INTRAVENOUS CONTINUOUS PRN
Status: DISCONTINUED | OUTPATIENT
Start: 2023-01-11 | End: 2023-01-26 | Stop reason: HOSPADM

## 2023-01-11 RX ORDER — DEXTROSE, SODIUM CHLORIDE, SODIUM LACTATE, POTASSIUM CHLORIDE, AND CALCIUM CHLORIDE 5; .6; .31; .03; .02 G/100ML; G/100ML; G/100ML; G/100ML; G/100ML
INJECTION, SOLUTION INTRAVENOUS CONTINUOUS
Status: DISCONTINUED | OUTPATIENT
Start: 2023-01-11 | End: 2023-01-15

## 2023-01-11 RX ORDER — MAGNESIUM SULFATE 1 G/100ML
1000 INJECTION INTRAVENOUS ONCE
Status: COMPLETED | OUTPATIENT
Start: 2023-01-11 | End: 2023-01-11

## 2023-01-11 RX ORDER — POTASSIUM CHLORIDE 29.8 MG/ML
20 INJECTION INTRAVENOUS PRN
Status: DISCONTINUED | OUTPATIENT
Start: 2023-01-11 | End: 2023-01-14

## 2023-01-11 RX ADMIN — CHLORHEXIDINE GLUCONATE 0.12% ORAL RINSE 15 ML: 1.2 LIQUID ORAL at 08:32

## 2023-01-11 RX ADMIN — DEXTROSE MONOHYDRATE 250 ML: 100 INJECTION, SOLUTION INTRAVENOUS at 08:31

## 2023-01-11 RX ADMIN — PIPERACILLIN SODIUM,TAZOBACTAM SODIUM 3375 MG: 3; .375 INJECTION, POWDER, FOR SOLUTION INTRAVENOUS at 06:11

## 2023-01-11 RX ADMIN — POTASSIUM CHLORIDE 20 MEQ: 29.8 INJECTION, SOLUTION INTRAVENOUS at 10:38

## 2023-01-11 RX ADMIN — SODIUM CHLORIDE, SODIUM LACTATE, POTASSIUM CHLORIDE, CALCIUM CHLORIDE AND DEXTROSE MONOHYDRATE: 5; 600; 310; 30; 20 INJECTION, SOLUTION INTRAVENOUS at 10:36

## 2023-01-11 RX ADMIN — ENOXAPARIN SODIUM 40 MG: 100 INJECTION SUBCUTANEOUS at 08:33

## 2023-01-11 RX ADMIN — SODIUM CHLORIDE, PRESERVATIVE FREE 10 ML: 5 INJECTION INTRAVENOUS at 08:32

## 2023-01-11 RX ADMIN — MAGNESIUM SULFATE HEPTAHYDRATE 2000 MG: 40 INJECTION, SOLUTION INTRAVENOUS at 10:46

## 2023-01-11 RX ADMIN — DEXTROSE MONOHYDRATE 125 ML: 10 INJECTION, SOLUTION INTRAVENOUS at 04:56

## 2023-01-11 RX ADMIN — DEXTROSE MONOHYDRATE 125 ML: 10 INJECTION, SOLUTION INTRAVENOUS at 20:50

## 2023-01-11 RX ADMIN — DEXTROSE MONOHYDRATE 125 ML: 10 INJECTION, SOLUTION INTRAVENOUS at 06:12

## 2023-01-11 RX ADMIN — PANTOPRAZOLE SODIUM 40 MG: 40 INJECTION, POWDER, FOR SOLUTION INTRAVENOUS at 08:32

## 2023-01-11 RX ADMIN — MUPIROCIN: 20 OINTMENT TOPICAL at 08:35

## 2023-01-11 RX ADMIN — MIDAZOLAM 2 MG: 1 INJECTION INTRAMUSCULAR; INTRAVENOUS at 08:00

## 2023-01-11 RX ADMIN — MIDAZOLAM 2 MG: 1 INJECTION INTRAMUSCULAR; INTRAVENOUS at 04:26

## 2023-01-11 RX ADMIN — CHLORHEXIDINE GLUCONATE 0.12% ORAL RINSE 15 ML: 1.2 LIQUID ORAL at 20:57

## 2023-01-11 RX ADMIN — DEXTROSE MONOHYDRATE: 10 INJECTION, SOLUTION INTRAVENOUS at 06:58

## 2023-01-11 RX ADMIN — SODIUM CHLORIDE, POTASSIUM CHLORIDE, SODIUM LACTATE AND CALCIUM CHLORIDE: 600; 310; 30; 20 INJECTION, SOLUTION INTRAVENOUS at 01:39

## 2023-01-11 RX ADMIN — PIPERACILLIN SODIUM,TAZOBACTAM SODIUM 3375 MG: 3; .375 INJECTION, POWDER, FOR SOLUTION INTRAVENOUS at 22:54

## 2023-01-11 RX ADMIN — FLUCONAZOLE 200 MG: 2 INJECTION, SOLUTION INTRAVENOUS at 12:03

## 2023-01-11 RX ADMIN — FENTANYL CITRATE 25 MCG: 50 INJECTION, SOLUTION INTRAMUSCULAR; INTRAVENOUS at 08:00

## 2023-01-11 RX ADMIN — PIPERACILLIN SODIUM,TAZOBACTAM SODIUM 3375 MG: 3; .375 INJECTION, POWDER, FOR SOLUTION INTRAVENOUS at 15:05

## 2023-01-11 RX ADMIN — POTASSIUM CHLORIDE 20 MEQ: 29.8 INJECTION, SOLUTION INTRAVENOUS at 12:04

## 2023-01-11 RX ADMIN — MAGNESIUM SULFATE HEPTAHYDRATE 1000 MG: 1 INJECTION, SOLUTION INTRAVENOUS at 15:02

## 2023-01-11 RX ADMIN — MUPIROCIN: 20 OINTMENT TOPICAL at 20:57

## 2023-01-11 ASSESSMENT — PULMONARY FUNCTION TESTS
PIF_VALUE: 19
PIF_VALUE: 23
PIF_VALUE: 25
PIF_VALUE: 22
PIF_VALUE: 18
PIF_VALUE: 18

## 2023-01-11 ASSESSMENT — PAIN SCALES - GENERAL: PAINLEVEL_OUTOF10: 0

## 2023-01-11 NOTE — PROGRESS NOTES
01/10/23 2037   Patient Observation   Heart Rate 73   Resp 21   SpO2 94 %   Breath Sounds   Right Upper Lobe Diminished   Right Middle Lobe Diminished   Right Lower Lobe Diminished   Left Upper Lobe Diminished   Left Lower Lobe Diminished   Vent Information   Vent Mode AC/VC   Ventilator Settings   FiO2  40 %   Vt (Set, mL) 370 mL   Resp Rate (Set) 18 bmp   PEEP/CPAP (cmH2O) 5   Vent Patient Data (Readings)   Vt (Measured) 380 mL   Peak Inspiratory Pressure (cmH2O) 23 cmH2O   Rate Measured 22 br/min   Minute Volume (L/min) 8.39 Liters   Mean Airway Pressure (cmH2O) 9.7 cmH20   Plateau Pressure (cm H2O) 14 cm H2O   I:E Ratio 1:3.9   Static Compliance (L/cm H2O) 42   Dynamic Compliance (L/cm H2O) 21.11 L/cm H2O   Additional Respiratoray Assessments   Humidification Source Heated wire   Humidification Temp 37   Circuit Condensation Drained   Ambu Bag With Mask At Bedside Yes   Airway Clearance   Suction ET Tube;Oral   Subglottic Suction Done Yes   Suction Device Inline suction catheter;Suction catheter   Sputum Method Obtained Endotracheal   Sputum Amount Small   Sputum Color/Odor Brown   ETT    Placement Date/Time: 01/07/23 2210   Placed By: Licensed provider  Placement Verified By: Chest X-ray;Colorimetric ETCO2 device  Preoxygenation: Yes  Airway Tube Size: 8 mm  Location: Oral  Secured At: 24 cm  Measured From: Lips   Secured At 23 cm   Measured From Lips   ETT Placement Right  (from center)   Secured By Commercial tube arteaga   Site Assessment Cool;Dry

## 2023-01-11 NOTE — PROGRESS NOTES
Repeat blood sugar is 50,125mls of 10%Dextrose given. A perfect serve sent to  as to whether I should start the 10% dextrose continuous infusion despite the patient being on LR at 100mls/hr.

## 2023-01-11 NOTE — PROGRESS NOTES
Shift assessment done,patient is intubated with an ETT size 8 secured at lip sal[place] 23,she is not sedated currently. She is on levo at 2mcg/min,has an arterial line on the left radial,blood pressure monitoring is ongoing. She has bilateral wrist restraints on for safety. Has an OG  at the lip ,[id number],connected to wall suction draining bile  and remains to be NPO. Has a MICHELLE drain at the RLQ and a colostomy  Has a folley in draining good urine output. 0856-Blood sugar noted to be 66,Dextrose bolus 10% 125ml given. 2116-Patient's blood sugar -66;125ml of 10% dextrose given.

## 2023-01-11 NOTE — PROGRESS NOTES
[facility] GENERAL SURGERY DAILY PROGRESS NOTE    SUBJECTIVE: Awakens to voice. Squeezes hand and wiggles toes to command. OBJECTIVE: CURRENT VITALS:  BP (!) 140/95   Pulse 83   Temp 98.4 °F (36.9 °C) (Bladder)   Resp 23   Ht 5' 7\" (1.702 m)   Wt 221 lb 1.9 oz (100.3 kg)   SpO2 (!) 9%   BMI 34.63 kg/m²          ABD: Soft  INCISION:  C/D/I  COLOSTOMY:  Functional.    LABS:    CBC:   Recent Labs     01/09/23  0437 01/10/23  0607 01/11/23  0501   WBC 11.4* 11.9* 10.7   RBC 4.11 3.57* 3.08*   HGB 11.2* 9.9* 8.4*   HCT 34.6* 29.3* 25.6*   MCV 84.1 81.9 83.1   RDW 16.2* 15.7* 15.8*    139 121*     BMP:   Recent Labs     01/09/23  0437 01/10/23  0607 01/11/23  0501   * 129* 137   K 4.4 4.1 3.5    104 109   CO2 15* 18* 20*   BUN 26* 32* 29*   CREATININE 0.9 0.8 0.9     Recent Labs     01/11/23  0501   MG 1.50*       ASSESSMENT:   POD 4 Judson's  /  SBR        PLAN:   Continue supportive care, antibiotics and observation. Pressor requirement is lower and making slow improvement. Begin slow rate orogastric TF today.               Michael Conway MD

## 2023-01-11 NOTE — PROGRESS NOTES
Consult for TF recommendations - provider to manage    Recommend ADULT TUBE FEEDING; Orogastric; Peptide-Based formula - Vital AF 1.2 with a trophic rate of 15 ml/hr x 20 hours. Water flushes, 30 ml every 4 hours for tube patency. Await clarification on patient's weight since weight on admission was 165# (actual) and CBW is 221# 1.9 oz so there is a 56# difference between weights - RD may need to re-calculate nutrition needs when re-weight is checked. TF recommendation reflects trophic rate at this time until re-weight is checked and RD can determine whether nutrition needs to be re-calculated.       Thank you,   [address] [facility],[address], [address] [facility]

## 2023-01-11 NOTE — PROGRESS NOTES
01/10/23 2327   Patient Observation   Heart Rate 82   Resp 21   Breath Sounds   Right Upper Lobe Diminished   Right Middle Lobe Diminished   Right Lower Lobe Diminished   Left Upper Lobe Diminished   Left Lower Lobe Diminished   Vent Information   Vent Mode AC/VC   Ventilator Settings   FiO2  40 %   Vt (Set, mL) 370 mL   Resp Rate (Set) 18 bmp   PEEP/CPAP (cmH2O) 5   Vent Patient Data (Readings)   Vt (Measured) 364 mL   Peak Inspiratory Pressure (cmH2O) 25 cmH2O   Rate Measured 20 br/min   Minute Volume (L/min) 7.83 Liters   Mean Airway Pressure (cmH2O) 9.8 cmH20   I:E Ratio 1:1.4   Additional Respiratoray Assessments   Circuit Condensation Drained

## 2023-01-11 NOTE — CARE COORDINATION
INTERDISCIPLINARY PLAN OF CARE CONFERENCE    Date/Time: 1/11/2023 10:08 AM  Completed by: ALINA Hawthorne  Case Management      Patient Name:  Amita Vega  YOB: 1932  Admitting Diagnosis: Diverticulitis of colon [K57.32]  Prolonged Q-T interval on ECG [R94.31]  Perforated viscus [R19.8]  Perforated diverticulum [K57.80]     Admit Date/Time:  1/6/2023  8:46 PM    Chart reviewed. Interdisciplinary team contacted or reviewed plan related to patient progress and discharge plans. Disciplines included Case Management, Nursing, and Dietitian. Current Status:ongoing  PT/OT recommendation for discharge plan of care: TBD    Expected D/C Disposition:  goal is home    Discharge Plan Comments: Chart review completed. Completed Interdisciplinary rounds with ICU staff. Pt remains in the ICU on a Vent. Pt has an ostomy per wound care note on 1/10. The goal is for pt to return home with daughter and son in law per initial CM assessment. CM will continue to follow and assist. Please notify CM if needs or concerns arise.     Home O2 in place on admit: No

## 2023-01-11 NOTE — PROGRESS NOTES
01/11/23 0331   Patient Observation   Heart Rate 85   Resp 24   SpO2 94 %   Breath Sounds   Right Upper Lobe Diminished   Right Middle Lobe Diminished   Right Lower Lobe Diminished   Left Upper Lobe Diminished   Left Lower Lobe Diminished   Vent Information   Vent Mode AC/VC   Ventilator Settings   FiO2  40 %   Vt (Set, mL) 381 mL   Resp Rate (Set) 18 bmp   PEEP/CPAP (cmH2O) 5   Vent Patient Data (Readings)   Vt (Measured) 399 mL   Peak Inspiratory Pressure (cmH2O) 23 cmH2O   Rate Measured 24 br/min   Minute Volume (L/min) 9.35 Liters   Mean Airway Pressure (cmH2O) 9.2 cmH20   I:E Ratio 1:3.9   Additional Respiratoray Assessments   Circuit Condensation Drained   Airway Clearance   Suction ET Tube   Suction Device Inline suction catheter   Sputum Method Obtained Endotracheal   Sputum Amount Small   Sputum Color/Odor Brown   Sputum Consistency Thick   ETT    Placement Date/Time: 01/07/23 5744   Placed By: Licensed provider  Placement Verified By: Chest X-ray;Colorimetric ETCO2 device  Preoxygenation: Yes  Airway Tube Size: 8 mm  Location: Oral  Secured At: 24 cm  Measured From: Lips   ETT Placement Left

## 2023-01-11 NOTE — PLAN OF CARE
Problem: Discharge Planning  Goal: Discharge to home or other facility with appropriate resources  Outcome: Progressing     Problem: Pain  Goal: Verbalizes/displays adequate comfort level or baseline comfort level  Outcome: Progressing     Problem: Safety - Medical Restraint  Goal: Remains free of injury from restraints (Restraint for Interference with Medical Device)  Description: INTERVENTIONS:  1. Determine that other, less restrictive measures have been tried or would not be effective before applying the restraint  2. Evaluate the patient's condition at the time of restraint application  3. Inform patient/family regarding the reason for restraint  4. Q2H: Monitor safety, psychosocial status, comfort, nutrition and hydration  Outcome: Progressing  Flowsheets  Taken 1/11/2023 0000 by Lela Ramirez RN  Remains free of injury from restraints (restraint for interference with medical device): Every 2 hours: Monitor safety, psychosocial status, comfort, nutrition and hydration  Taken 1/10/2023 2200 by Lela Ramirez RN  Remains free of injury from restraints (restraint for interference with medical device): Every 2 hours: Monitor safety, psychosocial status, comfort, nutrition and hydration  Taken 1/10/2023 2000 by Lela Ramirez RN  Remains free of injury from restraints (restraint for interference with medical device): Every 2 hours: Monitor safety, psychosocial status, comfort, nutrition and hydration  Taken 1/10/2023 1200 by Jenelle Blanc RN  Remains free of injury from restraints (restraint for interference with medical device): Every 2 hours: Monitor safety, psychosocial status, comfort, nutrition and hydration     Problem: Skin/Tissue Integrity  Goal: Absence of new skin breakdown  Description: 1. Monitor for areas of redness and/or skin breakdown  2. Assess vascular access sites hourly  3. Every 4-6 hours minimum:  Change oxygen saturation probe site  4.   Every 4-6 hours:  If on nasal continuous positive airway pressure, respiratory therapy assess nares and determine need for appliance change or resting period.   Outcome: Progressing

## 2023-01-11 NOTE — PROGRESS NOTES
Internal Medicine ICU Progress Note      Events of Last 24 hours:-    Patient currently operating room for per prorated diverticular disease. Febrile. In rapid A. fib. Sedated. Pt remains on vent support with severe hypotension needing levo and now off vaso     Improving BP , weaning off levo  Good UOP  Has good Ostomy output , low sugars noted       Invasive Lines: right IJ CVC    MV: Intubated on 2023    Recent Labs     01/10/23  1232 23  0501   PHART 7.397 7.413   [id number] 29.2* 18.4*   PO2ART 86.7 96.4         MV Settings:  Vent Mode: AC/VC Resp Rate (Set): 18 bmp/Vt (Set, mL): 381 mL/ /FiO2 : 40 %    IV:   dextrose 100 mL/hr at 23 0658    sodium chloride Stopped (23 1706)    norepinephrine 4 mcg/min (23 0500)    vasopressin (Septic Shock) infusion Stopped (01/10/23 1239)    lactated ringers 100 mL/hr at 23 0401    sodium chloride         Vitals:  Temp  Av.7 °F (37.1 °C)  Min: 97.1 °F (36.2 °C)  Max: 100.1 °F (37.8 °C)  Pulse  Av.4  Min: 65  Max: 90  BP  Min: 103/61  Max: 149/85  SpO2  Av %  Min: 91 %  Max: 98 %  FiO2   Av %  Min: 40 %  Max: 40 %  Patient Vitals for the past 4 hrs:   BP Temp Temp src Pulse Resp SpO2   23 0700 127/75 99.1 °F (37.3 °C) Bladder 89 19 94 %   23 0600 136/74 -- -- 90 21 93 %   23 0500 120/65 -- -- 85 18 94 %   23 0400 119/68 100.1 °F (37.8 °C) Bladder 89 24 --         CVP: CVP (Mean): 310 mmHg      Intake/Output Summary (Last 24 hours) at 2023 0747  Last data filed at 2023 0401  Gross per 24 hour   Intake 2825.04 ml   Output 2025 ml   Net 800.04 ml         EXAM:  General: elderly female on vent support   Oral ETT and OG noted  No distress. .   Eyes: PERRL. No sclera icterus. No conjunctiva injected. ENT: No discharge. ETT. Neck: Trachea midline. Normal thyroid. Resp: No accessory muscle use. No crackles. No wheezing. No rhonchi. No dullness on percussion. CV: Regular rate.  Regular rhythm. No mumur or rub. +edema. No JVD. Palpable pedal pulses. GI: Non-tender. Mid abd surgical dressing and right LQ Bobby drain in place  Left sided ostomy in place with black liquid stool   Non-distended. No masses. No organmegaly. sluggish bowel sounds. No hernia. Ext - no edema   Neuro - sedated    Medications:  Scheduled Meds:   insulin lispro  0-4 Units SubCUTAneous Q4H    prochlorperazine  10 mg IntraVENous Once    piperacillin-tazobactam  3,375 mg IntraVENous Q8H    sodium chloride flush  5-40 mL IntraVENous 2 times per day    chlorhexidine  15 mL Mouth/Throat BID    pantoprazole  40 mg IntraVENous Daily    mupirocin   Nasal BID    enoxaparin  40 mg SubCUTAneous Daily    fluconazole  200 mg IntraVENous Q24H       PRN Meds:  glucose, dextrose bolus **OR** dextrose bolus, glucagon (rDNA), dextrose, HYDROmorphone **OR** HYDROmorphone, prochlorperazine, sodium chloride flush, sodium chloride, acetaminophen **OR** acetaminophen, midazolam, fentanNYL, albuterol sulfate HFA, potassium chloride **OR** potassium alternative oral replacement **OR** potassium chloride, sodium chloride    Results:  CBC:   Recent Labs     01/09/23  0437 01/10/23  0607 01/11/23  0501   WBC 11.4* 11.9* 10.7   HGB 11.2* 9.9* 8.4*   HCT 34.6* 29.3* 25.6*   MCV 84.1 81.9 83.1    139 121*       BMP:   Recent Labs     01/09/23  0437 01/10/23  0607 01/11/23  0501   * 129* 137   K 4.4 4.1 3.5    104 109   CO2 15* 18* 20*   BUN 26* 32* 29*   CREATININE 0.9 0.8 0.9       LIVER PROFILE:   Recent Labs     01/10/23  0607 01/11/23  0501   AST 21 18   ALT 11 10   BILITOT 0.7 1.0   ALKPHOS 84 84       PT/INR:   Recent Labs     01/09/23 0437   PROTIME 24.6*   INR 2.23*       APTT:   No results for input(s): APTT in the last 72 hours.     UA:  No results for input(s): NITRITE, COLORU, PHUR, LABCAST, [address] [facility], [address] [facility], MUCUS, TRICHOMONAS, YEAST, BACTERIA, CLARITYU, SPECGRAV, LEUKOCYTESUR, [address], [address] [place] [address] [address] GLUCOSEU, AMORPHOUS in the last 72 hours. Invalid input(s): Rebel Rell      Cultures:  [id number]  detected  Blood Culture positive for E. Coli and clostridium       Films:    XR CHEST PORTABLE   Final Result   Bilateral pleural effusions and adjacent lung consolidation, similar to prior         XR CHEST PORTABLE   Final Result   1. Stable lines, tubes and support devices. 2. Stable basilar opacities with pleural effusions. XR CHEST PORTABLE   Final Result   Increased pleural-parenchymal disease bilaterally         XR CHEST PORTABLE   Final Result   1. Endotracheal tube terminates in appropriate position above the kori. 2.  The enteric tube courses off the field of view in the upper abdomen. 3.  Right internal jugular line terminates at the level of the mid SVC. CT ABDOMEN PELVIS W IV CONTRAST Additional Contrast? None   Final Result   1. Small volume pneumoperitoneum and extraluminal fecal material appears to   arise from the sigmoid colon, presumably as a complication of diverticulitis. 2.  Small volume abdominopelvic ascites. No loculated fluid collection   identified. Findings were discussed with Forest Cortez at 10:25 pm on 1/6/2023. XR CHEST PORTABLE    (Results Pending)   XR CHEST PORTABLE    (Results Pending)            Assessment:    Principal Problem: Bowel perforation ([address]  Active Problems:    Septic shock (HCC)    Acute hypoxemic respiratory failure (HCC)    COVID-19    Coagulopathy (HCC)    Diverticulitis of colon  Resolved Problems:    * No resolved hospital problems. *       Plan:    #Perforated diverticulum. Gram neg bacteremia    S.p emergency ex lap with sigmoid colectomy , small bowel resection and ostomy placement   Surgery managing    On Zosyn and Diflucan   Fevers resolved   Improving sepsis   Recovering bowel function, start TF today    #Septic shock due to perforated diverticulum. Ecoli  bacteremia. On Zosyn and Diflucan.   Severe hypotension , no improvement with IVF boluses  Now   On IV vasopressors - vaso and levophed- weaning off today  Wean as tolerated    #Acute respiratory failure on the ventilator. Sec to perforated viscus - remains on vent post surgery   Management per pulmonary. #Paroxysmal A. fib. With RVR, started . On IV amiodarone. Coumadin on hold, INR reversed perioperatively   Can resume when safe     #COVID-19 detected. Incidental finding. No covid related issues      #lovenox  for DVT prophylaxis. Patient is critically ill but improving slowly   D/w family today       All questions and concerns were addressed to the patient/family. Alternatives to my treatment were discussed. The note was completed using EMR. Every effort was made to ensure accuracy; however, inadvertent computerized transcription errors may be present.          Jane Nguyen MD 7:47 AM 1/11/2023

## 2023-01-11 NOTE — PROGRESS NOTES
Reassessment done,patient is calm,coughing but tolerating the ventilator ,is on ACVC,RR-18,TV-370,PEEP-5,FIO2-40%. On levo (see MAR),blood pressure monitoring continues  Has bilateral wrist restraints on for safety. He remains to be NPO.

## 2023-01-11 NOTE — PROGRESS NOTES
replied I should start the 10% dextrose continuous infusion  0700-Repeat blood sugar -66  10% Dextrose started

## 2023-01-11 NOTE — PROGRESS NOTES
4 Eyes Skin Assessment     The patient is being assess for   Shift Handoff    I agree that 2 RN's have performed a thorough Head to Toe Skin Assessment on the patient. ALL assessment sites listed below have been assessed. Areas assessed for pressure by both nurses:   [x]   Head, Face, and Ears   [x]   Shoulders, Back, and Chest, Abdomen  [x]   Arms, Elbows, and Hands   [x]   Coccyx, Sacrum, and Ischium  [x]   Legs, Feet, and Heels        Skin Assessed Under all Medical Devices by both nurses:  ETT, de jesus, and OG              All Mepilex Borders were peeled back and area peeked at by both nurses:  No:    Please list where Mepilex Borders are located:               **SHARE this note so that the co-signing nurse is able to place an eSignature**    Co-signer eSignature: Electronically signed by Omaira Kidd RN on 1/11/23 at 6:37 AM EST    Does the Patient have Skin Breakdown related to pressure?   No     (Insert Photo here)         Sarwat Prevention initiated:  Yes   Wound Care Orders initiated:  No      WOC nurse consulted for Pressure Injury (Stage 3,4, Unstageable, DTI, NWPT, Complex wounds)and New or Established Ostomies:  No      Primary Nurse eSignature: Electronically signed by Hermelinda Roger RN on 1/11/23 at 6:35 AM EST

## 2023-01-11 NOTE — CONSULTS
Pt seen for ostomy care. Remains intubated and sedated in ICU setting. Current appliance with approximately 30 ml's of loose brown stool in pouch. Old appliance removed, skin cleansed with water and patted dry. Stoma is red, moist and near flush with abdomen, edges dusky in places. Mucocutaneous junction and peristomal skin intact. Pt repouched using small barrier seal and 2 piece 2 1/4\" flat wafer and drainable pouch. Good seal obtained. No family in room. Will follow.

## 2023-01-11 NOTE — PROGRESS NOTES
Reassessment done,patient is  intubated on vent settings FI02-40%,peep-5,RR-18. Has an OG connected to wall suction draining bile. Has bilateral wrist restraints on for safety. Has an arterial line,CVP and a tripple lumen on the right IJ.    0426-Patient coughing and alarming the vent,suctioning done but no change,2mg of versed given. 0445-Blood pressure noted to be dropping BP-86/45,MAP below 65,Levo increased to 4mcg/min,will continue monitoring closely. 0456-Blood sugar-48;125mls of 10% Dextrose given.

## 2023-01-11 NOTE — PROGRESS NOTES
Pulmonary & Critical Care Medicine ICU Progress Note    CC: Abdominal pain, perforated diverticulum, septic shock    Events of Last 24 hours: Hypoglycemia overnight  Levophed @ 4    Invasive Lines: Right IJ CVC 1/7/2023    MV: 1/7/2023  Vent Mode: AC/VC Resp Rate (Set): 18 bmp/Vt (Set, mL): 381 mL/ /FiO2 : 40 %  Recent Labs     01/10/23  1232 01/11/23  0501   PHART 7.397 7.413   DUQ3CHC 29.2* 18.4*   PO2ART 86.7 96.4       Vitals:  Blood pressure 127/75, pulse 89, temperature 99.1 °F (37.3 °C), temperature source Bladder, resp. rate 19, height 5' 7\" (1.702 m), weight 221 lb 1.9 oz (100.3 kg), SpO2 94 %, not currently breastfeeding. on vent    Intake/Output Summary (Last 24 hours) at 1/11/2023 0746  Last data filed at 1/11/2023 0401  Gross per 24 hour   Intake 2825.04 ml   Output 2025 ml   Net 800.04 ml       /76   Pulse 85   Temp 99.1 °F (37.3 °C) (Bladder)   Resp 17   Ht 5' 7\" (1.702 m)   Wt 221 lb 1.9 oz (100.3 kg)   SpO2 95%   BMI 34.63 kg/m²    Constitutional:  No acute distress. Eyes: PERRL. Conjunctivae anicteric. ENT: Normal nose. Normal tongue. Neck:  Trachea is midline. No thyroid tenderness. Respiratory: No accessory muscle usage. No decreased breath sounds. No wheezes. No rales. No Rhonchi. Cardiovascular: Normal S1S2. No digit clubbing. No digit cyanosis. + UE/ LE edema. GI: soft, stool in ostomy bag. Psychiatric: No anxiety or Agitation. Somnolent. Not following commands.     Scheduled Meds:   insulin lispro  0-4 Units SubCUTAneous Q4H    prochlorperazine  10 mg IntraVENous Once    piperacillin-tazobactam  3,375 mg IntraVENous Q8H    sodium chloride flush  5-40 mL IntraVENous 2 times per day    chlorhexidine  15 mL Mouth/Throat BID    pantoprazole  40 mg IntraVENous Daily    mupirocin   Nasal BID    enoxaparin  40 mg SubCUTAneous Daily    fluconazole  200 mg IntraVENous Q24H     PRN Meds:  glucose, dextrose bolus **OR** dextrose bolus, glucagon (rDNA), dextrose, HYDROmorphone **OR** HYDROmorphone, prochlorperazine, sodium chloride flush, sodium chloride, acetaminophen **OR** acetaminophen, midazolam, fentanNYL, albuterol sulfate HFA, potassium chloride **OR** potassium alternative oral replacement **OR** potassium chloride, sodium chloride    Results:  CBC:   Recent Labs     01/09/23  0437 01/10/23  0607 01/11/23  0501   WBC 11.4* 11.9* 10.7   HGB 11.2* 9.9* 8.4*   HCT 34.6* 29.3* 25.6*   MCV 84.1 81.9 83.1    139 121*       BMP:   Recent Labs     01/09/23  0437 01/10/23  0607 01/11/23  0501   * 129* 137   K 4.4 4.1 3.5    104 109   CO2 15* 18* 20*   BUN 26* 32* 29*   CREATININE 0.9 0.8 0.9       LIVER PROFILE:   Recent Labs     01/10/23  0607 01/11/23  0501   AST 21 18   ALT 11 10   BILITOT 0.7 1.0   ALKPHOS 84 84         Cultures:  1/6/2023 SARS-CoV-2 positive  1/6/2023 blood 2 of 2 E. coli     Imaging:  Abdominal CT 1/6/2023  Impression   1. Small volume pneumoperitoneum and extraluminal fecal material appears to   arise from the sigmoid colon, presumably as a complication of diverticulitis. 2.  Small volume abdominopelvic ascites. No loculated fluid collection   identified. Left kidney/adrenal imaging abnormality is a large left kidney cyst (simple) which does not require any additional f/u per radiologist     CXR 1/1/23   No significant interval change in right greater than left pleural effusions   and bibasilar atelectasis or airspace disease as compared to prior. Tip of endotracheal tube is approximately 1.2 cm proximal to the kori and   could be retracted approximately 2 cm.        ASSESSMENT:  Septic shock  E. coli bacteremia  Acute hypoxemic respiratory failure  Diverticulitis with perforated viscus in the sigmoid colon area, post op x-lap with sigmoid colectomy and small bowel resection and ostomy on 1/7/23  COVID-19 infection, incidental finding  Paroxysmal atrial fibrillation, now AFIB RVR  - on Amio gtt for a time  H/O esophageal stricture   Coagulopathy 2/2 warfarin    PLAN:  COVID-19 isolation, droplet plus   Mechanical ventilation as per my orders. The ventilator was adjusted by me at the bedside for unstable, life threatening respiratory failure  IV Propofol for sedation, target RASS -2, with daily SAT  Fentanyl for CPOT less than 3   Fentanyl and Versed PRN   Zosyn D#5, Diflucan D#5  IV levophed & vasopressin to maintain MAP of 65  Change to D5 LR at 75cc/hr  Replace K/Mg  SSI   S/P 3 U FFP & vitamin K; give K-centra X 1 prior to emergent surgery  Prophylaxis: SCD, bactroban, protonix   Total critical care time caring for this patient with life threatening, unstable organ failure, including direct patient contact, management of life support systems, review of data including imaging and labs, discussions with other team members and physicians at least 32 minutes so far today, excluding procedures.

## 2023-01-12 ENCOUNTER — APPOINTMENT (OUTPATIENT)
Dept: GENERAL RADIOLOGY | Age: 88
DRG: 853 | End: 2023-01-12
Payer: MEDICARE

## 2023-01-12 LAB
A/G RATIO: 0.6 (ref 1.1–2.2)
ALBUMIN SERPL-MCNC: 1.5 G/DL (ref 3.4–5)
ALP BLD-CCNC: 241 U/L (ref 40–129)
ALT SERPL-CCNC: 12 U/L (ref 10–40)
ANION GAP SERPL CALCULATED.3IONS-SCNC: 6 MMOL/L (ref 3–16)
AST SERPL-CCNC: 24 U/L (ref 15–37)
BASE EXCESS ARTERIAL: -1.8 MMOL/L (ref -3–3)
BASE EXCESS ARTERIAL: -1.9 MMOL/L (ref -3–3)
BILIRUB SERPL-MCNC: 1.3 MG/DL (ref 0–1)
BUN BLDV-MCNC: 20 MG/DL (ref 7–20)
CALCIUM SERPL-MCNC: 8.2 MG/DL (ref 8.3–10.6)
CARBOXYHEMOGLOBIN ARTERIAL: 0.7 % (ref 0–1.5)
CARBOXYHEMOGLOBIN ARTERIAL: 0.8 % (ref 0–1.5)
CHLORIDE BLD-SCNC: 112 MMOL/L (ref 99–110)
CO2: 22 MMOL/L (ref 21–32)
CREAT SERPL-MCNC: 0.7 MG/DL (ref 0.6–1.2)
GFR SERPL CREATININE-BSD FRML MDRD: >60 ML/MIN/{1.73_M2}
GLUCOSE BLD-MCNC: 119 MG/DL (ref 70–99)
GLUCOSE BLD-MCNC: 120 MG/DL (ref 70–99)
GLUCOSE BLD-MCNC: 121 MG/DL (ref 70–99)
GLUCOSE BLD-MCNC: 123 MG/DL (ref 70–99)
GLUCOSE BLD-MCNC: 133 MG/DL (ref 70–99)
GLUCOSE BLD-MCNC: 137 MG/DL (ref 70–99)
GLUCOSE BLD-MCNC: 66 MG/DL (ref 70–99)
GLUCOSE BLD-MCNC: 95 MG/DL (ref 70–99)
HCO3 ARTERIAL: 21.7 MMOL/L (ref 21–29)
HCO3 ARTERIAL: 21.7 MMOL/L (ref 21–29)
HCT VFR BLD CALC: 25.4 % (ref 36–48)
HEMOGLOBIN, ART, EXTENDED: 11.6 G/DL (ref 12–16)
HEMOGLOBIN, ART, EXTENDED: 9.2 G/DL (ref 12–16)
HEMOGLOBIN: 8.3 G/DL (ref 12–16)
MAGNESIUM: 2 MG/DL (ref 1.8–2.4)
MCH RBC QN AUTO: 26.8 PG (ref 26–34)
MCHC RBC AUTO-ENTMCNC: 32.7 G/DL (ref 31–36)
MCV RBC AUTO: 82.1 FL (ref 80–100)
METHEMOGLOBIN ARTERIAL: 0.3 %
METHEMOGLOBIN ARTERIAL: 0.3 %
O2 SAT, ARTERIAL: 96.3 %
O2 SAT, ARTERIAL: 96.6 %
O2 THERAPY: ABNORMAL
O2 THERAPY: ABNORMAL
PCO2 ARTERIAL: 32.5 MMHG (ref 35–45)
PCO2 ARTERIAL: 32.6 MMHG (ref 35–45)
PDW BLD-RTO: 15.9 % (ref 12.4–15.4)
PERFORMED ON: ABNORMAL
PERFORMED ON: NORMAL
PH ARTERIAL: 7.44 (ref 7.35–7.45)
PH ARTERIAL: 7.44 (ref 7.35–7.45)
PHOSPHORUS: 1.7 MG/DL (ref 2.5–4.9)
PLATELET # BLD: 150 K/UL (ref 135–450)
PMV BLD AUTO: 8.4 FL (ref 5–10.5)
PO2 ARTERIAL: 79.5 MMHG (ref 75–108)
PO2 ARTERIAL: 82.4 MMHG (ref 75–108)
POTASSIUM REFLEX MAGNESIUM: 3.7 MMOL/L (ref 3.5–5.1)
RBC # BLD: 3.1 M/UL (ref 4–5.2)
SODIUM BLD-SCNC: 140 MMOL/L (ref 136–145)
TCO2 ARTERIAL: 22.7 MMOL/L
TCO2 ARTERIAL: 22.7 MMOL/L
TOTAL PROTEIN: 4.2 G/DL (ref 6.4–8.2)
TRIGL SERPL-MCNC: 234 MG/DL (ref 0–150)
WBC # BLD: 14.9 K/UL (ref 4–11)

## 2023-01-12 PROCEDURE — 84100 ASSAY OF PHOSPHORUS: CPT

## 2023-01-12 PROCEDURE — 94761 N-INVAS EAR/PLS OXIMETRY MLT: CPT

## 2023-01-12 PROCEDURE — 6370000000 HC RX 637 (ALT 250 FOR IP): Performed by: SURGERY

## 2023-01-12 PROCEDURE — C9113 INJ PANTOPRAZOLE SODIUM, VIA: HCPCS | Performed by: SURGERY

## 2023-01-12 PROCEDURE — 80053 COMPREHEN METABOLIC PANEL: CPT

## 2023-01-12 PROCEDURE — 6360000002 HC RX W HCPCS: Performed by: SURGERY

## 2023-01-12 PROCEDURE — 2000000000 HC ICU R&B

## 2023-01-12 PROCEDURE — 2500000003 HC RX 250 WO HCPCS: Performed by: SURGERY

## 2023-01-12 PROCEDURE — 2700000000 HC OXYGEN THERAPY PER DAY

## 2023-01-12 PROCEDURE — 2580000003 HC RX 258: Performed by: INTERNAL MEDICINE

## 2023-01-12 PROCEDURE — 2580000003 HC RX 258: Performed by: SURGERY

## 2023-01-12 PROCEDURE — 36600 WITHDRAWAL OF ARTERIAL BLOOD: CPT

## 2023-01-12 PROCEDURE — 71045 X-RAY EXAM CHEST 1 VIEW: CPT

## 2023-01-12 PROCEDURE — 82803 BLOOD GASES ANY COMBINATION: CPT

## 2023-01-12 PROCEDURE — 99233 SBSQ HOSP IP/OBS HIGH 50: CPT | Performed by: INTERNAL MEDICINE

## 2023-01-12 PROCEDURE — 2500000003 HC RX 250 WO HCPCS: Performed by: INTERNAL MEDICINE

## 2023-01-12 PROCEDURE — 99024 POSTOP FOLLOW-UP VISIT: CPT | Performed by: SURGERY

## 2023-01-12 PROCEDURE — 84478 ASSAY OF TRIGLYCERIDES: CPT

## 2023-01-12 PROCEDURE — 6360000002 HC RX W HCPCS: Performed by: INTERNAL MEDICINE

## 2023-01-12 PROCEDURE — 85027 COMPLETE CBC AUTOMATED: CPT

## 2023-01-12 PROCEDURE — 83735 ASSAY OF MAGNESIUM: CPT

## 2023-01-12 PROCEDURE — 99291 CRITICAL CARE FIRST HOUR: CPT | Performed by: INTERNAL MEDICINE

## 2023-01-12 PROCEDURE — 94003 VENT MGMT INPAT SUBQ DAY: CPT

## 2023-01-12 RX ORDER — FUROSEMIDE 10 MG/ML
20 INJECTION INTRAMUSCULAR; INTRAVENOUS ONCE
Status: COMPLETED | OUTPATIENT
Start: 2023-01-12 | End: 2023-01-12

## 2023-01-12 RX ADMIN — PANTOPRAZOLE SODIUM 40 MG: 40 INJECTION, POWDER, FOR SOLUTION INTRAVENOUS at 08:42

## 2023-01-12 RX ADMIN — FUROSEMIDE 20 MG: 10 INJECTION, SOLUTION INTRAMUSCULAR; INTRAVENOUS at 15:59

## 2023-01-12 RX ADMIN — NOREPINEPHRINE BITARTRATE 3 MCG/MIN: 1 INJECTION, SOLUTION, CONCENTRATE INTRAVENOUS at 06:17

## 2023-01-12 RX ADMIN — SODIUM CHLORIDE, SODIUM LACTATE, POTASSIUM CHLORIDE, CALCIUM CHLORIDE AND DEXTROSE MONOHYDRATE: 5; 600; 310; 30; 20 INJECTION, SOLUTION INTRAVENOUS at 15:03

## 2023-01-12 RX ADMIN — PIPERACILLIN SODIUM,TAZOBACTAM SODIUM 3375 MG: 3; .375 INJECTION, POWDER, FOR SOLUTION INTRAVENOUS at 15:58

## 2023-01-12 RX ADMIN — SODIUM CHLORIDE, PRESERVATIVE FREE 10 ML: 5 INJECTION INTRAVENOUS at 08:41

## 2023-01-12 RX ADMIN — ENOXAPARIN SODIUM 40 MG: 100 INJECTION SUBCUTANEOUS at 08:42

## 2023-01-12 RX ADMIN — POTASSIUM PHOSPHATE, MONOBASIC AND POTASSIUM PHOSPHATE, DIBASIC 30 MMOL: 224; 236 INJECTION, SOLUTION, CONCENTRATE INTRAVENOUS at 10:14

## 2023-01-12 RX ADMIN — FLUCONAZOLE 200 MG: 2 INJECTION, SOLUTION INTRAVENOUS at 13:39

## 2023-01-12 RX ADMIN — SODIUM CHLORIDE, SODIUM LACTATE, POTASSIUM CHLORIDE, CALCIUM CHLORIDE AND DEXTROSE MONOHYDRATE: 5; 600; 310; 30; 20 INJECTION, SOLUTION INTRAVENOUS at 00:04

## 2023-01-12 RX ADMIN — SODIUM CHLORIDE, PRESERVATIVE FREE 10 ML: 5 INJECTION INTRAVENOUS at 20:52

## 2023-01-12 RX ADMIN — CHLORHEXIDINE GLUCONATE 0.12% ORAL RINSE 15 ML: 1.2 LIQUID ORAL at 08:42

## 2023-01-12 RX ADMIN — HYDROMORPHONE HYDROCHLORIDE 0.25 MG: 1 INJECTION, SOLUTION INTRAMUSCULAR; INTRAVENOUS; SUBCUTANEOUS at 21:59

## 2023-01-12 RX ADMIN — PIPERACILLIN SODIUM,TAZOBACTAM SODIUM 3375 MG: 3; .375 INJECTION, POWDER, FOR SOLUTION INTRAVENOUS at 22:43

## 2023-01-12 RX ADMIN — PIPERACILLIN SODIUM,TAZOBACTAM SODIUM 3375 MG: 3; .375 INJECTION, POWDER, FOR SOLUTION INTRAVENOUS at 06:05

## 2023-01-12 RX ADMIN — DEXTROSE MONOHYDRATE 125 ML: 10 INJECTION, SOLUTION INTRAVENOUS at 00:31

## 2023-01-12 ASSESSMENT — PAIN SCALES - GENERAL
PAINLEVEL_OUTOF10: 0
PAINLEVEL_OUTOF10: 0

## 2023-01-12 ASSESSMENT — PULMONARY FUNCTION TESTS
PIF_VALUE: 11
PIF_VALUE: 17

## 2023-01-12 ASSESSMENT — PAIN DESCRIPTION - DESCRIPTORS: DESCRIPTORS: PATIENT UNABLE TO DESCRIBE

## 2023-01-12 ASSESSMENT — PAIN DESCRIPTION - PAIN TYPE: TYPE: ACUTE PAIN

## 2023-01-12 ASSESSMENT — PAIN DESCRIPTION - LOCATION: LOCATION: ABDOMEN

## 2023-01-12 NOTE — CONSULTS
Pt seen for ostomy care. Current appliance intact. No output since appliance changed yesterday, small amount of serosanguinous drainage noted in pouch. Discussed with staff RN, possible extubation today. Will continue to follow.   No family in room

## 2023-01-12 NOTE — PROGRESS NOTES
ABG results noted & called to Dr. Justine Vidal. Order received to extubate.        Latest Reference Range & Units 1/12/23 11:34   pH, Arterial 7.350 - 7.450  7.442   pCO2, Arterial 35.0 - 45.0 mmHg 32.5 (L)   pO2, Arterial 75.0 - 108.0 mmHg 79.5   HCO3, Arterial 21.0 - 29.0 mmol/L 21.7   TCO2 (calc), Art Not Established mmol/L 22.7   Base Excess, Arterial -3.0 - 3.0 mmol/L -1.8   O2 Sat, Arterial >92 % 96.3   Methemoglobin, Arterial <1.5 % 0.3   Carboxyhgb, Arterial 0.0 - 1.5 % 0.7   (L): Data is abnormally low

## 2023-01-12 NOTE — PROGRESS NOTES
Pulmonary & Critical Care Medicine ICU Progress Note    CC: Abdominal pain, perforated diverticulum, septic shock    Events of Last 24 hours: off sedation > 24 hrs  Tolerating SBT today  Levophed @ 3    Invasive Lines: Right IJ CVC 1/7/2023    MV: 1/7/2023  Vent Mode: AC/VC Resp Rate (Set): 16 bmp/Vt (Set, mL): 370 mL/ /FiO2 : 35 %  Recent Labs     01/11/23  0501 01/12/23  0507   PHART 7.413 7.442   [id number] 18.4* 32.6*   PO2ART 96.4 82.4       Vitals:  Blood pressure (!) 142/73, pulse 84, temperature 99.8 °F (37.7 °C), temperature source Bladder, resp. rate 20, height 5' 7\" (1.702 m), weight 210 lb 15.7 oz (95.7 kg), SpO2 95 %, not currently breastfeeding. on vent    Intake/Output Summary (Last 24 hours) at 1/12/2023 0858  Last data filed at 1/12/2023 0800  Gross per 24 hour   Intake 3183.07 ml   Output 1870 ml   Net 1313.07 ml       BP (!) 142/73   Pulse 84   Temp 99.8 °F (37.7 °C) (Bladder)   Resp 20   Ht 5' 7\" (1.702 m)   Wt 210 lb 15.7 oz (95.7 kg)   SpO2 95%   BMI 33.04 kg/m²    Constitutional:  No acute distress. Eyes: PERRL. Conjunctivae anicteric. ENT: Normal nose. Normal tongue. Neck:  Trachea is midline. No thyroid tenderness. Respiratory: No accessory muscle usage. No decreased breath sounds. No wheezes. No rales. No Rhonchi. Cardiovascular: Normal S1S2. No digit clubbing. No digit cyanosis. + UE/ LE edema. GI: soft, stool in ostomy bag. Psychiatric: No anxiety or Agitation. Somnolent. Not following commands.     Scheduled Meds:   insulin lispro  0-4 Units SubCUTAneous Q4H    prochlorperazine  10 mg IntraVENous Once    piperacillin-tazobactam  3,375 mg IntraVENous Q8H    sodium chloride flush  5-40 mL IntraVENous 2 times per day    chlorhexidine  15 mL Mouth/Throat BID    pantoprazole  40 mg IntraVENous Daily    mupirocin   Nasal BID    enoxaparin  40 mg SubCUTAneous Daily    fluconazole  200 mg IntraVENous Q24H     PRN Meds:  dextrose, potassium chloride, magnesium sulfate, glucose, dextrose bolus **OR** dextrose bolus, glucagon (rDNA), HYDROmorphone **OR** HYDROmorphone, prochlorperazine, sodium chloride flush, sodium chloride, acetaminophen **OR** acetaminophen, midazolam, fentanNYL, albuterol sulfate HFA, sodium chloride    Results:  CBC:   Recent Labs     01/10/23  0607 01/11/23  0501 01/12/23  0501   WBC 11.9* 10.7 14.9*   HGB 9.9* 8.4* 8.3*   HCT 29.3* 25.6* 25.4*   MCV 81.9 83.1 82.1    121* 150       BMP:   Recent Labs     01/10/23  0607 01/11/23  0501 01/12/23  0501   * 137 140   K 4.1 3.5 3.7    109 112*   CO2 18* 20* 22   PHOS  --   --  1.7*   BUN 32* 29* 20   CREATININE 0.8 0.9 0.7       LIVER PROFILE:   Recent Labs     01/10/23  0607 01/11/23  0501 01/12/23  0501   AST 21 18 24   ALT 11 10 12   BILITOT 0.7 1.0 1.3*   ALKPHOS 84 84 241*         Cultures:  1/6/2023 SARS-CoV-2 positive  1/6/2023 blood 2 of 2 E. coli     Imaging:  Abdominal CT 1/6/2023  Impression   1. Small volume pneumoperitoneum and extraluminal fecal material appears to   arise from the sigmoid colon, presumably as a complication of diverticulitis. 2.  Small volume abdominopelvic ascites. No loculated fluid collection   identified. Left kidney/adrenal imaging abnormality is a large left kidney cyst (simple) which does not require any additional f/u per radiologist     CXR 1/12/23   Impression   Small bilateral pleural effusions and bibasilar atelectasis or airspace   disease, increased on the left as compared to prior.        ASSESSMENT:  Septic shock  E. coli bacteremia  Acute hypoxemic respiratory failure  Diverticulitis with perforated viscus in the sigmoid colon area, post op x-lap with sigmoid colectomy and small bowel resection and ostomy on 1/7/23  COVID-19 infection, incidental finding  Paroxysmal atrial fibrillation, now AFIB RVR  - on Amio gtt for a time  H/O esophageal stricture   Coagulopathy 2/2 warfarin    PLAN:  Plan to extubate today  COVID-19 isolation, droplet plus Mechanical ventilation as per my orders. The ventilator was adjusted by me at the bedside for unstable, life threatening respiratory failure  IV Propofol for sedation, target RASS -2, with daily SAT  Fentanyl for CPOT less than 3   Fentanyl and Versed PRN   Zosyn D#6, Diflucan D#6  Trophic TF started per surgery  IV levophed & vasopressin to maintain MAP of 65  D5 LR at 75cc/hr  SSI   S/P 3 U FFP & vitamin K; give K-centra X 1 prior to emergent surgery  Prophylaxis: SCD, bactroban, protonix   Total critical care time caring for this patient with life threatening, unstable organ failure, including direct patient contact, management of life support systems, review of data including imaging and labs, discussions with other team members and physicians at least 31 minutes so far today, excluding procedures.

## 2023-01-12 NOTE — PROGRESS NOTES
Internal Medicine ICU Progress Note      Events of Last 24 hours:-    Patient currently operating room for per prorated diverticular disease.  Febrile.  In rapid A. fib.  Sedated.    Pt remains on vent support , drowsy and on spontaneous breathing trial    Improved BP and off levo   Good UOP  Has good Ostomy output ,  Started TF yesterday  Ongoing weaning trials , likely extubation today       Invasive Lines: right IJ CVC    MV: Intubated on 2023    Recent Labs     23  0501 23  0507   PHART 7.413 7.442   [id number] 18.4* 32.6*   PO2ART 96.4 82.4         MV Settings:  Vent Mode: AC/VC Resp Rate (Set): 16 bmp/Vt (Set, mL): 370 mL/ /FiO2 : 35 %    IV:   dextrose Stopped (23 1048)    dextrose 5% in lactated ringers 75 mL/hr at 23 0611    sodium chloride Stopped (23 1706)    norepinephrine 3 mcg/min (23 0617)    sodium chloride         Vitals:  Temp  Av.2 °F (37.3 °C)  Min: 98.4 °F (36.9 °C)  Max: 99.7 °F (37.6 °C)  Pulse  Av.9  Min: 75  Max: 103  BP  Min: 109/71  Max: 160/83  SpO2  Av %  Min: 9 %  Max: 97 %  FiO2   Av.6 %  Min: 35 %  Max: 60 %  Patient Vitals for the past 4 hrs:   BP Temp Temp src Pulse Resp SpO2 Weight   23 0708 (!) 144/78 99.7 °F (37.6 °C) -- -- -- -- --   23 0700 (!) 144/78 -- -- 87 23 95 % --   23 0630 130/74 -- -- 93 21 94 % --   23 0615 (!) 116/8 -- -- 90 20 95 % --   23 0600 (!) 157/88 99.5 °F (37.5 °C) Bladder 90 21 94 % --   23 0530 (!) 142/82 -- -- (!) 103 19 95 % --   23 0500 (!) 153/78 -- -- 95 19 94 % --   23 0430 (!) 160/83 -- -- 98 18 95 % --   23 0400 (!) 156/81 99.3 °F (37.4 °C) Bladder 86 22 94 % 210 lb 15.7 oz (95.7 kg)   23 0330 (!) 144/87 -- -- 93 21 94 % --         CVP: CVP (Mean): 310 mmHg      Intake/Output Summary (Last 24 hours) at 2023 0726  Last data filed at 2023 0611  Gross per 24 hour   Intake 3183.07 ml   Output 2595 ml   Net 588.07 ml  EXAM:  General: elderly female on vent support sedated, drowsy  Oral ETT and OG noted  No distress. .   Eyes: PERRL. No sclera icterus. No conjunctiva injected. ENT: No discharge. ETT. Neck: Trachea midline. Normal thyroid. Resp: No accessory muscle use. No crackles. No wheezing. No rhonchi. No dullness on percussion. CV: Regular rate. Regular rhythm. No mumur or rub. +edema. No JVD. Palpable pedal pulses. GI: Non-tender. Mid abd surgical dressing and right LQ Bobby drain in place  left  sided ostomy in place with black liquid stool   Non-distended. No masses. No organmegaly. sluggish bowel sounds. No hernia.   Ext - 2+ edema to all ext  Neuro - sedated    Medications:  Scheduled Meds:   insulin lispro  0-4 Units SubCUTAneous Q4H    prochlorperazine  10 mg IntraVENous Once    piperacillin-tazobactam  3,375 mg IntraVENous Q8H    sodium chloride flush  5-40 mL IntraVENous 2 times per day    chlorhexidine  15 mL Mouth/Throat BID    pantoprazole  40 mg IntraVENous Daily    mupirocin   Nasal BID    enoxaparin  40 mg SubCUTAneous Daily    fluconazole  200 mg IntraVENous Q24H       PRN Meds:  dextrose, potassium chloride, magnesium sulfate, glucose, dextrose bolus **OR** dextrose bolus, glucagon (rDNA), HYDROmorphone **OR** HYDROmorphone, prochlorperazine, sodium chloride flush, sodium chloride, acetaminophen **OR** acetaminophen, midazolam, fentanNYL, albuterol sulfate HFA, sodium chloride    Results:  CBC:   Recent Labs     01/10/23  0607 01/11/23  0501 01/12/23  0501   WBC 11.9* 10.7 14.9*   HGB 9.9* 8.4* 8.3*   HCT 29.3* 25.6* 25.4*   MCV 81.9 83.1 82.1    121* 150       BMP:   Recent Labs     01/10/23  0607 01/11/23  0501 01/12/23  0501   * 137 140   K 4.1 3.5 3.7    109 112*   CO2 18* 20* 22   PHOS  --   --  1.7*   BUN 32* 29* 20   CREATININE 0.8 0.9 0.7       LIVER PROFILE:   Recent Labs     01/10/23  0607 01/11/23  0501 01/12/23  0501   AST 21 18 24   ALT 11 10 12   BILITOT 0.7 1.0 1.3*   ALKPHOS 84 84 241*       PT/INR:   No results for input(s): PROTIME, INR in the last 72 hours. APTT:   No results for input(s): APTT in the last 72 hours. UA:  No results for input(s): NITRITE, COLORU, PHUR, LABCAST, WBCUA, RBCUA, MUCUS, TRICHOMONAS, YEAST, BACTERIA, CLARITYU, SPECGRAV, LEUKOCYTESUR, UROBILINOGEN, BILIRUBINUR, BLOODU, GLUCOSEU, AMORPHOUS in the last 72 hours. Invalid input(s): [place]      Cultures:  [id number]  detected  Blood Culture positive for E. Coli and clostridium       Films:    XR CHEST PORTABLE   Final Result   No significant interval change in right greater than left pleural effusions   and bibasilar atelectasis or airspace disease as compared to prior. Tip of endotracheal tube is approximately 1.2 cm proximal to the kori and   could be retracted approximately 2 cm. XR CHEST PORTABLE   Final Result   Bilateral pleural effusions and adjacent lung consolidation, similar to prior         XR CHEST PORTABLE   Final Result   1. Stable lines, tubes and support devices. 2. Stable basilar opacities with pleural effusions. XR CHEST PORTABLE   Final Result   Increased pleural-parenchymal disease bilaterally         XR CHEST PORTABLE   Final Result   1. Endotracheal tube terminates in appropriate position above the kori. 2.  The enteric tube courses off the field of view in the upper abdomen. 3.  Right internal jugular line terminates at the level of the mid SVC. CT ABDOMEN PELVIS W IV CONTRAST Additional Contrast? None   Final Result   1. Small volume pneumoperitoneum and extraluminal fecal material appears to   arise from the sigmoid colon, presumably as a complication of diverticulitis. 2.  Small volume abdominopelvic ascites. No loculated fluid collection   identified. Findings were discussed with Raquel Grossman at 10:25 pm on 1/6/2023.          XR CHEST PORTABLE    (Results Pending)   XR CHEST PORTABLE    (Results Pending)            Assessment:    Principal Problem:    Bowel perforation (HCC)  Active Problems:    Septic shock (HCC)    Acute hypoxemic respiratory failure (HCC)    COVID-19    Coagulopathy (HCC)    Diverticulitis of colon  Resolved Problems:    * No resolved hospital problems. *       Plan:    #Perforated diverticulum.  Gram neg bacteremia    S.p emergency ex lap with sigmoid colectomy , small bowel resection and ostomy placement   Surgery managing    On Zosyn and Diflucan   Fevers resolved   Improvedsepsis   Recovering bowel function, started TF     #Septic shock due to perforated diverticulum.  Ecoli  bacteremia.  On Zosyn and Diflucan.  Severe hypotension , no improvement with IVF boluses  was On IV vasopressors - vaso and levophed- improved Bp      #Acute respiratory failure on the ventilator.   Sec to perforated viscus - remains on vent post surgery   Management per pulmonary.  Ongoing weaning trials, likely extubation today    #Paroxysmal A. fib. With RVR, started .  On IV amiodarone.  Coumadin on hold, INR reversed perioperatively   Can resume when safe     #COVID-19 detected.  Incidental finding. No covid related issues    # Anemia  - multifactorial - surgery, iatrogenic, nutritional. Monitor and transfuse less than 7    # Edema - 20 L+ since adm, good UOP. Add lasix     #lovenox  for DVT prophylaxis.    Patient is  ill but improving slowly       All questions and concerns were addressed to the patient/family. Alternatives to my treatment were discussed. The note was completed using EMR. Every effort was made to ensure accuracy; however, inadvertent computerized transcription errors may be present.         Johann Keller MD 7:26 AM 1/12/2023

## 2023-01-12 NOTE — PROGRESS NOTES
CM-A. Fib, VSS with Levo gtt off. Pt remains afebrile, UO WNL. Pt is tolerating extubation on 5 L high flow NC.  MICHELLE drain with mod. Amount of serousang drainage. Abd incision staples intact. Small amount of drainage noted @ site Dr. Marciano Minaya aware.

## 2023-01-12 NOTE — PROGRESS NOTES
Pt is awake and will look at you on command. Still very weak with trying to move arms and legs. SBT of 5/5 started. Pt is tolerating well at this time. VTs 400, RR 21 and easy. Sp02 is 94%. Continuing to monitor pt.

## 2023-01-12 NOTE — PROGRESS NOTES
AM assessment done. Pt is stable & w/out evidence of distress @ this time. For information on Fall & Injury Prevention, visit: [email or web address] OR  [email or web address] OR  https://www.cdc.gov/steadi/patient.html

## 2023-01-12 NOTE — PROGRESS NOTES
[facility] GENERAL SURGERY    Surgery Progress Note           POD # 5    PATIENT NAME: Emile Kowalski     TODAY'S DATE: 1/12/2023    INTERVAL HISTORY:    Pt  extubated but somnolent. OBJECTIVE:   VITALS:  /67   Pulse 84   Temp 99.8 °F (37.7 °C) (Bladder)   Resp 26   Ht 5' 7\" (1.702 m)   Wt 210 lb 15.7 oz (95.7 kg)   SpO2 95%   BMI 33.04 kg/m²     INTAKE/OUTPUT:    I/O last 3 completed shifts: In: 4469.1 [I.V.:3656.8; NG/GT:171; IV Piggyback:641.2]  Out: 9463 [Urine:3475; Emesis/NG output:300; Drains:210; Stool:60]  I/O this shift:  In: -   Out: 76 [Urine:75]                ABDOMEN:   hypoactive bowel sounds, soft, non-distended, ostomy beefy red without output   INCISION: clean with drainage from inferior incision    Data:  CBC:   Recent Labs     01/10/23  0607 01/11/23  0501 01/12/23  0501   WBC 11.9* 10.7 14.9*   HGB 9.9* 8.4* 8.3*   HCT 29.3* 25.6* 25.4*    121* 150     BMP:    Recent Labs     01/10/23  0607 01/11/23  0501 01/12/23  0501   * 137 140   K 4.1 3.5 3.7    109 112*   CO2 18* 20* 22   BUN 32* 29* 20   CREATININE 0.8 0.9 0.7   GLUCOSE 135* 73 133*     Hepatic:   Recent Labs     01/10/23  0607 01/11/23  0501 01/12/23  0501   AST 21 18 24   ALT 11 10 12   BILITOT 0.7 1.0 1.3*   ALKPHOS 84 84 241*     Mag:      Recent Labs     01/11/23  0501 01/12/23  0501   MG 1.50* 2.00      Phos:     Recent Labs     01/12/23  0501   PHOS 1.7*      INR: No results for input(s): INR in the last 72 hours. ASSESSMENT AND PLAN:  80 y.o. female status post Judson's/SBR. Await better GI function to start a diet .  Continue antibiotics and supportive care        Electronically signed by Maria Elena Camejo MD

## 2023-01-12 NOTE — PROGRESS NOTES
Pt extubated to 5 liters per high flow NC. Pt remains lethargic but does attempt to cough. Levo gtt off. Well continue to monitor.

## 2023-01-12 NOTE — PROGRESS NOTES
Blood pressure 125/74, pulse 80, temperature 99.1 °F (37.3 °C), temperature source Bladder, resp. rate 18, height 5' 7\" (1.702 m), weight 221 lb 1.9 oz (100.3 kg), SpO2 95 %, not currently breastfeeding. Patient currently intubated with ETT at 24LL. Vent settings are 16/370/+40%/5. Patient tolerating vent well at this time. LEVOPHED infusing at a rate of 2 mcg/min. Blood pressure 132/80 and arterial blood pressure 105/67. LR with 5% dextrose infusing at 75 ml/H    Blood sugar 65. PRN order for dextrose 10% 125ml bolus given with blood sugar coming up to 85. Tube feed started at this time though OG. Vital AF at 15 ml/H and 30 cc H2O flushes Q 4 hours. BUE soft wrist restraints in place due to attempts of pulling at ETT and lines. No signs of injury noted and PROM performed. Rick catheter intact draining yellow clear urine.      Electronically signed by Vinay Reynolds RN on 1/11/2023 at 9:34 PM

## 2023-01-12 NOTE — PROGRESS NOTES
01/11/23 2014   Patient Observation   Heart Rate 81   Resp 18   SpO2 95 %   Breath Sounds   Right Upper Lobe Rhonchi   Right Middle Lobe Diminished   Right Lower Lobe Diminished   Left Upper Lobe Rhonchi   Left Lower Lobe Diminished   Vent Information   Vent Mode AC/VC   Ventilator Settings   FiO2  40 %   Vt (Set, mL) 370 mL   Resp Rate (Set) 16 bmp   PEEP/CPAP (cmH2O) 5   Vent Patient Data (Readings)   Vt (Measured) 340 mL   Peak Inspiratory Pressure (cmH2O) 18 cmH2O   Rate Measured 21 br/min   Minute Volume (L/min) 7.3 Liters   Peak Inspiratory Flow (lpm) 60 L/sec   Mean Airway Pressure (cmH2O) 8.2 cmH20   Plateau Pressure (cm H2O) 14 cm H2O   I:E Ratio 1:2.6   Flow Sensitivity 3 L/min   Static Compliance (L/cm H2O) 38   Dynamic Compliance (L/cm H2O) 26 L/cm H2O   Vent Alarm Settings   High Pressure (cmH2O) 40 cmH2O   Low Minute Volume (lpm) 3 L/min   Low Exhaled Vt (ml) 250 mL   RR High (bpm) 40 br/min   Apnea (secs) 20 secs   Additional Respiratoray Assessments   Humidification Source Heated wire   Humidification Temp 36.9   Circuit Condensation Drained   Ambu Bag With Mask At Bedside Yes   Airway Clearance   Suction ET Tube   Suction Device Inline suction catheter   Sputum Method Obtained Endotracheal   Sputum Amount Moderate   Sputum Color/Odor Brown   Sputum Consistency Thick   ETT    Placement Date/Time: 01/07/23 9605   Placed By: Licensed provider  Placement Verified By: Chest X-ray;Colorimetric ETCO2 device  Preoxygenation: Yes  Airway Tube Size: 8 mm  Location: Oral  Secured At: 24 cm  Measured From: Lips   Secured At 22 cm   Measured From Lips   ETT Placement Right   Secured By Commercial tube arteaga   Site Assessment Dry

## 2023-01-12 NOTE — PROGRESS NOTES
01/12/23 0249   Patient Observation   Heart Rate 89   Resp 21   SpO2 96 %   Breath Sounds   Right Upper Lobe Diminished   Right Middle Lobe Diminished   Right Lower Lobe Diminished   Left Upper Lobe Diminished   Left Lower Lobe Diminished   Vent Information   Vent Mode AC/VC   Ventilator Settings   FiO2  (S)  35 %   Vt (Set, mL) 370 mL   Resp Rate (Set) 16 bmp   PEEP/CPAP (cmH2O) 5   Vent Patient Data (Readings)   Vt (Measured) 359 mL   Peak Inspiratory Pressure (cmH2O) 17 cmH2O   Rate Measured 19 br/min   Minute Volume (L/min) 7.21 Liters   Peak Inspiratory Flow (lpm) 60 L/sec   Mean Airway Pressure (cmH2O) 7.1 cmH20   Vent Alarm Settings   High Pressure (cmH2O) 40 cmH2O   Low Minute Volume (lpm) 3 L/min   High Minute Volume (lpm) 20 L/min   Low Exhaled Vt (ml) 250 mL   High Exhaled Vt (ml) 1000 mL   RR High (bpm) 40 br/min   Apnea (secs) 20 secs   Additional Respiratoray Assessments   Humidification Source Heated wire   Humidification Temp 37   Circuit Condensation Drained   Airway Clearance   Suction ET Tube   Subglottic Suction Done Yes   Suction Device Inline suction catheter   Sputum Method Obtained Endotracheal   ETT    Placement Date/Time: 01/07/23 6604   Placed By: Licensed provider  Placement Verified By: Chest X-ray;Colorimetric ETCO2 device  Preoxygenation: Yes  Airway Tube Size: 8 mm  Location: Oral  Secured At: 24 cm  Measured From: Lips   Secured At 23 cm   Measured From Lips   ETT Placement Left   Secured By Commercial tube arteaga   Site Assessment Dry

## 2023-01-12 NOTE — PROGRESS NOTES
01/11/23 2328   Patient Observation   Heart Rate 76   Resp 18   SpO2 95 %   Breath Sounds   Right Upper Lobe Diminished   Right Middle Lobe Diminished   Right Lower Lobe Diminished   Left Upper Lobe Diminished   Left Lower Lobe Diminished   Vent Information   Vent Mode AC/VC   Ventilator Settings   FiO2  40 %   Vt (Set, mL) 370 mL   Resp Rate (Set) 16 bmp   PEEP/CPAP (cmH2O) 5   Vent Patient Data (Readings)   Vt (Measured) 452 mL   Peak Inspiratory Pressure (cmH2O) 22 cmH2O   Rate Measured 22 br/min   Minute Volume (L/min) 8.29 Liters   Peak Inspiratory Flow (lpm) 60 L/sec   Mean Airway Pressure (cmH2O) 9.2 cmH20   Vent Alarm Settings   High Pressure (cmH2O) 40 cmH2O   Low Minute Volume (lpm) 3 L/min   High Minute Volume (lpm) 20 L/min   Low Exhaled Vt (ml) 250 mL   High Exhaled Vt (ml) 1000 mL   RR High (bpm) 40 br/min   Apnea (secs) 20 secs   Additional Respiratoray Assessments   Humidification Source Heated wire   Humidification Temp 37   Circuit Condensation Drained   Airway Clearance   Suction ET Tube   Subglottic Suction Done Yes   Suction Device Inline suction catheter   Suction Catheter Size 14 Fr   Sputum Method Obtained Endotracheal   Sputum Amount Moderate   Sputum Color/Odor White;Yellow   Sputum Consistency Thick   ETT    Placement Date/Time: 01/07/23 0638   Placed By: Licensed provider  Placement Verified By: Chest X-ray;Colorimetric ETCO2 device  Preoxygenation: Yes  Airway Tube Size: 8 mm  Location: Oral  Secured At: 24 cm  Measured From: Lips   Secured At 23 cm   Measured From Lips   ETT Placement Center   Secured By Commercial tube arteaga   Site Assessment Dry

## 2023-01-13 ENCOUNTER — APPOINTMENT (OUTPATIENT)
Dept: GENERAL RADIOLOGY | Age: 88
DRG: 853 | End: 2023-01-13
Payer: MEDICARE

## 2023-01-13 LAB
A/G RATIO: 0.5 (ref 1.1–2.2)
ALBUMIN SERPL-MCNC: 1.5 G/DL (ref 3.4–5)
ALP BLD-CCNC: 162 U/L (ref 40–129)
ALT SERPL-CCNC: 13 U/L (ref 10–40)
ANION GAP SERPL CALCULATED.3IONS-SCNC: 6 MMOL/L (ref 3–16)
AST SERPL-CCNC: 24 U/L (ref 15–37)
BILIRUB SERPL-MCNC: 1.2 MG/DL (ref 0–1)
BUN BLDV-MCNC: 18 MG/DL (ref 7–20)
CALCIUM SERPL-MCNC: 8.3 MG/DL (ref 8.3–10.6)
CHLORIDE BLD-SCNC: 113 MMOL/L (ref 99–110)
CO2: 24 MMOL/L (ref 21–32)
CREAT SERPL-MCNC: 0.7 MG/DL (ref 0.6–1.2)
GFR SERPL CREATININE-BSD FRML MDRD: >60 ML/MIN/{1.73_M2}
GLUCOSE BLD-MCNC: 102 MG/DL (ref 70–99)
GLUCOSE BLD-MCNC: 108 MG/DL (ref 70–99)
GLUCOSE BLD-MCNC: 123 MG/DL (ref 70–99)
GLUCOSE BLD-MCNC: 62 MG/DL (ref 70–99)
GLUCOSE BLD-MCNC: 69 MG/DL (ref 70–99)
GLUCOSE BLD-MCNC: 77 MG/DL (ref 70–99)
GLUCOSE BLD-MCNC: 77 MG/DL (ref 70–99)
GLUCOSE BLD-MCNC: 98 MG/DL (ref 70–99)
HCT VFR BLD CALC: 24.5 % (ref 36–48)
HEMOGLOBIN: 8 G/DL (ref 12–16)
MAGNESIUM: 1.8 MG/DL (ref 1.8–2.4)
MCH RBC QN AUTO: 27.1 PG (ref 26–34)
MCHC RBC AUTO-ENTMCNC: 32.7 G/DL (ref 31–36)
MCV RBC AUTO: 82.9 FL (ref 80–100)
PDW BLD-RTO: 16 % (ref 12.4–15.4)
PERFORMED ON: ABNORMAL
PERFORMED ON: NORMAL
PHOSPHORUS: 3.4 MG/DL (ref 2.5–4.9)
PLATELET # BLD: 174 K/UL (ref 135–450)
PMV BLD AUTO: 8.6 FL (ref 5–10.5)
POTASSIUM REFLEX MAGNESIUM: 4 MMOL/L (ref 3.5–5.1)
POTASSIUM SERPL-SCNC: 4 MMOL/L (ref 3.5–5.1)
RBC # BLD: 2.96 M/UL (ref 4–5.2)
SODIUM BLD-SCNC: 143 MMOL/L (ref 136–145)
TOTAL PROTEIN: 4.4 G/DL (ref 6.4–8.2)
TRIGL SERPL-MCNC: 220 MG/DL (ref 0–150)
WBC # BLD: 13.7 K/UL (ref 4–11)

## 2023-01-13 PROCEDURE — 6360000002 HC RX W HCPCS: Performed by: SURGERY

## 2023-01-13 PROCEDURE — 97530 THERAPEUTIC ACTIVITIES: CPT

## 2023-01-13 PROCEDURE — 2580000003 HC RX 258: Performed by: INTERNAL MEDICINE

## 2023-01-13 PROCEDURE — 97110 THERAPEUTIC EXERCISES: CPT

## 2023-01-13 PROCEDURE — 94640 AIRWAY INHALATION TREATMENT: CPT

## 2023-01-13 PROCEDURE — 2580000003 HC RX 258: Performed by: SURGERY

## 2023-01-13 PROCEDURE — C9113 INJ PANTOPRAZOLE SODIUM, VIA: HCPCS | Performed by: SURGERY

## 2023-01-13 PROCEDURE — 97166 OT EVAL MOD COMPLEX 45 MIN: CPT

## 2023-01-13 PROCEDURE — 99024 POSTOP FOLLOW-UP VISIT: CPT | Performed by: SURGERY

## 2023-01-13 PROCEDURE — 2700000000 HC OXYGEN THERAPY PER DAY

## 2023-01-13 PROCEDURE — 83735 ASSAY OF MAGNESIUM: CPT

## 2023-01-13 PROCEDURE — 97162 PT EVAL MOD COMPLEX 30 MIN: CPT

## 2023-01-13 PROCEDURE — 85027 COMPLETE CBC AUTOMATED: CPT

## 2023-01-13 PROCEDURE — 94761 N-INVAS EAR/PLS OXIMETRY MLT: CPT

## 2023-01-13 PROCEDURE — 99232 SBSQ HOSP IP/OBS MODERATE 35: CPT | Performed by: INTERNAL MEDICINE

## 2023-01-13 PROCEDURE — 99291 CRITICAL CARE FIRST HOUR: CPT | Performed by: INTERNAL MEDICINE

## 2023-01-13 PROCEDURE — 6360000002 HC RX W HCPCS: Performed by: INTERNAL MEDICINE

## 2023-01-13 PROCEDURE — 2000000000 HC ICU R&B

## 2023-01-13 PROCEDURE — 80053 COMPREHEN METABOLIC PANEL: CPT

## 2023-01-13 PROCEDURE — 84478 ASSAY OF TRIGLYCERIDES: CPT

## 2023-01-13 PROCEDURE — 84100 ASSAY OF PHOSPHORUS: CPT

## 2023-01-13 RX ORDER — ALBUTEROL SULFATE 2.5 MG/3ML
2.5 SOLUTION RESPIRATORY (INHALATION) EVERY 4 HOURS PRN
Status: DISCONTINUED | OUTPATIENT
Start: 2023-01-13 | End: 2023-01-26 | Stop reason: HOSPADM

## 2023-01-13 RX ORDER — FUROSEMIDE 10 MG/ML
20 INJECTION INTRAMUSCULAR; INTRAVENOUS ONCE
Status: COMPLETED | OUTPATIENT
Start: 2023-01-13 | End: 2023-01-13

## 2023-01-13 RX ADMIN — SODIUM CHLORIDE, SODIUM LACTATE, POTASSIUM CHLORIDE, CALCIUM CHLORIDE AND DEXTROSE MONOHYDRATE: 5; 600; 310; 30; 20 INJECTION, SOLUTION INTRAVENOUS at 20:59

## 2023-01-13 RX ADMIN — PIPERACILLIN SODIUM,TAZOBACTAM SODIUM 3375 MG: 3; .375 INJECTION, POWDER, FOR SOLUTION INTRAVENOUS at 22:45

## 2023-01-13 RX ADMIN — PIPERACILLIN SODIUM,TAZOBACTAM SODIUM 3375 MG: 3; .375 INJECTION, POWDER, FOR SOLUTION INTRAVENOUS at 06:11

## 2023-01-13 RX ADMIN — DEXTROSE MONOHYDRATE 250 ML: 100 INJECTION, SOLUTION INTRAVENOUS at 16:50

## 2023-01-13 RX ADMIN — FLUCONAZOLE 200 MG: 2 INJECTION, SOLUTION INTRAVENOUS at 10:44

## 2023-01-13 RX ADMIN — DEXTROSE MONOHYDRATE 125 ML: 10 INJECTION, SOLUTION INTRAVENOUS at 17:11

## 2023-01-13 RX ADMIN — ENOXAPARIN SODIUM 40 MG: 100 INJECTION SUBCUTANEOUS at 07:56

## 2023-01-13 RX ADMIN — SODIUM CHLORIDE, PRESERVATIVE FREE 10 ML: 5 INJECTION INTRAVENOUS at 07:56

## 2023-01-13 RX ADMIN — SODIUM CHLORIDE, SODIUM LACTATE, POTASSIUM CHLORIDE, CALCIUM CHLORIDE AND DEXTROSE MONOHYDRATE: 5; 600; 310; 30; 20 INJECTION, SOLUTION INTRAVENOUS at 03:42

## 2023-01-13 RX ADMIN — FUROSEMIDE 20 MG: 10 INJECTION, SOLUTION INTRAMUSCULAR; INTRAVENOUS at 09:01

## 2023-01-13 RX ADMIN — SODIUM CHLORIDE, PRESERVATIVE FREE 10 ML: 5 INJECTION INTRAVENOUS at 20:44

## 2023-01-13 RX ADMIN — ALBUTEROL SULFATE 2.5 MG: 2.5 SOLUTION RESPIRATORY (INHALATION) at 22:58

## 2023-01-13 RX ADMIN — PIPERACILLIN SODIUM,TAZOBACTAM SODIUM 3375 MG: 3; .375 INJECTION, POWDER, FOR SOLUTION INTRAVENOUS at 14:38

## 2023-01-13 RX ADMIN — PANTOPRAZOLE SODIUM 40 MG: 40 INJECTION, POWDER, FOR SOLUTION INTRAVENOUS at 07:55

## 2023-01-13 NOTE — CARE COORDINATION
INTERDISCIPLINARY PLAN OF CARE CONFERENCE    Date/Time: 1/13/2023 3:19 PM  Completed by: BERNABE Ramírez student Case Management      Patient Name:  Alicia Pate  YOB: 1932  Admitting Diagnosis: Diverticulitis of colon [K57.32]  Prolonged Q-T interval on ECG [R94.31]  Perforated viscus [R19.8]  Perforated diverticulum [K57.80]     Admit Date/Time:  1/6/2023  8:46 PM    Chart reviewed. Interdisciplinary team contacted or reviewed plan related to patient progress and discharge plans. Disciplines included Case Management, Nursing, and Dietitian. Current Status:ongoing  PT/OT recommendation for discharge plan of care: PT/OT pending    Expected D/C Disposition:  Goal is Home - pending pt's progress, per pt's daughter Justin Meyers with:Pt's daughter, Kevin Sevilla, outside of pt's room as pt is COVID positive. Pt was recently taken off the vent but is not actively communicating at the moment. Discharge Plan Comments: Chart review completed. Pt is no longer on the vent, and utilizing nasal canula, pt is resting. CM discussed d/c plans with pt's daughter, Kevin Sevilla, outside of pt's room. Kevin Sevilla stated that plans are pending based on pt's needs and progress upon d/c. CM confirmed understanding and stated CM would continue to follow and assist as pt progresses. Per wound care notes, pt has ostomy. Cm will continue to follow and assist. Please notify CM if needs or concerns arise.      Home O2 in place on admit: No

## 2023-01-13 NOTE — CONSULTS
Pt seen for ostomy care. Recently extubated in ICU setting. Nonverbal, no eye contact at this time. Current appliance intact with only serosanguinous drainage noted, no flatus. Old appliance removed, skin cleansed with water and patted dry. Stoma is red, moist and near flush with abdomen, edges dusky in places. Mucocutaneous junction and peristomal skin intact. Pt repouched using small barrier seal and 2 piece 2 1/4\" flat wafer and drainable pouch. Good seal obtained. No family in room. Will follow. No family present.

## 2023-01-13 NOTE — PROGRESS NOTES
Occupational Therapy  Inpatient Occupational Therapy  Evaluation and Treatment    Unit: ICU  Date:  1/13/2023  Patient Name:    Renny Zacarias  Admitting diagnosis:  Diverticulitis of colon [K57.32]  Prolonged Q-T interval on ECG [R94.31]  Perforated viscus [R19.8]  Perforated diverticulum [K57.80]  Admit Date:  1/6/2023  Precautions/Restrictions/WB Status/ Lines/ Wounds/ Oxygen: fall risk, IV, de jesus catheter , supplemental O2 (4L), colostomy, confusion, telemetry, ICU monitoring, continuous pulse ox, Droplet Plus precautions (+ COVID 19), and [place] (hard of hearing)    Treatment Time:  2884-5214  Treatment Number: 1     Billable Treatment Time: 17 minutes   Total Treatment Time:   27   minutes    Patient Goals for Therapy:  None stated      Discharge Recommendations: SNF  DME needs for discharge: defer to facility       Therapy recommendations for staff:   Assist of 2 for bed mobility. History of Present Illness: Per H&P  \"The patient is a 80 y.o. female with PMH below, presented to [facility] w/ lower abd pain. Pt arrived via helicopter. She is unable to give me any Hx during the brief time that I saw her priior to sedating her so a central line could be placed. She appeared to be very uncomfortable despite multiple doses of pain medicine including 100 mg She was subsequently preemptively intubated 2/2 hypoxia and increased WOB. She was found to have a perforated diverticulitis w/ small extraluminal stool at [facility]. She is on Coumadin and INR is ~3. She was given 10 SQ Vit K at [facility]. Dr. Angelica Irene is planning on OR as soon as we can get her INR down. She is now on levophed. She was also found to be COVID +. \"    Home Health S4 Level Recommendation:  NA  AM-PAC Score: AM-PAC Inpatient Daily Activity Raw Score: 6     Preadmission Environment  Patient unable to provide information,no family present therefore copied from CM note  Pt.  Lives with family (daughter and son in law)  Home environment:    house  Steps to enter first floor:  3-4 steps to enter  Steps to second floor: unknown  Bathroom:  unknown  Equipment owned: shower chair/bench and [facility]     Preadmission Status:  Pt. Able to drive: Unknown  Pt Fully independent with ADLs: no  Pt. Required assistance from family for: unknown       Subjective  Patient found in Bed with no family present. Pt agreeable to this OT eval & tx. Cognition    A&O x0   Able to follow 0% commands. Pain  Unknown    Upper Extremity ROM:    Impaired. Bilateral shoulders impaired. Tone noted during L elbow flexion. Upper Extremity Sensation    NT    Upper Extremity Proprioception:  NT    Coordination and Tone  Impaired    Balance  Static Sitting:   Not Tested  Dynamic Sitting:  Not Tested  Static Standing:   Not Tested  Dynamic Standing:  Not Tested    Bed mobility:    Supine to sit:   Not Tested  Sit to supine:   Not Tested  Rolling:    Not Tested  Scooting in sitting:  Not Tested  Scooting to head of bed:   Not Tested    Bridging:   Not Tested    Transfers:    Sit to stand:  Not Tested  Stand to sit:  Not Tested  Bed to chair:   Not Tested  Standard toilet: Not Tested  Bed to [facility]:  Not Tested      See PT note for gait analysis. Dressing:      UE:   Not Tested  LE:    Not Tested    Bathing:    UE:  Not Tested  LE:  Not Tested    Eating:   Not Tested    Toileting:  Not Tested    Grooming/hygiene: Not Tested    Activity Tolerance     Pt tolerated bed level PROM with no adverse reaction. BP (mmHg)  HR (bpm) SpO2 (%)   Semifowlers before activity  118/70 86 95% on 4L       Positioning Needs: In bed, call light and needs in reach. Per RN no alarm required.      Exercise / Activities Initiated:   PROM exercises performed bilaterally   Hand flex/ext:  x10  Wrist flex/ext:  x10  Elbow flex/ext x10   Shoulder flex/ext:  x10  Shoulder abd/add:  x10    Patient/Family Education:   Role of OT    Assessment of Deficits: Pt seen for Occupational therapy evaluation in acute care setting. Pt demonstrated decreased Activity tolerance, ADLs, IADLs, Balance , Bathing, Bed mobility, Dressing, ROM, Safety Awareness, Strength, Transfers, and Cognition. Pt functioning below baseline and will likely benefit from skilled occupational therapy services to maximize safety and independence. Goal(s) : To be met in 3 Visits:  1). Bed to toilet/BSC: Assess at next visit as able    To be met in 5 Visits:  1.) Pt will perform AAROM HEP  2.) pt will tolerate rolling with Max Ax2  3.) pt  will respond to commands ~25% of the time. Rehabilitation Potential:  Fair for goals listed above. Strengths for achieving goals include: PLOF  Barriers to achieving goals include:  Complexity of condition, Weakness, and Cognition     Plan: To be seen 2-3 x/wk  while in acute care setting for therapeutic exercises, bed mobility, transfers, dressing, bathing, family/patient education, ADL/IADL retraining, energy conservation training. Alfonso Peacock OTR/L #[id number]      If patient discharges from this facility prior to next visit, this note will serve as the Discharge Summary

## 2023-01-13 NOTE — PROGRESS NOTES
Internal Medicine ICU Progress Note      Events of Last 24 hours:-    Patient currently operating room for per prorated diverticular disease. Febrile. In rapid A. fib. Sedated. extubation on 5 L high flow NC  Tolerated well but mentation remains poor  21 L positive   Tolerating diuresis   BP stable off pressors      Invasive Lines: right IJ CVC    MV: Intubated on 2023    Recent Labs     23  0507 23  1134   PHART 7.442 7.442   IXN1LSO 32.6* 32.5*   PO2ART 82.4 79.5         MV Settings:  Vent Mode: AC/VC Resp Rate (Set): 16 bmp/Vt (Set, mL): 370 mL/ /FiO2 : 35 %    IV:   dextrose Stopped (23 1048)    dextrose 5% in lactated ringers 75 mL/hr at 23 0425    sodium chloride Stopped (23 1706)    norepinephrine Stopped (23 1433)    sodium chloride         Vitals:  Temp  Av °F (37.2 °C)  Min: 97.8 °F (36.6 °C)  Max: 99.8 °F (37.7 °C)  Pulse  Av.6  Min: 76  Max: 113  BP  Min: 109/75  Max: 154/100  SpO2  Av %  Min: 89 %  Max: 98 %  FiO2   Av %  Min: 35 %  Max: 35 %  Patient Vitals for the past 4 hrs:   BP Temp Temp src Pulse Resp SpO2 Weight   23 0701 115/77 -- -- 78 15 97 % --   23 0600 135/78 -- -- 82 18 98 % --   23 0500 121/61 -- -- 80 19 98 % --   23 0442 -- -- -- 86 15 98 % --   23 0400 134/74 98.3 °F (36.8 °C) Bladder 83 (!) 36 97 % 227 lb 4.7 oz (103.1 kg)         CVP: CVP (Mean): 8 mmHg      Intake/Output Summary (Last 24 hours) at 2023 0716  Last data filed at 2023 0425  Gross per 24 hour   Intake 2339.83 ml   Output 1565 ml   Net 774.83 ml         EXAM:    General: elderly female off  vent support  remains , drowsy  No distress. .   Eyes: PERRL. No sclera icterus. No conjunctiva injected. ENT: No discharge. ETT. Neck: Trachea midline. Normal thyroid. Resp: No accessory muscle use. No crackles. No wheezing. No rhonchi. No dullness on percussion. CV: Regular rate. Regular rhythm. No mumur or rub. +3edema. No JVD. Palpable pedal pulses. GI: Non-tender. Mid abd surgical dressing and right LQ Bobby drain in place  left  sided ostomy in place with black liquid stool   Non-distended. No masses. No organmegaly. sluggish bowel sounds. No hernia. Ext - 2+ edema to all ext  Neuro - drowsy , unable to follow commands    Medications:  Scheduled Meds:   insulin lispro  0-4 Units SubCUTAneous Q4H    prochlorperazine  10 mg IntraVENous Once    piperacillin-tazobactam  3,375 mg IntraVENous Q8H    sodium chloride flush  5-40 mL IntraVENous 2 times per day    pantoprazole  40 mg IntraVENous Daily    enoxaparin  40 mg SubCUTAneous Daily    fluconazole  200 mg IntraVENous Q24H       PRN Meds:  dextrose, potassium chloride, magnesium sulfate, glucose, dextrose bolus **OR** dextrose bolus, glucagon (rDNA), HYDROmorphone **OR** HYDROmorphone, prochlorperazine, sodium chloride flush, sodium chloride, acetaminophen **OR** acetaminophen, midazolam, fentanNYL, albuterol sulfate HFA, sodium chloride    Results:  CBC:   Recent Labs     01/11/23  0501 01/12/23  0501 01/13/23  0410   WBC 10.7 14.9* 13.7*   HGB 8.4* 8.3* 8.0*   HCT 25.6* 25.4* 24.5*   MCV 83.1 82.1 82.9   * 150 174       BMP:   Recent Labs     01/11/23  0501 01/12/23  0501 01/13/23  0410    140 143   K 3.5 3.7 4.0  4.0    112* 113*   CO2 20* 22 24   PHOS  --  1.7* 3.4   BUN 29* 20 18   CREATININE 0.9 0.7 0.7       LIVER PROFILE:   Recent Labs     01/11/23  0501 01/12/23  0501 01/13/23  0410   AST 18 24 24   ALT 10 12 13   BILITOT 1.0 1.3* 1.2*   ALKPHOS 84 241* 162*       PT/INR:   No results for input(s): PROTIME, INR in the last 72 hours. APTT:   No results for input(s): APTT in the last 72 hours. UA:  No results for input(s): NITRITE, COLORU, PHUR, LABCAST, WBCUA, RBCUA, MUCUS, TRICHOMONAS, YEAST, BACTERIA, CLARITYU, SPECGRAV, LEUKOCYTESUR, UROBILINOGEN, BILIRUBINUR, BLOODU, GLUCOSEU, AMORPHOUS in the last 72 hours.     Invalid input(s): KETONESU      Cultures:  [id number]  detected  Blood Culture positive for E. Coli and clostridium       Films:    XR CHEST PORTABLE   Final Result   Small bilateral pleural effusions and bibasilar atelectasis or airspace   disease, increased on the left as compared to prior. XR CHEST PORTABLE   Final Result   No significant interval change in right greater than left pleural effusions   and bibasilar atelectasis or airspace disease as compared to prior. Tip of endotracheal tube is approximately 1.2 cm proximal to the kori and   could be retracted approximately 2 cm. XR CHEST PORTABLE   Final Result   Bilateral pleural effusions and adjacent lung consolidation, similar to prior         XR CHEST PORTABLE   Final Result   1. Stable lines, tubes and support devices. 2. Stable basilar opacities with pleural effusions. XR CHEST PORTABLE   Final Result   Increased pleural-parenchymal disease bilaterally         XR CHEST PORTABLE   Final Result   1. Endotracheal tube terminates in appropriate position above the kori. 2.  The enteric tube courses off the field of view in the upper abdomen. 3.  Right internal jugular line terminates at the level of the mid SVC. CT ABDOMEN PELVIS W IV CONTRAST Additional Contrast? None   Final Result   1. Small volume pneumoperitoneum and extraluminal fecal material appears to   arise from the sigmoid colon, presumably as a complication of diverticulitis. 2.  Small volume abdominopelvic ascites. No loculated fluid collection   identified. Findings were discussed with Akilah Larsen at 10:25 pm on 1/6/2023. XR CHEST PORTABLE    (Results Pending)   XR CHEST PORTABLE    (Results Pending)            Assessment:    Principal Problem:     Bowel perforation ([address]  Active Problems:    Septic shock (HCC)    Acute hypoxemic respiratory failure (HCC)    COVID-19    Coagulopathy (HCC)    Diverticulitis of colon  Resolved Problems: * No resolved hospital problems. *       Plan:    #Perforated diverticulum. Gram neg bacteremia    S.p emergency ex lap with sigmoid colectomy , small bowel resection and ostomy placement   Surgery managing    On Zosyn and Diflucan   Fevers resolved   Improvedsepsis   Recovering bowel function, started TF and holding after extubation     #Septic shock due to perforated diverticulum. Ecoli  bacteremia. On Zosyn and Diflucan. Severe hypotension , no improvement with IVF boluses  was On IV vasopressors - vaso and levophed- improved Bp  Now off pressors     #Acute respiratory failure on the ventilator. Sec to perforated viscus - remains on vent post surgery   Management per pulmonary. Extubated to NC 5 L    #Paroxysmal A. fib. With RVR, started . On IV amiodarone. Coumadin on hold, INR reversed perioperatively   Can resume when safe     #COVID-19 detected. Incidental finding. No covid related issues    # Anemia  - multifactorial - surgery, iatrogenic, nutritional. Monitor and transfuse less than 7    # Edema - 20 L+ since adm, good UOP. Added lasix     #lovenox  for DVT prophylaxis. Patient is  ill but improving slowly       All questions and concerns were addressed to the patient/family. Alternatives to my treatment were discussed. The note was completed using EMR. Every effort was made to ensure accuracy; however, inadvertent computerized transcription errors may be present.          Paul Navarro MD 7:16 AM 1/13/2023

## 2023-01-13 NOTE — PROGRESS NOTES
Jasmyn Lynn, pt's daughter at bedside and was updated. Pt's son, Celeste Murnoe called. Jasmyn Lynn gave permission for information to be shared with him.  He was also updated on pt condition and will visit tomorrow

## 2023-01-13 NOTE — PLAN OF CARE
Pt resting in bed, will squeeze nurses hands with direction, nonverbal. VSS, on 5L HFNC, has moist sound of phlegm in the back of her throat, pt suctioned with scant results. Pt edematous and B/L upper extremities weeping. Colostomy pouch c/d/I, no stool in bag, MICHELLE drain patent with 10 ml serous drainage. IV D5LR infusing, de jesus patent, no s/s of distress.    Problem: Discharge Planning  Goal: Discharge to home or other facility with appropriate resources  Outcome: Progressing     Problem: Pain  Goal: Verbalizes/displays adequate comfort level or baseline comfort level  Outcome: Progressing  Flowsheets (Taken 1/12/2023 2000)  Verbalizes/displays adequate comfort level or baseline comfort level:   Encourage patient to monitor pain and request assistance   Assess pain using appropriate pain scale   Administer analgesics based on type and severity of pain and evaluate response   Implement non-pharmacological measures as appropriate and evaluate response     Problem: Safety - Medical Restraint  Goal: Remains free of injury from restraints (Restraint for Interference with Medical Device)  Description: INTERVENTIONS:  1. Determine that other, less restrictive measures have been tried or would not be effective before applying the restraint  2. Evaluate the patient's condition at the time of restraint application  3. Inform patient/family regarding the reason for restraint  4. Q2H: Monitor safety, psychosocial status, comfort, nutrition and hydration  Outcome: Progressing  Flowsheets  Taken 1/12/2023 1200 by Florentino Welch RN  Remains free of injury from restraints (restraint for interference with medical device): Every 2 hours: Monitor safety, psychosocial status, comfort, nutrition and hydration  Taken 1/12/2023 1000 by Florentino Welch RN  Remains free of injury from restraints (restraint for interference with medical device): Every 2 hours: Monitor safety, psychosocial status, comfort, nutrition and hydration  Taken  1/12/2023 0800 by Katlyn Eason RN  Remains free of injury from restraints (restraint for interference with medical device): Every 2 hours: Monitor safety, psychosocial status, comfort, nutrition and hydration     Problem: Skin/Tissue Integrity  Goal: Absence of new skin breakdown  Description: 1. Monitor for areas of redness and/or skin breakdown  2. Assess vascular access sites hourly  3. Every 4-6 hours minimum:  Change oxygen saturation probe site  4. Every 4-6 hours:  If on nasal continuous positive airway pressure, respiratory therapy assess nares and determine need for appliance change or resting period.   Outcome: Progressing     Problem: Nutrition Deficit:  Goal: Optimize nutritional status  Outcome: Progressing  Flowsheets (Taken 1/12/2023 1050 by Quiana Lopes RD, LD)  Nutrient intake appropriate for improving, restoring, or maintaining nutritional needs:   Assess nutritional status and recommend course of action   Recommend, monitor, and adjust tube feedings and TPN/PPN based on assessed needs     Problem: Neurosensory - Adult  Goal: Achieves stable or improved neurological status  Outcome: Progressing     Problem: Respiratory - Adult  Goal: Achieves optimal ventilation and oxygenation  Outcome: Progressing     Problem: Cardiovascular - Adult  Goal: Maintains optimal cardiac output and hemodynamic stability  Outcome: Progressing     Problem: Skin/Tissue Integrity - Adult  Goal: Skin integrity remains intact  Outcome: Progressing  Goal: Incisions, wounds, or drain sites healing without S/S of infection  Outcome: Progressing  Goal: Oral mucous membranes remain intact  Outcome: Progressing     Problem: Musculoskeletal - Adult  Goal: Return mobility to safest level of function  Outcome: Progressing     Problem: Gastrointestinal - Adult  Goal: Establish and maintain optimal ostomy function  Outcome: Progressing     Problem: Genitourinary - Adult  Goal: Absence of urinary retention  Outcome: Progressing  Goal: Urinary catheter remains patent  Outcome: Progressing     Problem: Infection - Adult  Goal: Absence of infection at discharge  Outcome: Progressing     Problem: Metabolic/Fluid and Electrolytes - Adult  Goal: Electrolytes maintained within normal limits  Outcome: Progressing  Goal: Glucose maintained within prescribed range  Outcome: Progressing     Problem: Hematologic - Adult  Goal: Maintains hematologic stability  Outcome: Progressing

## 2023-01-13 NOTE — PROGRESS NOTES
[facility] GENERAL SURGERY    Surgery Progress Note           POD # 6    PATIENT NAME: Radha Small     TODAY'S DATE: 1/13/2023    INTERVAL HISTORY:    Pt somnolent. OBJECTIVE:   VITALS:  /83   Pulse 81   Temp 98.6 °F (37 °C) (Bladder)   Resp 24   Ht 5' 7\" (1.702 m)   Wt 227 lb 4.7 oz (103.1 kg)   SpO2 97%   BMI 35.60 kg/m²     INTAKE/OUTPUT:    I/O last 3 completed shifts: In: 5522.9 [I.V.:4098. 2; NG/GT:171; IV Piggyback:1253.7]  Out: 2510 [Urine:2335; Drains:175]  No intake/output data recorded. CONSTITUTIONAL:  fatigued and somnolent  ABDOMEN:   hypoactive bowel sounds, soft, non-distended, ostomy without output  INCISION: Clean with minimal serous drainage    Data:  CBC:   Recent Labs     01/11/23  0501 01/12/23  0501 01/13/23  0410   WBC 10.7 14.9* 13.7*   HGB 8.4* 8.3* 8.0*   HCT 25.6* 25.4* 24.5*   * 150 174     BMP:    Recent Labs     01/11/23  0501 01/12/23  0501 01/13/23  0410    140 143   K 3.5 3.7 4.0  4.0    112* 113*   CO2 20* 22 24   BUN 29* 20 18   CREATININE 0.9 0.7 0.7   GLUCOSE 73 133* 108*     Hepatic:   Recent Labs     01/11/23  0501 01/12/23  0501 01/13/23  0410   AST 18 24 24   ALT 10 12 13   BILITOT 1.0 1.3* 1.2*   ALKPHOS 84 241* 162*     Mag:      Recent Labs     01/11/23  0501 01/12/23  0501 01/13/23  0410   MG 1.50* 2.00 1.80      Phos:     Recent Labs     01/12/23  0501 01/13/23  0410   PHOS 1.7* 3.4      INR: No results for input(s): INR in the last 72 hours. ASSESSMENT AND PLAN:  80 y.o. female status post Mann's procedure/small bowel resection. Continue antibiotics and supportive care.   Await better GI function to start a diet        Electronically signed by Alcira Brizuela MD

## 2023-01-13 NOTE — PROGRESS NOTES
Inpatient Physical Therapy Evaluation and Treatment    Unit: ICU  Date:  1/13/2023  Patient Name:    Suzie Garcia  Admitting diagnosis:  Diverticulitis of colon [K57.32]  Prolonged Q-T interval on ECG [R94.31]  Perforated viscus [R19.8]  Perforated diverticulum [K57.80]  Admit Date:  1/6/2023  Precautions/Restrictions/WB Status/ Lines/ Wounds/ Oxygen: Fall risk, Bed/chair alarm, Lines -IV, Supplemental O2 (4), Rick catheter, PICC right, and Drains (MICHELLE), [place] (hard of hearing), Telemetry, Continuous pulse oximetry, and Isolation Precautions: Droplet Plus - COVID, Ostomy wound abdomen  Full code  Treatment Time:  17:50-18:28  Treatment Number:  1   Timed Code Treatment Minutes: 28 minutes  Total Treatment Minutes:  38  minutes    Patient Goals for Therapy: Unable to state          Discharge Recommendations: SNF  DME needs for discharge: defer to facility       Therapy recommendation for EMS Transport: requires transport by cot due to requires lift equiptment to transfer    Therapy recommendations for staff:   PROM and frequent repositioning    History Of Present Illness:     [place]:  'The patient is a 80 y.o. female with PMH below, presented to [place] [address] w/ lower abd pain. Pt arrived via helicopter. She is unable to give me any Hx during the brief time that I saw her priior to sedating her so a central line could be placed. She appeared to be very uncomfortable despite multiple doses of pain medicine including 100 mg She was subsequently preemptively intubated 2/2 hypoxia and increased WOB. She was found to have a perforated diverticulitis w/ small extraluminal stool at [place] [address]. She is on Coumadin and INR is ~3. She was given 10 SQ Vit K at [place] [address]. Dr. Catalina Small is planning on OR as soon as we can get her INR down. She is now on levophed.   She was also found to be COVID +\"     Home Health S4 Level Recommendation:  NA  AM-PAC Mobility Score    AM-PAC Inpatient Mobility Raw Score : 6       Preadmission Environment Patient unable to provide information,no family present therefore copied from CM note  Pt. Lives with family (daughter and son in law)  Home environment:  house  Steps to enter first floor:  3-4 steps to enter  Steps to second floor: unknown  Bathroom:  unknown  Equipment owned: shower chair/bench and [facility]    Preadmission Status:  Pt. Able to drive: Unknown  Pt Fully independent with ADLs: no  Pt. Required assistance from family for: unknown      Pain   Unknown  Did not appear to demonstrate pain behaviors during session    Cognition    A&O x0   Able to follow  no commands    Subjective  Patient lying supine in bed with no family present. Pt agreeable to this PT eval & tx. Upper Extremity ROM/Strength  Please see OT evaluation. Lower Extremity ROM / Strength   AROM WFL: No  ROM limitations: limited ROM BLe's,a;; extremity movements. Especially limited knee and hip flexion  and abduction of hips        Lower Extremity Sensation    NT    Lower Extremity Proprioception:   NT    Coordination and Tone  NT    Balance  Sitting:  Not tested; Not Tested  Comments:     Standing: Not tested; Not Tested  Comments:     Bed Mobility   Supine to Sit:    Not Tested  Sit to Supine:   Not Tested  Rolling: Total A and 2 persons  Scooting in sitting: Not Tested  Scooting in supine: Total A    Transfer Training     Sit to stand:   Not Tested  Stand to sit:   Not Tested  Bed to Chair:   Not Tested with use of N/A      Activity Tolerance   Pt completed therapy session with No adverse symptoms noted w/activity  HR 80's,occ PVC  /70 Spo2 92-93% wearing 4 L  Positioning Needs   Patient remained in bed, all needs in reach    Exercises Initiated  all completed bilaterally unless indicated  PROM BLE's in supine. , 10 reps each movement  Patient did not actively participate     Other  None.      Patient/Family Education   Pt educated on role of inpatient PT and purpose of visist  Assessment  Pt seen for Physical Therapy evaluation in acute care setting. Pt demonstrated decreased Activity tolerance, ROM, Safety, and Strength as well as decreased independence with Ambulation, Bed Mobility , and Transfers. Patient will benefit from skilled PT to progress activity as patient is able to tolerate. Recommending SNF upon discharge as patient functioning well below baseline, demonstrates good rehab potential and unable to return home due to inability to negotiate stairs to enter home/bedroom/bathroom, burden of care beyond caregiver ability, and home environment not conducive to patient recovery. Goals : To be met in 3 visits:  1). Roll S-S with Max A  2.) Patient will perform AAROM BLE's    To be met in 6 visits:  1.) Tolerate chair position x 5 minutes with midline head control  2). Bed to chair:  Assess when appropriate    Rehabilitation Potential: Good  Strengths for achieving goals include:   PLOF and Family Support   Barriers to achieving goals include:    Complexity of condition, Weakness, and [place] and cognition    Plan    To be seen 2-3 x / week  while in acute care setting for therapeutic exercises, bed mobility, transfers, progressive gait training, balance training, and family/patient education. Signature: Marzette Rubinstein, PT #[id number]     If patient discharges from this facility prior to next visit, this note will serve as the Discharge Summary.

## 2023-01-13 NOTE — PROGRESS NOTES
AM assessment completed. See flowsheet. A/O x 1. Pt follows commands, but does not speak or attempt to speak. Does squeeze hands and move feet when asked. Lungs sounds rhochi in upper, diminished in bases. Oral suctioning completed as pt seems to be holding secretions in upper airway. Afib on bedside monitor. Bowel sounds hypoactive. Midline incision approximated with staples. Some serous drainage from top and bottom of incision. Colostomy with little output to LUQ. +2-3 BUE and +2 BLE edema noted. BUE weeping. Urinary catheter in place draining clear yellow urine. Labs reviewed. Cont to monitor.

## 2023-01-13 NOTE — PROGRESS NOTES
Pulmonary & Critical Care Medicine ICU Progress Note    CC: Abdominal pain, perforated diverticulum, septic shock    Events of Last 24 hours: off sedation > 48 but still somnolent  Extubated to 5L NC yesterday  Levophed off    Invasive Lines: Right IJ CVC 1/7/2023    MV: 1/7-1/12/23  Vitals:  Blood pressure 115/77, pulse 78, temperature 98.6 °F (37 °C), temperature source Bladder, resp. rate 15, height 5' 7\" (1.702 m), weight 227 lb 4.7 oz (103.1 kg), SpO2 97 %, not currently breastfeeding. on 4L   Constitutional:  No acute distress. Eyes: PERRL. Conjunctivae anicteric. ENT: Normal nose. Normal tongue. Neck:  Trachea is midline. No thyroid tenderness. Respiratory: No accessory muscle usage. No decreased breath sounds. No wheezes. No rales. No Rhonchi. Cardiovascular: Normal S1S2. No digit clubbing. No digit cyanosis. + UE/ LE edema. GI: soft, stool in ostomy bag. Psychiatric: No anxiety or Agitation. Somnolent. Not following commands.     Scheduled Meds:   insulin lispro  0-4 Units SubCUTAneous Q4H    prochlorperazine  10 mg IntraVENous Once    piperacillin-tazobactam  3,375 mg IntraVENous Q8H    sodium chloride flush  5-40 mL IntraVENous 2 times per day    pantoprazole  40 mg IntraVENous Daily    enoxaparin  40 mg SubCUTAneous Daily    fluconazole  200 mg IntraVENous Q24H     PRN Meds:  dextrose, potassium chloride, magnesium sulfate, glucose, dextrose bolus **OR** dextrose bolus, glucagon (rDNA), HYDROmorphone **OR** HYDROmorphone, prochlorperazine, sodium chloride flush, sodium chloride, acetaminophen **OR** acetaminophen, midazolam, fentanNYL, albuterol sulfate HFA, sodium chloride    Results:  CBC:   Recent Labs     01/11/23  0501 01/12/23  0501 01/13/23  0410   WBC 10.7 14.9* 13.7*   HGB 8.4* 8.3* 8.0*   HCT 25.6* 25.4* 24.5*   MCV 83.1 82.1 82.9   * 150 174       BMP:   Recent Labs     01/11/23  0501 01/12/23  0501 01/13/23  0410    140 143   K 3.5 3.7 4.0  4.0    112* 113* CO2 20* 22 24   PHOS  --  1.7* 3.4   BUN 29* 20 18   CREATININE 0.9 0.7 0.7       LIVER PROFILE:   Recent Labs     01/11/23  0501 01/12/23  0501 01/13/23  0410   AST 18 24 24   ALT 10 12 13   BILITOT 1.0 1.3* 1.2*   ALKPHOS 84 241* 162*         Cultures:  1/6/2023 SARS-CoV-2 positive  1/6/2023 blood 2 of 2 E. coli     Imaging:  Abdominal CT 1/6/2023  Impression   1. Small volume pneumoperitoneum and extraluminal fecal material appears to   arise from the sigmoid colon, presumably as a complication of diverticulitis. 2.  Small volume abdominopelvic ascites. No loculated fluid collection   identified. Left kidney/adrenal imaging abnormality is a large left kidney cyst (simple) which does not require any additional f/u per radiologist     CXR 1/12/23   Impression   Small bilateral pleural effusions and bibasilar atelectasis or airspace   disease, increased on the left as compared to prior.        ASSESSMENT:  Septic shock  E. coli bacteremia  Acute hypoxemic respiratory failure  Diverticulitis with perforated viscus in the sigmoid colon area, post op x-lap with sigmoid colectomy and small bowel resection and ostomy on 1/7/23  COVID-19 infection, incidental finding  Paroxysmal atrial fibrillation, now AFIB RVR  - on Amio gtt for a time  H/O esophageal stricture   Coagulopathy 2/2 warfarin    PLAN:  COVID-19 isolation, droplet plus   Supplemental oxygen to maintain SaO2 >92%; wean as tolerated    D#7 Zosyn and Diflucan   IV levophed & vasopressin to maintain MAP of 65  D5 LR at 50cc/hr for hypoglycemia  Lasix 20mg IV x 1  SSI   S/P 3 U FFP & vitamin K; give K-centra X 1 prior to emergent surgery  Prophylaxis: SCD, bactroban, protonix   Total critical care time caring for this patient with life threatening, unstable organ failure, including direct patient contact, management of life support systems, review of data including imaging and labs, discussions with other team members and physicians at least 31 minutes so far today, excluding procedures.

## 2023-01-13 NOTE — PROGRESS NOTES
Comprehensive Nutrition Assessment    Type and Reason for Visit:  Reassess    Nutrition Recommendations/Plan:   Continue NPO status until patient is medically cleared to receive nutrition therapy. Recommend alternate means of nutrition if patient's diet cannot be advanced by day 7 of this admission. Monitor GI status and surgery notes. Monitor nutrition-related labs, colostomy output + function, and weight trends. Malnutrition Assessment:  Malnutrition Status:   At risk for malnutrition (01/13/23 1405)    Context:  Acute Illness     Findings of the 6 clinical characteristics of malnutrition:  Energy Intake:  50% or less of estimated energy requirements for 5 or more days  Weight Loss:  No significant weight loss     Body Fat Loss:  Unable to assess (COVID-19 +)     Muscle Mass Loss:  Unable to assess (COVID-19 +)    Fluid Accumulation:  No significant fluid accumulation     Strength:  Not Performed    Nutrition Assessment:    patient is declining from a nutritional standpoint AEB she was extubated on 1/12/23 so OG was d/c'd at that time as well and she is s/p ex-lap, sigmoid colectomy, SBR, and ostomy on 1/7/23 and she remains at risk for further compromise d/t NPO status, GI dysfunction, and altered nutrition-related labs; will continue NPO status and await GI function to return prior to starting po diet order, per surgery    Nutrition Related Findings:    patient was extubated on 1/12/23; she would squeeze hands this am but was not very interactive; colostomy output last documented on 1/10/23; she is s/p ex-lap, sigmoid colectomy, SBR, and ostomy on 1/7/23; patient has D5 in LR at 50 ml/hr infusing at this time; on low-dose SSI; awaiting return of GI function prior to starting a diet, per surgery note Wound Type: Surgical Incision (abdominal incision from surgery on 1/7/23)       Current Nutrition Intake & Therapies:    Average Meal Intake: NPO  Average Supplements Intake: NPO  Diet NPO    Anthropometric Measures:  Height: 5' 7\" (170.2 cm)  Ideal Body Weight (IBW): 135 lbs (61 kg)    Admission Body Weight: 165 lb (74.8 kg) (obtained on 1/9/23; actual weight)  Current Body Weight: 227 lb 4.7 oz (103.1 kg) (obtained on 1/13/23; actual weight), 168.4 % IBW. Weight Source: Bed Scale  Current BMI (kg/m2): 35.6  Usual Body Weight:  (unable to assess d/t lack of actual weight hx in EMR)  % Weight Change (Calculated): -2.9  Weight Adjustment For: No Adjustment                 BMI Categories: Obese Class 2 (BMI 35.0 -39.9)    Estimated Daily Nutrient Needs:  Energy Requirements Based On: Kcal/kg  Weight Used for Energy Requirements: Current  Energy (kcal/day): 1045 - 1330 kcals based on 11-14 kcals/kg/CBW  Weight Used for Protein Requirements: Ideal  Protein (g/day): 122 - 134 g protein based on 2.0-2.2 g/kg/IBW  Method Used for Fluid Requirements: 1 ml/kcal  Fluid (ml/day): 1045 - 1330 ml    Nutrition Diagnosis:   Inadequate oral intake related to catabolic illness, altered GI structure, altered GI function, impaired respiratory function, inadequate protein-energy intake as evidenced by NPO or clear liquid status due to medical condition, intubation, wounds, lab values, GI abnormality    Nutrition Interventions:   Food and/or Nutrient Delivery: Continue NPO  Nutrition Education/Counseling: No recommendation at this time  Coordination of Nutrition Care: Continue to monitor while inpatient, Interdisciplinary Rounds, Coordination of Care  Plan of Care discussed with: ICU Team    Goals:  Previous Goal Met: No Progress toward Goal(s)  Goals: Initiate nutrition support, by next RD assessment       Nutrition Monitoring and Evaluation:   Behavioral-Environmental Outcomes: None Identified  Food/Nutrient Intake Outcomes: Diet Advancement/Tolerance, IVF Intake  Physical Signs/Symptoms Outcomes: Biochemical Data, GI Status, Hemodynamic Status, Skin, Weight    Discharge Planning:     Too soon to determine Kassie Arriola, [address] [place] [address],   Contact: [phone]

## 2023-01-14 ENCOUNTER — APPOINTMENT (OUTPATIENT)
Dept: GENERAL RADIOLOGY | Age: 88
DRG: 853 | End: 2023-01-14
Payer: MEDICARE

## 2023-01-14 LAB
A/G RATIO: 0.6 (ref 1.1–2.2)
ALBUMIN SERPL-MCNC: 1.6 G/DL (ref 3.4–5)
ALP BLD-CCNC: 175 U/L (ref 40–129)
ALT SERPL-CCNC: 15 U/L (ref 10–40)
ANION GAP SERPL CALCULATED.3IONS-SCNC: 30 MMOL/L (ref 3–16)
ANION GAP SERPL CALCULATED.3IONS-SCNC: 6 MMOL/L (ref 3–16)
AST SERPL-CCNC: 26 U/L (ref 15–37)
BILIRUB SERPL-MCNC: 1.1 MG/DL (ref 0–1)
BUN BLDV-MCNC: 18 MG/DL (ref 7–20)
BUN BLDV-MCNC: 44 MG/DL (ref 7–20)
CALCIUM SERPL-MCNC: 6.6 MG/DL (ref 8.3–10.6)
CALCIUM SERPL-MCNC: 8.3 MG/DL (ref 8.3–10.6)
CHLORIDE BLD-SCNC: 109 MMOL/L (ref 99–110)
CHLORIDE BLD-SCNC: 94 MMOL/L (ref 99–110)
CO2: 13 MMOL/L (ref 21–32)
CO2: 26 MMOL/L (ref 21–32)
CREAT SERPL-MCNC: 0.6 MG/DL (ref 0.6–1.2)
CREAT SERPL-MCNC: 5.4 MG/DL (ref 0.6–1.2)
GFR SERPL CREATININE-BSD FRML MDRD: 7 ML/MIN/{1.73_M2}
GFR SERPL CREATININE-BSD FRML MDRD: >60 ML/MIN/{1.73_M2}
GLUCOSE BLD-MCNC: 100 MG/DL (ref 70–99)
GLUCOSE BLD-MCNC: 126 MG/DL (ref 70–99)
GLUCOSE BLD-MCNC: 133 MG/DL (ref 70–99)
GLUCOSE BLD-MCNC: 134 MG/DL (ref 70–99)
GLUCOSE BLD-MCNC: 138 MG/DL (ref 70–99)
GLUCOSE BLD-MCNC: 53 MG/DL (ref 70–99)
GLUCOSE BLD-MCNC: 72 MG/DL (ref 70–99)
GLUCOSE BLD-MCNC: 92 MG/DL (ref 70–99)
HCT VFR BLD CALC: 24.2 % (ref 36–48)
HEMOGLOBIN: 7.9 G/DL (ref 12–16)
MAGNESIUM: 1.7 MG/DL (ref 1.8–2.4)
MCH RBC QN AUTO: 27 PG (ref 26–34)
MCHC RBC AUTO-ENTMCNC: 32.9 G/DL (ref 31–36)
MCV RBC AUTO: 82.3 FL (ref 80–100)
PDW BLD-RTO: 15.5 % (ref 12.4–15.4)
PERFORMED ON: ABNORMAL
PERFORMED ON: NORMAL
PERFORMED ON: NORMAL
PHOSPHORUS: 3.3 MG/DL (ref 2.5–4.9)
PLATELET # BLD: 256 K/UL (ref 135–450)
PMV BLD AUTO: 7.6 FL (ref 5–10.5)
POTASSIUM REFLEX MAGNESIUM: 3.5 MMOL/L (ref 3.5–5.1)
POTASSIUM SERPL-SCNC: 3.5 MMOL/L (ref 3.5–5.1)
POTASSIUM SERPL-SCNC: 5.4 MMOL/L (ref 3.5–5.1)
RBC # BLD: 2.94 M/UL (ref 4–5.2)
SODIUM BLD-SCNC: 137 MMOL/L (ref 136–145)
SODIUM BLD-SCNC: 141 MMOL/L (ref 136–145)
TOTAL PROTEIN: 4.5 G/DL (ref 6.4–8.2)
WBC # BLD: 16.1 K/UL (ref 4–11)

## 2023-01-14 PROCEDURE — 71045 X-RAY EXAM CHEST 1 VIEW: CPT

## 2023-01-14 PROCEDURE — 2580000003 HC RX 258: Performed by: SURGERY

## 2023-01-14 PROCEDURE — 80053 COMPREHEN METABOLIC PANEL: CPT

## 2023-01-14 PROCEDURE — 2000000000 HC ICU R&B

## 2023-01-14 PROCEDURE — 84100 ASSAY OF PHOSPHORUS: CPT

## 2023-01-14 PROCEDURE — 99233 SBSQ HOSP IP/OBS HIGH 50: CPT | Performed by: INTERNAL MEDICINE

## 2023-01-14 PROCEDURE — 99291 CRITICAL CARE FIRST HOUR: CPT | Performed by: INTERNAL MEDICINE

## 2023-01-14 PROCEDURE — 6360000002 HC RX W HCPCS: Performed by: SURGERY

## 2023-01-14 PROCEDURE — 94761 N-INVAS EAR/PLS OXIMETRY MLT: CPT

## 2023-01-14 PROCEDURE — 85027 COMPLETE CBC AUTOMATED: CPT

## 2023-01-14 PROCEDURE — 99024 POSTOP FOLLOW-UP VISIT: CPT | Performed by: SURGERY

## 2023-01-14 PROCEDURE — C9113 INJ PANTOPRAZOLE SODIUM, VIA: HCPCS | Performed by: SURGERY

## 2023-01-14 PROCEDURE — 2580000003 HC RX 258: Performed by: INTERNAL MEDICINE

## 2023-01-14 PROCEDURE — 6360000002 HC RX W HCPCS: Performed by: INTERNAL MEDICINE

## 2023-01-14 PROCEDURE — 2700000000 HC OXYGEN THERAPY PER DAY

## 2023-01-14 PROCEDURE — 83735 ASSAY OF MAGNESIUM: CPT

## 2023-01-14 PROCEDURE — 74018 RADEX ABDOMEN 1 VIEW: CPT

## 2023-01-14 RX ORDER — POTASSIUM CHLORIDE 29.8 MG/ML
20 INJECTION INTRAVENOUS
Status: COMPLETED | OUTPATIENT
Start: 2023-01-14 | End: 2023-01-14

## 2023-01-14 RX ORDER — MAGNESIUM SULFATE IN WATER 40 MG/ML
2000 INJECTION, SOLUTION INTRAVENOUS ONCE
Status: COMPLETED | OUTPATIENT
Start: 2023-01-14 | End: 2023-01-14

## 2023-01-14 RX ORDER — FUROSEMIDE 10 MG/ML
40 INJECTION INTRAMUSCULAR; INTRAVENOUS ONCE
Status: COMPLETED | OUTPATIENT
Start: 2023-01-14 | End: 2023-01-14

## 2023-01-14 RX ADMIN — MAGNESIUM SULFATE HEPTAHYDRATE 2000 MG: 40 INJECTION, SOLUTION INTRAVENOUS at 11:08

## 2023-01-14 RX ADMIN — ENOXAPARIN SODIUM 40 MG: 100 INJECTION SUBCUTANEOUS at 09:11

## 2023-01-14 RX ADMIN — PIPERACILLIN SODIUM,TAZOBACTAM SODIUM 3375 MG: 3; .375 INJECTION, POWDER, FOR SOLUTION INTRAVENOUS at 15:03

## 2023-01-14 RX ADMIN — PIPERACILLIN SODIUM,TAZOBACTAM SODIUM 3375 MG: 3; .375 INJECTION, POWDER, FOR SOLUTION INTRAVENOUS at 06:16

## 2023-01-14 RX ADMIN — FUROSEMIDE 40 MG: 10 INJECTION, SOLUTION INTRAMUSCULAR; INTRAVENOUS at 09:11

## 2023-01-14 RX ADMIN — ALBUTEROL SULFATE 2.5 MG: 2.5 SOLUTION RESPIRATORY (INHALATION) at 15:51

## 2023-01-14 RX ADMIN — SODIUM CHLORIDE, SODIUM LACTATE, POTASSIUM CHLORIDE, CALCIUM CHLORIDE AND DEXTROSE MONOHYDRATE: 5; 600; 310; 30; 20 INJECTION, SOLUTION INTRAVENOUS at 19:51

## 2023-01-14 RX ADMIN — DEXTROSE MONOHYDRATE 125 ML: 10 INJECTION, SOLUTION INTRAVENOUS at 11:31

## 2023-01-14 RX ADMIN — PANTOPRAZOLE SODIUM 40 MG: 40 INJECTION, POWDER, FOR SOLUTION INTRAVENOUS at 09:11

## 2023-01-14 RX ADMIN — FLUCONAZOLE 200 MG: 2 INJECTION, SOLUTION INTRAVENOUS at 13:58

## 2023-01-14 RX ADMIN — POTASSIUM CHLORIDE 20 MEQ: 29.8 INJECTION, SOLUTION INTRAVENOUS at 11:10

## 2023-01-14 RX ADMIN — SODIUM CHLORIDE, PRESERVATIVE FREE 10 ML: 5 INJECTION INTRAVENOUS at 20:06

## 2023-01-14 RX ADMIN — PIPERACILLIN SODIUM,TAZOBACTAM SODIUM 3375 MG: 3; .375 INJECTION, POWDER, FOR SOLUTION INTRAVENOUS at 22:58

## 2023-01-14 RX ADMIN — POTASSIUM CHLORIDE 20 MEQ: 29.8 INJECTION, SOLUTION INTRAVENOUS at 13:06

## 2023-01-14 RX ADMIN — SODIUM CHLORIDE, PRESERVATIVE FREE 10 ML: 5 INJECTION INTRAVENOUS at 09:11

## 2023-01-14 ASSESSMENT — PAIN SCALES - GENERAL: PAINLEVEL_OUTOF10: 0

## 2023-01-14 NOTE — PROGRESS NOTES
Internal Medicine ICU Progress Note      Events of Last 24 hours:-    Patient currently operating room for per prorated diverticular disease. Febrile. In rapid A. fib. Sedated. extubation on 5 L high flow NC  Tolerated well but mentation remains drowsy , slightly better today  20 L positive   Tolerating diuresis   BP stable off pressors  Low sugars on D 10 fluids      Invasive Lines: right IJ CVC    MV: Intubated on 2023    Recent Labs     23  0507 23  1134   PHART 7.442 7.442   DUB1VVJ 32.6* 32.5*   PO2ART 82.4 79.5         MV Settings:  Vent Mode: AC/VC Resp Rate (Set): 16 bmp/Vt (Set, mL): 370 mL/ /FiO2 : 35 %    IV:   dextrose Stopped (23 1048)    dextrose 5% in lactated ringers 50 mL/hr at 23 0406    sodium chloride Stopped (23 1706)    sodium chloride         Vitals:  Temp  Av.2 °F (36.8 °C)  Min: 97.7 °F (36.5 °C)  Max: 98.8 °F (37.1 °C)  Pulse  Av.1  Min: 77  Max: 104  BP  Min: 113/78  Max: 138/83  SpO2  Av.7 %  Min: 92 %  Max: 99 %  Patient Vitals for the past 4 hrs:   BP Temp Temp src Pulse Resp SpO2   23 0600 130/77 -- -- 83 29 --   23 0500 117/67 -- -- 88 17 --   23 0400 125/71 98.8 °F (37.1 °C) Bladder (!) 104 26 95 %         CVP: CVP (Mean): 8 mmHg      Intake/Output Summary (Last 24 hours) at 2023 0723  Last data filed at 2023 0406  Gross per 24 hour   Intake 2006.01 ml   Output 1305 ml   Net 701.01 ml         EXAM:    General: elderly female off  vent support  remains , drowsy  No distress. .   Eyes: PERRL. No sclera icterus. No conjunctiva injected. ENT: No discharge. ETT. Neck: Trachea midline. Normal thyroid. Resp: No accessory muscle use. No crackles. No wheezing. No rhonchi. No dullness on percussion. CV: Regular rate. Regular rhythm. No mumur or rub. +3edema. No JVD. Palpable pedal pulses. GI: Non-tender.  Mid abd surgical dressing and right LQ Bobby drain in place  left  sided ostomy in place with black liquid stool   Non-distended. No masses. No organmegaly. sluggish bowel sounds. No hernia. Ext - 2+ edema to all ext  Neuro - drowsy , unable to follow commands    Medications:  Scheduled Meds:   insulin lispro  0-4 Units SubCUTAneous Q4H    prochlorperazine  10 mg IntraVENous Once    piperacillin-tazobactam  3,375 mg IntraVENous Q8H    sodium chloride flush  5-40 mL IntraVENous 2 times per day    pantoprazole  40 mg IntraVENous Daily    enoxaparin  40 mg SubCUTAneous Daily    fluconazole  200 mg IntraVENous Q24H       PRN Meds:  albuterol, dextrose, potassium chloride, magnesium sulfate, glucose, dextrose bolus **OR** dextrose bolus, glucagon (rDNA), HYDROmorphone **OR** HYDROmorphone, prochlorperazine, sodium chloride flush, sodium chloride, acetaminophen **OR** acetaminophen, midazolam, fentanNYL, albuterol sulfate HFA, sodium chloride    Results:  CBC:   Recent Labs     01/12/23  0501 01/13/23  0410 01/14/23  0552   WBC 14.9* 13.7* 16.1*   HGB 8.3* 8.0* 7.9*   HCT 25.4* 24.5* 24.2*   MCV 82.1 82.9 82.3    174 256       BMP:   Recent Labs     01/12/23  0501 01/13/23  0410 01/14/23  0552    143 141   K 3.7 4.0  4.0 3.5  3.5   * 113* 109   CO2 22 24 26   PHOS 1.7* 3.4 3.3   BUN 20 18 18   CREATININE 0.7 0.7 0.6       LIVER PROFILE:   Recent Labs     01/12/23  0501 01/13/23  0410 01/14/23  0552   AST 24 24 26   ALT 12 13 15   BILITOT 1.3* 1.2* 1.1*   ALKPHOS 241* 162* 175*       PT/INR:   No results for input(s): PROTIME, INR in the last 72 hours. APTT:   No results for input(s): APTT in the last 72 hours. UA:  No results for input(s): NITRITE, COLORU, PHUR, LABCAST, WBCUA, RBCUA, MUCUS, TRICHOMONAS, YEAST, BACTERIA, CLARITYU, SPECGRAV, LEUKOCYTESUR, UROBILINOGEN, BILIRUBINUR, BLOODU, GLUCOSEU, AMORPHOUS in the last 72 hours. Invalid input(s): Antione Staple      Cultures:  [id number]  detected  Blood Culture positive for E.  Coli and clostridium       Films:    XR CHEST PORTABLE   Final Result   Bibasilar effusions with mild congestion.      Support tubes as described above.         XR CHEST PORTABLE   Final Result   Small bilateral pleural effusions and bibasilar atelectasis or airspace   disease, increased on the left as compared to prior.         XR CHEST PORTABLE   Final Result   No significant interval change in right greater than left pleural effusions   and bibasilar atelectasis or airspace disease as compared to prior.      Tip of endotracheal tube is approximately 1.2 cm proximal to the kori and   could be retracted approximately 2 cm.         XR CHEST PORTABLE   Final Result   Bilateral pleural effusions and adjacent lung consolidation, similar to prior         XR CHEST PORTABLE   Final Result   1. Stable lines, tubes and support devices.   2. Stable basilar opacities with pleural effusions.         XR CHEST PORTABLE   Final Result   Increased pleural-parenchymal disease bilaterally         XR CHEST PORTABLE   Final Result   1.  Endotracheal tube terminates in appropriate position above the kori.      2.  The enteric tube courses off the field of view in the upper abdomen.      3.  Right internal jugular line terminates at the level of the mid SVC.         CT ABDOMEN PELVIS W IV CONTRAST Additional Contrast? None   Final Result   1.  Small volume pneumoperitoneum and extraluminal fecal material appears to   arise from the sigmoid colon, presumably as a complication of diverticulitis.      2.  Small volume abdominopelvic ascites.  No loculated fluid collection   identified.      Findings were discussed with KATHI ZEE at 10:25 pm on 1/6/2023.                  Assessment:    Principal Problem:    Bowel perforation (HCC)  Active Problems:    Septic shock (HCC)    Acute hypoxemic respiratory failure (HCC)    COVID-19    Coagulopathy (HCC)    Diverticulitis of colon  Resolved Problems:    * No resolved hospital problems. *       Plan:    #Perforated diverticulum.  Gram neg  bacteremia    S.p emergency ex lap with sigmoid colectomy , small bowel resection and ostomy placement   Surgery managing    On Zosyn and Diflucan   Fevers resolved   Improvedsepsis   Recovering bowel function, started TF and holding after extubation   - can place dubhoff and start feeding in view of low sugars     #Septic shock due to perforated diverticulum. Ecoli  bacteremia. On Zosyn and Diflucan. Severe hypotension , no improvement with IVF boluses  was On IV vasopressors - vaso and levophed- improved Bp  Now off pressors     #Acute respiratory failure on the ventilator. Sec to perforated viscus - remains on vent post surgery   Management per pulmonary. Extubated to NC 5 L  Wean as able   Ongoing diuresis     #Paroxysmal A. fib. With RVR, started . On IV amiodarone. Coumadin on hold, INR reversed perioperatively   Can resume when safe     #COVID-19 detected. Incidental finding. No covid related issues    # Anemia  - multifactorial - surgery, iatrogenic, nutritional. Monitor and transfuse less than 7    # Edema - 20 L+ since adm, good UOP. Added lasix     #lovenox  for DVT prophylaxis. Patient is  ill but improving slowly       All questions and concerns were addressed to the patient/family. Alternatives to my treatment were discussed. The note was completed using EMR. Every effort was made to ensure accuracy; however, inadvertent computerized transcription errors may be present.          Deanna Valdez MD 7:23 AM 1/14/2023

## 2023-01-14 NOTE — PROGRESS NOTES
Shift assessment done,patient is on oxygen via nasal cannula at 4L/min and oxygenating between 90-96%. She is unable to communicate verbally,all she does is produce moaning sounds. She is able to follow some commands,for example she was able to squeeze my right  arm with her right arm  when instructed to ,she was unable to wiggle her toes when instructed to. She is NPO,blood sugar monitoring is done. She is on dextrose 5% in LR continuous infusion at 50ml/hr.

## 2023-01-14 NOTE — PROGRESS NOTES
[facility] GENERAL SURGERY    Surgery Progress Note           POD # 7    PATIENT NAME: Tony Christensen     TODAY'S DATE: 1/14/2023    INTERVAL HISTORY:    Pt  resting comfortably, remains extubated, minimal verbal communication. ICU team working on diuresis. Wants to start enteral feeds. OBJECTIVE:   VITALS:  /70   Pulse 86   Temp 98.7 °F (37.1 °C) (Bladder)   Resp 19   Ht 5' 7\" (1.702 m)   Wt 227 lb 4.7 oz (103.1 kg)   SpO2 98%   BMI 35.60 kg/m²     INTAKE/OUTPUT:    I/O last 3 completed shifts: In: 3000.9 [I.V.:2484.7; IV Piggyback:516.2]  Out: 1960 [Urine:1850; Drains:110]  I/O this shift:  In: -   Out: 1000 [Urine:1000]              CONSTITUTIONAL:  fatigued and somnolent  LUNGS:    ABDOMEN:   soft, non-distended, non-tender   INCISION: clean, dry, no drainage, ostomy - no stool/gas in pouch    Data:  CBC:   Recent Labs     01/12/23  0501 01/13/23  0410 01/14/23  0552   WBC 14.9* 13.7* 16.1*   HGB 8.3* 8.0* 7.9*   HCT 25.4* 24.5* 24.2*    174 256     BMP:    Recent Labs     01/12/23  0501 01/13/23  0410 01/14/23  0552    143 141   K 3.7 4.0  4.0 3.5  3.5   * 113* 109   CO2 22 24 26   BUN 20 18 18   CREATININE 0.7 0.7 0.6   GLUCOSE 133* 108* 134*     Hepatic:   Recent Labs     01/12/23  0501 01/13/23  0410 01/14/23  0552   AST 24 24 26   ALT 12 13 15   BILITOT 1.3* 1.2* 1.1*   ALKPHOS 241* 162* 175*     Mag:      Recent Labs     01/12/23  0501 01/13/23  0410 01/14/23  0552   MG 2.00 1.80 1.70*      Phos:     Recent Labs     01/12/23  0501 01/13/23  0410 01/14/23  0552   PHOS 1.7* 3.4 3.3      INR: No results for input(s): INR in the last 72 hours.       Radiology Review:       ASSESSMENT AND PLAN:  80 y.o. female status post Mann's procedure/small bowel resection   - ok to trial trophic tube feeds via NGT   - cont IV abx   - will follow         Electronically signed by Josefina Chavez MD

## 2023-01-14 NOTE — PROGRESS NOTES
Reassessment done,patient is still unable to communicate,all she does is produce moarning sounds,she is able to follow some commands. She is on oxygen via nasal cannula at 4L/min. She is on dextrose 5% in LR continuous infusion at 50ml/hr. Will continue to monitor.

## 2023-01-14 NOTE — PROGRESS NOTES
Reassessment done,patient is unable to follow commands,not really doing much,she is on oxygen via nasal cannula at 4L/min. She is on dextrose 5% LR at 50ml/hr. She has oedema of the upper and lower limbs and weeping a lot. She has a MICHELLE drain and a colostomy   Bed is at its lowest position,will continue to monitor.

## 2023-01-14 NOTE — PROGRESS NOTES
Pulmonary & Critical Care Medicine ICU Progress Note    CC: Abdominal pain, perforated diverticulum, septic shock    Events of Last 24 hours: slightly mor awake today  Levophed off  Requiring some oral suctioning  Invasive Lines: Right IJ CVC 1/7/2023    MV: 1/7-1/12/23  Vitals:  Blood pressure 130/77, pulse 83, temperature 98.8 °F (37.1 °C), temperature source Bladder, resp. rate 29, height 5' 7\" (1.702 m), weight 227 lb 4.7 oz (103.1 kg), SpO2 95 %, not currently breastfeeding. on 4L   Constitutional:  No acute distress. Eyes: PERRL. Conjunctivae anicteric. ENT: Normal nose. Normal tongue. Neck:  Trachea is midline. No thyroid tenderness. Respiratory: No accessory muscle usage. No decreased breath sounds. No wheezes. No rales. No Rhonchi. Cardiovascular: Normal S1S2. No digit clubbing. No digit cyanosis. + UE/ LE edema. GI: soft, stool in ostomy bag. Psychiatric: No anxiety or Agitation. Somnolent. Not following commands.     Scheduled Meds:   insulin lispro  0-4 Units SubCUTAneous Q4H    prochlorperazine  10 mg IntraVENous Once    piperacillin-tazobactam  3,375 mg IntraVENous Q8H    sodium chloride flush  5-40 mL IntraVENous 2 times per day    pantoprazole  40 mg IntraVENous Daily    enoxaparin  40 mg SubCUTAneous Daily    fluconazole  200 mg IntraVENous Q24H     PRN Meds:  albuterol, dextrose, potassium chloride, magnesium sulfate, glucose, dextrose bolus **OR** dextrose bolus, glucagon (rDNA), HYDROmorphone **OR** HYDROmorphone, prochlorperazine, sodium chloride flush, sodium chloride, acetaminophen **OR** acetaminophen, midazolam, fentanNYL, albuterol sulfate HFA, sodium chloride    Results:  CBC:   Recent Labs     01/12/23  0501 01/13/23  0410 01/14/23  0552   WBC 14.9* 13.7* 16.1*   HGB 8.3* 8.0* 7.9*   HCT 25.4* 24.5* 24.2*   MCV 82.1 82.9 82.3    174 256       BMP:   Recent Labs     01/12/23  0501 01/13/23  0410 01/14/23  0552    143 141   K 3.7 4.0  4.0 3.5  3.5   * 113* 109   CO2 22 24 26   PHOS 1.7* 3.4 3.3   BUN 20 18 18   CREATININE 0.7 0.7 0.6       LIVER PROFILE:   Recent Labs     01/12/23  0501 01/13/23  0410 01/14/23  0552   AST 24 24 26   ALT 12 13 15   BILITOT 1.3* 1.2* 1.1*   ALKPHOS 241* 162* 175*         Cultures:  1/6/2023 SARS-CoV-2 positive  1/6/2023 blood 2 of 2 E. coli     Imaging:  Abdominal CT 1/6/2023  Impression   1. Small volume pneumoperitoneum and extraluminal fecal material appears to   arise from the sigmoid colon, presumably as a complication of diverticulitis. 2.  Small volume abdominopelvic ascites. No loculated fluid collection   identified. Left kidney/adrenal imaging abnormality is a large left kidney cyst (simple) which does not require any additional f/u per radiologist     CXR 1/12/23   Impression   Small bilateral pleural effusions and bibasilar atelectasis or airspace   disease, increased on the left as compared to prior.        ASSESSMENT:  Septic shock  E. coli bacteremia  Acute hypoxemic respiratory failure  Diverticulitis with perforated viscus in the sigmoid colon area, post op x-lap with sigmoid colectomy and small bowel resection and ostomy on 1/7/23  COVID-19 infection, incidental finding  Paroxysmal atrial fibrillation, now AFIB RVR  - on Amio gtt for a time  H/O esophageal stricture   Coagulopathy 2/2 warfarin    PLAN:  COVID-19 isolation, droplet plus   Supplemental oxygen to maintain SaO2 >92%; wean as tolerated    D#8 Zosyn and Diflucan   D5 LR at 50cc/hr for hypoglycemia  Lasix 40mg IV x 1  Replace K/Mg  SSI   S/P 3 U FFP & vitamin K; give K-centra X 1 prior to emergent surgery  Prophylaxis: SCD, bactroban, protonix   Total critical care time caring for this patient with life threatening, unstable organ failure, including direct patient contact, management of life support systems, review of data including imaging and labs, discussions with other team members and physicians at least 31 minutes so far today, excluding procedures.

## 2023-01-14 NOTE — PROGRESS NOTES
4 Eyes Skin Assessment     The patient is being assess for   Shift Handoff    I agree that 2 RN's have performed a thorough Head to Toe Skin Assessment on the patient. ALL assessment sites listed below have been assessed. Areas assessed for pressure by both nurses:   [x]   Head, Face, and Ears   [x]   Shoulders, Back, and Chest, Abdomen  [x]   Arms, Elbows, and Hands   [x]   Coccyx, Sacrum, and Ischium  [x]   Legs, Feet, and Heels        Skin Assessed Under all Medical Devices by both nurses:  O2 device tubing and de jesus              All Mepilex Borders were peeled back and area peeked at by both nurses:  No:    Please list where Mepilex Borders are located:               **SHARE this note so that the co-signing nurse is able to place an eSignature**    Co-signer eSignature: Electronically signed by Reba Pond RN on 1/14/23 at 6:46 AM EST    Does the Patient have Skin Breakdown related to pressure?   No     (Insert Photo here)         Sarwat Prevention initiated:  Yes   Wound Care Orders initiated:  No      [facility] nurse consulted for Pressure Injury (Stage 3,4, Unstageable, DTI, NWPT, Complex wounds)and New or Established Ostomies:  No      Primary Nurse eSignature: Electronically signed by Lela Ramirez RN on 1/14/23 at 5:50 AM EST

## 2023-01-14 NOTE — OP NOTE
[address]                 [address] [address]                                OPERATIVE REPORT    PATIENT NAME: Nicolas Mendes                   :        1932  MED REC NO:   [id number]                          ROOM:       3016  ACCOUNT NO:   [de-identified]                           ADMIT DATE: 2023  PROVIDER:     Mynor Vila MD    DATE OF PROCEDURE:  2023    PREOPERATIVE DIAGNOSIS:  Perforated sigmoid colon. POSTOPERATIVE DIAGNOSIS:  Perforated sigmoid colon. OPERATION PERFORMED:  1. Sigmoid colectomy with creation of end colostomy (Judson's  procedure). 2.  Small bowel resection. 3.  Drainage of intra-abdominal abscess. SURGEON:  Mynor Vila MD    ANESTHESIA:  General.    COMPLICATIONS:  None. ESTIMATED BLOOD LOSS:  50 mL. INDICATIONS FOR THE OPERATION:  A 80-year-old female, who presented to  [facility] Emergency Department with complaints of abdominal pain. She  was diagnosed with a perforated colon. There was suspicion of stool  outside the colon in the pelvis. The patient was hemodynamically  unstable. She arrived at the hospital and was resuscitated. A central  line was placed by the hospitalist team.  Anticoagulation was corrected. She was taken emergently to the operating room. The diagnosis was  discussed with the family. The patient was in the intensive care unit  and already been intubated. The patient's family understood the  critical nature of her illness and life-threatening nature of her  illness. DESCRIPTION OF OPERATION:  The patient was brought to the operating  room. General anesthesia was induced. She was prepped and draped in  usual surgical sterile fashion. Midline incision was made. There were  abdominal wall adhesions. These were taken down with a combination of  sharp and blunt dissection. We were eventually able to get into the  pelvis.   There were loops of small bowel that were adherent down into an  abscess cavity in the pelvis. The abscess cavity was drained. This  included formed stool that was within the pelvis. All of this was  consistent with infection and intraabdominal abscess and _____  peritonitis. All of the extraluminal stool was evacuated. The abscess  cavity was drained. There was a large hole in the rectosigmoid area. A  contour stapler was used to divide the bowel proximal and distal to  this. Vessel sealing device was used to control the mesentery. Specimen was passed off and labeled rectosigmoid. I irrigated the  abdomen copiously and suctioned out the irrigant. One of the loops of  small bowel was nonviable after removing it from its adherence in the  pelvis. I divided the bowel on either side of the affected area with a  JERRI-75 stapler. Aysha clamps and _____ were used to control the  mesentery. The two loops were aligned and a JERRI-75 reload was used to  do a side-to-side, but functional end-to-end anastomosis. The end-hole  was closed with a TX stapler. Staple lines and mesenteric defect were  oversewn with silk sutures. The anastomosis was placed in the abdomen. Lysis of adhesions amongst the bowel loops had been performed. At this  point, the remnant omentum was laid over the abdominal contents. A  drain was placed in the pelvis through a right lower quadrant stab  incision. A skin disc was excised in the left mid abdomen. Fascia was  divided in a cruciate manner. Muscles were spread. The proximal colon  segment was brought out and matured as an end ostomy with 3-0 Vicryl  sutures. Midline fascia was closed with #1 Prolene starting superiorly  and inferiorly and tying in the middle. Skin clips were used to  reapproximate the skin. Dressings were placed. DISPOSITION:  The patient tolerated the procedure without any acute  complication. She went to the intensive care unit in critical  condition.         CRISTIAN DEAN MD    D: 01/13/2023 12:34:23       T: 01/13/2023 12:38:42     [email or web address]  Job#: [id number]     Doc#: [id number]    CC:

## 2023-01-14 NOTE — PLAN OF CARE
Problem: Discharge Planning  Goal: Discharge to home or other facility with appropriate resources  Outcome: Progressing     Problem: Pain  Goal: Verbalizes/displays adequate comfort level or baseline comfort level  Outcome: Progressing     Problem: Safety - Medical Restraint  Goal: Remains free of injury from restraints (Restraint for Interference with Medical Device)  Description: INTERVENTIONS:  1. Determine that other, less restrictive measures have been tried or would not be effective before applying the restraint  2. Evaluate the patient's condition at the time of restraint application  3. Inform patient/family regarding the reason for restraint  4. Q2H: Monitor safety, psychosocial status, comfort, nutrition and hydration  Outcome: Progressing     Problem: Skin/Tissue Integrity  Goal: Absence of new skin breakdown  Description: 1. Monitor for areas of redness and/or skin breakdown  2. Assess vascular access sites hourly  3. Every 4-6 hours minimum:  Change oxygen saturation probe site  4. Every 4-6 hours:  If on nasal continuous positive airway pressure, respiratory therapy assess nares and determine need for appliance change or resting period.   Outcome: Progressing     Problem: Nutrition Deficit:  Goal: Optimize nutritional status  Outcome: Progressing  Flowsheets (Taken 1/13/2023 9092 by Sol Ruiz, RD, LD)  Nutrient intake appropriate for improving, restoring, or maintaining nutritional needs:   Assess nutritional status and recommend course of action   Monitor oral intake, labs, and treatment plans   Recommend appropriate diets, oral nutritional supplements, and vitamin/mineral supplements   Recommend, monitor, and adjust tube feedings and TPN/PPN based on assessed needs     Problem: Neurosensory - Adult  Goal: Achieves stable or improved neurological status  Outcome: Progressing     Problem: Respiratory - Adult  Goal: Achieves optimal ventilation and oxygenation  Outcome: Progressing Problem: Cardiovascular - Adult  Goal: Maintains optimal cardiac output and hemodynamic stability  Outcome: Progressing     Problem: Skin/Tissue Integrity - Adult  Goal: Skin integrity remains intact  Outcome: Progressing  Flowsheets (Taken 1/13/2023 2240)  Skin Integrity Remains Intact:   Monitor for areas of redness and/or skin breakdown   Assess vascular access sites hourly   Every 4-6 hours minimum: Change oxygen saturation probe site  Goal: Incisions, wounds, or drain sites healing without S/S of infection  Outcome: Progressing  Goal: Oral mucous membranes remain intact  Outcome: Progressing     Problem: Musculoskeletal - Adult  Goal: Return mobility to safest level of function  Outcome: Progressing     Problem: Gastrointestinal - Adult  Goal: Establish and maintain optimal ostomy function  Outcome: Progressing     Problem: Genitourinary - Adult  Goal: Absence of urinary retention  Outcome: Progressing  Goal: Urinary catheter remains patent  Outcome: Progressing     Problem: Infection - Adult  Goal: Absence of infection at discharge  Outcome: Progressing     Problem: Metabolic/Fluid and Electrolytes - Adult  Goal: Electrolytes maintained within normal limits  Outcome: Progressing  Goal: Glucose maintained within prescribed range  Outcome: Progressing     Problem: Hematologic - Adult  Goal: Maintains hematologic stability  Outcome: Progressing

## 2023-01-14 NOTE — PROGRESS NOTES
AM assessment completed. See flowsheet. A/O x 1. Pt follows commands, but does not speak or attempt to speak. Does squeeze hands and move feet when asked. Lungs sounds rhochi in upper, diminished in bases. Oral suctioning effective. Afib on bedside monitor. Bowel sounds hypoactive. Midline incision approximated with staples. Some serous drainage from top and bottom of incision. Colostomy with little output to LUQ. +2-3 BUE and +2 BLE edema noted. BUE weeping. Urinary catheter in place draining clear yellow urine. Labs reviewed. Cont to monitor.

## 2023-01-15 LAB
ANION GAP SERPL CALCULATED.3IONS-SCNC: 4 MMOL/L (ref 3–16)
ANISOCYTOSIS: ABNORMAL
BANDED NEUTROPHILS RELATIVE PERCENT: 1 % (ref 0–7)
BASOPHILS ABSOLUTE: 0.1 K/UL (ref 0–0.2)
BASOPHILS RELATIVE PERCENT: 1 %
BUN BLDV-MCNC: 19 MG/DL (ref 7–20)
CALCIUM SERPL-MCNC: 8.3 MG/DL (ref 8.3–10.6)
CHLORIDE BLD-SCNC: 111 MMOL/L (ref 99–110)
CO2: 28 MMOL/L (ref 21–32)
CREAT SERPL-MCNC: 0.7 MG/DL (ref 0.6–1.2)
EOSINOPHILS ABSOLUTE: 0 K/UL (ref 0–0.6)
EOSINOPHILS RELATIVE PERCENT: 0 %
GFR SERPL CREATININE-BSD FRML MDRD: >60 ML/MIN/{1.73_M2}
GLUCOSE BLD-MCNC: 101 MG/DL (ref 70–99)
GLUCOSE BLD-MCNC: 127 MG/DL (ref 70–99)
GLUCOSE BLD-MCNC: 134 MG/DL (ref 70–99)
GLUCOSE BLD-MCNC: 138 MG/DL (ref 70–99)
GLUCOSE BLD-MCNC: 138 MG/DL (ref 70–99)
GLUCOSE BLD-MCNC: 141 MG/DL (ref 70–99)
GLUCOSE BLD-MCNC: 157 MG/DL (ref 70–99)
HCT VFR BLD CALC: 23.7 % (ref 36–48)
HEMOGLOBIN: 7.8 G/DL (ref 12–16)
LYMPHOCYTES ABSOLUTE: 0.3 K/UL (ref 1–5.1)
LYMPHOCYTES RELATIVE PERCENT: 2 %
MAGNESIUM: 2 MG/DL (ref 1.8–2.4)
MCH RBC QN AUTO: 27 PG (ref 26–34)
MCHC RBC AUTO-ENTMCNC: 32.8 G/DL (ref 31–36)
MCV RBC AUTO: 82.3 FL (ref 80–100)
MONOCYTES ABSOLUTE: 0.1 K/UL (ref 0–1.3)
MONOCYTES RELATIVE PERCENT: 1 %
NEUTROPHILS ABSOLUTE: 13.2 K/UL (ref 1.7–7.7)
NEUTROPHILS RELATIVE PERCENT: 95 %
OVALOCYTES: ABNORMAL
PDW BLD-RTO: 15.5 % (ref 12.4–15.4)
PERFORMED ON: ABNORMAL
PHOSPHORUS: 3.5 MG/DL (ref 2.5–4.9)
PLATELET # BLD: 342 K/UL (ref 135–450)
PLATELET SLIDE REVIEW: ADEQUATE
PMV BLD AUTO: 7.5 FL (ref 5–10.5)
POIKILOCYTES: ABNORMAL
POTASSIUM SERPL-SCNC: 3.8 MMOL/L (ref 3.5–5.1)
RBC # BLD: 2.89 M/UL (ref 4–5.2)
SLIDE REVIEW: ABNORMAL
SODIUM BLD-SCNC: 143 MMOL/L (ref 136–145)
WBC # BLD: 13.7 K/UL (ref 4–11)

## 2023-01-15 PROCEDURE — 6360000002 HC RX W HCPCS: Performed by: SURGERY

## 2023-01-15 PROCEDURE — 2580000003 HC RX 258: Performed by: SURGERY

## 2023-01-15 PROCEDURE — 99024 POSTOP FOLLOW-UP VISIT: CPT | Performed by: SURGERY

## 2023-01-15 PROCEDURE — 6360000002 HC RX W HCPCS: Performed by: INTERNAL MEDICINE

## 2023-01-15 PROCEDURE — 84100 ASSAY OF PHOSPHORUS: CPT

## 2023-01-15 PROCEDURE — 99233 SBSQ HOSP IP/OBS HIGH 50: CPT | Performed by: INTERNAL MEDICINE

## 2023-01-15 PROCEDURE — 2000000000 HC ICU R&B

## 2023-01-15 PROCEDURE — 2700000000 HC OXYGEN THERAPY PER DAY

## 2023-01-15 PROCEDURE — 80048 BASIC METABOLIC PNL TOTAL CA: CPT

## 2023-01-15 PROCEDURE — 85025 COMPLETE CBC W/AUTO DIFF WBC: CPT

## 2023-01-15 PROCEDURE — C9113 INJ PANTOPRAZOLE SODIUM, VIA: HCPCS | Performed by: SURGERY

## 2023-01-15 PROCEDURE — 99232 SBSQ HOSP IP/OBS MODERATE 35: CPT | Performed by: INTERNAL MEDICINE

## 2023-01-15 PROCEDURE — 94761 N-INVAS EAR/PLS OXIMETRY MLT: CPT

## 2023-01-15 PROCEDURE — 83735 ASSAY OF MAGNESIUM: CPT

## 2023-01-15 RX ORDER — FUROSEMIDE 10 MG/ML
40 INJECTION INTRAMUSCULAR; INTRAVENOUS ONCE
Status: COMPLETED | OUTPATIENT
Start: 2023-01-15 | End: 2023-01-15

## 2023-01-15 RX ADMIN — PIPERACILLIN SODIUM,TAZOBACTAM SODIUM 3375 MG: 3; .375 INJECTION, POWDER, FOR SOLUTION INTRAVENOUS at 15:00

## 2023-01-15 RX ADMIN — PANTOPRAZOLE SODIUM 40 MG: 40 INJECTION, POWDER, FOR SOLUTION INTRAVENOUS at 08:13

## 2023-01-15 RX ADMIN — HYDROMORPHONE HYDROCHLORIDE 0.25 MG: 1 INJECTION, SOLUTION INTRAMUSCULAR; INTRAVENOUS; SUBCUTANEOUS at 21:50

## 2023-01-15 RX ADMIN — SODIUM CHLORIDE, PRESERVATIVE FREE 10 ML: 5 INJECTION INTRAVENOUS at 10:35

## 2023-01-15 RX ADMIN — SODIUM CHLORIDE, PRESERVATIVE FREE 10 ML: 5 INJECTION INTRAVENOUS at 21:50

## 2023-01-15 RX ADMIN — FUROSEMIDE 40 MG: 10 INJECTION, SOLUTION INTRAMUSCULAR; INTRAVENOUS at 10:35

## 2023-01-15 RX ADMIN — PIPERACILLIN SODIUM,TAZOBACTAM SODIUM 3375 MG: 3; .375 INJECTION, POWDER, FOR SOLUTION INTRAVENOUS at 06:16

## 2023-01-15 RX ADMIN — PIPERACILLIN SODIUM,TAZOBACTAM SODIUM 3375 MG: 3; .375 INJECTION, POWDER, FOR SOLUTION INTRAVENOUS at 21:52

## 2023-01-15 RX ADMIN — FLUCONAZOLE 200 MG: 2 INJECTION, SOLUTION INTRAVENOUS at 10:37

## 2023-01-15 RX ADMIN — SODIUM CHLORIDE: 9 INJECTION, SOLUTION INTRAVENOUS at 10:37

## 2023-01-15 RX ADMIN — ENOXAPARIN SODIUM 40 MG: 100 INJECTION SUBCUTANEOUS at 08:13

## 2023-01-15 ASSESSMENT — PAIN SCALES - GENERAL: PAINLEVEL_OUTOF10: 4

## 2023-01-15 NOTE — PROGRESS NOTES
[facility] GENERAL SURGERY    Surgery Progress Note           POD # 8    PATIENT NAME: Hector Camera     TODAY'S DATE: 1/15/2023    INTERVAL HISTORY:    Pt  resting comfortably, slightly more alert, communicative, nasoduodenal feeding placed and trophic feeds started. Still w/ minimal ostomy output. OBJECTIVE:   VITALS:  BP (!) 144/74   Pulse 90   Temp 98.7 °F (37.1 °C) (Bladder)   Resp 19   Ht 5' 7\" (1.702 m)   Wt 227 lb 4.7 oz (103.1 kg)   SpO2 90%   BMI 35.60 kg/m²     INTAKE/OUTPUT:    I/O last 3 completed shifts: In: 2352.3 [I.V.:1651.2; NG/GT:150; IV Piggyback:551]  Out: 2190 [Urine:2085; Drains:105]  No intake/output data recorded. CONSTITUTIONAL:  fatigued and somnolent  LUNGS:     ABDOMEN:    , soft, non-distended, tenderness noted at incision   INCISION: clean, dry, no drainage, sero-sanguinous drainage in MICHELLE; ostomy - pink, scant gas and mucous in pouch    Data:  CBC:   Recent Labs     01/13/23 0410 01/14/23  0552 01/15/23  0510   WBC 13.7* 16.1* 13.7*   HGB 8.0* 7.9* 7.8*   HCT 24.5* 24.2* 23.7*    256 342     BMP:    Recent Labs     01/14/23  0552 01/14/23  1838 01/15/23  0510    137 143   K 3.5  3.5 5.4* 3.8    94* 111*   CO2 26 13* 28   BUN 18 44* 19   CREATININE 0.6 5.4* 0.7   GLUCOSE 134* 133* 157*     Hepatic:   Recent Labs     01/13/23 0410 01/14/23  0552   AST 24 26   ALT 13 15   BILITOT 1.2* 1.1*   ALKPHOS 162* 175*     Mag:      Recent Labs     01/13/23  0410 01/14/23  0552 01/15/23  0510   MG 1.80 1.70* 2.00      Phos:     Recent Labs     01/13/23 0410 01/14/23  0552 01/15/23  0510   PHOS 3.4 3.3 3.5      INR: No results for input(s): INR in the last 72 hours.       Radiology Review:       ASSESSMENT AND PLAN:  80 y.o. female status post Mann's procedure/small bowel resection   - cont trophic feeds as tolerated   - await improved bowel function   - cont IV abx   - will follow         Electronically signed by José Miguel Baez MD

## 2023-01-15 NOTE — PROGRESS NOTES
Reassessment done,patient is able to follow some commands,unable to communicate verbally. She is on oxygen via nasal cannula at 5L/min. She is on dextrose 5% LR at 50ml/hr. Urine noted to be cloudy. Bed is at its lowest position,will continue to monitor.

## 2023-01-15 NOTE — PROGRESS NOTES
Reassessment done,patient is awake,able to verbalize very few words,  On dextrose 5% in LR at 50ml/hr. Has oedema of the upper and lower limbs,weeping noted on the upper extremeties  Bed is at its lowest position,will continue to monitor.

## 2023-01-15 NOTE — PROGRESS NOTES
Shift assessment done,patient is more alert and awake,much improvement noted. She is able to squeeze my hands and wiggle her toes slightly. She is on oxygen via nasal cannula at 5L/min and she is oxygenating well between 90-96%. I asked her if she was in pain and she denied being in pain. She has a colostomy,MICHELLE drain and an inscision site with stapples,clean site. She has a folley in. She is on dextrose 5% in LR at 50mls/hr. She is weeping a lot from both of her arms,which are swollen and elevated on pillows. Tube feeds started at 20mls/hr and water flush 50ml Q6hrs. Bed is at its lowest position,will continue to monitor.

## 2023-01-15 NOTE — PROGRESS NOTES
Internal Medicine ICU Progress Note      Events of Last 24 hours:-    Patient currently operating room for per prorated diverticular disease. Febrile. In rapid A. fib. Sedated. extubation on 5 L high flow NC  Drowsiness improving slowly , responding better today  20 L positive   Tolerating diuresis   BP stable off pressors  Started TF via NG , low sugars improving      Invasive Lines: right IJ CVC    MV: Intubated on 2023    Recent Labs     23  1134   PHART 7.442   QNO5IRE 32.5*   PO2ART 79.5         MV Settings:  Vent Mode: AC/VC Resp Rate (Set): 16 bmp/Vt (Set, mL): 370 mL/ /FiO2 : 35 %    IV:   dextrose Stopped (23 1048)    dextrose 5% in lactated ringers 50 mL/hr at 01/15/23 0404    sodium chloride Stopped (23 1706)    sodium chloride         Vitals:  Temp  Av.7 °F (37.1 °C)  Min: 98.5 °F (36.9 °C)  Max: 98.9 °F (37.2 °C)  Pulse  Av.9  Min: 82  Max: 113  BP  Min: 110/86  Max: 145/72  SpO2  Av.2 %  Min: 82 %  Max: 99 %  Patient Vitals for the past 4 hrs:   BP Temp Temp src Pulse Resp SpO2   01/15/23 0600 133/84 -- -- 88 20 (!) 87 %   01/15/23 0500 (!) 143/89 -- -- 85 22 90 %   01/15/23 0400 124/69 98.9 °F (37.2 °C) Bladder 99 19 99 %         CVP: CVP (Mean): 3 mmHg      Intake/Output Summary (Last 24 hours) at 1/15/2023 0713  Last data filed at 1/15/2023 0404  Gross per 24 hour   Intake 1804.62 ml   Output 1965 ml   Net -160.38 ml         EXAM:    General: elderly female   ,   remains  drowsy but slowly improving   No distress. Eyes: PERRL. No sclera icterus. No conjunctiva injected. ENT: No discharge. ETT. Neck: Trachea midline. Normal thyroid. Resp: No accessory muscle use. No crackles. No wheezing. No rhonchi. No dullness on percussion. CV: Regular rate. Regular rhythm. No mumur or rub. +3edema. No JVD. Palpable pedal pulses. GI: Non-tender.  Mid abd surgical dressing and right LQ Bobby drain in place  left  sided ostomy in place with black liquid stool Non-distended. No masses. No organmegaly. sluggish bowel sounds. No hernia. Ext - 2+ edema to all ext  Neuro - drowsy , unable to follow commands    Medications:  Scheduled Meds:   insulin lispro  0-4 Units SubCUTAneous Q4H    prochlorperazine  10 mg IntraVENous Once    piperacillin-tazobactam  3,375 mg IntraVENous Q8H    sodium chloride flush  5-40 mL IntraVENous 2 times per day    pantoprazole  40 mg IntraVENous Daily    enoxaparin  40 mg SubCUTAneous Daily    fluconazole  200 mg IntraVENous Q24H       PRN Meds:  albuterol, dextrose, glucose, dextrose bolus **OR** dextrose bolus, glucagon (rDNA), HYDROmorphone **OR** HYDROmorphone, prochlorperazine, sodium chloride flush, sodium chloride, acetaminophen **OR** acetaminophen, midazolam, fentanNYL, albuterol sulfate HFA, sodium chloride    Results:  CBC:   Recent Labs     01/13/23 0410 01/14/23  0552 01/15/23  0510   WBC 13.7* 16.1* 13.7*   HGB 8.0* 7.9* 7.8*   HCT 24.5* 24.2* 23.7*   MCV 82.9 82.3 82.3    256 342       BMP:   Recent Labs     01/13/23  0410 01/14/23  0552 01/14/23  1838 01/15/23  0510    141 137 143   K 4.0  4.0 3.5  3.5 5.4* 3.8   * 109 94* 111*   CO2 24 26 13* 28   PHOS 3.4 3.3  --  3.5   BUN 18 18 44* 19   CREATININE 0.7 0.6 5.4* 0.7       LIVER PROFILE:   Recent Labs     01/13/23 0410 01/14/23  0552   AST 24 26   ALT 13 15   BILITOT 1.2* 1.1*   ALKPHOS 162* 175*       PT/INR:   No results for input(s): PROTIME, INR in the last 72 hours. APTT:   No results for input(s): APTT in the last 72 hours. UA:  No results for input(s): NITRITE, COLORU, PHUR, LABCAST, WBCUA, RBCUA, MUCUS, TRICHOMONAS, YEAST, BACTERIA, CLARITYU, SPECGRAV, LEUKOCYTESUR, UROBILINOGEN, BILIRUBINUR, BLOODU, GLUCOSEU, AMORPHOUS in the last 72 hours. Invalid input(s): Allan Bartlett      Cultures:  [id number]  detected  Blood Culture positive for E.  Coli and clostridium       Films:    XR ABDOMEN FOR NG/OG/NE TUBE PLACEMENT   Final Result   Tip of the feeding tube is in the expected position, projecting over the   gastric body. Continued pleuroparenchymal disease in the lower lungs bilaterally. XR CHEST PORTABLE   Final Result   Bibasilar effusions with mild congestion. Support tubes as described above. XR CHEST PORTABLE   Final Result   Small bilateral pleural effusions and bibasilar atelectasis or airspace   disease, increased on the left as compared to prior. XR CHEST PORTABLE   Final Result   No significant interval change in right greater than left pleural effusions   and bibasilar atelectasis or airspace disease as compared to prior. Tip of endotracheal tube is approximately 1.2 cm proximal to the kori and   could be retracted approximately 2 cm. XR CHEST PORTABLE   Final Result   Bilateral pleural effusions and adjacent lung consolidation, similar to prior         XR CHEST PORTABLE   Final Result   1. Stable lines, tubes and support devices. 2. Stable basilar opacities with pleural effusions. XR CHEST PORTABLE   Final Result   Increased pleural-parenchymal disease bilaterally         XR CHEST PORTABLE   Final Result   1. Endotracheal tube terminates in appropriate position above the kori. 2.  The enteric tube courses off the field of view in the upper abdomen. 3.  Right internal jugular line terminates at the level of the mid SVC. CT ABDOMEN PELVIS W IV CONTRAST Additional Contrast? None   Final Result   1. Small volume pneumoperitoneum and extraluminal fecal material appears to   arise from the sigmoid colon, presumably as a complication of diverticulitis. 2.  Small volume abdominopelvic ascites. No loculated fluid collection   identified. Findings were discussed with Patricia Collins at 10:25 pm on 1/6/2023. Assessment:    Principal Problem:     Bowel perforation ([address]  Active Problems:    Septic shock (HCC)    Acute hypoxemic respiratory failure ([facility]    COVID-19    Coagulopathy ([facility] [address]    Diverticulitis of colon  Resolved Problems:    * No resolved hospital problems. *       Plan:    #Perforated diverticulum. Gram neg bacteremia    S.p emergency ex lap with sigmoid colectomy , small bowel resection and ostomy placement   Surgery managing    On Zosyn and Diflucan   Fevers resolved   Improved sepsis and off pressors   Recovering bowel function, started TF     #Septic shock due to perforated diverticulum. Ecoli  bacteremia. On Zosyn and Diflucan. Severe hypotension , no improvement with IVF boluses  was On IV vasopressors - vaso and levophed- improved Bp  Now off pressors     #Acute respiratory failure on the ventilator. Sec to perforated viscus - remains on vent post surgery   Management per pulmonary. Extubated to NC 5 L  Wean as able   Ongoing diuresis     #Paroxysmal A. fib. With RVR, started . Off IV amiodarone. Coumadin on hold, INR reversed perioperatively with vit k , ffp  Can resume when safe     #COVID-19 detected. Incidental finding. No covid related issues    # Anemia  - multifactorial - surgery, iatrogenic, nutritional. Monitor and transfuse less than 7    # Edema - 20 L+ since adm, good UOP. Added lasix , tolerating     #lovenox  for DVT prophylaxis. Patient is  ill but improving slowly       All questions and concerns were addressed to the patient/family. Alternatives to my treatment were discussed. The note was completed using EMR. Every effort was made to ensure accuracy; however, inadvertent computerized transcription errors may be present.          Kulwant Brasher MD 7:13 AM 1/15/2023

## 2023-01-15 NOTE — PROGRESS NOTES
Pt is more awake. States she wants to go home. Speech is slurred and difficult to understand. Pt's family has stated she as speech issues at baseline. Reorientation and education provided. Pt understanding.

## 2023-01-15 NOTE — PROGRESS NOTES
Pulmonary & Critical Care Medicine ICU Progress Note    CC: Abdominal pain, perforated diverticulum, septic shock    Events of Last 24 hours: more alert and talked to family briefly  Required NT suctioning yesterday    Invasive Lines: Right IJ CVC 1/7/2023    MV: 1/7-1/12/23  Vitals:  Blood pressure 133/84, pulse 88, temperature 98.9 °F (37.2 °C), temperature source Bladder, resp. rate 20, height 5' 7\" (1.702 m), weight 227 lb 4.7 oz (103.1 kg), SpO2 (!) 87 %, not currently breastfeeding. on 4L   Constitutional:  No acute distress. Eyes: PERRL. Conjunctivae anicteric. ENT: Normal nose. Normal tongue. Neck:  Trachea is midline. No thyroid tenderness. Respiratory: No accessory muscle usage. No decreased breath sounds. No wheezes. + rales. No Rhonchi. Cardiovascular: Normal S1S2. No digit clubbing. No digit cyanosis. + UE/ LE edema. GI: soft, stool in ostomy bag. Psychiatric: No anxiety or Agitation. Somnolent. Not following commands.     Scheduled Meds:   insulin lispro  0-4 Units SubCUTAneous Q4H    prochlorperazine  10 mg IntraVENous Once    piperacillin-tazobactam  3,375 mg IntraVENous Q8H    sodium chloride flush  5-40 mL IntraVENous 2 times per day    pantoprazole  40 mg IntraVENous Daily    enoxaparin  40 mg SubCUTAneous Daily    fluconazole  200 mg IntraVENous Q24H     PRN Meds:  albuterol, dextrose, glucose, dextrose bolus **OR** dextrose bolus, glucagon (rDNA), HYDROmorphone **OR** HYDROmorphone, prochlorperazine, sodium chloride flush, sodium chloride, acetaminophen **OR** acetaminophen, midazolam, fentanNYL, albuterol sulfate HFA, sodium chloride    Results:  CBC:   Recent Labs     01/13/23 0410 01/14/23  0552 01/15/23  0510   WBC 13.7* 16.1* 13.7*   HGB 8.0* 7.9* 7.8*   HCT 24.5* 24.2* 23.7*   MCV 82.9 82.3 82.3    256 342       BMP:   Recent Labs     01/13/23 0410 01/14/23  0552 01/14/23  1838 01/15/23  0510    141 137 143   K 4.0  4.0 3.5  3.5 5.4* 3.8   * 109 94* 111* CO2 24 26 13* 28   PHOS 3.4 3.3  --  3.5   BUN 18 18 44* 19   CREATININE 0.7 0.6 5.4* 0.7       LIVER PROFILE:   Recent Labs     01/13/23  0410 01/14/23  0552   AST 24 26   ALT 13 15   BILITOT 1.2* 1.1*   ALKPHOS 162* 175*         Cultures:  1/6/2023 SARS-CoV-2 positive  1/6/2023 blood 2 of 2 E. coli     Imaging:  Abdominal CT 1/6/2023  Impression   1. Small volume pneumoperitoneum and extraluminal fecal material appears to   arise from the sigmoid colon, presumably as a complication of diverticulitis. 2.  Small volume abdominopelvic ascites. No loculated fluid collection   identified. Left kidney/adrenal imaging abnormality is a large left kidney cyst (simple) which does not require any additional f/u per radiologist     CXR 1/12/23   Impression   Small bilateral pleural effusions and bibasilar atelectasis or airspace   disease, increased on the left as compared to prior.        ASSESSMENT:  Septic shock  E. coli bacteremia  Acute hypoxemic respiratory failure  Diverticulitis with perforated viscus in the sigmoid colon area, post op x-lap with sigmoid colectomy and small bowel resection and ostomy on 1/7/23  COVID-19 infection, incidental finding  Paroxysmal atrial fibrillation, now AFIB RVR  - on Amio gtt for a time  H/O esophageal stricture   Coagulopathy 2/2 warfarin    PLAN:  COVID-19 isolation, droplet plus   Supplemental oxygen to maintain SaO2 >92%; wean as tolerated    D#9 Zosyn and Diflucan   Stop  IVF  TFs started by surgery  Lasix 40mg IV x 1  Replace K/Mg  SSI   S/P 3 U FFP & vitamin K; give K-centra X 1 prior to emergent surgery  Prophylaxis: SCD, bactroban, protonix

## 2023-01-15 NOTE — PROGRESS NOTES
Dobhoff placed and CXR ordered to verify placement. Awaiting verification to begin tropic tube feeds. NG to 60 cm.

## 2023-01-15 NOTE — PROGRESS NOTES
4 Eyes Skin Assessment     The patient is being assess for   Shift Handoff    I agree that 2 RN's have performed a thorough Head to Toe Skin Assessment on the patient. ALL assessment sites listed below have been assessed. Areas assessed for pressure by both nurses:   [x]   Head, Face, and Ears   [x]   Shoulders, Back, and Chest, Abdomen  [x]   Arms, Elbows, and Hands:  skin tear right arm and scattered bruising  [x]   Coccyx, Sacrum, and Ischium  [x]   Legs, Feet, and Heels        Skin Assessed Under all Medical Devices by both nurses:  O2 device tubing, de jesus, and NG              All Mepilex Borders were peeled back and area peeked at by both nurses:  No: ***  Please list where Mepilex Borders are located:               **SHARE this note so that the co-signing nurse is able to place an eSignature**    Co-signer eSignature: Electronically signed by Loli Velasco RN on 1/15/23 at 6:55 AM EST    Does the Patient have Skin Breakdown related to pressure?   No     (Insert Photo here***)         Sarwat Prevention initiated:  Yes   Wound Care Orders initiated:  No      [facility] nurse consulted for Pressure Injury (Stage 3,4, Unstageable, DTI, NWPT, Complex wounds)and New or Established Ostomies:  No      Primary Nurse eSignature: Electronically signed by Janis Small RN on 1/15/23 at 6:45 AM EST

## 2023-01-16 PROBLEM — J96.01 ACUTE RESPIRATORY FAILURE WITH HYPOXIA (HCC): Status: ACTIVE | Noted: 2023-01-16

## 2023-01-16 PROBLEM — R78.81 E COLI BACTEREMIA: Status: ACTIVE | Noted: 2023-01-16

## 2023-01-16 PROBLEM — I48.0 PAROXYSMAL ATRIAL FIBRILLATION (HCC): Status: ACTIVE | Noted: 2023-01-16

## 2023-01-16 PROBLEM — B96.20 E COLI BACTEREMIA: Status: ACTIVE | Noted: 2023-01-16

## 2023-01-16 LAB
ANION GAP SERPL CALCULATED.3IONS-SCNC: 3 MMOL/L (ref 3–16)
ANISOCYTOSIS: ABNORMAL
BANDED NEUTROPHILS RELATIVE PERCENT: 1 % (ref 0–7)
BASOPHILIC STIPPLING: ABNORMAL
BASOPHILS ABSOLUTE: 0 K/UL (ref 0–0.2)
BASOPHILS RELATIVE PERCENT: 0 %
BUN BLDV-MCNC: 20 MG/DL (ref 7–20)
BURR CELLS: ABNORMAL
CALCIUM SERPL-MCNC: 8.4 MG/DL (ref 8.3–10.6)
CHLORIDE BLD-SCNC: 111 MMOL/L (ref 99–110)
CO2: 30 MMOL/L (ref 21–32)
CREAT SERPL-MCNC: 0.7 MG/DL (ref 0.6–1.2)
EOSINOPHILS ABSOLUTE: 0.1 K/UL (ref 0–0.6)
EOSINOPHILS RELATIVE PERCENT: 1 %
GFR SERPL CREATININE-BSD FRML MDRD: >60 ML/MIN/{1.73_M2}
GLUCOSE BLD-MCNC: 108 MG/DL (ref 70–99)
GLUCOSE BLD-MCNC: 113 MG/DL (ref 70–99)
GLUCOSE BLD-MCNC: 119 MG/DL (ref 70–99)
GLUCOSE BLD-MCNC: 128 MG/DL (ref 70–99)
GLUCOSE BLD-MCNC: 98 MG/DL (ref 70–99)
HCT VFR BLD CALC: 23.6 % (ref 36–48)
HEMOGLOBIN: 7.7 G/DL (ref 12–16)
HYPOCHROMIA: ABNORMAL
LYMPHOCYTES ABSOLUTE: 0.2 K/UL (ref 1–5.1)
LYMPHOCYTES RELATIVE PERCENT: 2 %
MAGNESIUM: 1.9 MG/DL (ref 1.8–2.4)
MCH RBC QN AUTO: 26.7 PG (ref 26–34)
MCHC RBC AUTO-ENTMCNC: 32.6 G/DL (ref 31–36)
MCV RBC AUTO: 82 FL (ref 80–100)
MONOCYTES ABSOLUTE: 0.5 K/UL (ref 0–1.3)
MONOCYTES RELATIVE PERCENT: 4 %
NEUTROPHILS ABSOLUTE: 10.9 K/UL (ref 1.7–7.7)
NEUTROPHILS RELATIVE PERCENT: 92 %
OVALOCYTES: ABNORMAL
PDW BLD-RTO: 15.3 % (ref 12.4–15.4)
PERFORMED ON: ABNORMAL
PERFORMED ON: NORMAL
PHOSPHORUS: 3.8 MG/DL (ref 2.5–4.9)
PLATELET # BLD: 412 K/UL (ref 135–450)
PLATELET SLIDE REVIEW: ADEQUATE
PMV BLD AUTO: 7.3 FL (ref 5–10.5)
POIKILOCYTES: ABNORMAL
POLYCHROMASIA: ABNORMAL
POTASSIUM SERPL-SCNC: 3.6 MMOL/L (ref 3.5–5.1)
RBC # BLD: 2.88 M/UL (ref 4–5.2)
SLIDE REVIEW: ABNORMAL
SODIUM BLD-SCNC: 144 MMOL/L (ref 136–145)
WBC # BLD: 11.7 K/UL (ref 4–11)

## 2023-01-16 PROCEDURE — 84100 ASSAY OF PHOSPHORUS: CPT

## 2023-01-16 PROCEDURE — 94761 N-INVAS EAR/PLS OXIMETRY MLT: CPT

## 2023-01-16 PROCEDURE — 6360000002 HC RX W HCPCS: Performed by: INTERNAL MEDICINE

## 2023-01-16 PROCEDURE — 2580000003 HC RX 258: Performed by: SURGERY

## 2023-01-16 PROCEDURE — 2000000000 HC ICU R&B

## 2023-01-16 PROCEDURE — 99024 POSTOP FOLLOW-UP VISIT: CPT | Performed by: SURGERY

## 2023-01-16 PROCEDURE — 85025 COMPLETE CBC W/AUTO DIFF WBC: CPT

## 2023-01-16 PROCEDURE — 6360000002 HC RX W HCPCS: Performed by: SURGERY

## 2023-01-16 PROCEDURE — 99233 SBSQ HOSP IP/OBS HIGH 50: CPT | Performed by: INTERNAL MEDICINE

## 2023-01-16 PROCEDURE — 80048 BASIC METABOLIC PNL TOTAL CA: CPT

## 2023-01-16 PROCEDURE — C9113 INJ PANTOPRAZOLE SODIUM, VIA: HCPCS | Performed by: SURGERY

## 2023-01-16 PROCEDURE — 2700000000 HC OXYGEN THERAPY PER DAY

## 2023-01-16 PROCEDURE — 83735 ASSAY OF MAGNESIUM: CPT

## 2023-01-16 RX ORDER — POTASSIUM CHLORIDE 29.8 MG/ML
20 INJECTION INTRAVENOUS ONCE
Status: COMPLETED | OUTPATIENT
Start: 2023-01-16 | End: 2023-01-16

## 2023-01-16 RX ORDER — FUROSEMIDE 10 MG/ML
40 INJECTION INTRAMUSCULAR; INTRAVENOUS ONCE
Status: COMPLETED | OUTPATIENT
Start: 2023-01-16 | End: 2023-01-16

## 2023-01-16 RX ADMIN — SODIUM CHLORIDE, PRESERVATIVE FREE 10 ML: 5 INJECTION INTRAVENOUS at 20:38

## 2023-01-16 RX ADMIN — PIPERACILLIN SODIUM,TAZOBACTAM SODIUM 3375 MG: 3; .375 INJECTION, POWDER, FOR SOLUTION INTRAVENOUS at 15:35

## 2023-01-16 RX ADMIN — ENOXAPARIN SODIUM 40 MG: 100 INJECTION SUBCUTANEOUS at 09:05

## 2023-01-16 RX ADMIN — POTASSIUM CHLORIDE 20 MEQ: 29.8 INJECTION, SOLUTION INTRAVENOUS at 12:22

## 2023-01-16 RX ADMIN — SODIUM CHLORIDE, PRESERVATIVE FREE 10 ML: 5 INJECTION INTRAVENOUS at 09:05

## 2023-01-16 RX ADMIN — PANTOPRAZOLE SODIUM 40 MG: 40 INJECTION, POWDER, FOR SOLUTION INTRAVENOUS at 09:05

## 2023-01-16 RX ADMIN — FUROSEMIDE 40 MG: 10 INJECTION, SOLUTION INTRAMUSCULAR; INTRAVENOUS at 12:21

## 2023-01-16 RX ADMIN — PIPERACILLIN SODIUM,TAZOBACTAM SODIUM 3375 MG: 3; .375 INJECTION, POWDER, FOR SOLUTION INTRAVENOUS at 22:51

## 2023-01-16 RX ADMIN — FLUCONAZOLE 200 MG: 2 INJECTION, SOLUTION INTRAVENOUS at 14:13

## 2023-01-16 RX ADMIN — PIPERACILLIN SODIUM,TAZOBACTAM SODIUM 3375 MG: 3; .375 INJECTION, POWDER, FOR SOLUTION INTRAVENOUS at 05:32

## 2023-01-16 NOTE — PROGRESS NOTES
Internal Medicine ICU Progress Note      Events of Last 24 hours:-    Patient currently operating room for per prorated diverticular disease. Febrile. In rapid A. fib. Sedated. On 5 L of O2      Invasive Lines: right IJ CVC    MV: Intubated on 2023    No results for input(s): PHART, GPF7BUH, PO2ART in the last 72 hours. MV Settings:  Vent Mode: AC/VC Resp Rate (Set): 16 bmp/Vt (Set, mL): 370 mL/ /FiO2 : 35 %    IV:   dextrose Stopped (23 1048)    sodium chloride Stopped (23 05)    sodium chloride         Vitals:  Temp  Av °F (37.2 °C)  Min: 98.6 °F (37 °C)  Max: 99.7 °F (37.6 °C)  Pulse  Av.6  Min: 76  Max: 101  BP  Min: 115/61  Max: 163/81  SpO2  Av.6 %  Min: 83 %  Max: 99 %  Patient Vitals for the past 4 hrs:   BP Temp Temp src Pulse Resp SpO2 Weight   23 0800 (!) 141/80 -- -- 91 18 -- --   23 0700 128/72 99.7 °F (37.6 °C) Bladder 88 20 97 % --   23 0600 115/61 -- -- 95 25 -- --   23 0532 -- -- -- -- -- -- 202 lb (91.6 kg)   23 0530 (!) 153/87 -- -- 91 14 -- --         CVP: CVP (Mean): 217 mmHg      Intake/Output Summary (Last 24 hours) at 2023 0910  Last data filed at 2023 0545  Gross per 24 hour   Intake 594.46 ml   Output 2875 ml   Net -2280.54 ml         EXAM:    General: elderly female   ,   remains  drowsy but slowly improving   No distress. Eyes: PERRL. No sclera icterus. No conjunctiva injected. ENT: No discharge. ETT. Neck: Trachea midline. Normal thyroid. Resp: No accessory muscle use. No crackles. No wheezing. No rhonchi. No dullness on percussion. CV: Regular rate. Regular rhythm. No mumur or rub. +3edema. No JVD. Palpable pedal pulses. GI: Non-tender. Mid abd surgical dressing and right LQ Bobby drain in place  left  sided ostomy in place with black liquid stool   Non-distended. No masses. No organmegaly. sluggish bowel sounds. No hernia.   Ext - 2+ edema to all ext  Neuro - drowsy , able  to follow commands    Medications:  Scheduled Meds:   insulin lispro  0-4 Units SubCUTAneous Q4H    prochlorperazine  10 mg IntraVENous Once    piperacillin-tazobactam  3,375 mg IntraVENous Q8H    sodium chloride flush  5-40 mL IntraVENous 2 times per day    pantoprazole  40 mg IntraVENous Daily    enoxaparin  40 mg SubCUTAneous Daily    fluconazole  200 mg IntraVENous Q24H       PRN Meds:  albuterol, dextrose, glucose, dextrose bolus **OR** dextrose bolus, glucagon (rDNA), HYDROmorphone **OR** HYDROmorphone, prochlorperazine, sodium chloride flush, sodium chloride, acetaminophen **OR** acetaminophen, midazolam, fentanNYL, albuterol sulfate HFA, sodium chloride    Results:  CBC:   Recent Labs     01/14/23  0552 01/15/23  0510 01/16/23  0406   WBC 16.1* 13.7* 11.7*   HGB 7.9* 7.8* 7.7*   HCT 24.2* 23.7* 23.6*   MCV 82.3 82.3 82.0    342 412       BMP:   Recent Labs     01/14/23  0552 01/14/23  1838 01/15/23  0510 01/16/23  0406    137 143 144   K 3.5  3.5 5.4* 3.8 3.6    94* 111* 111*   CO2 26 13* 28 30   PHOS 3.3  --  3.5 3.8   BUN 18 44* 19 20   CREATININE 0.6 5.4* 0.7 0.7       LIVER PROFILE:   Recent Labs     01/14/23  0552   AST 26   ALT 15   BILITOT 1.1*   ALKPHOS 175*       PT/INR:   No results for input(s): PROTIME, INR in the last 72 hours. APTT:   No results for input(s): APTT in the last 72 hours. UA:  No results for input(s): NITRITE, COLORU, PHUR, LABCAST, WBCUA, RBCUA, MUCUS, TRICHOMONAS, YEAST, BACTERIA, CLARITYU, SPECGRAV, LEUKOCYTESUR, UROBILINOGEN, BILIRUBINUR, BLOODU, GLUCOSEU, AMORPHOUS in the last 72 hours. Invalid input(s): [facility]      Cultures:  [id number]  detected  Blood Culture positive for E. Coli and clostridium       Films:    XR ABDOMEN FOR NG/OG/NE TUBE PLACEMENT   Final Result   Tip of the feeding tube is in the expected position, projecting over the   gastric body. Continued pleuroparenchymal disease in the lower lungs bilaterally.          XR CHEST PORTABLE Final Result   Bibasilar effusions with mild congestion. Support tubes as described above. XR CHEST PORTABLE   Final Result   Small bilateral pleural effusions and bibasilar atelectasis or airspace   disease, increased on the left as compared to prior. XR CHEST PORTABLE   Final Result   No significant interval change in right greater than left pleural effusions   and bibasilar atelectasis or airspace disease as compared to prior. Tip of endotracheal tube is approximately 1.2 cm proximal to the kori and   could be retracted approximately 2 cm. XR CHEST PORTABLE   Final Result   Bilateral pleural effusions and adjacent lung consolidation, similar to prior         XR CHEST PORTABLE   Final Result   1. Stable lines, tubes and support devices. 2. Stable basilar opacities with pleural effusions. XR CHEST PORTABLE   Final Result   Increased pleural-parenchymal disease bilaterally         XR CHEST PORTABLE   Final Result   1. Endotracheal tube terminates in appropriate position above the kori. 2.  The enteric tube courses off the field of view in the upper abdomen. 3.  Right internal jugular line terminates at the level of the mid SVC. CT ABDOMEN PELVIS W IV CONTRAST Additional Contrast? None   Final Result   1. Small volume pneumoperitoneum and extraluminal fecal material appears to   arise from the sigmoid colon, presumably as a complication of diverticulitis. 2.  Small volume abdominopelvic ascites. No loculated fluid collection   identified. Findings were discussed with Ruby Ferreira at 10:25 pm on 1/6/2023. Assessment:    Principal Problem: Bowel perforation ([address]  Active Problems:    Septic shock (HCC)    Acute hypoxemic respiratory failure (HCC)    COVID-19    Coagulopathy (HCC)    Diverticulitis of colon  Resolved Problems:    * No resolved hospital problems. *       Plan:    #Perforated diverticulum.   Gram neg bacteremia    S.p emergency ex lap with sigmoid colectomy , small bowel resection and ostomy placement   Surgery managing    On Zosyn and Diflucan day # 10   Fevers resolved   Improved sepsis and off pressors   Recovering bowel function, started TF     #Septic shock due to perforated diverticulum. Ecoli  bacteremia. On Zosyn and Diflucan. Severe hypotension , no improvement with IVF boluses  was On IV vasopressors - vaso and levophed- improved Bp  Now off pressors     #Acute respiratory failure on the ventilator. Sec to perforated viscus - remains on vent post surgery   Management per pulmonary. Extubated to NC 5 L  Wean as able. Lasix 40 mg x 1     #Paroxysmal A. fib. With RVR, started . Off IV amiodarone. Coumadin on hold, INR reversed perioperatively with vit k , ffp  Can resume when safe     #COVID-19 detected. Incidental finding. No covid related issues    # Anemia  - multifactorial - surgery, iatrogenic, nutritional. Monitor and transfuse less than 7    # Edema - 20 L+ since adm, good UOP. Added lasix , tolerating     #lovenox  for DVT prophylaxis.     Patient is  ill but improving slowly       Sravani Escobar MD 9:10 AM 1/16/2023

## 2023-01-16 NOTE — PROGRESS NOTES
AM assessment completed. See flowsheet. A/O x 1. Pt states she wants to go home. Lungs sounds rhochi in upper, diminished in bases. Afib on bedside monitor. Bowel sounds hypoactive. Midline incision approximated with staples. Less serous drainage from top and bottom of incision than yesterday. Colostomy with with green watery output to LUQ. +2 BUE and +2 BLE edema noted. BUE weeping. Urinary catheter in place draining clear yellow urine. Labs reviewed.  Cont to monitor

## 2023-01-16 NOTE — CARE COORDINATION
INTERDISCIPLINARY PLAN OF CARE CONFERENCE    Date/Time: 1/16/2023 2:34 PM  Completed by: ALINA Rojas   Case Management      Patient Name:  Tina Ledesma  YOB: 1932  Admitting Diagnosis: Diverticulitis of colon [K57.32]  Prolonged Q-T interval on ECG [R94.31]  Perforated viscus [R19.8]  Perforated diverticulum [K57.80]     Admit Date/Time:  1/6/2023  8:46 PM    Chart reviewed. Interdisciplinary team contacted or reviewed plan related to patient progress and discharge plans. Disciplines included Case Management, Nursing, and Dietitian. Current Status:ongoing   PT/OT recommendation for discharge plan of care: SNF    Expected D/C Disposition:  TBD    Discharge Plan Comments: Chart review completed. Completed Interdisciplinary rounds with ICU staff. RN states pt has a dobhoff    Message left for pt's daughter Brianna Forbes requesting a call back to discuss plan of care. CM will continue to follow and assist. Please notify CM if needs or concerns arise. Home O2 in place on admit: No    Addendum at 2:53pm: Received return call from pt's daughter Brianna Forbes. Discussed SNF recommendations and she states that all her siblings are in agreement with SNF. She states pt has been at [facility] in the past but would review the SNF list; emailed SNF list to her at [email or web address]. Brianna Forbes aware pending pt's progress, another level of care may need to be reviewed due to the dobhoff which she stated understanding.

## 2023-01-16 NOTE — CONSULTS
Pt seen for ostomy care. Current appliance in place and intact. Large amount of liquid brown stool present, +flatus. Pt alert x 1, verbal but difficult to understand. No family in room. Will change appliance tomorrow.

## 2023-01-16 NOTE — PROGRESS NOTES
Shift assessment, completed, see flow sheet. Pt is alert and oriented x 1. Following commands. A-fib 92, /98, SpO2 99%. Respirations are easy, and unlabored. Bilateral lung sounds diminished. 5 L NC O2. Rick in place and patent with STAT lock. Call light within reach. Bed in lowest position. Bed alarm on.

## 2023-01-16 NOTE — PROGRESS NOTES
Pulmonary & Critical Care Medicine ICU Progress Note    CC: Abdominal pain, perforated diverticulum, septic shock    Events of Last 24 hours: On 5LPM   DobHoff with trophic tube feed           Invasive Lines: Right IJ CVC 1/7/2023    MV: 1/7-1/12/23  Vitals:  Blood pressure 128/72, pulse 88, temperature 99.7 °F (37.6 °C), temperature source Bladder, resp. rate 20, height 5' 7\" (1.702 m), weight 202 lb (91.6 kg), SpO2 97 %, not currently breastfeeding. on 5 L   Constitutional:  No acute distress. Eyes: PERRL. Conjunctivae anicteric. ENT: Normal nose. Normal tongue. Neck:  Trachea is midline. No thyroid tenderness. Respiratory: No accessory muscle usage. No decreased breath sounds. No wheezes. Few rales. No Rhonchi. Cardiovascular: Normal S1S2. No digit clubbing. No digit cyanosis. + UE/ LE edema. GI: soft, stool in ostomy bag. Psychiatric: No anxiety or Agitation. Somnolent. Not following commands.     Scheduled Meds:   insulin lispro  0-4 Units SubCUTAneous Q4H    prochlorperazine  10 mg IntraVENous Once    piperacillin-tazobactam  3,375 mg IntraVENous Q8H    sodium chloride flush  5-40 mL IntraVENous 2 times per day    pantoprazole  40 mg IntraVENous Daily    enoxaparin  40 mg SubCUTAneous Daily    fluconazole  200 mg IntraVENous Q24H     PRN Meds:  albuterol, dextrose, glucose, dextrose bolus **OR** dextrose bolus, glucagon (rDNA), HYDROmorphone **OR** HYDROmorphone, prochlorperazine, sodium chloride flush, sodium chloride, acetaminophen **OR** acetaminophen, midazolam, fentanNYL, albuterol sulfate HFA, sodium chloride    Results:  CBC:   Recent Labs     01/14/23  0552 01/15/23  0510 01/16/23  0406   WBC 16.1* 13.7* 11.7*   HGB 7.9* 7.8* 7.7*   HCT 24.2* 23.7* 23.6*   MCV 82.3 82.3 82.0    342 412       BMP:   Recent Labs     01/14/23  0552 01/14/23  1838 01/15/23  0510 01/16/23  0406    137 143 144   K 3.5  3.5 5.4* 3.8 3.6    94* 111* 111*   CO2 26 13* 28 30   PHOS 3.3  -- 3.5 3.8   BUN 18 44* 19 20   CREATININE 0.6 5.4* 0.7 0.7       LIVER PROFILE:   Recent Labs     01/14/23  0552   AST 26   ALT 15   BILITOT 1.1*   ALKPHOS 175*         Cultures:  1/6/2023 SARS-CoV-2 positive  1/6/2023 BCx Escherichia coli and Clostridium ramosum      Imaging:  CXR 1/15/23  images reviewed by me and showed:   Bibasilar effusions with mild congestion.    Support tubes as described above        ASSESSMENT:  Acute hypoxemic respiratory failure   Septic shock  E. coli bacteremia  Diverticulitis with perforated viscus in the sigmoid colon area, post op x-lap with sigmoid colectomy and small bowel resection and ostomy on 1/7/23  COVID-19 infection, incidental finding  Paroxysmal atrial fibrillation, now AFIB RVR  - on Amio gtt for a time  H/O esophageal stricture   Coagulopathy 2/2 warfarin- S/P 3 U FFP & vitamin K; give K-centra X 1 prior to emergent surgery    PLAN:  COVID-19 isolation, droplet plus   Supplemental oxygen to maintain SaO2 >92%; wean as tolerated    D#10 Zosyn and Diflucan   Off  IVF  Electrolytes replacement   Lasix 40 mg IV x1   TFs started   SSI   Prophylaxis: SCD, bactroban, protonix   Okay to move out of the ICU from our perspective

## 2023-01-17 PROBLEM — E87.0 HYPERNATREMIA: Status: ACTIVE | Noted: 2023-01-17

## 2023-01-17 LAB
ANION GAP SERPL CALCULATED.3IONS-SCNC: 6 MMOL/L (ref 3–16)
BUN BLDV-MCNC: 24 MG/DL (ref 7–20)
CALCIUM SERPL-MCNC: 8.2 MG/DL (ref 8.3–10.6)
CHLORIDE BLD-SCNC: 114 MMOL/L (ref 99–110)
CO2: 31 MMOL/L (ref 21–32)
CREAT SERPL-MCNC: 0.7 MG/DL (ref 0.6–1.2)
GFR SERPL CREATININE-BSD FRML MDRD: >60 ML/MIN/{1.73_M2}
GLUCOSE BLD-MCNC: 106 MG/DL (ref 70–99)
GLUCOSE BLD-MCNC: 112 MG/DL (ref 70–99)
GLUCOSE BLD-MCNC: 118 MG/DL (ref 70–99)
GLUCOSE BLD-MCNC: 137 MG/DL (ref 70–99)
GLUCOSE BLD-MCNC: 141 MG/DL (ref 70–99)
GLUCOSE BLD-MCNC: 150 MG/DL (ref 70–99)
PERFORMED ON: ABNORMAL
POTASSIUM SERPL-SCNC: 3.5 MMOL/L (ref 3.5–5.1)
SODIUM BLD-SCNC: 151 MMOL/L (ref 136–145)

## 2023-01-17 PROCEDURE — 6370000000 HC RX 637 (ALT 250 FOR IP): Performed by: INTERNAL MEDICINE

## 2023-01-17 PROCEDURE — 80048 BASIC METABOLIC PNL TOTAL CA: CPT

## 2023-01-17 PROCEDURE — 2060000000 HC ICU INTERMEDIATE R&B

## 2023-01-17 PROCEDURE — 6360000002 HC RX W HCPCS: Performed by: SURGERY

## 2023-01-17 PROCEDURE — C9113 INJ PANTOPRAZOLE SODIUM, VIA: HCPCS | Performed by: SURGERY

## 2023-01-17 PROCEDURE — 6360000002 HC RX W HCPCS: Performed by: INTERNAL MEDICINE

## 2023-01-17 PROCEDURE — 2580000003 HC RX 258: Performed by: SURGERY

## 2023-01-17 PROCEDURE — 97530 THERAPEUTIC ACTIVITIES: CPT

## 2023-01-17 PROCEDURE — 97110 THERAPEUTIC EXERCISES: CPT

## 2023-01-17 PROCEDURE — 2580000003 HC RX 258: Performed by: INTERNAL MEDICINE

## 2023-01-17 PROCEDURE — 92610 EVALUATE SWALLOWING FUNCTION: CPT

## 2023-01-17 PROCEDURE — 92526 ORAL FUNCTION THERAPY: CPT

## 2023-01-17 PROCEDURE — 36415 COLL VENOUS BLD VENIPUNCTURE: CPT

## 2023-01-17 PROCEDURE — 94761 N-INVAS EAR/PLS OXIMETRY MLT: CPT

## 2023-01-17 PROCEDURE — 2700000000 HC OXYGEN THERAPY PER DAY

## 2023-01-17 PROCEDURE — 99024 POSTOP FOLLOW-UP VISIT: CPT | Performed by: SURGERY

## 2023-01-17 PROCEDURE — 99233 SBSQ HOSP IP/OBS HIGH 50: CPT | Performed by: INTERNAL MEDICINE

## 2023-01-17 RX ORDER — CITALOPRAM 20 MG/1
20 TABLET ORAL DAILY
Status: DISCONTINUED | OUTPATIENT
Start: 2023-01-18 | End: 2023-01-18

## 2023-01-17 RX ORDER — FUROSEMIDE 10 MG/ML
40 INJECTION INTRAMUSCULAR; INTRAVENOUS ONCE
Status: COMPLETED | OUTPATIENT
Start: 2023-01-17 | End: 2023-01-17

## 2023-01-17 RX ORDER — DEXTROSE MONOHYDRATE 50 MG/ML
INJECTION, SOLUTION INTRAVENOUS CONTINUOUS
Status: DISCONTINUED | OUTPATIENT
Start: 2023-01-17 | End: 2023-01-18

## 2023-01-17 RX ORDER — POTASSIUM CHLORIDE 29.8 MG/ML
20 INJECTION INTRAVENOUS
Status: COMPLETED | OUTPATIENT
Start: 2023-01-17 | End: 2023-01-17

## 2023-01-17 RX ADMIN — FUROSEMIDE 40 MG: 10 INJECTION, SOLUTION INTRAMUSCULAR; INTRAVENOUS at 12:44

## 2023-01-17 RX ADMIN — PIPERACILLIN SODIUM,TAZOBACTAM SODIUM 3375 MG: 3; .375 INJECTION, POWDER, FOR SOLUTION INTRAVENOUS at 05:50

## 2023-01-17 RX ADMIN — SODIUM CHLORIDE, PRESERVATIVE FREE 10 ML: 5 INJECTION INTRAVENOUS at 08:11

## 2023-01-17 RX ADMIN — DEXTROSE MONOHYDRATE: 50 INJECTION, SOLUTION INTRAVENOUS at 12:46

## 2023-01-17 RX ADMIN — FLUCONAZOLE 200 MG: 2 INJECTION, SOLUTION INTRAVENOUS at 11:02

## 2023-01-17 RX ADMIN — PANTOPRAZOLE SODIUM 40 MG: 40 INJECTION, POWDER, FOR SOLUTION INTRAVENOUS at 08:10

## 2023-01-17 RX ADMIN — ENOXAPARIN SODIUM 40 MG: 100 INJECTION SUBCUTANEOUS at 08:10

## 2023-01-17 RX ADMIN — POTASSIUM CHLORIDE 20 MEQ: 29.8 INJECTION, SOLUTION INTRAVENOUS at 12:44

## 2023-01-17 RX ADMIN — METOPROLOL TARTRATE 12.5 MG: 25 TABLET, FILM COATED ORAL at 11:00

## 2023-01-17 RX ADMIN — METOPROLOL TARTRATE 12.5 MG: 25 TABLET, FILM COATED ORAL at 21:18

## 2023-01-17 RX ADMIN — PIPERACILLIN SODIUM,TAZOBACTAM SODIUM 3375 MG: 3; .375 INJECTION, POWDER, FOR SOLUTION INTRAVENOUS at 15:34

## 2023-01-17 RX ADMIN — POTASSIUM CHLORIDE 20 MEQ: 29.8 INJECTION, SOLUTION INTRAVENOUS at 13:45

## 2023-01-17 RX ADMIN — PIPERACILLIN SODIUM,TAZOBACTAM SODIUM 3375 MG: 3; .375 INJECTION, POWDER, FOR SOLUTION INTRAVENOUS at 22:50

## 2023-01-17 NOTE — CONSULTS
Pt seen for ostomy pouch change. Pt awake, alert to self only. Current appliance intact since Friday. Old appliance removed skin cleansed and patted dry. Stoma is 75% red, moist tissue, 25% dusky tissue near edges. Mucocutaneous junction ad peristomal skin intact, visible intact sutures at junction. Small abrrier seal applied all around stoma, then pt repouched using Coloplast 2 1/4\" flat wafer and drainable pouch. Good seal obtained. No family in room. Will follow.

## 2023-01-17 NOTE — PROGRESS NOTES
Pulmonary & Critical Care Medicine ICU Progress Note    CC: Abdominal pain, perforated diverticulum, septic shock    Events of Last 24 hours: On 5 LPM   DobHoff with trophic tube feed   Responded well to lasix overnight       Invasive Lines:   Right IJ CVC 1/7/2023    MV: 1/7-1/12/23      Intake/Output Summary (Last 24 hours) at 1/17/2023 0857  Last data filed at 1/17/2023 0845  Gross per 24 hour   Intake 989.94 ml   Output 3115 ml   Net -2125.06 ml         Vitals:  Blood pressure (!) 156/87, pulse 89, temperature 99 °F (37.2 °C), temperature source Bladder, resp. rate 15, height 5' 7\" (1.702 m), weight 207 lb (93.9 kg), SpO2 99 %, not currently breastfeeding. on 5 L   Constitutional:  No acute distress. Eyes: PERRL. Conjunctivae anicteric. ENT: Normal nose. Normal tongue. Neck:  Trachea is midline. No thyroid tenderness. Respiratory: No accessory muscle usage. No decreased breath sounds. No wheezes. Few rales. No Rhonchi. Cardiovascular: Normal S1S2. No digit clubbing. No digit cyanosis. + UE/ LE edema. GI: soft, stool in ostomy bag. + MICHELLE drain in place  Psychiatric: No anxiety or Agitation. +  following commands.     Scheduled Meds:   insulin lispro  0-4 Units SubCUTAneous Q4H    prochlorperazine  10 mg IntraVENous Once    piperacillin-tazobactam  3,375 mg IntraVENous Q8H    sodium chloride flush  5-40 mL IntraVENous 2 times per day    pantoprazole  40 mg IntraVENous Daily    enoxaparin  40 mg SubCUTAneous Daily    fluconazole  200 mg IntraVENous Q24H     PRN Meds:  HYDROmorphone, albuterol, dextrose, glucose, dextrose bolus **OR** dextrose bolus, glucagon (rDNA), prochlorperazine, sodium chloride flush, sodium chloride, acetaminophen **OR** acetaminophen, albuterol sulfate HFA    Results:  CBC:   Recent Labs     01/15/23  0510 01/16/23  0406   WBC 13.7* 11.7*   HGB 7.8* 7.7*   HCT 23.7* 23.6*   MCV 82.3 82.0    412     BMP:   Recent Labs     01/14/23  1838 01/15/23  0510 01/16/23  0406   NA 137 143 144   K 5.4* 3.8 3.6   CL 94* 111* 111*   CO2 13* 28 30   PHOS  --  3.5 3.8   BUN 44* 19 20   CREATININE 5.4* 0.7 0.7     LIVER PROFILE:   No results for input(s): AST, ALT, LIPASE, BILIDIR, BILITOT, ALKPHOS in the last 72 hours. Invalid input(s): AMYLASE,  ALB    Cultures:  1/6/2023 SARS-CoV-2 positive  1/6/2023 BCx Escherichia coli and Clostridium ramosum      Imaging:  CXR 1/15/23  images reviewed by me and showed:   Bibasilar effusions with mild congestion.    Support tubes as described above        ASSESSMENT:  Acute hypoxemic respiratory failure   Septic shock  E. coli bacteremia  Hypernatremia  Diverticulitis with perforated viscus in the sigmoid colon area, post op x-lap with sigmoid colectomy and small bowel resection and ostomy on 1/7/23  COVID-19 infection, incidental finding  Paroxysmal atrial fibrillation, now AFIB RVR    H/O esophageal stricture   Coagulopathy 2/2 warfarin- S/P 3 U FFP & vitamin K; give K-centra X 1 prior to emergent surgery    PLAN:  COVID-19 isolation, droplet plus   Supplemental oxygen to maintain SaO2 >92%; wean as tolerated    D#11 Zosyn and Diflucan   Off  IVF  Repeat BMP  D5W and follow sodium  Repeat Lasix today  TFs   Resume home lopressor 12.5 BID   Resume home Celexa when off diflucan   SSI   Prophylaxis: Lovenox, bactroban, protonix - Coumadin at home and resume when ok with surgery   Okay to move out of the ICU from our perspective

## 2023-01-17 NOTE — PROGRESS NOTES
Occupational Therapy Daily Treatment Note    Unit: ICU  Date:  1/17/2023  Patient Name:    Shania Garner  Admitting diagnosis:  Diverticulitis of colon [K57.32]  Prolonged Q-T interval on ECG [R94.31]  Perforated viscus [R19.8]  Perforated diverticulum [K57.80]  Admit Date:  1/6/2023  Precautions/Restrictions:  fall risk, IV, bed/chair alarm, de jesus catheter , supplemental O2 (5L), colostomy, ICU monitoring, Droplet Plus precautions (+ COVID 19), and [place] (hard of hearing), Abdominal incision, MICHELLE drain    LAPAROTOMY EXPLORATORY, SIGMOID COLECTOMY, SMALL BOWEL RESECTION,OSTOMY 1/7/23    Intubated 1/7/23-1/12/23      Discharge Recommendations: SNF  DME needs for discharge: defer to facility       Therapy recommendations for staff:   Assist of 2 for bed mobility    AM-PAC Score: AM-PAC Inpatient Daily Activity Raw Score: 12  [facility] S4 Level: NA       Treatment Time:  8163-3483  Treatment number:  2    Total Treatment Time:   30 minutes    History of Present Illness: per Dr Jennifer Avila consult 1/7/23:  \"The patient is a 80 y.o. female who presented with severe, sharp lower abdominal pain that started yesterday. She is intubated in the ICU on pressors. History obtained from her daughter with whom the patient lives. The patient fell on [place] and was thought to be having some residual pain from the fall. Things worsened yesterday with no alleviating or modifying factors. She had associated nausea. Last colonoscopy was a few years ago with benign polyps removed. \"    Subjective:  Patient alert, oriented to self. Stated she is in [facility]" for a [employer]" and was reoriented to current location. Stated month to be \"November 2020\" and reoriented to current month/year. Later in session patient stated location to be \"[facility]" and unable to recall month/year.     Pain   No  Rating: NA  Location:  Pain Medicine Status: No request made      Bed Mobility:   Supine to Sit:  Not Tested  Sit to Supine:  Not Tested  Rolling:           Not Tested  Scooting:        Not Tested    Transfer Training:   Sit to stand:   Not Tested  Stand to sit:  Not Tested  Bed to Chair:  Not Tested  Bed to [facility]:   Not Tested  Standard toilet:   Not Tested    Activity Tolerance   Pt completed therapy session with No adverse symptoms noted w/activity    Balance  Sitting Balance :     Not Tested  Standing Balance   Not Tested    ADL Training:   Upper body dressing:  Not Tested  Upper body bathing:  Not Tested  Lower body dressing:  Not Tested  Lower body bathing:  Not Tested  Toileting:   Not Tested  Grooming/Hygiene:  Not Tested  Feeding : Max A eat three ice chips with cues to take time, coughing noted; RN notified/present    Therapeutic Exercise: AAROM  Hand flex/ext:  x10  Wrist flex/ext:  x10  Forearm sup/pronation:  x10  Elbow flex/ext: x10  Shoulder flex/ext:  x10    Patient Education:   Role of OT  Recommendations for DC  Reorientation    Positioning Needs: In bed, call light and needs in reach. Family Present:  No    Assessment: Making gradual progress toward goals, continue. GOALS  To be met in 3 Visits:  1). Bed to toilet/BSC: Assess at next visit as able     To be met in 5 Visits:  1.) Pt will perform AAROM HEP (MET 1/17/23)  2.) pt will tolerate rolling with Max Ax2  3.) pt  will respond to commands ~25% of the time.  (MET 1/17/23)    Plan: cont with [address]ine, OTR/L 5886        If patient discharges from this facility prior to next visit, this note will serve as the Discharge Summary

## 2023-01-17 NOTE — PROGRESS NOTES
[facility] GENERAL SURGERY DAILY PROGRESS NOTE    SUBJECTIVE: Awake, more alert    OBJECTIVE: CURRENT VITALS:  BP (!) 157/80   Pulse 83   Temp 99.9 °F (37.7 °C) (Bladder)   Resp 20   Ht 5' 7\" (1.702 m)   Wt 207 lb (93.9 kg)   SpO2 96%   BMI 32.42 kg/m²          LABS:    CBC:   Recent Labs     01/15/23  0510 01/16/23  0406   WBC 13.7* 11.7*   RBC 2.89* 2.88*   HGB 7.8* 7.7*   HCT 23.7* 23.6*   MCV 82.3 82.0   RDW 15.5* 15.3    412     BMP:   Recent Labs     01/15/23  0510 01/16/23  0406 01/17/23  1000    144 151*   K 3.8 3.6 3.5   * 111* 114*   CO2 28 30 31   PHOS 3.5 3.8  --    BUN 19 20 24*   CREATININE 0.7 0.7 0.7     Recent Labs     01/15/23  0510 01/16/23  0406   MG 2.00 1.90       ASSESSMENT:   POD 10  Judson's  /  SBR        PLAN:   Increase nasogastric TF to 50 cc/hr which is goal  Transferring out of ICU  D/C Mariel Escobar MD

## 2023-01-17 NOTE — PROGRESS NOTES
2000  Shift assessment completed, see flowsheets. VSS. A&Ox1. Nods appropriately when asked a question. Denies pain. Bilateral lung sounds diminished. Rick in place and patent with STAT lock. Call light within reach. Bed in lowest position. Bed alarm on.

## 2023-01-17 NOTE — PROGRESS NOTES
Speech Language Pathology    Speech Language Pathology  Clinical Bedside Swallow Assessment  Facility/Department: [facility] ICU      Recommendations:  Instrumentation: will continue to monitor for need  Diet recommendation: NPO exception of sips of water and ice chips, puree pleasure feeds (1/2 tsp at a time); Meds via alt means of nutrition  Risk management: upright for all intake, stay upright for at least 30 mins after intake, small bites/sips, total feed, 1:1 supervision with intake, oral care q4 hrs to reduce adverse affects in the event of aspiration, alternate bites/sips, slow rate of intake, general GERD precautions, general aspiration precautions, and hold PO and contact SLP if s/s of aspiration or worsening respiratory status develop.    Oropharyngeal swallow function needs ongoing assessment -- ST to continue to follow      NAME:Elisha Espinoza  : 4/3/1932 (89 y.o.)   MRN: [id number]  ROOM: 3016/3016-01  ADMISSION DATE: 2023  PATIENT DIAGNOSIS(ES): Diverticulitis of colon [K57.32]  Prolonged Q-T interval on ECG [R94.31]  Perforated viscus [R19.8]  Perforated diverticulum [K57.80]  Chief Complaint   Patient presents with    Abdominal Pain     RLQ pain starting approx 2 hrs ago, denies n/v/d. Pt took 1g tylenol for pain approx 1 hr ago.      Patient Active Problem List    Diagnosis Date Noted    Paroxysmal atrial fibrillation (HCC) 2023    E coli bacteremia 2023    Acute respiratory failure with hypoxia (HCC) 2023    Bowel perforation (HCC) 2023    Septic shock (HCC) 2023    Acute hypoxemic respiratory failure (HCC) 2023    COVID-19 2023    Coagulopathy (HCC) 2023    Diverticulitis of colon 2023    Neoplasm of uncertain behavior of skin     Pelvic mass 2013    Uterine fibroid 2013    Diverticular disease of colon 2013    Chronic diarrhea of unknown origin 2013    Abdominal pain, RLQ 2013    Abdominal fullness in  right lower quadrant 01/24/2013     Past Medical History:   Diagnosis Date    A-fib ([address]     Acid reflux     Hypertension      Past Surgical History:   Procedure Laterality Date    ANKLE FRACTURE SURGERY      LEFT ANKLE    APPENDECTOMY      ARM SURGERY Right 08/04/2020    EXCISION OF SKIN CANCER RIGHT ARM performed by Nathaniel Celaya MD at [address] [place] [address]  07/12/2017    cecal polyp; random colon biopsies    FOOT AMPUTATION Right 11/09/2022    AMPUTATION OF SECOND TOE RIGHT FOOT performed by Lisa Gauthier DPM at [facility] ([address] [place] [address])      LAPAROTOMY N/A 1/7/2023    LAPAROTOMY EXPLORATORY, SIGMOID COLECTOMY, SMALL BOWEL RESECTION,OSTOMY performed by Moi Garcia MD at [facility] 04/20/2016    EXCISION LESION LEFT UPPER EXTREMITY LEFT BACK AND RIGHT SHOULDER    UPPER GASTROINTESTINAL ENDOSCOPY  09/15/2017    dilated, biopsies    VULVA SURGERY      x 2      Allergies   Allergen Reactions    Morphine Nausea And Vomiting       DATE ONSET: Pt admitted to [facility] on 1/6/23    Date of Evaluation: 1/17/2023   Evaluating Therapist: GUERLINE Loo    Chart Reviewed: : [x] Yes [] No    Current Diet: Diet NPO  ADULT TUBE FEEDING; Nasogastric; Standard with Fiber; Continuous; 40; No; 50; Q 6 hours    Recent Chest Radiography: [x] Chest XR   [] CT of Chest  Date: 1/14/23  Impressions  Impression   Bibasilar effusions with mild congestion. Support tubes as described above. Pain: The patient does not complain of pain    Reason for Referral  King Mccormick was referred for a bedside swallow evaluation to assess the efficiency of their swallow function, identify signs and symptoms of aspiration and make recommendations regarding safe dietary consistencies, effective compensatory strategies, and safe eating environment.     Assessment    Medical record review/interview: Per MD H&P: \"The patient is a 80 y.o. female with PMH below, presented to [facility] w/ lower abd pain. Pt arrived via helicopter. She is unable to give me any Hx during the brief time that I saw her priior to sedating her so a central line could be placed. She appeared to be very uncomfortable despite multiple doses of pain medicine including 100 mg She was subsequently preemptively intubated 2/2 hypoxia and increased WOB. She was found to have a perforated diverticulitis w/ small extraluminal stool at [facility]. She is on Coumadin and INR is ~3. She was given 10 SQ Vit K at [facility]. Dr. Nabil Peters is planning on OR as soon as we can get her INR down. She is now on levophed. She was also found to be COVID +\". Patient Complaints: pt is an unreliable historian, pleasantly confused. Pt denied dysphagia, however unable to elaborate/answer follow-up questions regarding swallow function when asked by SLP  Odynophagia: [] Yes [] No  Globus Sensation: [] Yes [] No  SOB with PO intake: [] Yes [] No  Increased WOB with PO intake: [] Yes [] No  Reflux Sx's: [] Yes [] No  Weight loss: [] Yes [] No  Coughing/Choking with PO intake: [] Yes [] No  Reduced Appetite: [] Yes [] No  Trouble with Mastication:  [] Yes [] No  Avoidance of Certain Foods:  [] Yes [] No  Premature Satiety:  [] Yes [] No  Recent PNA:  [] Yes [] No  Recurring PNA:  [] Yes [] No  Changes in vocal quality: [] Yes [] No    Baseline Method of Oral Meds: LEEROY, see above  [] Whole with liquid    [] Cut with liquid    [] Whole with puree    [] Cut in puree    [] Crushed in puree    [] Crushed in liquid    [] Via TF    Additional Reported Symptoms/Complaints: Pt admitted d/t diverticulitis of colon, perforated viscus, bowel perforation. Pt s/p \"laparotomy exploratory, sigmoid colectomy, small bowel resection, ostomy\" on 1/7. Pt intubated 1/7-1/12/23. Pt currently w/ NG in place.       Pt had MBSS completed at outside hospital in July 2015 per report with recommendations for a regular solid diet with thin liquids, GI consult rec. Per radiologist report on MBSS, pt demonstrated intermittent penetration with TL via straw. Pt has PMHx of reflux and esophageal stricture per chart.       Predisposing dysphagia risk factors: Age, PMHx of reflux per chart, hx of dysphagia  Clinical signs of possible chronic dysphagia: hx of dysphagia  Precipitating dysphagia risk factors: reduced physical mobility, increased O2 demands, recent intubation, and suspected disuse atrophy    Vitals/labs:     Vitals:    01/17/23 0600 01/17/23 0700 01/17/23 0800 01/17/23 0830   BP: (!) 156/87 (!) 140/75 (!) 148/88 (!) 154/82   Pulse: 89 91 (!) 103 92   Resp: 15 16 22 12   Temp:  99.3 °F (37.4 °C) 99.3 °F (37.4 °C)    TempSrc:  Bladder Bladder    SpO2: 99% 97% 99% 98%   Weight:       Height:            CBC:   Recent Labs     01/16/23  0406   WBC 11.7*   HGB 7.7*         BMP:  Recent Labs     01/16/23  0406      K 3.6   *   CO2 30   BUN 20   CREATININE 0.7   GLUCOSE 128*          Cranial nerve exam:   CN V (trigeminal): ophthalmic, maxillary, and mandibular facial sensation- LEEROY  CN VII (facial): Reduced  CN IX/X (glossopharyngeal/vagus): MPT: Reduced; pitch range: Reduced; vocal quality: weak; cough: Weak- perceptually and Non-Productive  CN XII (hypoglossal): Reduced    Laryngeal function exam:   Secretions: trace white-tinged secretions along lingual surface, labial surfaces  Vocal quality: See CN exam above  MPT: See CN exam above  S/Z ratio: DNT  Pitch range: See CN exam above  Cough: See CN exam above    Oral Care Status:    [] Oral Care [facility]  [] Poor oral care status  [x] Edentulous  [] Upper Dentures  [] Lower Dentures  [] Missing/Broken Teeth  [] Evidence of dental cavities/carries    PO trials:   Ice:     IDDSI 0 (thin):   - 5cc bolus (tsp): no anterior bolus loss , suspect premature bolus loss into pharynx, suspect delayed swallow onset, no clinical s/s of aspiration, and vitals stable  - Cup: x1 only; pt had difficulty forming adequate labial seal on cup edge despite cues (suspect negatively impacted d/t presence of NG as well). - Straw: no anterior bolus loss , suspect premature bolus loss into pharynx, suspect delayed swallow onset, no clinical s/s of aspiration, and vitals stable. Pt with intermittent \"grunting\" throughout BSE, RN reported pt does this at baseline as well. Pt's RR consistent at 21-24/min and O2 sats fluctuated between 94-98%. Suspected reduced hyolaryngeal excursion upon palpation of the anterior neck. Pt required intermittent cues for bolus manipulation and swallow initiation with PO trials. IDDSI 4 (puree): no anterior bolus loss , suspect functional A-P bolus transit, suspect premature bolus loss into pharynx, suspect delayed swallow onset, oral clearance grossly WFL, no clinical s/s of aspiration, and vitals stable    3 oz water: FAIL    Impressions:  Pt oriented to self and place (\"hospital\", however incorrect name provided by pt). She was able to follow majority of basic commands throughout BSE given cues from SLP. Oral care with simultaneous suction completed prior to PO trials. Clinical signs of oropharyngeal dysphagia likely acute-on-chronic related to hx of dysphagia, reduced physical mobility, increased O2 demands, acute infection, fatigue, disuse atrophy, the aging swallow, and intubation. Swallow prognosis is fair. Instrumental swallow study is not indicated at this time, will continue to monitor. Given suspected disuse atrophy of swallow musculature, pt's acuity of illness, reduced physical mobility, poor oral care status, immunocompromised state, and impaired respiratory-swallow coordination, pt is not safe for oral diet at this time (PO pleasure feeds only)    Recommendations: Instrumentation: will continue to monitor for need  Diet recommendation: NPO exception of sips of water and ice chips, puree pleasure feeds (1/2 tsp at a time);  Meds via alt means of nutrition  Risk management: upright for all intake, stay upright for at least 30 mins after intake, small bites/sips, total feed, 1:1 supervision with intake, oral care q4 hrs to reduce adverse affects in the event of aspiration, alternate bites/sips, slow rate of intake, general GERD precautions, general aspiration precautions, and hold PO and contact SLP if s/s of aspiration or worsening respiratory status develop.     Prognosis: Fair    Recommended Intervention:  [x] Dysphagia tx  [] Videostroboscopy                      [x] NPO   [x] MBS       [] Speech/Cog Eval    [x] Therapeutic PO Trials     [x] Ice Chips   [] Other:  [x] FEES                                                 Dysphagia Therapeutic Intervention:  []  Bolus control Exercises  []  Oral Motor Exercises  []  [employer] Protocol  []  Thermal Stimulation  [x]  Oral Care    []  Vital Stim/NMES  []  Laryngeal Exercises  [x]  Patient/Family Education  []  Pharyngeal Exercises  [x]  Therapeutic PO trials with SLP  [x]  Diet tolerance monitoring  []  Other:     Referrals:  [] ENT    [] PT  [] Pulmonology [] GI  [] Neurology  [] RD  [] OT   []     Goals:  Short Term Goals:  Timeframe for Short Term Goals: (7 days, 1/24/23)  Goal 1: The patient will tolerate recommended diet with no clinical s/s of aspiration 5/5  Goal 2: The patient/caregiver will demonstrate understanding of compensatory swallow strategies, for improved swallow safety  Goal 3: The patient will tolerate repeat BSE when able for ongoing assessment  Goal 4: The patient will tolerate instrumental assessment when able     Long Term Goals:   Timeframe for Long Term Goals: (10 days, 1/27/23)  Goal 1: The patient will tolerate least restrictive diet with no clinical s/s of aspiration or worsening respiratory/pulmonary status    Treatment:  Skilled instruction completed with patient re: evidenced based practice regarding recommendations and POC, importance of oral care to reduce adverse affects in the event of aspiration, and instruction of recommended compensatory strategies developed based upon clinical exam. Pt able to recall/demonstrate compensatory strategies with mod-max cues. Pt Education: SLP educated the patient re: Role of SLP, rationale for completion of assessment, results of assessment, recommendations, and POC  Pt Education Response: no evidence of learning, would benefit from ongoing education, and RN aware    Duration/Frequency of Tx: 5-7x/wk    Individuals Consulted:   [x]  Patient     []  NP         [x]  RN   []  RD                   []  MD      []  Family Member                        []  PA    []  Other:      Safety Devices / Report:  [x]  All fall risk precautions in place [x]  Safety handoff completed with RN  [x]  Bed alarm in place  [x]  Left in bed     []  Chair alarm in place  []  Left in chair   [x]  Call light in reach   []  Other:           Total Treatment Time / Charges       Time in Time out Total Time / units   Swallow Eval/Tx Time  0832 0902 30 min / 2 units (DE/DT)       Signature:  Theo Armando M.SCarol [id number] [facility]  Speech-language pathologist  [id number]

## 2023-01-17 NOTE — CARE COORDINATION
INTERDISCIPLINARY PLAN OF CARE CONFERENCE    Date/Time: 1/17/2023 1:46 PM  Completed by: ALINA Mchugh  Case Management      Patient Name:  Teofilo Snider  YOB: 1932  Admitting Diagnosis: Diverticulitis of colon [K57.32]  Prolonged Q-T interval on ECG [R94.31]  Perforated viscus [R19.8]  Perforated diverticulum [K57.80]     Admit Date/Time:  1/6/2023  8:46 PM    Chart reviewed. Interdisciplinary team contacted or reviewed plan related to patient progress and discharge plans. Disciplines included Case Management, Nursing, and Dietitian. Current Status:ongoing   PT/OT recommendation for discharge plan of care: SNF    Expected D/C Disposition:  Skilled nursing facility  Confirmed plan with patient and/or family Yes confirmed with: (name) pt's daughter Joselito Vogel via phone call     Discharge Plan Comments: Chart review completed. Called and spoke with pt's daughter Joseliot Vogel. Inquired if she has reviewed the SNF list. She states she has and they are considering 1. [facility] 2. [address] [place] [address],  [address] ([place]) [address] [facility] ([facility]) and a fourth facility in [place] which she will obtain the information. Explained that facilities aren't able to accept with a dobhoff and a feeding plan would need to be determined prior to leaving the hospital which she stated understanding. Daughter aware SNF's won't be able to determine if they can accept at this time. CM awaiting return call from daughter on choice in [place].      Home O2 in place on admit: No    Addendum at 2:50pm: Spoke with RN who states pt is getting tube feeds and speech therapy will continue to evaluate for feeding trials when appropriate

## 2023-01-17 NOTE — PROGRESS NOTES
Care rounds completed with Dr. Anastacia Layton and multidisciplinary team. Reviewed labs, meds, VS, assessment, & plan of care for today.  See dictated note and new orders for details.     -resume metoprolol   - check BMP

## 2023-01-17 NOTE — PROGRESS NOTES
AM assessment complete, see flowsheet. Medications given per MAR. Pt alert to self only, slurred speech. Able to follow commands. IV lines and monitors checked and assessed. Pt resting in bed comfortably. SLP at the bedside for evaluation. No other needs noted, awaiting MD rounds.

## 2023-01-17 NOTE — PLAN OF CARE
Bedside swallow evaluation completed this date.     Coby Snider M.S. [id number] [facility]  Speech-language pathologist  [id number]

## 2023-01-17 NOTE — PROGRESS NOTES
Internal Medicine ICU Progress Note      Events of Last 24 hours:-    Patient currently operating room for per prorated diverticular disease. Febrile. In rapid A. fib. On 5 L of O2  Good response to IV Lasix      Invasive Lines: right IJ CVC    MV: Intubated on 2023    No results for input(s): PHART, ZFU8FFG, PO2ART in the last 72 hours. MV Settings:  Vent Mode: AC/VC Resp Rate (Set): 16 bmp/Vt (Set, mL): 370 mL/ /FiO2 : 35 %    IV:   dextrose Stopped (23 1048)    sodium chloride Stopped (23 0532)       Vitals:  Temp  Av.9 °F (37.2 °C)  Min: 98.4 °F (36.9 °C)  Max: 99.4 °F (37.4 °C)  Pulse  Av.6  Min: 84  Max: 105  BP  Min: 135/90  Max: 172/68  SpO2  Av.7 %  Min: 91 %  Max: 100 %  Patient Vitals for the past 4 hrs:   BP Temp Temp src Pulse Resp SpO2 Weight   23 0800 (!) 148/88 99.3 °F (37.4 °C) Bladder (!) 103 22 99 % --   23 0700 (!) 140/75 99.3 °F (37.4 °C) Bladder 91 16 97 % --   23 0600 (!) 156/87 -- -- 89 15 99 % --   23 0552 -- -- -- -- -- -- 207 lb (93.9 kg)   23 0500 (!) 165/86 -- -- 84 14 -- --         CVP: CVP (Mean): 217 mmHg      Intake/Output Summary (Last 24 hours) at 2023 0836  Last data filed at 2023 0813  Gross per 24 hour   Intake 989.94 ml   Output 3065 ml   Net -2075.06 ml         EXAM:    General: elderly female   ,   remains  drowsy but slowly improving   No distress. Eyes: PERRL. No sclera icterus. No conjunctiva injected. ENT: No discharge. ETT. Neck: Trachea midline. Normal thyroid. Resp: No accessory muscle use. No crackles. No wheezing. No rhonchi. No dullness on percussion. CV: Regular rate. Regular rhythm. No mumur or rub. +3edema. No JVD. Palpable pedal pulses. GI: Non-tender. Mid abd surgical dressing and right LQ Bobby drain in place  left  sided ostomy in place with black liquid stool   Non-distended. No masses. No organmegaly. sluggish bowel sounds. No hernia.   Ext - 2+ edema to all ext  Neuro - drowsy , able  to follow commands    Medications:  Scheduled Meds:   insulin lispro  0-4 Units SubCUTAneous Q4H    prochlorperazine  10 mg IntraVENous Once    piperacillin-tazobactam  3,375 mg IntraVENous Q8H    sodium chloride flush  5-40 mL IntraVENous 2 times per day    pantoprazole  40 mg IntraVENous Daily    enoxaparin  40 mg SubCUTAneous Daily    fluconazole  200 mg IntraVENous Q24H       PRN Meds:  HYDROmorphone, albuterol, dextrose, glucose, dextrose bolus **OR** dextrose bolus, glucagon (rDNA), prochlorperazine, sodium chloride flush, sodium chloride, acetaminophen **OR** acetaminophen, albuterol sulfate HFA    Results:  CBC:   Recent Labs     01/15/23  0510 01/16/23  0406   WBC 13.7* 11.7*   HGB 7.8* 7.7*   HCT 23.7* 23.6*   MCV 82.3 82.0    412       BMP:   Recent Labs     01/14/23  1838 01/15/23  0510 01/16/23  0406    143 144   K 5.4* 3.8 3.6   CL 94* 111* 111*   CO2 13* 28 30   PHOS  --  3.5 3.8   BUN 44* 19 20   CREATININE 5.4* 0.7 0.7       LIVER PROFILE:   No results for input(s): AST, ALT, LIPASE, BILIDIR, BILITOT, ALKPHOS in the last 72 hours. Invalid input(s): AMYLASE,  ALB    PT/INR:   No results for input(s): PROTIME, INR in the last 72 hours. APTT:   No results for input(s): APTT in the last 72 hours. UA:  No results for input(s): NITRITE, COLORU, PHUR, LABCAST, WBCUA, RBCUA, MUCUS, TRICHOMONAS, YEAST, BACTERIA, CLARITYU, SPECGRAV, LEUKOCYTESUR, UROBILINOGEN, BILIRUBINUR, BLOODU, GLUCOSEU, AMORPHOUS in the last 72 hours. Invalid input(s): Chery[place] Co      Cultures:  [id number]  detected  Blood Culture positive for E. Coli and clostridium       Films:    XR ABDOMEN FOR NG/OG/NE TUBE PLACEMENT   Final Result   Tip of the feeding tube is in the expected position, projecting over the   gastric body. Continued pleuroparenchymal disease in the lower lungs bilaterally. XR CHEST PORTABLE   Final Result   Bibasilar effusions with mild congestion. Support tubes as described above. XR CHEST PORTABLE   Final Result   Small bilateral pleural effusions and bibasilar atelectasis or airspace   disease, increased on the left as compared to prior. XR CHEST PORTABLE   Final Result   No significant interval change in right greater than left pleural effusions   and bibasilar atelectasis or airspace disease as compared to prior. Tip of endotracheal tube is approximately 1.2 cm proximal to the kori and   could be retracted approximately 2 cm. XR CHEST PORTABLE   Final Result   Bilateral pleural effusions and adjacent lung consolidation, similar to prior         XR CHEST PORTABLE   Final Result   1. Stable lines, tubes and support devices. 2. Stable basilar opacities with pleural effusions. XR CHEST PORTABLE   Final Result   Increased pleural-parenchymal disease bilaterally         XR CHEST PORTABLE   Final Result   1. Endotracheal tube terminates in appropriate position above the kori. 2.  The enteric tube courses off the field of view in the upper abdomen. 3.  Right internal jugular line terminates at the level of the mid SVC. CT ABDOMEN PELVIS W IV CONTRAST Additional Contrast? None   Final Result   1. Small volume pneumoperitoneum and extraluminal fecal material appears to   arise from the sigmoid colon, presumably as a complication of diverticulitis. 2.  Small volume abdominopelvic ascites. No loculated fluid collection   identified. Findings were discussed with Verona Woods at 10:25 pm on 1/6/2023. Assessment:    Principal Problem: Bowel perforation ([address]  Active Problems:    Septic shock (HCC)    Acute hypoxemic respiratory failure (HCC)    COVID-19    Coagulopathy (HCC)    Diverticulitis of colon    Paroxysmal atrial fibrillation (HCC)    E coli bacteremia    Acute respiratory failure with hypoxia (HCC)  Resolved Problems:    * No resolved hospital problems.  *       Plan:    #Perforated diverticulum.  Gram neg bacteremia    S.p emergency ex lap with sigmoid colectomy , small bowel resection and ostomy placement   Surgery managing    On Zosyn and Diflucan day # 11   Fevers resolved   Improved sepsis and off pressors   Recovering bowel function, started TF     #Septic shock due to perforated diverticulum.  Ecoli  bacteremia.  On Zosyn and Diflucan.  Severe hypotension , no improvement with IVF boluses  was On IV vasopressors - vaso and levophed- improved Bp  Now off pressors     #Acute respiratory failure on the ventilator.   Sec to perforated viscus - remains on vent post surgery   Management per pulmonary.  Extubated to NC 5 L  Wean as able.  Lasix 40 mg x 1     #Paroxysmal A. fib. With RVR, started .  Off IV amiodarone.  Coumadin on hold, INR reversed perioperatively with vit k , ffp  Can resume when safe     #COVID-19 detected.  Incidental finding. No covid related issues    # Anemia  - multifactorial - surgery, iatrogenic, nutritional. Monitor and transfuse less than 7    # Edema - 20 L+ since adm, good UOP. Added lasix , tolerating     #lovenox  for DVT prophylaxis.    Transfer to [facility]      MARY ELLEN RODRIGUEZ MD 8:36 AM 1/17/2023

## 2023-01-17 NOTE — PROGRESS NOTES
01/17/23 1013   Encounter Summary   Encounter Overview/Reason  Attempted Encounter   Service Provided For: Patient not available   Referral/Consult From: Carlito   Last Encounter    (1/17 attempted visit, Pt not responsive)   Complexity of Encounter Low   Begin Time 1009   End Time  1014   Total Time Calculated 5 min

## 2023-01-18 ENCOUNTER — APPOINTMENT (OUTPATIENT)
Dept: GENERAL RADIOLOGY | Age: 88
DRG: 853 | End: 2023-01-18
Payer: MEDICARE

## 2023-01-18 PROBLEM — T17.908A ASPIRATION INTO AIRWAY: Status: ACTIVE | Noted: 2023-01-18

## 2023-01-18 LAB
ANION GAP SERPL CALCULATED.3IONS-SCNC: 5 MMOL/L (ref 3–16)
APTT: 29.5 SEC (ref 23–34.3)
BASE EXCESS ARTERIAL: 6.7 MMOL/L (ref -3–3)
BASOPHILS ABSOLUTE: 0 K/UL (ref 0–0.2)
BASOPHILS RELATIVE PERCENT: 0.2 %
BUN BLDV-MCNC: 24 MG/DL (ref 7–20)
CALCIUM SERPL-MCNC: 8.1 MG/DL (ref 8.3–10.6)
CARBOXYHEMOGLOBIN ARTERIAL: 0.1 % (ref 0–1.5)
CHLORIDE BLD-SCNC: 108 MMOL/L (ref 99–110)
CO2: 30 MMOL/L (ref 21–32)
CREAT SERPL-MCNC: 0.8 MG/DL (ref 0.6–1.2)
EOSINOPHILS ABSOLUTE: 0 K/UL (ref 0–0.6)
EOSINOPHILS RELATIVE PERCENT: 0.4 %
GFR SERPL CREATININE-BSD FRML MDRD: >60 ML/MIN/{1.73_M2}
GLUCOSE BLD-MCNC: 111 MG/DL (ref 70–99)
GLUCOSE BLD-MCNC: 146 MG/DL (ref 70–99)
GLUCOSE BLD-MCNC: 153 MG/DL (ref 70–99)
GLUCOSE BLD-MCNC: 154 MG/DL (ref 70–99)
GLUCOSE BLD-MCNC: 163 MG/DL (ref 70–99)
GLUCOSE BLD-MCNC: 165 MG/DL (ref 70–99)
GLUCOSE BLD-MCNC: 165 MG/DL (ref 70–99)
GLUCOSE BLD-MCNC: 178 MG/DL (ref 70–99)
HCO3 ARTERIAL: 29.9 MMOL/L (ref 21–29)
HCT VFR BLD CALC: 22.7 % (ref 36–48)
HCT VFR BLD CALC: 23.5 % (ref 36–48)
HEMOGLOBIN, ART, EXTENDED: 8.8 G/DL (ref 12–16)
HEMOGLOBIN: 7.5 G/DL (ref 12–16)
HEMOGLOBIN: 7.7 G/DL (ref 12–16)
LYMPHOCYTES ABSOLUTE: 0.2 K/UL (ref 1–5.1)
LYMPHOCYTES RELATIVE PERCENT: 1.9 %
MCH RBC QN AUTO: 27.2 PG (ref 26–34)
MCH RBC QN AUTO: 27.4 PG (ref 26–34)
MCHC RBC AUTO-ENTMCNC: 33 G/DL (ref 31–36)
MCHC RBC AUTO-ENTMCNC: 33.1 G/DL (ref 31–36)
MCV RBC AUTO: 82.5 FL (ref 80–100)
MCV RBC AUTO: 82.7 FL (ref 80–100)
METHEMOGLOBIN ARTERIAL: 0.1 %
MONOCYTES ABSOLUTE: 0.4 K/UL (ref 0–1.3)
MONOCYTES RELATIVE PERCENT: 3.5 %
NEUTROPHILS ABSOLUTE: 11 K/UL (ref 1.7–7.7)
NEUTROPHILS RELATIVE PERCENT: 94 %
O2 SAT, ARTERIAL: 91.5 %
O2 THERAPY: ABNORMAL
PCO2 ARTERIAL: 36.9 MMHG (ref 35–45)
PDW BLD-RTO: 15.3 % (ref 12.4–15.4)
PDW BLD-RTO: 15.5 % (ref 12.4–15.4)
PERFORMED ON: ABNORMAL
PH ARTERIAL: 7.53 (ref 7.35–7.45)
PLATELET # BLD: 470 K/UL (ref 135–450)
PLATELET # BLD: 490 K/UL (ref 135–450)
PMV BLD AUTO: 7 FL (ref 5–10.5)
PMV BLD AUTO: 7.2 FL (ref 5–10.5)
PO2 ARTERIAL: 54.4 MMHG (ref 75–108)
POTASSIUM SERPL-SCNC: 3.2 MMOL/L (ref 3.5–5.1)
RBC # BLD: 2.74 M/UL (ref 4–5.2)
RBC # BLD: 2.84 M/UL (ref 4–5.2)
SODIUM BLD-SCNC: 143 MMOL/L (ref 136–145)
TCO2 ARTERIAL: 31 MMOL/L
WBC # BLD: 10.7 K/UL (ref 4–11)
WBC # BLD: 11.7 K/UL (ref 4–11)

## 2023-01-18 PROCEDURE — 85730 THROMBOPLASTIN TIME PARTIAL: CPT

## 2023-01-18 PROCEDURE — 6360000002 HC RX W HCPCS: Performed by: SURGERY

## 2023-01-18 PROCEDURE — 6370000000 HC RX 637 (ALT 250 FOR IP): Performed by: INTERNAL MEDICINE

## 2023-01-18 PROCEDURE — 82803 BLOOD GASES ANY COMBINATION: CPT

## 2023-01-18 PROCEDURE — 2580000003 HC RX 258: Performed by: SURGERY

## 2023-01-18 PROCEDURE — 71045 X-RAY EXAM CHEST 1 VIEW: CPT

## 2023-01-18 PROCEDURE — 99024 POSTOP FOLLOW-UP VISIT: CPT | Performed by: SURGERY

## 2023-01-18 PROCEDURE — 85025 COMPLETE CBC W/AUTO DIFF WBC: CPT

## 2023-01-18 PROCEDURE — 6360000002 HC RX W HCPCS: Performed by: INTERNAL MEDICINE

## 2023-01-18 PROCEDURE — C9113 INJ PANTOPRAZOLE SODIUM, VIA: HCPCS | Performed by: SURGERY

## 2023-01-18 PROCEDURE — 2000000000 HC ICU R&B

## 2023-01-18 PROCEDURE — 97110 THERAPEUTIC EXERCISES: CPT

## 2023-01-18 PROCEDURE — 92526 ORAL FUNCTION THERAPY: CPT

## 2023-01-18 PROCEDURE — 97530 THERAPEUTIC ACTIVITIES: CPT

## 2023-01-18 PROCEDURE — 99232 SBSQ HOSP IP/OBS MODERATE 35: CPT | Performed by: INTERNAL MEDICINE

## 2023-01-18 PROCEDURE — 2580000003 HC RX 258: Performed by: INTERNAL MEDICINE

## 2023-01-18 PROCEDURE — 97535 SELF CARE MNGMENT TRAINING: CPT

## 2023-01-18 PROCEDURE — 80048 BASIC METABOLIC PNL TOTAL CA: CPT

## 2023-01-18 PROCEDURE — 85027 COMPLETE CBC AUTOMATED: CPT

## 2023-01-18 PROCEDURE — 6360000002 HC RX W HCPCS

## 2023-01-18 RX ORDER — HEPARIN SODIUM 1000 [USP'U]/ML
6000 INJECTION, SOLUTION INTRAVENOUS; SUBCUTANEOUS ONCE
Status: COMPLETED | OUTPATIENT
Start: 2023-01-18 | End: 2023-01-18

## 2023-01-18 RX ORDER — FUROSEMIDE 10 MG/ML
20 INJECTION INTRAMUSCULAR; INTRAVENOUS ONCE
Status: COMPLETED | OUTPATIENT
Start: 2023-01-18 | End: 2023-01-18

## 2023-01-18 RX ORDER — CITALOPRAM 20 MG/1
10 TABLET ORAL DAILY
Status: DISCONTINUED | OUTPATIENT
Start: 2023-01-18 | End: 2023-01-26 | Stop reason: HOSPADM

## 2023-01-18 RX ORDER — HEPARIN SODIUM 1000 [USP'U]/ML
6000 INJECTION, SOLUTION INTRAVENOUS; SUBCUTANEOUS PRN
Status: DISCONTINUED | OUTPATIENT
Start: 2023-01-18 | End: 2023-01-24 | Stop reason: ALTCHOICE

## 2023-01-18 RX ORDER — FUROSEMIDE 10 MG/ML
40 INJECTION INTRAMUSCULAR; INTRAVENOUS ONCE
Status: COMPLETED | OUTPATIENT
Start: 2023-01-18 | End: 2023-01-18

## 2023-01-18 RX ORDER — FUROSEMIDE 10 MG/ML
INJECTION INTRAMUSCULAR; INTRAVENOUS
Status: COMPLETED
Start: 2023-01-18 | End: 2023-01-18

## 2023-01-18 RX ORDER — HEPARIN SODIUM 10000 [USP'U]/100ML
1280 INJECTION, SOLUTION INTRAVENOUS CONTINUOUS
Status: DISCONTINUED | OUTPATIENT
Start: 2023-01-18 | End: 2023-01-24

## 2023-01-18 RX ORDER — POTASSIUM CHLORIDE 20 MEQ/1
40 TABLET, EXTENDED RELEASE ORAL ONCE
Status: DISCONTINUED | OUTPATIENT
Start: 2023-01-18 | End: 2023-01-18 | Stop reason: ALTCHOICE

## 2023-01-18 RX ORDER — HEPARIN SODIUM 1000 [USP'U]/ML
3000 INJECTION, SOLUTION INTRAVENOUS; SUBCUTANEOUS PRN
Status: DISCONTINUED | OUTPATIENT
Start: 2023-01-18 | End: 2023-01-24 | Stop reason: ALTCHOICE

## 2023-01-18 RX ADMIN — PANTOPRAZOLE SODIUM 40 MG: 40 INJECTION, POWDER, FOR SOLUTION INTRAVENOUS at 09:15

## 2023-01-18 RX ADMIN — FUROSEMIDE 20 MG: 10 INJECTION, SOLUTION INTRAMUSCULAR; INTRAVENOUS at 16:01

## 2023-01-18 RX ADMIN — HEPARIN SODIUM 1350 UNITS/HR: 10000 INJECTION, SOLUTION INTRAVENOUS at 20:57

## 2023-01-18 RX ADMIN — ENOXAPARIN SODIUM 40 MG: 100 INJECTION SUBCUTANEOUS at 09:16

## 2023-01-18 RX ADMIN — POTASSIUM BICARBONATE 40 MEQ: 782 TABLET, EFFERVESCENT ORAL at 09:33

## 2023-01-18 RX ADMIN — METOPROLOL TARTRATE 12.5 MG: 25 TABLET, FILM COATED ORAL at 09:16

## 2023-01-18 RX ADMIN — CITALOPRAM HYDROBROMIDE 10 MG: 20 TABLET ORAL at 09:16

## 2023-01-18 RX ADMIN — SODIUM CHLORIDE, PRESERVATIVE FREE 10 ML: 5 INJECTION INTRAVENOUS at 09:16

## 2023-01-18 RX ADMIN — METOPROLOL TARTRATE 12.5 MG: 25 TABLET, FILM COATED ORAL at 20:47

## 2023-01-18 RX ADMIN — HEPARIN SODIUM 6000 UNITS: 1000 INJECTION INTRAVENOUS; SUBCUTANEOUS at 20:54

## 2023-01-18 RX ADMIN — PIPERACILLIN SODIUM,TAZOBACTAM SODIUM 3375 MG: 3; .375 INJECTION, POWDER, FOR SOLUTION INTRAVENOUS at 23:09

## 2023-01-18 RX ADMIN — FUROSEMIDE 20 MG: 10 INJECTION INTRAMUSCULAR; INTRAVENOUS at 16:01

## 2023-01-18 RX ADMIN — PIPERACILLIN SODIUM,TAZOBACTAM SODIUM 3375 MG: 3; .375 INJECTION, POWDER, FOR SOLUTION INTRAVENOUS at 06:19

## 2023-01-18 RX ADMIN — SODIUM CHLORIDE, PRESERVATIVE FREE 10 ML: 5 INJECTION INTRAVENOUS at 20:44

## 2023-01-18 RX ADMIN — DEXTROSE MONOHYDRATE: 50 INJECTION, SOLUTION INTRAVENOUS at 06:31

## 2023-01-18 RX ADMIN — FUROSEMIDE 40 MG: 10 INJECTION, SOLUTION INTRAMUSCULAR; INTRAVENOUS at 09:15

## 2023-01-18 RX ADMIN — PIPERACILLIN SODIUM,TAZOBACTAM SODIUM 3375 MG: 3; .375 INJECTION, POWDER, FOR SOLUTION INTRAVENOUS at 14:01

## 2023-01-18 ASSESSMENT — PAIN SCALES - GENERAL: PAINLEVEL_OUTOF10: 5

## 2023-01-18 NOTE — CARE COORDINATION
INTERDISCIPLINARY PLAN OF CARE CONFERENCE    Date/Time: 1/18/2023 11:46 AM  Completed by: BERNABE Jackson Student Case Management      Patient Name:  Elisha Espinoza  YOB: 1932  Admitting Diagnosis: Diverticulitis of colon [K57.32]  Prolonged Q-T interval on ECG [R94.31]  Perforated viscus [R19.8]  Perforated diverticulum [K57.80]     Admit Date/Time:  1/6/2023  8:46 PM    Chart reviewed. Interdisciplinary team contacted or reviewed plan related to patient progress and discharge plans.   Disciplines included Case Management, Nursing, and Dietitian.    Current Status:ongoing  PT/OT recommendation for discharge plan of care: SNF    Expected D/C Disposition:  Skilled nursing facility  Confirmed plan with patient and/or family Yes confirmed with: (name) pt's daughter  Met with:Geremias via phone call    Discharge Plan Comments: Chart review completed. Called and spoke with pt's daughter, Geremias. CM updated that SNF pre-cert initiation still pending due to need for feeding plan for pt, and no impending d/c noted during rounds this AM. URVASHI inquired about 4th choice for pt SNF and she stated [facility] in [place].     CM called [facility] to inquire about COVID+ window for acceptance, message was left with Nazia for call back to URVASHI.     URVASHI called [facility] and spoke with Angelina to inquire about COVID+ window for acceptance. Angelina stated COVID+ patients are accepted assuming there is a private room available. Angelina also stated that isolation window is 10 days, so if pt tested positive on 1/6, pt would be outside of isolation window.     CM call [facility] and spoke with Jayne about COVID window. Jayne confirmed 10 day isolation window from first positive test, pt would be out of COVID window.     Home O2 in place on admit: No

## 2023-01-18 NOTE — PROGRESS NOTES
Chaplain Era Riggins    Rapid response. Collab with IDT. No concerns from family.         01/18/23 1548   Encounter Summary   Encounter Overview/Reason  Crisis   Service Provided For: Patient not available   Last Encounter  01/18/23  (Rapid Response,)   Complexity of Encounter Moderate   Begin Time 1530   End Time  1540   Total Time Calculated 10 min   Crisis   Type Rapid Response

## 2023-01-18 NOTE — PROGRESS NOTES
Called to RRT for patient having difficulty breathing , Pt on 9 L and 100 NRB with labored breathing. Spoke to Dr Yamilka Erwin who ordered c xray and patient to ICU.  Pt placed on bipap and moved to ICU 16

## 2023-01-18 NOTE — PROGRESS NOTES
Occupational Therapy Daily Treatment Note    Unit: ICU  Date:  1/18/2023  Patient Name:    Louann Campo  Admitting diagnosis:  Diverticulitis of colon [K57.32]  Prolonged Q-T interval on ECG [R94.31]  Perforated viscus [R19.8]  Perforated diverticulum [K57.80]  Admit Date:  1/6/2023  Precautions/Restrictions:  fall risk, IV, bed/chair alarm, de jesus catheter , supplemental O2 (5L), colostomy, ICU monitoring, Droplet Plus precautions (+ COVID 19), and [place] (hard of hearing), Abdominal incision, MICHELLE drain    LAPAROTOMY EXPLORATORY, SIGMOID COLECTOMY, SMALL BOWEL RESECTION,OSTOMY 1/7/23    Intubated 1/7/23-1/12/23      Discharge Recommendations: SNF  DME needs for discharge: defer to facility       Therapy recommendations for staff:   Assist of 2 for bed mobility    AM-PAC Score: AM-PAC Inpatient Daily Activity Raw Score: 12  Home Health S4 Level: NA       Treatment Time:  8:50-9:30  Treatment number:  3   Total Treatment Time:  40 minutes    History of Present Illness: per Dr Enriqueta Hodgkins consult 1/7/23:  \"The patient is a 80 y.o. female who presented with severe, sharp lower abdominal pain that started yesterday. She is intubated in the ICU on pressors. History obtained from her daughter with whom the patient lives. The patient fell on [address] and was thought to be having some residual pain from the fall. Things worsened yesterday with no alleviating or modifying factors. She had associated nausea. Last colonoscopy was a few years ago with benign polyps removed. \"    Subjective:  Patient lying supine in bed with no family present   Pt agreeable to this PT tx. Cognition    A&O Person and place  Able to follow 1 step commands    Pain   No  Rating: NA  Location:  Pain Medicine Status: No request made      Bed Mobility:   Supine to Sit:  Max A of 2  Sit to Supine:  Max A of 2  Rolling: Max A of 2  Scooting:         Max A of 2    Transfer Training:   Sit to stand:   Not Tested  Stand to sit:  Not Tested  Bed to Chair:  Not Tested  Bed to [facility]:   Not Tested  Standard toilet:   Not Tested    Activity Tolerance   Pt completed therapy session with No adverse symptoms noted w/activity    Balance  Sitting Balance : Max A (patient with lateral R lean). Sat EOB for ~8 minutes  Standing Balance   Not Tested    ADL Training:   Upper body dressing:  Not Tested  Upper body bathing:  Not Tested  Lower body dressing:  Not Tested  Lower body bathing:  Not Tested  Toileting:   Not Tested  Grooming/Hygiene:  Not Tested  Feeding :   HOHA initially. Progressed to setup (patient able to grasp ice chips with L hand and bring to mouth). Therapeutic Exercise: AAROM  Hand flex/ext:  x10  Wrist flex/ext:  x10  Forearm sup/pronation:  x10  Elbow flex/ext: x10  Shoulder flex/ext:  x10    Patient Education:   Role of OT  Recommendations for DC  Reorientation    Positioning Needs: In bed, call light and needs in reach. Family Present:  No    Assessment: Making gradual progress toward goals, continue. GOALS  To be met in 3 Visits:  1).  Bed to toilet/BSC: Mod A with STEDY     To be met in 5 Visits:  1.) Pt will perform supine to sit: mod A  2.) pt will maintain unsupported sitting for 15 minutes   3.) pt  will perform upper body ADLs: min A    Plan: cont with [address] [place] [address], OTR/L #[id number]          If patient discharges from this facility prior to next visit, this note will serve as the Discharge Summary

## 2023-01-18 NOTE — PROGRESS NOTES
Speech Language Pathology    Speech Language Pathology  Dysphagia Treatment/Follow-Up Note  [facility]    Recommendations:  Solid Consistency: NPO  Liquid Consistency: NPO with exception of spoon sip trials of Nectar thick liquid for pleasure  Medication: Meds via alt means of nutrition    Risk Management: upright for all intake, stay upright for at least 30 mins after intake, small bites/sips, total feed, liquids by tsp only, no straws, 1:1 supervision with intake, oral care 2-3x/day to reduce adverse affects in the event of aspiration, slow rate of intake, general aspiration precautions, and hold PO and contact SLP if s/s of aspiration or worsening respiratory status develop. Randall Todd  : 4/3/1932 (80 y.o.)   MRN: [id number]  ROOM: /[id number]  ADMISSION DATE: 2023  PATIENT DIAGNOSIS(ES): Diverticulitis of colon [K57.32]  Prolonged Q-T interval on ECG [R94.31]  Perforated viscus [R19.8]  Perforated diverticulum [K57.80]  Allergies   Allergen Reactions    Morphine Nausea And Vomiting       DATE ONSET: 2023    Pain: The patient does not complain of pain      Current Diet: Diet NPO  ADULT TUBE FEEDING; Nasogastric; Standard with Fiber; Continuous; 50; No; 100; Q 4 hours      Diet Tolerance:  Patient not tolerating current diet level without signs/symptoms of aspiration. Dysphagia Treatment and Impressions:  Subjective: Pt seen in room at bedside with RN permission: Fred Sarkar / Cognition: oriented to self only  RN Report/Chart Review: Pt intubated 23-23. Covid +. Had bowel surgery. Patient tolerance: Pt not tolerating pleasure feeds of pureed food trials, ice chips without overt s/s of aspiration.     Baseline Respiratory Status Measures: Pt O2 sat could not be obtained at bedside, pt on 3lpm NC with RR of 22/min    Liquid PO Trials:    IDDSI 0 Thin:  Assessed via tsp (5cc): anterior bolus loss, suspect reduced/impaired A-P bolus transit, suspect premature bolus loss into pharynx, and coughing after the swallow  IDDSI 2 Mildly Thick (nectar):  Assessed via tsp (5cc): no anterior bolus loss , oral clearance grossly WFL, no clinical s/s of aspiration, and no coughing    Solid PO Trials  IDDSI 4 Puree:   coughing after the swallow, wet vocal quality, and wet breath sounds upon exhalation    Repeat Respiratory Status Measures: Pt O2 sat could not be obtained at bedside, pt on 3lpm NC with RR of 24/min    Impressions:    Oral phase deficits characterized by extended rolling of thin liquid bolus in oral cavity with intermittent holding of thin, pureed food trials with delayed A-P transmission. Anterior loss of thin liquids noted. Suspected pharyngeal dysphagia with onelia s/s of aspiration of thin liquids via spoon sip with deep, wet coughing post swallow. Pt's cough is very weak and not effective at clearing suspected aspirate versus penetrated material. Pt also displaying s/s of aspiration with pureed food trials with intermittent/frequent deep, wet coughing post swallow. Pt cough is very weak and pt is unable to cough forcefully enough to clear any penetrated/aspirated material. No overt s/s of aspiration with spoon sip trials of nectar thick. Dysphagia Goals:  Timeframe for Long-term Goals: 10 days, 1/27/23  Goal 1: The pt will tolerate safest and least restrictive diet without clinical s/s of aspiration or change in respiratory status. Short-term Goals  Timeframe for Short-term Goals: 7 days, 1/24/23  Dysphagia Goals: The patient will tolerate recommended diet without observed clinical signs of aspiration, The patient will tolerate instrumental swallowing procedure, The patient/caregiver will demonstrate understanding of compensatory strategies for improved swallowing safety. , The patient will tolerate repeat bedside swallowing evaluation when able.     Speech/Language/Cog Goals: N/A         Recommendations:  Solid Consistency: NPO  Liquid Consistency: NPO with exception of spoon sip trials of Nectar thick liquid for pleasure  Medication: Meds via alt means of nutrition    Risk Management: upright for all intake, stay upright for at least 30 mins after intake, small bites/sips, total feed, liquids by tsp only, no straws, 1:1 supervision with intake, oral care 2-3x/day to reduce adverse affects in the event of aspiration, slow rate of intake, general aspiration precautions, and hold PO and contact SLP if s/s of aspiration or worsening respiratory status develop    Education: SLP edu pt re: Role of SLP, Rationale for dysphagia tx, Recommended compensatory strategies, Aspiration precautions, Risks of not following recommendations, and Risks of not following recommended diet. Pt verbalized understanding, would benefit from ongoing education, and RN aware of recommendations           Plan:    Continued Dysphagia treatment with goals per plan of care. Discharge Recommendations: Defer to PT/OT    If pt discharges from hospital prior to Speech/Swallowing discharge, this note serves as tx and discharge summary. Total Treatment Time / Charges     Time in Time out Total Time / units   Cognitive Tx         Speech Tx      Dysphagia Tx  1421  1450  29 min / 1 unit     Signature:    Connie Draper, [id number] [facility] [id number]  Speech-Language Pathologist

## 2023-01-18 NOTE — PROGRESS NOTES
Internal Medicine ICU Progress Note      Events of Last 24 hours:-    perforated diverticular disease. . S.p emergency ex lap with sigmoid colectomy , small bowel resection and ostomy placement     Off vent support, off pressors    Remains On 5 L of O2  Good response to IV Lasix, 16 L positive still  Awake , alert , progressively improving mentation   Tolerating TF     Invasive Lines: right IJ CVC    MV: Intubated on 2023, extubated     No results for input(s): PHART, EPZ3VBK, PO2ART in the last 72 hours. MV Settings:  Vent Mode: AC/VC Resp Rate (Set): 16 bmp/Vt (Set, mL): 370 mL/ /FiO2 : 35 %    IV:   dextrose 50 mL/hr at 23 0631    dextrose Stopped (23 1048)    sodium chloride Stopped (23 0532)       Vitals:  Temp  Av.9 °F (37.2 °C)  Min: 97.8 °F (36.6 °C)  Max: 99.9 °F (37.7 °C)  Pulse  Av.2  Min: 76  Max: 103  BP  Min: 121/74  Max: 167/80  SpO2  Av.2 %  Min: 93 %  Max: 99 %  No data found. CVP: CVP (Mean): 217 mmHg      Intake/Output Summary (Last 24 hours) at 2023 0719  Last data filed at 2023 2553  Gross per 24 hour   Intake 1042 ml   Output 1885 ml   Net -843 ml         EXAM:    General: elderly female   ,   remains  fatigued  but slowly improving   No distress. Eyes: PERRL. No sclera icterus. No conjunctiva injected. ENT: No discharge. NG in place  Neck: Trachea midline. Normal thyroid. Resp: No accessory muscle use. No crackles. No wheezing. No rhonchi. No dullness on percussion. CV: Regular rate. Regular rhythm. No mumur or rub. 2+ edema. No JVD. Palpable pedal pulses. GI: Non-tender. Mid abd surgical dressing and right LQ Bobby drain in place  left  sided ostomy in place with liquid stool   Non-distended. No masses. No organmegaly. sluggish bowel sounds. No hernia.   Ext - 2+ edema to all ext  Neuro - improving mentation moving ext , able  to follow commands    Medications:  Scheduled Meds:   metoprolol tartrate  12.5 mg Oral BID citalopram  20 mg Oral Daily    insulin lispro  0-4 Units SubCUTAneous Q4H    prochlorperazine  10 mg IntraVENous Once    piperacillin-tazobactam  3,375 mg IntraVENous Q8H    sodium chloride flush  5-40 mL IntraVENous 2 times per day    pantoprazole  40 mg IntraVENous Daily    enoxaparin  40 mg SubCUTAneous Daily       PRN Meds:  HYDROmorphone, albuterol, dextrose, glucose, dextrose bolus **OR** dextrose bolus, glucagon (rDNA), prochlorperazine, sodium chloride flush, sodium chloride, acetaminophen **OR** acetaminophen, albuterol sulfate HFA    Results:  CBC:   Recent Labs     01/16/23  0406 01/18/23  0631   WBC 11.7* 11.7*   HGB 7.7* 7.5*   HCT 23.6* 22.7*   MCV 82.0 82.7    470*       BMP:   Recent Labs     01/16/23  0406 01/17/23  1000 01/18/23  0631    151* 143   K 3.6 3.5 3.2*   * 114* 108   CO2 30 31 30   PHOS 3.8  --   --    BUN 20 24* 24*   CREATININE 0.7 0.7 0.8       LIVER PROFILE:   No results for input(s): AST, ALT, LIPASE, BILIDIR, BILITOT, ALKPHOS in the last 72 hours. Invalid input(s): AMYLASE,  ALB    PT/INR:   No results for input(s): PROTIME, INR in the last 72 hours. APTT:   No results for input(s): APTT in the last 72 hours. UA:  No results for input(s): NITRITE, COLORU, PHUR, LABCAST, WBCUA, RBCUA, MUCUS, TRICHOMONAS, YEAST, BACTERIA, CLARITYU, SPECGRAV, LEUKOCYTESUR, UROBILINOGEN, BILIRUBINUR, BLOODU, GLUCOSEU, AMORPHOUS in the last 72 hours. Invalid input(s): Manju Mayberry      Cultures:  [id number]  detected  Blood Culture positive for E. Coli and clostridium       Films:    XR ABDOMEN FOR NG/OG/NE TUBE PLACEMENT   Final Result   Tip of the feeding tube is in the expected position, projecting over the   gastric body. Continued pleuroparenchymal disease in the lower lungs bilaterally. XR CHEST PORTABLE   Final Result   Bibasilar effusions with mild congestion. Support tubes as described above.          XR CHEST PORTABLE   Final Result   Small bilateral pleural effusions and bibasilar atelectasis or airspace   disease, increased on the left as compared to prior.         XR CHEST PORTABLE   Final Result   No significant interval change in right greater than left pleural effusions   and bibasilar atelectasis or airspace disease as compared to prior.      Tip of endotracheal tube is approximately 1.2 cm proximal to the kori and   could be retracted approximately 2 cm.         XR CHEST PORTABLE   Final Result   Bilateral pleural effusions and adjacent lung consolidation, similar to prior         XR CHEST PORTABLE   Final Result   1. Stable lines, tubes and support devices.   2. Stable basilar opacities with pleural effusions.         XR CHEST PORTABLE   Final Result   Increased pleural-parenchymal disease bilaterally         XR CHEST PORTABLE   Final Result   1.  Endotracheal tube terminates in appropriate position above the kori.      2.  The enteric tube courses off the field of view in the upper abdomen.      3.  Right internal jugular line terminates at the level of the mid SVC.         CT ABDOMEN PELVIS W IV CONTRAST Additional Contrast? None   Final Result   1.  Small volume pneumoperitoneum and extraluminal fecal material appears to   arise from the sigmoid colon, presumably as a complication of diverticulitis.      2.  Small volume abdominopelvic ascites.  No loculated fluid collection   identified.      Findings were discussed with KATHI ZEE at 10:25 pm on 1/6/2023.                  Assessment:    Principal Problem:    Bowel perforation (HCC)  Active Problems:    Septic shock (HCC)    Acute hypoxemic respiratory failure (HCC)    COVID-19    Coagulopathy (HCC)    Diverticulitis of colon    Paroxysmal atrial fibrillation (HCC)    E coli bacteremia    Acute respiratory failure with hypoxia (HCC)    Hypernatremia  Resolved Problems:    * No resolved hospital problems. *       Plan:    #Perforated diverticulum.  Gram neg bacteremia    S.p emergency ex  lap with sigmoid colectomy , small bowel resection and ostomy placement   Surgery managing    On Zosyn and Diflucan - can stop   Fevers resolved   Improved sepsis and off pressors   Recovering bowel function, started TF     #Septic shock due to perforated diverticulum. Ecoli  bacteremia. On Zosyn and Diflucan. Severe hypotension , no improvement with IVF boluses  was On IV vasopressors - vaso and levophed- improved Bp  Now off pressors     #Acute respiratory failure on the ventilator. Sec to perforated viscus - remained on vent post surgery   Management per pulmonary improved . Extubated to NC 5 L  Wean as able. Lasix 40 mg daily     #Paroxysmal A. fib. With RVR, started . Off IV amiodarone. Coumadin on hold, INR reversed perioperatively with vit k , ffp  Can resume when safe     #COVID-19 detected. Incidental finding. No covid related issues    # Anemia  - multifactorial - surgery, iatrogenic, nutritional. Monitor and transfuse less than 7    # Edema - 20 L+ since adm, good UOP. Added lasix , tolerating - increase lasix     #lovenox  for DVT prophylaxis.     Start SLP and PT   Out of bed     Kenia Broussard MD 7:19 AM 1/18/2023

## 2023-01-18 NOTE — FLOWSHEET NOTE
[facility], Emergency Department    [address] [address]    Phone:  [phone]                                       Marychuy Zhou   MRN: [id number]    Department:  [facility], Emergency Department   Date of Visit:  5/14/2018           Patient Information     Date Of Birth          1956        Your diagnoses for this visit were:     Pelvic pain     Leukocytosis, unspecified type        You were seen by No Rubio MD.        Discharge Instructions       Continue doing twice a day dressing changes.   Keep wounds clean and dry.     Return to the ER if symptoms worsen.       Your next 10 appointments already scheduled     Jun 11, 2018  3:20 PM CDT   (Arrive by 3:05 PM)   New Patient Visit with Faustino Coyne MD   [facility] ([facility])    [address] [place] [id number] [phone]              24 Hour Appointment Hotline       To make an appointment at any [facility], call [phone] ([phone]). If you don't have a family doctor or clinic, we will help you find one. [place] clinics are conveniently located to serve the needs of you and your family.             Review of your medicines      Our records show that you are taking the medicines listed below. If these are incorrect, please call your family doctor or clinic.        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   Quantity:  1 tablet        Take 1 tablet (150 mg) by mouth daily for 7 days   Refills:  0        ipratropium - albuterol 0.5 mg/2.5 mg/3 mL 0.5-2.5 (3) MG/3ML neb solution   Commonly  01/18/23 0145   Vital Signs   Temp 97.8 °F (36.6 °C)   Temp Source Axillary   Heart Rate 76   Heart Rate Source Monitor   Resp 20   /74   MAP (Calculated) 90   BP Location Left lower arm   Patient Position Semi fowlers   Level of Consciousness 0   MEWS Score 1   Oxygen Therapy   SpO2 95 %   O2 Device Nasal cannula   O2 Flow Rate (L/min) 4.5 L/min   Height and Weight   Weight 208 lb 11.2 oz (94.7 kg)   Weight Method Actual;Bed scale   BMI (Calculated) 32.8     Vitals taken, shown above. Patient is stable with no current needs at this time. Bobby drain emptied. Colostomy bag emptied. Rick care completed. Call light in reach. Bed in lowest position. known as:  DUONEB   Dose:  3 mL   Quantity:  360 mL        Take 1 vial (3 mLs) by nebulization every 4 hours as needed for wheezing   Refills:  0        levETIRAcetam 250 MG tablet   Commonly known as:  KEPPRA   Dose:  250 mg        Take 1 tablet (250 mg) by mouth every evening Until 4/7/2018 then decrease to 250mg BID 4/8/2018-4/21/2018, then decrease to 250mg QAM 4/22-5/5/2018   Refills:  0        LEVOTHYROXINE SODIUM PO   Dose:  125 mcg        Take 125 mcg by mouth every morning   Refills:  0        loperamide 2 MG capsule   Commonly known as:  IMODIUM   Dose:  4 mg   Quantity:  20 capsule        Take 2 capsules (4 mg) by mouth 4 times daily   Refills:  0        miconazole 2 % Aerp powder   Commonly known as:  MICATIN        Apply topically 2 times daily Apply to groin folds   Refills:  0        mineral oil-hydrophilic petrolatum        Apply topically daily Apply to lower extremities for skin irritation.   Refills:  0        multivitamin, therapeutic with minerals Tabs tablet   Dose:  1 tablet   Quantity:  30 each        Take 1 tablet by mouth daily   Refills:  0        OMEPRAZOLE PO   Dose:  20 mg        Take 20 mg by mouth daily (with breakfast)   Refills:  0        ondansetron 4 MG ODT tab   Commonly known as:  ZOFRAN ODT   Dose:  4 mg   Quantity:  20 tablet        Take 1 tablet (4 mg) by mouth every 6 hours as needed for nausea   Refills:  1        oxyCODONE IR 5 MG tablet   Commonly known as:  ROXICODONE   Dose:  5-10 mg   Quantity:  40 tablet        Take 1-2 tablets (5-10 mg) by mouth every 4 hours as needed (Give 5mg for pain level 4-6 on a 10 point scale. Give 10mg for pain level 6-10 on a 10 point scale.)   Refills:  0        PROSTAT PO   Dose:  1 oz        Take 1 oz by mouth daily   Refills:  0        protein modular   Dose:  1 packet        1 packet by Per Feeding Tube route 3 times daily   Refills:  0        triamcinolone 0.1 % cream   Commonly known as:  KENALOG        Apply topically 3 times daily    Refills:  0        XARELTO PO   Dose:  20 mg        Take 20 mg by mouth At Bedtime   Refills:  0                Procedures and tests performed during your visit     Blood culture    CBC with platelets differential    Comprehensive metabolic panel    ISTAT gases lactate austin POCT    Lactic acid      Orders Needing Specimen Collection     Ordered          05/14/18 1753  Routine UA with microscopic - ROUTINE, Prio: Routine, Needs to be Collected     Scheduled Task Status   05/14/18 1754 Collect Routine UA with microscopic Open   Order Class:  PCU Collect                05/14/18 1853  Urine Culture - ROUTINE, Prio: Routine, Needs to be Collected     Scheduled Task Status   05/14/18 1854 Collect Urine Culture Open   Order Class:  PCU Collect                  Pending Results     Date and Time Order Name Status Description    5/14/2018 1854 Blood culture Preliminary             Pending Culture Results     Date and Time Order Name Status Description    5/14/2018 1854 Blood culture Preliminary             Pending Results Instructions     If you had any lab results that were not finalized at the time of your Discharge, you can call the ED Lab Result RN at [phone]. You will be contacted by this team for any positive Lab results or changes in treatment. The nurses are available 7 days a week from 10A to 6:30P.  You can leave a message 24 hours per day and they will return your call.        Thank you for choosing [facility]       Thank you for choosing [facility] for your care. Our goal is always to provide you with excellent care. Hearing back from our patients is one way we can continue to improve our services. Please take a few minutes to complete the written survey that you may receive in the mail after you visit with us. Thank you!        [employer] Information     [employer] lets you send messages to your doctor, view your test results, renew your prescriptions, schedule appointments and more. To sign up, go to  [email or web address] . Click on \"Log in\" on the left side of the screen, which will take you to the Welcome page. Then click on \"Sign up Now\" on the right side of the page.     You will be asked to enter the access code listed below, as well as some personal information. Please follow the directions to create your username and password.     Your access code is: [email or web address]  Expires: 2018 10:46 PM     Your access code will  in 90 days. If you need help or a new code, please call your [facility] clinic or [phone].        Care EveryWhere ID     This is your Care EveryWhere ID. This could be used by other organizations to access your [facility] medical records  [id number]        Equal Access to Services     DANIELLA HILL : Drake [place], lilliam elizabeth, miguel kay, hemant dave . So [facility] [phone].    ATENCIÓN: Si habla español, tiene a thorpe disposición servicios gratuitos de asistencia lingüística. Llame al [phone].    We comply with applicable federal civil rights laws and [place] laws. We do not discriminate on the basis of race, color, national origin, age, disability, sex, sexual orientation, or gender identity.            After Visit Summary       This is your record. Keep this with you and show to your community pharmacist(s) and doctor(s) at your next visit.                  "

## 2023-01-18 NOTE — PROGRESS NOTES
[facility] GENERAL SURGERY DAILY PROGRESS NOTE    SUBJECTIVE: Awake, more alert each day    OBJECTIVE: CURRENT VITALS:  BP (!) 137/98   Pulse 94   Temp 97.6 °F (36.4 °C) (Axillary)   Resp 20   Ht 5' 7\" (1.702 m)   Wt 208 lb 11.2 oz (94.7 kg)   SpO2 94%   BMI 32.69 kg/m²          ABD: Soft  INCISION:  C/D/I  COLOSTOMY:  Functional    LABS:    CBC:   Recent Labs     01/16/23  0406 01/18/23  0631   WBC 11.7* 11.7*   RBC 2.88* 2.74*   HGB 7.7* 7.5*   HCT 23.6* 22.7*   MCV 82.0 82.7   RDW 15.3 15.3    470*     BMP:   Recent Labs     01/16/23  0406 01/17/23  1000 01/18/23  0631    151* 143   K 3.6 3.5 3.2*   * 114* 108   CO2 30 31 30   PHOS 3.8  --   --    BUN 20 24* 24*   CREATININE 0.7 0.7 0.8     Recent Labs     01/16/23  0406   MG 1.90        ASSESSMENT:   POD 11  Judson's  /  SBR        PLAN:   TF at 50 cc/hr which is goal  Continue supportive care  Antibiotics for 14 days. Can be transitioned to PO if goes to rehab. Addendum:  0858:  Patient transferred to ICU for hypoxia. I was asked to re-evaluate her incision for drainage. She is awake and alert. Superior incision area with scant drainage. Staples removed and looks OK. Inferior incision opened after removing some staples and moderate purulent drainage evacuated. This is not surprising given fecal contamination and infection tin the abdomen at the time of surgery. Begin BID dressing changes. Continue antibiotics.          Paul Otto MD

## 2023-01-18 NOTE — PROGRESS NOTES
Inpatient Physical Therapy Daily Treatment Note    Unit: PCU  Date:  1/18/2023  Patient Name:    Tina Ledesma  Admitting diagnosis:  Diverticulitis of colon [K57.32]  Prolonged Q-T interval on ECG [R94.31]  Perforated viscus [R19.8]  Perforated diverticulum [K57.80]  Admit Date:  1/6/2023  Precautions/Restrictions:  Fall risk, Bed/chair alarm, Lines -IV, Supplemental O2 (4.5L), Rick catheter, NG tube, and Drains (MICHELLE drain), [place] (hard of hearing), Telemetry, Continuous pulse oximetry, Isolation Precautions: Droplet Plus - COVID, and colostomy    LAPAROTOMY EXPLORATORY, SIGMOID COLECTOMY, SMALL BOWEL RESECTION,OSTOMY 1/7/23     Intubated 1/7/23-1/12/23      Discharge Recommendations: SNF  DME needs for discharge: defer to facility       Therapy recommendation for EMS Transport: requires transport by cot due to requiring lift equipment for transfers    Therapy recommendations for staff:   Assist of 2 for repositioning in bed    History Of Present Illness:     Kam[place]:  'The patient is a 80 y.o. female with PMH below, presented to [place] [address] w/ lower abd pain. Pt arrived via helicopter. She is unable to give me any Hx during the brief time that I saw her priior to sedating her so a central line could be placed. She appeared to be very uncomfortable despite multiple doses of pain medicine including 100 mg She was subsequently preemptively intubated 2/2 hypoxia and increased WOB. She was found to have a perforated diverticulitis w/ small extraluminal stool at [place] [address]. She is on Coumadin and INR is ~3. She was given 10 SQ Vit K at [place] [address]. Dr. Jordis Goldberg is planning on OR as soon as we can get her INR down. She is now on levophed.   She was also found to be COVID +\"     Home Health S4 Level Recommendation: NA  AM-PAC Mobility Score   AM-PAC Inpatient Mobility without Stair Climbing Raw Score : 7    Treatment Time:  8:25 - 9:20  Treatment number: 2  Timed Code Treatment Minutes: 55 minutes  Total Treatment Minutes:  54 minutes    Cognition    A&O Person   Able to follow 1 step commands    Subjective  Patient lying supine in bed with no family present   Pt agreeable to this PT tx. Pain   No  Location:   Rating:    NA/10  Pain Medicine Status: Denies need     Bed Mobility   Supine to Sit:    Max A  and 2 persons  Sit to Supine:   Max A  and 2 persons  Rolling: Max A  and 2 persons  Scooting: Max A  and 2 persons    Transfer Training     Sit to stand:   Not Tested  Stand to sit:   Not Tested  Bed to Chair:   Not Tested with use of N/A    Gait Training gait deferred due to difficulty with transfers; pt ambulated 0 ft. Stair Training deferred, pt unsafe/not appropriate to complete stairs at this time    Therapeutic Exercise all completed bilaterally unless indicated  Ankle Pumps x 20 reps  Glut sets x 15 reps  Quad sets x 15 reps  Heel slides x 10 reps with mod A  Hip abduction: x 10 reps with min A    *Pt actively counting reps out loud during therex. Seated cervical extension x 5 reps sitting EOB. Max A x1 to maintain balance, min A x 1 to assist with exercise. Encouraged pt to perform neck ROM therex in bed throughout the day. Removed pillow behind head to improve ant neck stretching and overall posture. Balance  Sitting:  Poor; Max A   Comments: pt sat EOB ~ 8 min. Noted post and L lateral lean    Standing: Not tested; Not Tested  Comments:     Patient Education      Role of PT, POC, Discharge recommendations, safety awareness, transfer techniques, pacing activity, and calling for assist with mobility. Positioning Needs       Pt in bed, alarm set, positioned in proper neutral alignment and pressure relief provided. Call light provided and all needs within reach    Activity Tolerance   Pt completed therapy session with No adverse symptoms noted w/activity.     Supine at rest:  SpO2: 95% on 4.5L  HR: 87 bpm  BP: 113/88    Seated EOB:  SpO2: 94% on 4.5L  HR: 101 bpm  BP: 137/98    Other    Assessment :  Patient with good tolerance for tx, able to follow simple commands and participate fully in session. Improved mentation and command following. Pt tolerated sitting EOB well with no adverse reactions or complaints, however fatigued quickly and demonstrated poor trunk control. Progress bed mobility as pt is able to tolerate and eventually transfer to chair. Recommending SNF upon discharge as patient functioning well below baseline, demonstrates good rehab potential and unable to return home due to limited or no family support, burden of care beyond caregiver ability, home environment not conducive to patient recovery, and limited safety awareness. Goals (all goals ongoing unless otherwise indicated)  To be met in 3 visits:  1). Roll S-S with Max A  2.) Patient will perform AAROM BLE's     To be met in 6 visits:  1.) Tolerate chair position x 5 minutes with midline head control  2). Bed to chair:  Assess when appropriate  New Goal 1/18/23: Will tolerate sitting EOB ~ 5 min with CGA    Plan   Continue with plan of care. Signature: Price Drummond, PT, DPT, OMT-C  #[id number]      If patient discharges from this facility prior to next visit, this note will serve as the Discharge Summary.

## 2023-01-18 NOTE — PROGRESS NOTES
Shift assessment completed see flow sheet. Patient in bed alert and oriented to self. Patient on 5LO2, showing no signs of distress. Evening medications given per order through NG. Colostomy has good output, has been emptied as well as de jesus. Ng looks to be at the 62-63 line, tube is held by tape so its hard to see the exact line. No other needs at this time. Call light in reach.

## 2023-01-18 NOTE — PROGRESS NOTES
Placed pt on nrb plus 10 lpm hfnc  SpO2 93%  pt was evaluated and sent to icu   placed on vapotherm 40 lpm 100%  SpO2 94%  will monitor

## 2023-01-18 NOTE — FLOWSHEET NOTE
01/18/23 0900   Vital Signs   Temp 97.6 °F (36.4 °C)   Temp Source Axillary   Heart Rate 94   Heart Rate Source Monitor   Resp 20   BP (!) 137/98   MAP (Calculated) 111   Patient Position Sitting   Level of Consciousness 0   MEWS Score 1   Oxygen Therapy   SpO2 94 %   O2 Device Nasal cannula   O2 Flow Rate (L/min) 4.5 L/min   Pt. Resting in bed. Call light in reach. Shift assessment completed see flow sheet. Denies any needs at this time. Will continue to monitor.

## 2023-01-18 NOTE — PLAN OF CARE
Problem: Discharge Planning  Goal: Discharge to home or other facility with appropriate resources  Outcome: Progressing  Flowsheets (Taken 1/17/2023 2101 by Darvin Guerrero RN)  Discharge to home or other facility with appropriate resources: Identify barriers to discharge with patient and caregiver     Problem: Pain  Goal: Verbalizes/displays adequate comfort level or baseline comfort level  Outcome: Progressing     Problem: Safety - Medical Restraint  Goal: Remains free of injury from restraints (Restraint for Interference with Medical Device)  Description: INTERVENTIONS:  1. Determine that other, less restrictive measures have been tried or would not be effective before applying the restraint  2. Evaluate the patient's condition at the time of restraint application  3. Inform patient/family regarding the reason for restraint  4. Q2H: Monitor safety, psychosocial status, comfort, nutrition and hydration  Outcome: Progressing     Problem: Skin/Tissue Integrity  Goal: Absence of new skin breakdown  Description: 1. Monitor for areas of redness and/or skin breakdown  2. Assess vascular access sites hourly  3. Every 4-6 hours minimum:  Change oxygen saturation probe site  4. Every 4-6 hours:  If on nasal continuous positive airway pressure, respiratory therapy assess nares and determine need for appliance change or resting period.   Outcome: Progressing     Problem: Nutrition Deficit:  Goal: Optimize nutritional status  Outcome: Progressing     Problem: Neurosensory - Adult  Goal: Achieves stable or improved neurological status  Outcome: Progressing     Problem: Respiratory - Adult  Goal: Achieves optimal ventilation and oxygenation  Outcome: Progressing     Problem: Cardiovascular - Adult  Goal: Maintains optimal cardiac output and hemodynamic stability  Outcome: Progressing     Problem: Skin/Tissue Integrity - Adult  Goal: Skin integrity remains intact  Outcome: Progressing  Goal: Incisions, wounds, or drain sites healing without S/S of infection  Outcome: Progressing  Goal: Oral mucous membranes remain intact  Outcome: Progressing     Problem: Musculoskeletal - Adult  Goal: Return mobility to safest level of function  Outcome: Progressing     Problem: Gastrointestinal - Adult  Goal: Establish and maintain optimal ostomy function  Outcome: Progressing     Problem: Genitourinary - Adult  Goal: Absence of urinary retention  Outcome: Progressing  Goal: Urinary catheter remains patent  Outcome: Progressing     Problem: Infection - Adult  Goal: Absence of infection at discharge  Outcome: Progressing  Flowsheets (Taken 1/17/2023 2101 by Afua Brown RN)  Absence of infection at discharge: Assess and monitor for signs and symptoms of infection     Problem: Metabolic/Fluid and Electrolytes - Adult  Goal: Electrolytes maintained within normal limits  Outcome: Progressing  Flowsheets (Taken 1/17/2023 2101 by Afua Brown RN)  Electrolytes maintained within normal limits: Monitor labs and assess patient for signs and symptoms of electrolyte imbalances  Goal: Glucose maintained within prescribed range  Outcome: Progressing  Flowsheets (Taken 1/17/2023 2101 by Afua Brown RN)  Glucose maintained within prescribed range: Monitor blood glucose as ordered

## 2023-01-18 NOTE — PROGRESS NOTES
Hand off report given to  Jeny Thurston RN. Patient is stable showing no signs of distress and has no current needs at this time. Call light is in reach and bed is in lowest position. Care is transferred at this time.

## 2023-01-18 NOTE — PROGRESS NOTES
Please advise   Pulmonary & Critical Care Medicine ICU Progress Note    CC: Abdominal pain, perforated diverticulum, septic shock    Events of Last 24 hours:   Appears comfortable   Remains on 4.5 L O2   DobHoff with trophic tube feed   Responding to Lasix responding to diuresis    Invasive Lines:   Right IJ CVC 1/7/2023    MV: 1/7-1/12/23      Intake/Output Summary (Last 24 hours) at 1/18/2023 0754  Last data filed at 1/18/2023 0620  Gross per 24 hour   Intake 1042 ml   Output 1885 ml   Net -843 ml         Vitals:  Blood pressure 121/74, pulse 76, temperature 97.8 °F (36.6 °C), temperature source Axillary, resp. rate 20, height 5' 7\" (1.702 m), weight 208 lb 11.2 oz (94.7 kg), SpO2 95 %, not currently breastfeeding. on 4.5 L   Constitutional: + Acute distress. Eyes: PERRL. Conjunctivae anicteric. ENT: Normal nose. Normal tongue. Neck:  Trachea is midline. No thyroid tenderness. Respiratory: + Accessory muscle usage. Bilateral rhonchi.  + Decreased breath sounds. No wheezes. Few rales. No Rhonchi. Cardiovascular: Normal S1S2. No digit clubbing. No digit cyanosis.  + UE/ LE edema. GI: soft, stool in ostomy bag. + MICHELLE drain in place  Psychiatric: No anxiety or Agitation. +  following commands.     Scheduled Meds:   furosemide  40 mg IntraVENous Once    potassium chloride  40 mEq Oral Once    citalopram  10 mg Oral Daily    metoprolol tartrate  12.5 mg Oral BID    insulin lispro  0-4 Units SubCUTAneous Q4H    piperacillin-tazobactam  3,375 mg IntraVENous Q8H    sodium chloride flush  5-40 mL IntraVENous 2 times per day    pantoprazole  40 mg IntraVENous Daily    enoxaparin  40 mg SubCUTAneous Daily     PRN Meds:  HYDROmorphone, albuterol, dextrose, glucose, dextrose bolus **OR** dextrose bolus, glucagon (rDNA), prochlorperazine, sodium chloride flush, sodium chloride, acetaminophen **OR** acetaminophen, albuterol sulfate HFA    Results:  CBC:   Recent Labs     01/16/23  0406 01/18/23  0631   WBC 11.7* 11.7*   HGB 7.7* 7.5* HCT 23.6* 22.7*   MCV 82.0 82.7    470*     BMP:   Recent Labs     01/16/23  0406 01/17/23  1000 01/18/23  0631    151* 143   K 3.6 3.5 3.2*   * 114* 108   CO2 30 31 30   PHOS 3.8  --   --    BUN 20 24* 24*   CREATININE 0.7 0.7 0.8     LIVER PROFILE:   No results for input(s): AST, ALT, LIPASE, BILIDIR, BILITOT, ALKPHOS in the last 72 hours. Invalid input(s): AMYLASE,  ALB    Cultures:  1/6/2023 SARS-CoV-2 positive  1/6/2023 BCx Escherichia coli and Clostridium ramosum      Imaging:  CXR 1/15/23  images reviewed by me and showed:   Bibasilar effusions with mild congestion. Support tubes as described above        ASSESSMENT:  Acute hypoxemic respiratory failure -worsening this afternoon possible aspiration  E. coli bacteremia  Hypernatremia  Diverticulitis with perforated viscus in the sigmoid colon area, post op x-lap with sigmoid colectomy and small bowel resection and ostomy on 1/7/23  COVID-19 infection, incidental finding  Paroxysmal atrial fibrillation, now AFIB RVR    H/O esophageal stricture   Coagulopathy 2/2 warfarin- S/P 3 U FFP & vitamin K; give K-centra X 1 prior to emergent surgery    PLAN:  COVID-19 isolation, droplet plus   Supplemental oxygen to maintain SaO2 >92%; wean as tolerated    D#10 Zosyn. Completed 9 days of Diflucan -duration per general surgery  Off  IVF  Repeat BMP  DC IV D5W -sodium is corrected  TFs   Resumed home lopressor 12.5 BID   Resume home Celexa when off diflucan   SSI   Prophylaxis: Lovenox, bactroban, protonix - Coumadin at home and resume when ok with surgery     Was called to the bedside patient became hypoxic when however approximately after swallow evaluation. Requiring both high flow nasal cannula and 100% nonrebreather. Order chest x-ray reviewed by me and showed worsening left-sided airspace disease. ABG no hypercapnia. We moved to ICU. Given Lasix 20 IV x1 and placed on Vapotherm. Continue Zosyn for now.   Discussed with RT and nursing staff.  Liquid stool in her ostomy bag we will check C. difficile. Total critical care time caring for this patient with life threatening, unstable organ failure, including direct patient contact, management of life support systems, review of data including imaging and labs, discussions with other team members and physicians is 32 minutes, excluding procedures.

## 2023-01-18 NOTE — CONSULTS
Pt seen for appliance check. Current appliance intact. Pt remains alert to self. No family present. Teaching folder left at bedside.

## 2023-01-18 NOTE — PROGRESS NOTES
Pt transferred to ICU due to increase in O2 demands. Currently on vapotherm 40L 100%. Weak nonproductive cough. 20mg IVP lasix given- edematous throughout. Abdominal incision w staples, reddened and drainage noted. Colostomy has been emptied x3 times since transfer. C.Diff ordered. MICHELLE drain appears to have clot on the inside of the bulb. Dr. Jennifer Avila called regarding incision and other changes in care- will evaluate. TF off at this time per Dr. Oziel Hobson.  Electronically signed by Azra Garcia RN on 1/18/2023 at 4:55 PM

## 2023-01-18 NOTE — PROGRESS NOTES
4 Eyes Skin Assessment     The patient is being assess for   Transfer to New Unit    I agree that 2 RN's have performed a thorough Head to Toe Skin Assessment on the patient. ALL assessment sites listed below have been assessed. Areas assessed for pressure by both nurses:   [x]   Head, Face, and Ears   [x]   Shoulders, Back, and Chest, Abdomen  [x]   Arms, Elbows, and Hands   [x]   Coccyx, Sacrum, and Ischium  [x]   Legs, Feet, and Heels  Scattered bruising and abrasions. Busted blister on right thigh/hip. Midline incision with staples opent to air. RUQ MICHELLE drain with dressing. LLQ colostomy. Skin Assessed Under all Medical Devices by both nurses:  O2 device tubing and de jesus              All Mepilex Borders were peeled back and area peeked at by both nurses:  No: none  Please list where Mepilex Borders are located:  none             **SHARE this note so that the co-signing nurse is able to place an eSignature**    Co-signer eSignature: Electronically signed by Aleta Mcclellan RN on 1/18/23 at 12:48 AM EST    Does the Patient have Skin Breakdown related to pressure?   No            Sarwat Prevention initiated:  No   Wound Care Orders initiated:  No      [facility] nurse consulted for Pressure Injury (Stage 3,4, Unstageable, DTI, NWPT, Complex wounds)and New or Established Ostomies:  No      Primary Nurse eSignature: Electronically signed by Oz Fernandez RN on 1/17/23 at 7:55 PM EST

## 2023-01-18 NOTE — PROGRESS NOTES
Comprehensive Nutrition Assessment    Type and Reason for Visit:  Reassess    Nutrition Recommendations/Plan:   Increased water flush lightly with recent hypernatremia, monitor need for continued D5 infusions  Continue Jevity 1.5 at 50 ml/hr, monitor need for increased nutrition pending GI function   Monitor goals of care and long term nutrition plans     Malnutrition Assessment:  Malnutrition Status: At risk for malnutrition (Comment) (01/13/23 1405)    Context:  Acute Illness       Nutrition Assessment:    Follow up:  Currently on 4 liters nasal cannula. Transferred out of ICU last evening. NG placed on 1/14/23. Jevity 1.5 titrated up to 50 ml/hr over the past few days. Hypernatremic on 1/17, D5 at 50 ml/hr started, Na now within normal limits. Nutrition Related Findings:    Ostomy output 450 ml on 1/17/23 Wound Type: Surgical Incision (abdominal incision from surgery on 1/7/23)       Current Nutrition Intake & Therapies:    Average Meal Intake: NPO  Average Supplements Intake: NPO  Diet NPO  ADULT TUBE FEEDING; Nasogastric; Standard with Fiber; Continuous; 50; No; 100; Q 4 hours  Current Tube Feeding (TF) Orders:  Feeding Route: Nasogastric  Formula: Peptide Based  Schedule: Continuous  Feeding Regimen: Jevity 1.5 at 50 ml/hr  Water Flushes: 50 ml water flush every 6 hours  Current TF & Flush Orders Provides: TF can advance today if she is not extubated  Goal TF & Flush Orders Provides: Jevity 1.5 at 50 ml/hr provides 1500 calories, 63 grams of protein, 760 ml free water. Water flush recommend increasing to 100 ml every 4 hours. Anthropometric Measures:  Height: 5' 7\" (170.2 cm)  Ideal Body Weight (IBW): 135 lbs (61 kg)    Admission Body Weight: 165 lb (74.8 kg) (obtained on 1/9/23; actual weight)  Current Body Weight: 208 lb 11 oz (94.7 kg), 168.4 % IBW.  Weight Source: Bed Scale  Current BMI (kg/m2): 32.7  Usual Body Weight:  (unable to assess d/t lack of actual weight hx in EMR)  % Weight Change (Calculated): -2.9  Weight Adjustment For: No Adjustment                 BMI Categories: Obese Class 2 (BMI 35.0 -39.9)    Estimated Daily Nutrient Needs:  Energy Requirements Based On: Kcal/kg  Weight Used for Energy Requirements: Ideal  Energy (kcal/day): 4577-5348 (25-30 kcal/61.4 kg)  Weight Used for Protein Requirements: Ideal  Protein (g/day): 74-80 (1.2-1.3 g/61.4 kg)  Method Used for Fluid Requirements: 1 ml/kcal  Fluid (ml/day): 1045 - 1330 ml    Nutrition Diagnosis:   Inadequate oral intake related to catabolic illness, altered GI structure, altered GI function, impaired respiratory function, inadequate protein-energy intake as evidenced by NPO or clear liquid status due to medical condition, intubation, wounds, lab values, GI abnormality    Nutrition Interventions:   Food and/or Nutrient Delivery: Continue NPO  Nutrition Education/Counseling: No recommendation at this time  Coordination of Nutrition Care: Continue to monitor while inpatient, Interdisciplinary Rounds, Coordination of Care  Plan of Care discussed with: ICU Team    Goals:  Previous Goal Met: No Progress toward Goal(s)  Goals: Initiate nutrition support, by next RD assessment       Nutrition Monitoring and Evaluation:   Behavioral-Environmental Outcomes: None Identified  Food/Nutrient Intake Outcomes: Enteral Nutrition Intake/Tolerance  Physical Signs/Symptoms Outcomes: Biochemical Data, GI Status, Hemodynamic Status, Skin, Weight    Discharge Planning:     Too soon to determine     [facility] [address] [facility], [address] [place] [address],   Contact: [phone]

## 2023-01-18 NOTE — SIGNIFICANT EVENT
RAPID response was called. Patient on non rebreather, 9L HFNC. O2 saturation low 90s. Respiratory at bedside. BOGDAN Cazares at bedside. NT suction going to be performed by RT. RENE Bowman updated  and  made aware.

## 2023-01-19 ENCOUNTER — APPOINTMENT (OUTPATIENT)
Dept: VASCULAR LAB | Age: 88
DRG: 853 | End: 2023-01-19
Payer: MEDICARE

## 2023-01-19 PROBLEM — E87.8 DISORDER OF ELECTROLYTES: Status: ACTIVE | Noted: 2023-01-19

## 2023-01-19 PROBLEM — R60.0 LEG EDEMA: Status: ACTIVE | Noted: 2023-01-19

## 2023-01-19 LAB
ANION GAP SERPL CALCULATED.3IONS-SCNC: 5 MMOL/L (ref 3–16)
ANION GAP SERPL CALCULATED.3IONS-SCNC: 8 MMOL/L (ref 3–16)
ANTI-XA UNFRAC HEPARIN: 0.42 IU/ML (ref 0.3–0.7)
ANTI-XA UNFRAC HEPARIN: 0.48 IU/ML (ref 0.3–0.7)
ANTI-XA UNFRAC HEPARIN: 0.74 IU/ML (ref 0.3–0.7)
BASOPHILS ABSOLUTE: 0 K/UL (ref 0–0.2)
BASOPHILS RELATIVE PERCENT: 0.2 %
BUN BLDV-MCNC: 22 MG/DL (ref 7–20)
BUN BLDV-MCNC: 24 MG/DL (ref 7–20)
CALCIUM SERPL-MCNC: 8.2 MG/DL (ref 8.3–10.6)
CALCIUM SERPL-MCNC: 8.4 MG/DL (ref 8.3–10.6)
CHLORIDE BLD-SCNC: 106 MMOL/L (ref 99–110)
CHLORIDE BLD-SCNC: 109 MMOL/L (ref 99–110)
CO2: 31 MMOL/L (ref 21–32)
CO2: 31 MMOL/L (ref 21–32)
CREAT SERPL-MCNC: 0.7 MG/DL (ref 0.6–1.2)
CREAT SERPL-MCNC: 0.8 MG/DL (ref 0.6–1.2)
EOSINOPHILS ABSOLUTE: 0 K/UL (ref 0–0.6)
EOSINOPHILS RELATIVE PERCENT: 0.5 %
GFR SERPL CREATININE-BSD FRML MDRD: >60 ML/MIN/{1.73_M2}
GFR SERPL CREATININE-BSD FRML MDRD: >60 ML/MIN/{1.73_M2}
GLUCOSE BLD-MCNC: 105 MG/DL (ref 70–99)
GLUCOSE BLD-MCNC: 105 MG/DL (ref 70–99)
GLUCOSE BLD-MCNC: 106 MG/DL (ref 70–99)
GLUCOSE BLD-MCNC: 123 MG/DL (ref 70–99)
GLUCOSE BLD-MCNC: 145 MG/DL (ref 70–99)
GLUCOSE BLD-MCNC: 59 MG/DL (ref 70–99)
GLUCOSE BLD-MCNC: 71 MG/DL (ref 70–99)
GLUCOSE BLD-MCNC: 80 MG/DL (ref 70–99)
GLUCOSE BLD-MCNC: 93 MG/DL (ref 70–99)
HCT VFR BLD CALC: 23.2 % (ref 36–48)
HCT VFR BLD CALC: 23.4 % (ref 36–48)
HEMOGLOBIN: 7.7 G/DL (ref 12–16)
HEMOGLOBIN: 7.7 G/DL (ref 12–16)
LYMPHOCYTES ABSOLUTE: 0.2 K/UL (ref 1–5.1)
LYMPHOCYTES RELATIVE PERCENT: 2.1 %
MAGNESIUM: 2 MG/DL (ref 1.8–2.4)
MCH RBC QN AUTO: 27.4 PG (ref 26–34)
MCHC RBC AUTO-ENTMCNC: 33 G/DL (ref 31–36)
MCV RBC AUTO: 82.9 FL (ref 80–100)
MONOCYTES ABSOLUTE: 0.4 K/UL (ref 0–1.3)
MONOCYTES RELATIVE PERCENT: 3.7 %
NEUTROPHILS ABSOLUTE: 9.6 K/UL (ref 1.7–7.7)
NEUTROPHILS RELATIVE PERCENT: 93.5 %
PDW BLD-RTO: 15.1 % (ref 12.4–15.4)
PERFORMED ON: ABNORMAL
PERFORMED ON: NORMAL
PLATELET # BLD: 499 K/UL (ref 135–450)
PMV BLD AUTO: 7.4 FL (ref 5–10.5)
POTASSIUM SERPL-SCNC: 3.1 MMOL/L (ref 3.5–5.1)
POTASSIUM SERPL-SCNC: 3.1 MMOL/L (ref 3.5–5.1)
POTASSIUM SERPL-SCNC: 3.4 MMOL/L (ref 3.5–5.1)
RBC # BLD: 2.8 M/UL (ref 4–5.2)
SODIUM BLD-SCNC: 142 MMOL/L (ref 136–145)
SODIUM BLD-SCNC: 148 MMOL/L (ref 136–145)
WBC # BLD: 10.2 K/UL (ref 4–11)

## 2023-01-19 PROCEDURE — 85014 HEMATOCRIT: CPT

## 2023-01-19 PROCEDURE — 99233 SBSQ HOSP IP/OBS HIGH 50: CPT | Performed by: INTERNAL MEDICINE

## 2023-01-19 PROCEDURE — 36415 COLL VENOUS BLD VENIPUNCTURE: CPT

## 2023-01-19 PROCEDURE — 2580000003 HC RX 258: Performed by: INTERNAL MEDICINE

## 2023-01-19 PROCEDURE — 6370000000 HC RX 637 (ALT 250 FOR IP): Performed by: INTERNAL MEDICINE

## 2023-01-19 PROCEDURE — 2000000000 HC ICU R&B

## 2023-01-19 PROCEDURE — 85520 HEPARIN ASSAY: CPT

## 2023-01-19 PROCEDURE — 2580000003 HC RX 258: Performed by: SURGERY

## 2023-01-19 PROCEDURE — C9113 INJ PANTOPRAZOLE SODIUM, VIA: HCPCS | Performed by: SURGERY

## 2023-01-19 PROCEDURE — 83735 ASSAY OF MAGNESIUM: CPT

## 2023-01-19 PROCEDURE — 94761 N-INVAS EAR/PLS OXIMETRY MLT: CPT

## 2023-01-19 PROCEDURE — 6360000002 HC RX W HCPCS: Performed by: INTERNAL MEDICINE

## 2023-01-19 PROCEDURE — 99024 POSTOP FOLLOW-UP VISIT: CPT | Performed by: SURGERY

## 2023-01-19 PROCEDURE — 84132 ASSAY OF SERUM POTASSIUM: CPT

## 2023-01-19 PROCEDURE — 6360000002 HC RX W HCPCS: Performed by: SURGERY

## 2023-01-19 PROCEDURE — 2700000000 HC OXYGEN THERAPY PER DAY

## 2023-01-19 PROCEDURE — 85025 COMPLETE CBC W/AUTO DIFF WBC: CPT

## 2023-01-19 PROCEDURE — 85018 HEMOGLOBIN: CPT

## 2023-01-19 PROCEDURE — 93970 EXTREMITY STUDY: CPT

## 2023-01-19 PROCEDURE — 80048 BASIC METABOLIC PNL TOTAL CA: CPT

## 2023-01-19 RX ORDER — POTASSIUM CHLORIDE 29.8 MG/ML
20 INJECTION INTRAVENOUS PRN
Status: DISCONTINUED | OUTPATIENT
Start: 2023-01-19 | End: 2023-01-26 | Stop reason: HOSPADM

## 2023-01-19 RX ORDER — MAGNESIUM SULFATE 1 G/100ML
1000 INJECTION INTRAVENOUS PRN
Status: DISCONTINUED | OUTPATIENT
Start: 2023-01-19 | End: 2023-01-26 | Stop reason: HOSPADM

## 2023-01-19 RX ORDER — FUROSEMIDE 10 MG/ML
20 INJECTION INTRAMUSCULAR; INTRAVENOUS EVERY 12 HOURS
Status: DISCONTINUED | OUTPATIENT
Start: 2023-01-19 | End: 2023-01-20

## 2023-01-19 RX ADMIN — SODIUM CHLORIDE, PRESERVATIVE FREE 10 ML: 5 INJECTION INTRAVENOUS at 19:53

## 2023-01-19 RX ADMIN — CITALOPRAM HYDROBROMIDE 10 MG: 20 TABLET ORAL at 08:17

## 2023-01-19 RX ADMIN — POTASSIUM CHLORIDE 20 MEQ: 400 INJECTION, SOLUTION INTRAVENOUS at 04:52

## 2023-01-19 RX ADMIN — METOPROLOL TARTRATE 12.5 MG: 25 TABLET, FILM COATED ORAL at 20:03

## 2023-01-19 RX ADMIN — FUROSEMIDE 20 MG: 10 INJECTION, SOLUTION INTRAMUSCULAR; INTRAVENOUS at 21:58

## 2023-01-19 RX ADMIN — DEXTROSE MONOHYDRATE 125 ML: 10 INJECTION, SOLUTION INTRAVENOUS at 12:06

## 2023-01-19 RX ADMIN — METOPROLOL TARTRATE 12.5 MG: 25 TABLET, FILM COATED ORAL at 08:17

## 2023-01-19 RX ADMIN — FUROSEMIDE 20 MG: 10 INJECTION, SOLUTION INTRAMUSCULAR; INTRAVENOUS at 10:29

## 2023-01-19 RX ADMIN — SODIUM CHLORIDE, PRESERVATIVE FREE 10 ML: 5 INJECTION INTRAVENOUS at 08:19

## 2023-01-19 RX ADMIN — PIPERACILLIN SODIUM,TAZOBACTAM SODIUM 3375 MG: 3; .375 INJECTION, POWDER, FOR SOLUTION INTRAVENOUS at 22:13

## 2023-01-19 RX ADMIN — POTASSIUM CHLORIDE 20 MEQ: 400 INJECTION, SOLUTION INTRAVENOUS at 23:16

## 2023-01-19 RX ADMIN — POTASSIUM CHLORIDE 20 MEQ: 400 INJECTION, SOLUTION INTRAVENOUS at 05:54

## 2023-01-19 RX ADMIN — PANTOPRAZOLE SODIUM 40 MG: 40 INJECTION, POWDER, FOR SOLUTION INTRAVENOUS at 08:19

## 2023-01-19 RX ADMIN — HEPARIN SODIUM 1280 UNITS/HR: 10000 INJECTION, SOLUTION INTRAVENOUS at 16:22

## 2023-01-19 RX ADMIN — PIPERACILLIN SODIUM,TAZOBACTAM SODIUM 3375 MG: 3; .375 INJECTION, POWDER, FOR SOLUTION INTRAVENOUS at 06:56

## 2023-01-19 RX ADMIN — PIPERACILLIN SODIUM,TAZOBACTAM SODIUM 3375 MG: 3; .375 INJECTION, POWDER, FOR SOLUTION INTRAVENOUS at 15:24

## 2023-01-19 ASSESSMENT — PAIN SCALES - GENERAL: PAINLEVEL_OUTOF10: 5

## 2023-01-19 NOTE — PROGRESS NOTES
Pharmacy - Heparin  Anti-Xa level drawn at 1715 today = 0.42 IU/ml (Goal anti-Xa 0.3-0.7 IU/mL). Current heparin drip rate = 1280 units/hr. Per protocol, continue heparin drip rate at current rate = 1280 units/hr. Draw Anti-Xa level in the am with am labs since there have been two consecutive therapeutic levels. Will adjust to goal anti-Xa 0.3-0.7 IU/mL. Pharmacy will continue to monitor and adjust as necessary.

## 2023-01-19 NOTE — CONSULTS
Current appliance intact. Change yesterday after transfer to ICU. Staff believes pt tugging at appliance. Supplies in room. Will follow. No family in room at this time.

## 2023-01-19 NOTE — PROGRESS NOTES
Viral Gastroenteritis (Adult)    Gastroenteritis is commonly called the stomach flu. It is most often caused by a virus that affects the stomach and intestinal tract and usually lasts from 2 to 7 days. Common viruses causing gastroenteritis include norovirus, rotavirus, and hepatitis A. Non-viral causes of gastroenteritis include bacteria, parasites, and toxins.  The danger from repeated vomiting or diarrhea is dehydration. This is the loss of too much fluid from the body. When this occurs, body fluids must be replaced. Antibiotics do not help with this illness because it is usually viral.Simple home treatment will be helpful.  Symptoms of viral gastroenteritis may include:  · Watery, loose stools  · Stomach pain or abdominal cramps  · Fever and chills  · Nausea and vomiting  · Loss of bowel control  · Headache  Home care  Gastroenteritis is transmitted by contact with the stool or vomit of an infected person. This can occur from person to person or from contact with a contaminated surface.  Follow these guidelines when caring for yourself at home:  · If symptoms are severe, rest at home for the next 24 hours or until you are feeling better.  · Wash your hands with soap and water or use alcohol-based  to prevent the spread of infection. Wash your hands after touching anyone who is sick.  · Wash your hands or use alcohol-based  after using the toilet and before meals. Clean the toilet after each use.  Remember these tips when preparing food:  · People with diarrhea should not prepare or serve food to others. When preparing foods, wash your hands before and after.  · Wash your hands after using cutting boards, countertops, knives, or utensils that have been in contact with raw food.  · Keep uncooked meats away from cooked and ready-to-eat foods.  Medicine  You may use acetaminophen or NSAID medicines like ibuprofen or naproxen to control fever unless another medicine was given. If you have chronic  Pulmonary & Critical Care Medicine ICU Progress Note    CC: Abdominal pain, perforated diverticulum, septic shock    Events of Last 24 hours:   Hypoxemic event post swallow evaluation and probable aspiration moved to ICU requiring Vapotherm  Vapotherm 40 LPM 75% FiO2   Heparin drip     Invasive Lines:   Right IJ CVC 1/7/2023    MV: 1/7-1/12/23      Intake/Output Summary (Last 24 hours) at 1/19/2023 0739  Last data filed at 1/19/2023 0170  Gross per 24 hour   Intake 2912.63 ml   Output 3455 ml   Net -542.37 ml         Vitals:  Blood pressure 102/83, pulse 86, temperature 99.1 °F (37.3 °C), temperature source Bladder, resp. rate 18, height 5' 7\" (1.702 m), weight 173 lb (78.5 kg), SpO2 97 %, not currently breastfeeding. Vapotherm    Constitutional: + Acute distress. Eyes: PERRL. Conjunctivae anicteric. ENT: Normal nose. Normal tongue. Neck:  Trachea is midline. No thyroid tenderness. Respiratory: + Accessory muscle usage. Bilateral rhonchi.  + Decreased breath sounds. No wheezes. Few rales. No Rhonchi. Cardiovascular: Normal S1S2. No digit clubbing. No digit cyanosis. 1-2 + LE edema   GI: soft, stool in ostomy bag. + MICHELLE drain in place  Psychiatric: No anxiety or Agitation. +  following commands.     Scheduled Meds:   citalopram  10 mg Oral Daily    metoprolol tartrate  12.5 mg Oral BID    insulin lispro  0-4 Units SubCUTAneous Q4H    piperacillin-tazobactam  3,375 mg IntraVENous Q8H    sodium chloride flush  5-40 mL IntraVENous 2 times per day    pantoprazole  40 mg IntraVENous Daily     PRN Meds:  potassium chloride, magnesium sulfate, heparin (porcine), heparin (porcine), HYDROmorphone, albuterol, dextrose, glucose, dextrose bolus **OR** dextrose bolus, glucagon (rDNA), prochlorperazine, sodium chloride flush, sodium chloride, acetaminophen **OR** acetaminophen, albuterol sulfate HFA    Results:  CBC:   Recent Labs     01/18/23  0631 01/18/23  2034 01/19/23  0300   WBC 11.7* 10.7 10.2   HGB 7.5* 7.7* 7.7* HCT 22.7* 23.5* 23.2*   MCV 82.7 82.5 82.9   * 490* 499*     BMP:   Recent Labs     01/17/23  1000 01/18/23  0631 01/19/23  0300   * 143 148*   K 3.5 3.2* 3.1*   * 108 109   CO2 31 30 31   BUN 24* 24* 22*   CREATININE 0.7 0.8 0.8     LIVER PROFILE:   No results for input(s): AST, ALT, LIPASE, BILIDIR, BILITOT, ALKPHOS in the last 72 hours. Invalid input(s): AMYLASE,  ALB    Cultures:  1/6/2023 SARS-CoV-2 positive  1/6/2023 BCx Escherichia coli and Clostridium ramosum        Imaging:  CXR 1/18/23  images reviewed by me and showed:   New left upper lobe infiltrates with effusion      ASSESSMENT:  Acute hypoxemic respiratory failure -worsening  Aspiration pneumonia/pneumonitis  E. coli bacteremia  Hypernatremia  Electrolyte disorder  LE edema  Diverticulitis with perforated viscus in the sigmoid colon area, post op x-lap with sigmoid colectomy and small bowel resection and ostomy on 1/7/23  COVID-19 infection, incidental finding  Paroxysmal atrial fibrillation, now AFIB RVR    H/O esophageal stricture   Coagulopathy 2/2 warfarin- S/P 3 U FFP & vitamin K; give K-centra X 1 prior to emergent surgery    PLAN:  COVID-19 isolation, droplet plus   Vapotherm for life-threatening acute hypoxemic respiratory failure and titrate to maintain SaO2 >92%  D#11 Zosyn.   Completed 9 days of Diflucan -duration per general surgery  Lasix 20 mg IV Q12 hrs   Electrolytes replacement  TFs - resume and increase free water  Monitor H&H and transfuse for Hb < 7   Resumed home lopressor 12.5 BID   Resume home Celexa   LE doppler rule out DVT   PT OT   Blood sugar control ISS, with goal 150-180  Heparin drip- on Coumadin at home         Total critical care time caring for this patient with life threatening, unstable organ failure, including direct patient contact, management of life support systems, review of data including imaging and labs, discussions with other team members and physicians is 31 minutes, excluding 1376-2885 [employer]. [address] [place] [address], [place] [address]. All rights reserved. This information is not intended as a substitute for professional medical care. Always follow your healthcare professional's instructions.         procedures.

## 2023-01-19 NOTE — PROGRESS NOTES
Pharmacy - RE:  High-dose Heparin drip  Current rate = 12.8 ml/h  (1280 units/h)  Anti-Xa drawn @ 10:30 = 0.46 IU   Goal anti-Xa = 0.3 - 0.7  Per protocol, continue with heparin drip at 1280 Units/h (12.8 ml/h). Obtain another anti-Xa 1/19 @ 1700.   Loren Ramos Pharm D 1/19/20231:11 PM  .

## 2023-01-19 NOTE — PROGRESS NOTES
Call sent out to general surgery to approve the start of a heparin drip per Dr. Nathalie England. CT chest can be dc if ok to start on heparin drip. 1920: Ok to initiate heparin per  [facility].

## 2023-01-19 NOTE — PROGRESS NOTES
Pharmacy - RE:  High-dose Heparin drip  Current rate = 13.5 ml/h  (1350 units/h)  Anti-Xa drawn @ 0300 = 0.74 IU   Goal anti-Xa = 0.3 - 0.7  Per protocol, decrease the heparin drip rate to 1280 Units/h (12.8 ml/h). Obtain another anti-Xa 1/19 @ 1000.

## 2023-01-19 NOTE — PROGRESS NOTES
Care rounds completed with Dr. Harika Guerrero and multidisciplinary team. Reviewed labs, meds, VS, assessment, & plan of care for today.  See dictated note and new orders for details.     -Lasix 20 mg  -Resume TF, switch to free water flush 60 ml Q4  -Doppler to rule out DVT  -Monitor H&H

## 2023-01-19 NOTE — PLAN OF CARE
Pt resting in bed, A&O to person and place, denies pain. Pt repeatedly asking for water and applesauce. Pt informed of doctor orders to be NPO. Pt on Vapotherm 100%/40L with SpO2 96%, VSS. Pt has moist NPC, oropharyngeal suctioning done with scant clear phlegm obtained. IV heparin drip started, TF on hold, de jesus patent. Abdomen incision LOW, staples intact, small amount serous drainage from each end of incision. Colostomy intact, drsg C/D/I, no output in pouch. Pt able to move all extremities. Reviewed with pt plan of care for shift and medications, all questions answered, pt voices no further concerns. Problem: Discharge Planning  Goal: Discharge to home or other facility with appropriate resources  1/18/2023 2151 by Richelle Gaitan, RN  Outcome: Progressing  Flowsheets (Taken 1/18/2023 2000)  Discharge to home or other facility with appropriate resources:   Identify barriers to discharge with patient and caregiver   Arrange for needed discharge resources and transportation as appropriate  1/18/2023 0912 by Jigna Cueto, RN  Outcome: Darrick Gresham (Taken 1/17/2023 2101 by Nicole Torres RN)  Discharge to home or other facility with appropriate resources: Identify barriers to discharge with patient and caregiver     Problem: Pain  Goal: Verbalizes/displays adequate comfort level or baseline comfort level  1/18/2023 2151 by Richelle Gaitan RN  Outcome: Progressing  Flowsheets (Taken 1/18/2023 2000)  Verbalizes/displays adequate comfort level or baseline comfort level:   Encourage patient to monitor pain and request assistance   Assess pain using appropriate pain scale   Administer analgesics based on type and severity of pain and evaluate response   Implement non-pharmacological measures as appropriate and evaluate response  1/18/2023 0912 by Jigna Cueto, RN  Outcome: Progressing     Problem: Skin/Tissue Integrity  Goal: Absence of new skin breakdown  Description: 1. Monitor for areas of redness and/or skin breakdown  2. Assess vascular access sites hourly  3. Every 4-6 hours minimum:  Change oxygen saturation probe site  4. Every 4-6 hours:  If on nasal continuous positive airway pressure, respiratory therapy assess nares and determine need for appliance change or resting period.   1/18/2023 2151 by Srinivasan Maynard RN  Outcome: Progressing  1/18/2023 0912 by Yazan Brand RN  Outcome: Progressing     Problem: Nutrition Deficit:  Goal: Optimize nutritional status  1/18/2023 2151 by Srinivasan Maynard RN  Outcome: Progressing  Flowsheets (Taken 1/18/2023 0944 by Yari Mojica, RD, LD)  Nutrient intake appropriate for improving, restoring, or maintaining nutritional needs: Recommend, monitor, and adjust tube feedings and TPN/PPN based on assessed needs  1/18/2023 0912 by Yazan Brand RN  Outcome: Progressing     Problem: Neurosensory - Adult  Goal: Achieves stable or improved neurological status  1/18/2023 2151 by Srinivasan Maynard RN  Outcome: Progressing  Flowsheets (Taken 1/18/2023 2000)  Achieves stable or improved neurological status: Assess for and report changes in neurological status  1/18/2023 0912 by Yazan Brand RN  Outcome: Progressing     Problem: Respiratory - Adult  Goal: Achieves optimal ventilation and oxygenation  1/18/2023 2151 by Srinivasan Maynard RN  Outcome: Progressing  Flowsheets (Taken 1/18/2023 2000)  Achieves optimal ventilation and oxygenation:   Assess for changes in respiratory status   Assess for changes in mentation and behavior   Position to facilitate oxygenation and minimize respiratory effort   Oxygen supplementation based on oxygen saturation or arterial blood gases   Assess the need for suctioning and aspirate as needed   Respiratory therapy support as indicated  1/18/2023 0912 by Yazan Brand RN  Outcome: Progressing     Problem: Cardiovascular - Adult  Goal: Maintains optimal cardiac output and hemodynamic stability  1/18/2023 2151 by Philomena Andrade RN  Outcome: Progressing  Flowsheets (Taken 1/18/2023 2000)  Maintains optimal cardiac output and hemodynamic stability:   Monitor blood pressure and heart rate   Monitor urine output and notify Licensed Independent Practitioner for values outside of normal range   Assess for signs of decreased cardiac output  1/18/2023 0912 by Sandeep Black RN  Outcome: Progressing     Problem: Skin/Tissue Integrity - Adult  Goal: Skin integrity remains intact  1/18/2023 2151 by Philomena Andrade RN  Outcome: Progressing  Flowsheets  Taken 1/18/2023 2000 by Philomena Andrade RN  Skin Integrity Remains Intact: Monitor for areas of redness and/or skin breakdown  Taken 1/18/2023 0913 by Sandeep Black RN  Skin Integrity Remains Intact: Monitor for areas of redness and/or skin breakdown  1/18/2023 0912 by Sandeep Black RN  Outcome: Progressing  Goal: Incisions, wounds, or drain sites healing without S/S of infection  1/18/2023 2151 by Philomena Andrade RN  Outcome: Progressing  Flowsheets  Taken 1/18/2023 2000 by Philomena Andrade RN  Incisions, Wounds, or Drain Sites Healing Without Sign and Symptoms of Infection: Implement wound care per orders  Taken 1/18/2023 0913 by Sandeep Black RN  Incisions, Wounds, or Drain Sites Healing Without Sign and Symptoms of Infection: ADMISSION and DAILY: Assess and document risk factors for pressure ulcer development  1/18/2023 0912 by Sandeep Black RN  Outcome: Progressing  Goal: Oral mucous membranes remain intact  1/18/2023 2151 by Philomena Andrade RN  Outcome: Progressing  Flowsheets (Taken 1/18/2023 2000)  Oral Mucous Membranes Remain Intact: Assess oral mucosa and hygiene practices  1/18/2023 0912 by Sandeep Black RN  Outcome: Progressing     Problem: Musculoskeletal - Adult  Goal: Return mobility to safest level of function  1/18/2023 2151 by Chuy Rust RN  Outcome: Progressing  Flowsheets (Taken 1/18/2023 2000)  Return Mobility to Safest Level of Function:   Assess patient stability and activity tolerance for standing, transferring and ambulating with or without assistive devices   Assist with transfers and ambulation using safe patient handling equipment as needed   Ensure adequate protection for wounds/incisions during mobilization  1/18/2023 0912 by Leila Mars RN  Outcome: Progressing     Problem: Gastrointestinal - Adult  Goal: Establish and maintain optimal ostomy function  1/18/2023 2151 by Chuy Rust RN  Outcome: Progressing  [address] (Taken 1/18/2023 2000)  Establish and maintain optimal ostomy function: Monitor output from ostomies  1/18/2023 0912 by Leila Mars RN  Outcome: Progressing     Problem: Genitourinary - Adult  Goal: Absence of urinary retention  1/18/2023 2151 by Chuy Rust RN  Outcome: Progressing  [address] (Taken 1/18/2023 2000)  Absence of urinary retention:   Assess patients ability to void and empty bladder   Monitor intake/output and perform bladder scan as needed   Place urinary catheter per Licensed Independent Practitioner order if needed  1/18/2023 0912 by Leila Mars RN  Outcome: Progressing  Goal: Urinary catheter remains patent  1/18/2023 2151 by Chuy Rust RN  Outcome: Progressing  Flowsheets (Taken 1/18/2023 2000)  Urinary catheter remains patent: Assess patency of urinary catheter  1/18/2023 0912 by Leila Mars RN  Outcome: Progressing     Problem: Infection - Adult  Goal: Absence of infection at discharge  1/18/2023 2151 by Chuy Rust RN  Outcome: Progressing  [address] (Taken 1/18/2023 2000)  Absence of infection at discharge:   Assess and monitor for signs and symptoms of infection   Monitor lab/diagnostic results   Monitor all insertion sites i.e., indwelling lines, tubes and drains  1/18/2023 0912 by Benjamin Stoddard RN  Outcome: Progressing  Flowsheets (Taken 1/17/2023 2101 by Karlos Castellanos RN)  Absence of infection at discharge: Assess and monitor for signs and symptoms of infection     Problem: Metabolic/Fluid and Electrolytes - Adult  Goal: Electrolytes maintained within normal limits  1/18/2023 2151 by Bonnee Angelucci, RN  Outcome: Progressing  Flowsheets (Taken 1/18/2023 2000)  Electrolytes maintained within normal limits:   Monitor labs and assess patient for signs and symptoms of electrolyte imbalances   Administer electrolyte replacement as ordered   Monitor response to electrolyte replacements, including repeat lab results as appropriate  1/18/2023 0912 by Benjaimn Stoddard RN  Outcome: Progressing  Flowsheets (Taken 1/17/2023 2101 by Karlos Castellanos RN)  Electrolytes maintained within normal limits: Monitor labs and assess patient for signs and symptoms of electrolyte imbalances  Goal: Glucose maintained within prescribed range  1/18/2023 2151 by Bonnee Angelucci, RN  Outcome: Progressing  Flowsheets (Taken 1/18/2023 2000)  Glucose maintained within prescribed range:   Monitor blood glucose as ordered   Assess for signs and symptoms of hyperglycemia and hypoglycemia   Administer ordered medications to maintain glucose within target range  1/18/2023 0912 by Benjamin Stoddard RN  Outcome: Progressing  Flowsheets (Taken 1/17/2023 2101 by Karlos Castellanos RN)  Glucose maintained within prescribed range: Monitor blood glucose as ordered     Problem: Hematologic - Adult  Goal: Maintains hematologic stability  1/18/2023 2151 by Bonnee Angelucci, RN  Outcome: Progressing  Flowsheets (Taken 1/18/2023 2000)  Maintains hematologic stability:   Assess for signs and symptoms of bleeding or hemorrhage   Monitor labs for bleeding or clotting disorders   Administer blood products/factors as ordered  1/18/2023 0912 by Benjamin Stoddard RN  Outcome: Progressing  Flowsheets (Taken 1/17/2023 2101 by Anabel Noble, RENE)  Maintains hematologic stability: Assess for signs and symptoms of bleeding or hemorrhage     Problem: Safety - Medical Restraint  Goal: Remains free of injury from restraints (Restraint for Interference with Medical Device)  Description: INTERVENTIONS:  1. Determine that other, less restrictive measures have been tried or would not be effective before applying the restraint  2. Evaluate the patient's condition at the time of restraint application  3. Inform patient/family regarding the reason for restraint  4.  Q2H: Monitor safety, psychosocial status, comfort, nutrition and hydration  1/18/2023 2151 by Kana Land, RN  Outcome: Completed  1/18/2023 0912 by Mei Page RN  Outcome: Progressing

## 2023-01-19 NOTE — PROGRESS NOTES
Pt continually pulling on abdominal dressing and ostomy, throwing blankets and pillows out of bed. Lines and tubes assessed and repositioned. No evidence of understanding from patient. Will continue to monitor and assess.

## 2023-01-19 NOTE — PROGRESS NOTES
[facility] GENERAL SURGERY    Surgery Progress Note           POD #  12    PATIENT NAME: Serenity Cesar     TODAY'S DATE: 1/19/2023    INTERVAL HISTORY:    Pt had an episode of hypoxia last night after swallow study requiring return to the ICU. Her ostomy is functional.     OBJECTIVE:   VITALS:  /78   Pulse 93   Temp 99.4 °F (37.4 °C) (Oral)   Resp 20   Ht 5' 7\" (1.702 m)   Wt 173 lb (78.5 kg)   SpO2 97%   BMI 27.10 kg/m²     INTAKE/OUTPUT:    I/O last 3 completed shifts: In: 3477.6 [I.V.:1477.4; NG/GT:1045; IV Piggyback:955.3]  Out: 2320 [Urine:2660; Drains:55; CFCOZ:1335]  I/O this shift: In: 0   Out: 100 [Urine:100]              CONSTITUTIONAL:  awake and alert  ABDOMEN:   normal bowel sounds, soft, non-distended, ostomy functional  INCISION: Clean with minimal drainage from open areas    Data:  CBC:   Recent Labs     01/18/23  0631 01/18/23  2034 01/19/23  0300   WBC 11.7* 10.7 10.2   HGB 7.5* 7.7* 7.7*   HCT 22.7* 23.5* 23.2*   * 490* 499*     BMP:    Recent Labs     01/17/23  1000 01/18/23  0631 01/19/23  0300   * 143 148*   K 3.5 3.2* 3.1*   * 108 109   CO2 31 30 31   BUN 24* 24* 22*   CREATININE 0.7 0.8 0.8   GLUCOSE 137* 154* 105*     Hepatic: No results for input(s): AST, ALT, ALB, BILITOT, ALKPHOS in the last 72 hours. Mag:      Recent Labs     01/19/23  0300   MG 2.00      Phos:   No results for input(s): PHOS in the last 72 hours. INR: No results for input(s): INR in the last 72 hours. ASSESSMENT AND PLAN:  80 y.o. female status post Mann's and small bowel resection. Continue antibiotics, tube feeds, and supportive care.         Electronically signed by Wendy Perkins MD

## 2023-01-19 NOTE — PROGRESS NOTES
Speech Language Pathology    SLP attempted dysphagia f/u, reviewed pt's chart, spoke with RN. Pt not appropriate for f/u at this time, continues on Vapotherm (40L/min, 55% FiO2). Concern for possible aspiration after dysphagia f/u yesterday with pt requiring transfer to ICU. ST to continue to follow.       Kenia Bowser M.S. [place]  Speech-language pathologist  [phone]

## 2023-01-19 NOTE — CONSULTS
Pharmacy to Manage Heparin Infusion per Hospital Policy    Dx: PE  Pt wt = 74.5 kg (adjusted BW)  Baseline aPTT = ordered    Oral factor Xa-inhibitors may alter and elevate anti-Xa levels used for unfractionated heparin monitoring. As a result, anti-Xa monitoring is not accurate while Xa-inhibitor activity is detectable. Utilize aPTT monitoring when patient received an oral factor Xa-inhibitor (apixaban, betrixaban, edoxaban or rivaroxaban) within 72 hours prior to admission (please document last administration time). The goal is to allow a washout of oral factor Xa-inhibitors by using aPTT for 72 hours, then change to ant-Xa levels for UFH. Heparin (weight-based) Infusion: VTE/DVT/PE  Heparin 6000 unit IVP bolus followed by Heparin infusion at 1350 units/hr   Recheck anti-Xa in 6 hours.   Goal anti-Xa 0.3-0.7 IU/mL

## 2023-01-19 NOTE — PROGRESS NOTES
01/18/23 4855   Oxygen Therapy/Pulse Ox   O2 Therapy Oxygen humidified   O2 Device Heated high flow cannula   O2 Flow Rate (L/min) 40 L/min   FiO2  (S)  90 %   Heart Rate 85   Resp 22

## 2023-01-19 NOTE — CARE COORDINATION
INTERDISCIPLINARY PLAN OF CARE CONFERENCE    Date/Time: 1/19/2023 9:49 AM  Completed by: Mirza Gregory RN, Case Management      Patient Name:  Santy Flynn  YOB: 1932  Admitting Diagnosis: Diverticulitis of colon [K57.32]  Prolonged Q-T interval on ECG [R94.31]  Perforated viscus [R19.8]  Perforated diverticulum [K57.80]     Admit Date/Time:  1/6/2023  8:46 PM    Chart reviewed. Interdisciplinary team contacted or reviewed plan related to patient progress and discharge plans. Disciplines included Case Management, Nursing, and Dietitian. Current Status:inpt  PT/OT recommendation for discharge plan of care: SNF    Expected D/C Disposition:  Skilled nursing facility    Discharge Plan Comments: Reviewed chart. Pt in ICU on Vapotherm,C-19+. Awaiting call back from [facility] to inquire about C-19+ window for acceptance. Per Carlyle Flores with [facility] per daughter, isolation window is ten days and pt tested positive on 01/06. Pt has a dobhoff. Per Jayne with [facility] 10 day isolation window as well. Pt needs pre-cert. Following.     Home O2 in place on admit: No

## 2023-01-19 NOTE — PROGRESS NOTES
AM assessment complete, see flowsheet. Medications given per MAR. Pt alert to self and place only, repeatedly asking for food and water. This RN discussed the need to remain NPO, no evidence of learning. Vapotherm decreased to 40L and 75%, tolerating well. Abdominal incision clean and intact, gauze dressing changed per order. NG remains clamped, no residual. VSS, afib on the monitor. No other needs noted, awaiting MD rounds.

## 2023-01-19 NOTE — PROGRESS NOTES
Internal Medicine ICU Progress Note      Events of Last 24 hours:-    perforated diverticular disease. . S.p emergency ex lap with sigmoid colectomy , small bowel resection and ostomy placement     Off vent support, off pressors    Patient was transferred back to the ICU yesterday for worsening oxygenation. Likely secondary to aspiration  Placed on Vapotherm. Currently on 75% FiO2 and 40 L/min    Invasive Lines: right IJ CVC    MV: Intubated on 2023, extubated     Recent Labs     23  1535   PHART 7.526*   VPM4PRK 36.9   PO2ART 54.4*         MV Settings:  Vent Mode: AC/VC Resp Rate (Set): 16 bmp/Vt (Set, mL): 370 mL/ /FiO2 : 90 %    IV:   heparin (PORCINE) Infusion 1,280 Units/hr (23 0432)    dextrose Stopped (23 1048)    sodium chloride Stopped (23 1401)       Vitals:  Temp  Av.4 °F (36.9 °C)  Min: 97.6 °F (36.4 °C)  Max: 99.3 °F (37.4 °C)  Pulse  Av.1  Min: 81  Max: 96  BP  Min: 98/59  Max: 137/98  SpO2  Av.5 %  Min: 90 %  Max: 100 %  FiO2   Av.5 %  Min: 90 %  Max: 100 %  Patient Vitals for the past 4 hrs:   BP Temp Temp src Pulse Resp SpO2 Weight   23 0700 102/83 -- -- 86 18 97 % --   23 0600 105/81 -- -- 88 16 98 % --   23 0500 123/79 -- -- 83 16 98 % --   23 0400 112/73 99.1 °F (37.3 °C) Bladder 84 21 96 % 173 lb (78.5 kg)         CVP: CVP (Mean): 217 mmHg      Intake/Output Summary (Last 24 hours) at 2023 0753  Last data filed at 2023 6396  Gross per 24 hour   Intake 2912.63 ml   Output 3455 ml   Net -542.37 ml         EXAM:    General: elderly female   ,   remains  fatigued  but slowly improving   mild distress. Eyes: PERRL. No sclera icterus. No conjunctiva injected. ENT: No discharge. NG in place  Neck: Trachea midline. Normal thyroid. Resp: No accessory muscle use. No crackles. No wheezing. No rhonchi. No dullness on percussion. CV: Regular rate. Regular rhythm. No mumur or rub. 2+ edema. No JVD.  Palpable pedal pulses. GI: Non-tender. Mid abd surgical dressing and right LQ Bobby drain in place  left  sided ostomy in place with liquid stool   Non-distended. No masses. No organmegaly. sluggish bowel sounds. No hernia. Ext - 2+ edema to all ext  Neuro - improving mentation moving ext , able  to follow commands    Medications:  Scheduled Meds:   citalopram  10 mg Oral Daily    metoprolol tartrate  12.5 mg Oral BID    insulin lispro  0-4 Units SubCUTAneous Q4H    piperacillin-tazobactam  3,375 mg IntraVENous Q8H    sodium chloride flush  5-40 mL IntraVENous 2 times per day    pantoprazole  40 mg IntraVENous Daily       PRN Meds:  potassium chloride, magnesium sulfate, heparin (porcine), heparin (porcine), HYDROmorphone, albuterol, dextrose, glucose, dextrose bolus **OR** dextrose bolus, glucagon (rDNA), prochlorperazine, sodium chloride flush, sodium chloride, acetaminophen **OR** acetaminophen, albuterol sulfate HFA    Results:  CBC:   Recent Labs     01/18/23  0631 01/18/23 2034 01/19/23  0300   WBC 11.7* 10.7 10.2   HGB 7.5* 7.7* 7.7*   HCT 22.7* 23.5* 23.2*   MCV 82.7 82.5 82.9   * 490* 499*       BMP:   Recent Labs     01/17/23  1000 01/18/23  0631 01/19/23  0300   * 143 148*   K 3.5 3.2* 3.1*   * 108 109   CO2 31 30 31   BUN 24* 24* 22*   CREATININE 0.7 0.8 0.8       LIVER PROFILE:   No results for input(s): AST, ALT, LIPASE, BILIDIR, BILITOT, ALKPHOS in the last 72 hours. Invalid input(s): AMYLASE,  ALB    PT/INR:   No results for input(s): PROTIME, INR in the last 72 hours. APTT:   Recent Labs     01/18/23 2034   APTT 29.5       UA:  No results for input(s): NITRITE, COLORU, PHUR, LABCAST, WBCUA, RBCUA, MUCUS, TRICHOMONAS, YEAST, BACTERIA, CLARITYU, SPECGRAV, LEUKOCYTESUR, UROBILINOGEN, BILIRUBINUR, BLOODU, GLUCOSEU, AMORPHOUS in the last 72 hours. Invalid input(s): Nataliia Parnell      Cultures:  [facility]  detected  Blood Culture positive for E.  Coli and clostridium       Films:    XR CHEST PORTABLE   Final Result   New infiltrates seen within the left upper lung, compatible with pneumonia   versus aspiration. Increasing pleural effusions. XR ABDOMEN FOR NG/OG/NE TUBE PLACEMENT   Final Result   Tip of the feeding tube is in the expected position, projecting over the   gastric body. Continued pleuroparenchymal disease in the lower lungs bilaterally. XR CHEST PORTABLE   Final Result   Bibasilar effusions with mild congestion. Support tubes as described above. XR CHEST PORTABLE   Final Result   Small bilateral pleural effusions and bibasilar atelectasis or airspace   disease, increased on the left as compared to prior. XR CHEST PORTABLE   Final Result   No significant interval change in right greater than left pleural effusions   and bibasilar atelectasis or airspace disease as compared to prior. Tip of endotracheal tube is approximately 1.2 cm proximal to the kori and   could be retracted approximately 2 cm. XR CHEST PORTABLE   Final Result   Bilateral pleural effusions and adjacent lung consolidation, similar to prior         XR CHEST PORTABLE   Final Result   1. Stable lines, tubes and support devices. 2. Stable basilar opacities with pleural effusions. XR CHEST PORTABLE   Final Result   Increased pleural-parenchymal disease bilaterally         XR CHEST PORTABLE   Final Result   1. Endotracheal tube terminates in appropriate position above the kori. 2.  The enteric tube courses off the field of view in the upper abdomen. 3.  Right internal jugular line terminates at the level of the mid SVC. CT ABDOMEN PELVIS W IV CONTRAST Additional Contrast? None   Final Result   1. Small volume pneumoperitoneum and extraluminal fecal material appears to   arise from the sigmoid colon, presumably as a complication of diverticulitis. 2.  Small volume abdominopelvic ascites. No loculated fluid collection   identified. Findings were discussed with Sangita Finn at 10:25 pm on 1/6/2023. Assessment:    Principal Problem: Bowel perforation ([address]  Active Problems:    Septic shock (HCC)    Acute hypoxemic respiratory failure (HCC)    COVID-19    Coagulopathy (HCC)    Diverticulitis of colon    Paroxysmal atrial fibrillation (HCC)    E coli bacteremia    Acute respiratory failure with hypoxia (HCC)    Hypernatremia    Aspiration into airway  Resolved Problems:    * No resolved hospital problems. *       Plan:    #Perforated diverticulum. Gram neg bacteremia    S.p emergency ex lap with sigmoid colectomy , small bowel resection and ostomy placement   Surgery managing    On Zosyn   Fevers resolved   Improved sepsis and off pressors   Recovering bowel function, started TF     #Septic shock due to perforated diverticulum. Ecoli  bacteremia. On Zosyn and Diflucan. Severe hypotension , no improvement with IVF boluses  was On IV vasopressors - vaso and levophed- improved Bp  Now off pressors     #Acute respiratory failure on the ventilator. Sec to perforated viscus - remained on vent post surgery   Management per pulmonary improved . Extubated to NC 5 L  Wean as able. Lasix 40 mg daily   Transferred back to the ICU 1/18 for worsening oxygenation. Currently on Vapotherm    #Paroxysmal A. fib. With RVR, started . Off IV amiodarone. Coumadin on hold, INR reversed perioperatively with vit k , ffp  Currently on heparin drip  Continue metoprolol    #COVID-19 detected. Incidental finding. No covid related issues    # Anemia  - multifactorial - surgery, iatrogenic, nutritional. Monitor and transfuse less than 7    # Edema - 20 L+ since adm, good UOP. Added lasix , tolerating     for DVT prophylaxis.     Start SLP and PT   Out of bed     Vidhi Rhodes MD 7:53 AM 1/19/2023

## 2023-01-20 PROBLEM — I82.4Y1 ACUTE DEEP VEIN THROMBOSIS (DVT) OF PROXIMAL VEIN OF RIGHT LOWER EXTREMITY (HCC): Status: ACTIVE | Noted: 2023-01-20

## 2023-01-20 LAB
ANION GAP SERPL CALCULATED.3IONS-SCNC: 7 MMOL/L (ref 3–16)
ANION GAP SERPL CALCULATED.3IONS-SCNC: 7 MMOL/L (ref 3–16)
ANTI-XA UNFRAC HEPARIN: 0.37 IU/ML (ref 0.3–0.7)
BUN BLDV-MCNC: 23 MG/DL (ref 7–20)
BUN BLDV-MCNC: 24 MG/DL (ref 7–20)
CALCIUM SERPL-MCNC: 8.4 MG/DL (ref 8.3–10.6)
CALCIUM SERPL-MCNC: 8.5 MG/DL (ref 8.3–10.6)
CHLORIDE BLD-SCNC: 106 MMOL/L (ref 99–110)
CHLORIDE BLD-SCNC: 110 MMOL/L (ref 99–110)
CO2: 29 MMOL/L (ref 21–32)
CO2: 30 MMOL/L (ref 21–32)
CREAT SERPL-MCNC: 0.7 MG/DL (ref 0.6–1.2)
CREAT SERPL-MCNC: 0.8 MG/DL (ref 0.6–1.2)
GFR SERPL CREATININE-BSD FRML MDRD: >60 ML/MIN/{1.73_M2}
GFR SERPL CREATININE-BSD FRML MDRD: >60 ML/MIN/{1.73_M2}
GLUCOSE BLD-MCNC: 106 MG/DL (ref 70–99)
GLUCOSE BLD-MCNC: 112 MG/DL (ref 70–99)
GLUCOSE BLD-MCNC: 126 MG/DL (ref 70–99)
GLUCOSE BLD-MCNC: 134 MG/DL (ref 70–99)
GLUCOSE BLD-MCNC: 137 MG/DL (ref 70–99)
GLUCOSE BLD-MCNC: 149 MG/DL (ref 70–99)
GLUCOSE BLD-MCNC: 153 MG/DL (ref 70–99)
HCT VFR BLD CALC: 23.4 % (ref 36–48)
HCT VFR BLD CALC: 23.6 % (ref 36–48)
HEMOGLOBIN: 7.6 G/DL (ref 12–16)
HEMOGLOBIN: 7.7 G/DL (ref 12–16)
MCH RBC QN AUTO: 26.9 PG (ref 26–34)
MCHC RBC AUTO-ENTMCNC: 32.6 G/DL (ref 31–36)
MCV RBC AUTO: 82.5 FL (ref 80–100)
PDW BLD-RTO: 15.1 % (ref 12.4–15.4)
PERFORMED ON: ABNORMAL
PLATELET # BLD: 571 K/UL (ref 135–450)
PMV BLD AUTO: 7.4 FL (ref 5–10.5)
POTASSIUM SERPL-SCNC: 3.2 MMOL/L (ref 3.5–5.1)
POTASSIUM SERPL-SCNC: 3.7 MMOL/L (ref 3.5–5.1)
RBC # BLD: 2.87 M/UL (ref 4–5.2)
SODIUM BLD-SCNC: 143 MMOL/L (ref 136–145)
SODIUM BLD-SCNC: 146 MMOL/L (ref 136–145)
WBC # BLD: 9.7 K/UL (ref 4–11)

## 2023-01-20 PROCEDURE — 85014 HEMATOCRIT: CPT

## 2023-01-20 PROCEDURE — 85520 HEPARIN ASSAY: CPT

## 2023-01-20 PROCEDURE — 94761 N-INVAS EAR/PLS OXIMETRY MLT: CPT

## 2023-01-20 PROCEDURE — 2000000000 HC ICU R&B

## 2023-01-20 PROCEDURE — 36415 COLL VENOUS BLD VENIPUNCTURE: CPT

## 2023-01-20 PROCEDURE — 87040 BLOOD CULTURE FOR BACTERIA: CPT

## 2023-01-20 PROCEDURE — 2580000003 HC RX 258: Performed by: SURGERY

## 2023-01-20 PROCEDURE — 6370000000 HC RX 637 (ALT 250 FOR IP): Performed by: SURGERY

## 2023-01-20 PROCEDURE — 85018 HEMOGLOBIN: CPT

## 2023-01-20 PROCEDURE — 99024 POSTOP FOLLOW-UP VISIT: CPT | Performed by: SURGERY

## 2023-01-20 PROCEDURE — C9113 INJ PANTOPRAZOLE SODIUM, VIA: HCPCS | Performed by: SURGERY

## 2023-01-20 PROCEDURE — 6360000002 HC RX W HCPCS: Performed by: SURGERY

## 2023-01-20 PROCEDURE — 6360000002 HC RX W HCPCS: Performed by: INTERNAL MEDICINE

## 2023-01-20 PROCEDURE — 80048 BASIC METABOLIC PNL TOTAL CA: CPT

## 2023-01-20 PROCEDURE — 85027 COMPLETE CBC AUTOMATED: CPT

## 2023-01-20 PROCEDURE — 99233 SBSQ HOSP IP/OBS HIGH 50: CPT | Performed by: INTERNAL MEDICINE

## 2023-01-20 PROCEDURE — 2700000000 HC OXYGEN THERAPY PER DAY

## 2023-01-20 PROCEDURE — 6370000000 HC RX 637 (ALT 250 FOR IP): Performed by: INTERNAL MEDICINE

## 2023-01-20 RX ORDER — FUROSEMIDE 10 MG/ML
20 INJECTION INTRAMUSCULAR; INTRAVENOUS ONCE
Status: COMPLETED | OUTPATIENT
Start: 2023-01-20 | End: 2023-01-20

## 2023-01-20 RX ORDER — FUROSEMIDE 10 MG/ML
40 INJECTION INTRAMUSCULAR; INTRAVENOUS EVERY 12 HOURS
Status: DISCONTINUED | OUTPATIENT
Start: 2023-01-20 | End: 2023-01-21

## 2023-01-20 RX ADMIN — METOPROLOL TARTRATE 12.5 MG: 25 TABLET, FILM COATED ORAL at 20:27

## 2023-01-20 RX ADMIN — PIPERACILLIN SODIUM,TAZOBACTAM SODIUM 3375 MG: 3; .375 INJECTION, POWDER, FOR SOLUTION INTRAVENOUS at 16:11

## 2023-01-20 RX ADMIN — HEPARIN SODIUM 1280 UNITS/HR: 10000 INJECTION, SOLUTION INTRAVENOUS at 09:43

## 2023-01-20 RX ADMIN — FUROSEMIDE 40 MG: 10 INJECTION, SOLUTION INTRAMUSCULAR; INTRAVENOUS at 20:27

## 2023-01-20 RX ADMIN — POTASSIUM CHLORIDE 20 MEQ: 400 INJECTION, SOLUTION INTRAVENOUS at 06:11

## 2023-01-20 RX ADMIN — SODIUM CHLORIDE, PRESERVATIVE FREE 10 ML: 5 INJECTION INTRAVENOUS at 20:27

## 2023-01-20 RX ADMIN — ACETAMINOPHEN 650 MG: 325 TABLET ORAL at 16:51

## 2023-01-20 RX ADMIN — PIPERACILLIN SODIUM,TAZOBACTAM SODIUM 3375 MG: 3; .375 INJECTION, POWDER, FOR SOLUTION INTRAVENOUS at 05:43

## 2023-01-20 RX ADMIN — FUROSEMIDE 20 MG: 10 INJECTION, SOLUTION INTRAMUSCULAR; INTRAVENOUS at 09:38

## 2023-01-20 RX ADMIN — POTASSIUM CHLORIDE 20 MEQ: 400 INJECTION, SOLUTION INTRAVENOUS at 00:22

## 2023-01-20 RX ADMIN — FUROSEMIDE 20 MG: 10 INJECTION, SOLUTION INTRAMUSCULAR; INTRAVENOUS at 13:23

## 2023-01-20 RX ADMIN — POTASSIUM CHLORIDE 20 MEQ: 400 INJECTION, SOLUTION INTRAVENOUS at 05:08

## 2023-01-20 RX ADMIN — PANTOPRAZOLE SODIUM 40 MG: 40 INJECTION, POWDER, FOR SOLUTION INTRAVENOUS at 09:36

## 2023-01-20 RX ADMIN — PIPERACILLIN SODIUM,TAZOBACTAM SODIUM 3375 MG: 3; .375 INJECTION, POWDER, FOR SOLUTION INTRAVENOUS at 22:37

## 2023-01-20 RX ADMIN — SODIUM CHLORIDE, PRESERVATIVE FREE 10 ML: 5 INJECTION INTRAVENOUS at 09:38

## 2023-01-20 ASSESSMENT — PAIN SCALES - GENERAL: PAINLEVEL_OUTOF10: 0

## 2023-01-20 NOTE — CONSULTS
Pt seen for ostomy pouch change. Pt awake, more alert. Current appliance intact. Old appliance removed skin cleansed and patted dry. Stoma is 75% red, moist tissue, 25% dusky tissue near edges. Mucocutaneous junction ad peristomal skin intact, visible intact sutures at junction. Small barrier seal applied all around stoma, then pt repouched using Coloplast 2 1/4\" flat wafer and drainable pouch. Good seal obtained. Pt not teachable at this time. No family in room. Will follow.

## 2023-01-20 NOTE — PROGRESS NOTES
Internal Medicine ICU Progress Note      Events of Last 24 hours:-    perforated diverticular disease. . S.p emergency ex lap with sigmoid colectomy , small bowel resection and ostomy placement     Off vent support, off pressors    Patient was transferred back to the ICU yesterday for worsening oxygenation. Likely secondary to aspiration  Placed on Vapotherm. Currently on 50% FiO2 and 25 L/min    Invasive Lines: right IJ CVC    MV: Intubated on 2023, extubated     Recent Labs     23  1535   PHART 7.526*   OKV7IFG 36.9   PO2ART 54.4*         MV Settings:  Vent Mode: AC/VC Resp Rate (Set): 16 bmp/Vt (Set, mL): 370 mL/ /FiO2 : 50 %    IV:   heparin (PORCINE) Infusion 1,280 Units/hr (23 0622)    dextrose Stopped (23 1048)    sodium chloride Stopped (23 1401)       Vitals:  Temp  Av.4 °F (36.9 °C)  Min: 97.3 °F (36.3 °C)  Max: 99 °F (37.2 °C)  Pulse  Av.3  Min: 80  Max: 100  BP  Min: 100/83  Max: 138/79  SpO2  Av.9 %  Min: 91 %  Max: 98 %  FiO2   Av %  Min: 50 %  Max: 90 %  Patient Vitals for the past 4 hrs:   BP Temp Temp src Pulse Resp SpO2 Weight   23 0701 (!) 130/96 98.5 °F (36.9 °C) Bladder 91 19 96 % --   23 0602 124/88 -- -- 93 15 94 % --   23 0502 132/69 -- -- 90 16 -- --   23 0400 137/89 98.6 °F (37 °C) Bladder 90 24 95 % 194 lb 1.6 oz (88 kg)         CVP: CVP (Mean): 217 mmHg      Intake/Output Summary (Last 24 hours) at 2023 0758  Last data filed at 2023 9984  Gross per 24 hour   Intake 1783.56 ml   Output 1945 ml   Net -161.44 ml         EXAM:    General: elderly female   ,   remains  fatigued  but slowly improving   mild distress. Eyes: PERRL. No sclera icterus. No conjunctiva injected. ENT: No discharge. NG in place  Neck: Trachea midline. Normal thyroid. Resp: No accessory muscle use. No crackles. No wheezing. No rhonchi. No dullness on percussion. CV: Regular rate. Regular rhythm. No mumur or rub. 2+ edema.  No JVD. Palpable pedal pulses. GI: Non-tender. Mid abd surgical dressing and right LQ Bobby drain in place  left  sided ostomy in place with liquid stool   Non-distended. No masses. No organmegaly. sluggish bowel sounds. No hernia. Ext - 2+ edema to all ext  Neuro - improving mentation moving ext , able  to follow commands    Medications:  Scheduled Meds:   furosemide  20 mg IntraVENous Q12H    citalopram  10 mg Oral Daily    metoprolol tartrate  12.5 mg Oral BID    insulin lispro  0-4 Units SubCUTAneous Q4H    piperacillin-tazobactam  3,375 mg IntraVENous Q8H    sodium chloride flush  5-40 mL IntraVENous 2 times per day    pantoprazole  40 mg IntraVENous Daily       PRN Meds:  potassium chloride, magnesium sulfate, heparin (porcine), heparin (porcine), HYDROmorphone, albuterol, dextrose, glucose, dextrose bolus **OR** dextrose bolus, glucagon (rDNA), prochlorperazine, sodium chloride flush, sodium chloride, acetaminophen **OR** acetaminophen, albuterol sulfate HFA    Results:  CBC:   Recent Labs     01/18/23 2034 01/19/23 0300 01/19/23 2200 01/20/23  0430   WBC 10.7 10.2  --  9.7   HGB 7.7* 7.7* 7.7* 7.7*   HCT 23.5* 23.2* 23.4* 23.6*   MCV 82.5 82.9  --  82.5   * 499*  --  571*       BMP:   Recent Labs     01/19/23 0300 01/19/23  1030 01/19/23 2200 01/20/23  0430   *  --  142 143   K 3.1* 3.4* 3.1* 3.2*     --  106 106   CO2 31  --  31 30   BUN 22*  --  24* 23*   CREATININE 0.8  --  0.7 0.7       LIVER PROFILE:   No results for input(s): AST, ALT, LIPASE, BILIDIR, BILITOT, ALKPHOS in the last 72 hours. Invalid input(s): AMYLASE,  ALB    PT/INR:   No results for input(s): PROTIME, INR in the last 72 hours.     APTT:   Recent Labs     01/18/23 2034   APTT 29.5       UA:  No results for input(s): NITRITE, COLORU, PHUR, LABCAST, WBCUA, RBCUA, MUCUS, TRICHOMONAS, YEAST, BACTERIA, CLARITYU, SPECGRAV, LEUKOCYTESUR, UROBILINOGEN, BILIRUBINUR, BLOODU, GLUCOSEU, AMORPHOUS in the last 72 hours.    Invalid input(s): Fern Puga      Cultures:  [id number]-95  detected  Blood Culture positive for E. Coli and clostridium       Films:    VL Extremity Venous Bilateral         XR CHEST PORTABLE   Final Result   New infiltrates seen within the left upper lung, compatible with pneumonia   versus aspiration. Increasing pleural effusions. XR ABDOMEN FOR NG/OG/NE TUBE PLACEMENT   Final Result   Tip of the feeding tube is in the expected position, projecting over the   gastric body. Continued pleuroparenchymal disease in the lower lungs bilaterally. XR CHEST PORTABLE   Final Result   Bibasilar effusions with mild congestion. Support tubes as described above. XR CHEST PORTABLE   Final Result   Small bilateral pleural effusions and bibasilar atelectasis or airspace   disease, increased on the left as compared to prior. XR CHEST PORTABLE   Final Result   No significant interval change in right greater than left pleural effusions   and bibasilar atelectasis or airspace disease as compared to prior. Tip of endotracheal tube is approximately 1.2 cm proximal to the kori and   could be retracted approximately 2 cm. XR CHEST PORTABLE   Final Result   Bilateral pleural effusions and adjacent lung consolidation, similar to prior         XR CHEST PORTABLE   Final Result   1. Stable lines, tubes and support devices. 2. Stable basilar opacities with pleural effusions. XR CHEST PORTABLE   Final Result   Increased pleural-parenchymal disease bilaterally         XR CHEST PORTABLE   Final Result   1. Endotracheal tube terminates in appropriate position above the kori. 2.  The enteric tube courses off the field of view in the upper abdomen. 3.  Right internal jugular line terminates at the level of the mid SVC. CT ABDOMEN PELVIS W IV CONTRAST Additional Contrast? None   Final Result   1.   Small volume pneumoperitoneum and extraluminal fecal material appears to   arise from the sigmoid colon, presumably as a complication of diverticulitis. 2.  Small volume abdominopelvic ascites. No loculated fluid collection   identified. Findings were discussed with Raquel Grossman at 10:25 pm on 1/6/2023. Assessment:    Principal Problem: Bowel perforation ([address]  Active Problems:    Septic shock (HCC)    Acute hypoxemic respiratory failure (HCC)    COVID-19    Coagulopathy (HCC)    Diverticulitis of colon    Paroxysmal atrial fibrillation (HCC)    E coli bacteremia    Acute respiratory failure with hypoxia (HCC)    Hypernatremia    Aspiration into airway    Disorder of electrolytes    Leg edema  Resolved Problems:    * No resolved hospital problems. *       Plan:    #Perforated diverticulum. Gram neg bacteremia    S.p emergency ex lap with sigmoid colectomy , small bowel resection and ostomy placement   Surgery managing    On Zosyn   Fevers resolved   Improved sepsis and off pressors   Recovering bowel function, started TF -tolerating well    #Septic shock due to perforated diverticulum. Ecoli  bacteremia. On Zosyn and Diflucan. Severe hypotension , no improvement with IVF boluses  was On IV vasopressors - vaso and levophed- improved Bp  Now off pressors     #Acute respiratory failure on the ventilator. Sec to perforated viscus - remained on vent post surgery   Management per pulmonary improved . Extubated to NC 5 L  Wean as able. Lasix 40 mg daily   Transferred back to the ICU 1/18 for worsening oxygenation. Currently on Vapotherm    #Paroxysmal A. fib. With RVR, started . Off IV amiodarone. Coumadin on hold, INR reversed perioperatively with vit k , ffp  Currently on heparin drip  Continue metoprolol    #COVID-19 detected. Incidental finding. No covid related issues    # Anemia  - multifactorial - surgery, iatrogenic, nutritional. Monitor and transfuse less than 7    # Edema - good UOP.  Added lasix 40 IV twice daily, tolerating     Hypernatremia  Increase water boluses     for DVT prophylaxis.     Start SLP and PT   Out of bed     Jayjay Sr MD 7:58 AM 1/20/2023

## 2023-01-20 NOTE — FLOWSHEET NOTE
01/20/23 0701   Vitals   Temp 98.5 °F (36.9 °C)   Temp Source Bladder   Heart Rate 91   Heart Rate Source Monitor   Resp 19   BP (!) 130/96   MAP (Calculated) 107   MAP (mmHg) 105   BP Method Automatic   Oxygen Therapy   SpO2 96 %   O2 Device Heated high flow cannula   FiO2  50 %   O2 Flow Rate (L/min) 25 L/min   Vital signs stable. Pt is alert and oriented X3 and denies any worsening pain or SOB at the moment. Intermittent bouts of confusion noted. Nothing new noted on head to toe assessment. Lung sounds diminished. Pt is Afib on the monitor. HR remains controlled. Colostomy remains secure in place and draining clear, brown, loose stool. MICHELLE drain remains secure in place with small amount of serous drainage noted. Bowel sounds active X4. Surgical incision remains to abdomen. Staples in place. Morning medication administration completed. TF continues at goal. Pt tolerating TF well. Pt denies any further assistance at the moment. Will continue to monitor.

## 2023-01-20 NOTE — PROGRESS NOTES
01/19/23 2033   Oxygen Therapy/Pulse Ox   O2 Therapy Oxygen humidified   O2 Device Heated high flow cannula   O2 Flow Rate (L/min) 25 L/min   FiO2  50 %   Heart Rate 84   Resp 20   SpO2 95 %

## 2023-01-20 NOTE — PROGRESS NOTES
Speech Language Pathology    Pt continues on Vapotherm (20L/min 50% FiO2 per chart), considered at an increased risk of aspiration with PO. Pt will likely benefit from completion of instrumental swallow study prior to initiation of possible PO diet (*once respiratory status allows). ST to continue to follow. No charges.     Elena Moss M.S. [facility]  Speech-language pathologist  [phone]

## 2023-01-20 NOTE — PROGRESS NOTES
[facility] GENERAL SURGERY    Surgery Progress Note           POD #  13    PATIENT NAME: Serenity Cesar     TODAY'S DATE: 1/20/2023    INTERVAL HISTORY:    Pt states she feels okay. She is tolerating tube feeds. OBJECTIVE:   VITALS:  /83   Pulse 91   Temp 98.7 °F (37.1 °C) (Bladder)   Resp 21   Ht 5' 7\" (1.702 m)   Wt 194 lb 1.6 oz (88 kg)   SpO2 96%   BMI 30.40 kg/m²     INTAKE/OUTPUT:    I/O last 3 completed shifts: In: 2263.9 [I.V.:666.1; NG/GT:839; IV Piggyback:758.8]  Out: 2760 [Urine:1960; Drains:65; Stool:735]  I/O this shift:  In: 673 [NG/GT:673]  Out: 460 [Urine:150; Drains:10; Stool:300]              CONSTITUTIONAL:  awake and alert  ABDOMEN:   normal bowel sounds, soft, non-distended, ostomy functional  INCISION: Clean with minimal drainage from the superior and inferior aspect    Data:  CBC:   Recent Labs     01/18/23 2034 01/19/23  0300 01/19/23  2200 01/20/23  0430 01/20/23  0945   WBC 10.7 10.2  --  9.7  --    HGB 7.7* 7.7* 7.7* 7.7* 7.6*   HCT 23.5* 23.2* 23.4* 23.6* 23.4*   * 499*  --  571*  --      BMP:    Recent Labs     01/19/23 2200 01/20/23  0430 01/20/23  0945    143 146*   K 3.1* 3.2* 3.7    106 110   CO2 31 30 29   BUN 24* 23* 24*   CREATININE 0.7 0.7 0.8   GLUCOSE 145* 149* 153*     Hepatic: No results for input(s): AST, ALT, ALB, BILITOT, ALKPHOS in the last 72 hours. Mag:      Recent Labs     01/19/23  0300   MG 2.00      Phos:   No results for input(s): PHOS in the last 72 hours. INR: No results for input(s): INR in the last 72 hours. ASSESSMENT AND PLAN:  80 y.o. female status post Mann's and small bowel resection.   Continue antibiotics, tube feeds, and supportive care        Electronically signed by Wendy Perkins MD

## 2023-01-20 NOTE — CARE COORDINATION
INTERDISCIPLINARY PLAN OF CARE CONFERENCE    Date/Time: 1/20/2023 11:25 AM  Completed by: ALINA Terry   Case Management    Patient Name:  Gena Apodaca  YOB: 1932  Admitting Diagnosis: Diverticulitis of colon [K57.32]  Prolonged Q-T interval on ECG [R94.31]  Perforated viscus [R19.8]  Perforated diverticulum [K57.80]     Admit Date/Time:  1/6/2023  8:46 PM    Chart reviewed. Interdisciplinary team contacted or reviewed plan related to patient progress and discharge plans. Disciplines included Case Management, Nursing, and Dietitian. Current Status:ongoing   PT/OT recommendation for discharge plan of care: SNF    Expected D/C Disposition:  Skilled nursing facility    Discharge Plan Comments: Chart review completed. Pt remains in the ICU. Message left for Artemio Isaac at [facility] requesting call to inquire if they can accept pt with covid + on 1/6. Home O2 in place on admit: No    Addendum at 3:34pm: Received call from [facility] at [facility] who states pt would be out of the covid isolation. Referral given to [facility]. Message left for Claude Lopes, pt's daughter updating on the above and requested call back if questions arise.

## 2023-01-20 NOTE — PROGRESS NOTES
Attempted to wean patient from vapotherm. Patient was placed on 15L HFNC with desaturation to 85%. Patient returned to vapotherm at 40L 100%. Patient is 97% at this time. Respiratory will continue to titrate O2 as tolerated.

## 2023-01-20 NOTE — PROGRESS NOTES
Pt noted to be intermittently hypoxic (87%) since downgrading to 15 L HFNC. RT made aware that Pt needs to go back on vapotherm. RT at bedside to place Pt back on vapotherm. PRN tylenol provided per Pt request for complaints of mild abdominal discomfort. Will continue to monitor.

## 2023-01-20 NOTE — PROGRESS NOTES
01/19/23 2348   Oxygen Therapy/Pulse Ox   O2 Therapy Oxygen humidified   O2 Device Heated high flow cannula   O2 Flow Rate (L/min) 25 L/min   FiO2  50 %   Heart Rate 87   Resp 19   SpO2 93 %

## 2023-01-20 NOTE — PLAN OF CARE
Pt resting in bed, A&O to person and place, denies pain, tolerating Vapotherm 25L/50%, denies SOB, occasional moist NPC. IV heparin infusing at ordered rate, pt has dried blood around nose and upper lip, scant bleeding from left nare. TF infusing with rate increase per orders, no residual. Rick patent, MICHELLE drsg C/D/I with serous drainage. Colostomy drsg C/D/I, emptied of brown watery stool. Dressing changed to abd incision per orders, no drainage noted. Healing blister to right upper thigh/hip dressing changed. Reviewed with pt plan of care for shift and medications, all questions answered, pt voices no concerns. Problem: Discharge Planning  Goal: Discharge to home or other facility with appropriate resources  Outcome: Progressing  Flowsheets (Taken 1/19/2023 2000)  Discharge to home or other facility with appropriate resources: Identify barriers to discharge with patient and caregiver     Problem: Pain  Goal: Verbalizes/displays adequate comfort level or baseline comfort level  Outcome: Progressing  Flowsheets (Taken 1/19/2023 2000)  Verbalizes/displays adequate comfort level or baseline comfort level:   Encourage patient to monitor pain and request assistance   Assess pain using appropriate pain scale   Administer analgesics based on type and severity of pain and evaluate response   Implement non-pharmacological measures as appropriate and evaluate response     Problem: Skin/Tissue Integrity  Goal: Absence of new skin breakdown  Description: 1. Monitor for areas of redness and/or skin breakdown  2. Assess vascular access sites hourly  3. Every 4-6 hours minimum:  Change oxygen saturation probe site  4. Every 4-6 hours:  If on nasal continuous positive airway pressure, respiratory therapy assess nares and determine need for appliance change or resting period.   Outcome: Progressing     Problem: Nutrition Deficit:  Goal: Optimize nutritional status  Outcome: Progressing     Problem: Neurosensory - Adult  Goal: Achieves stable or improved neurological status  Outcome: Progressing  Flowsheets (Taken 1/19/2023 2000)  Achieves stable or improved neurological status: Assess for and report changes in neurological status     Problem: Respiratory - Adult  Goal: Achieves optimal ventilation and oxygenation  Outcome: Progressing  Flowsheets (Taken 1/19/2023 2000)  Achieves optimal ventilation and oxygenation:   Assess for changes in respiratory status   Assess for changes in mentation and behavior   Position to facilitate oxygenation and minimize respiratory effort   Oxygen supplementation based on oxygen saturation or arterial blood gases   Assess the need for suctioning and aspirate as needed   Assess and instruct to report shortness of breath or any respiratory difficulty     Problem: Cardiovascular - Adult  Goal: Maintains optimal cardiac output and hemodynamic stability  Outcome: Progressing  Flowsheets (Taken 1/19/2023 2000)  Maintains optimal cardiac output and hemodynamic stability:   Monitor blood pressure and heart rate   Monitor urine output and notify Licensed Independent Practitioner for values outside of normal range   Assess for signs of decreased cardiac output     Problem: Skin/Tissue Integrity - Adult  Goal: Skin integrity remains intact  Outcome: Progressing  Flowsheets (Taken 1/19/2023 2000)  Skin Integrity Remains Intact: Monitor for areas of redness and/or skin breakdown  Goal: Incisions, wounds, or drain sites healing without S/S of infection  Outcome: Progressing  Flowsheets (Taken 1/19/2023 2000)  Incisions, Wounds, or Drain Sites Healing Without Sign and Symptoms of Infection: Implement wound care per orders  Goal: Oral mucous membranes remain intact  Outcome: Progressing  Flowsheets (Taken 1/19/2023 2000)  Oral Mucous Membranes Remain Intact: Assess oral mucosa and hygiene practices     Problem: Musculoskeletal - Adult  Goal: Return mobility to safest level of function  Outcome: Progressing  Flowsheets (Taken 1/19/2023 2000)  Return Mobility to Safest Level of Function:   Assess patient stability and activity tolerance for standing, transferring and ambulating with or without assistive devices   Assist with transfers and ambulation using safe patient handling equipment as needed   Ensure adequate protection for wounds/incisions during mobilization     Problem: Gastrointestinal - Adult  Goal: Establish and maintain optimal ostomy function  Outcome: Progressing  Flowsheets (Taken 1/19/2023 2000)  Establish and maintain optimal ostomy function:   Monitor output from ostomies   Administer IV fluids and TPN as ordered   Infuse IV Fluids/TPN as ordered     Problem: Genitourinary - Adult  Goal: Absence of urinary retention  Outcome: Progressing  Flowsheets (Taken 1/19/2023 2000)  Absence of urinary retention:   Assess patients ability to void and empty bladder   Monitor intake/output and perform bladder scan as needed   Place urinary catheter per Licensed Independent Practitioner order if needed  Goal: Urinary catheter remains patent  Outcome: Progressing  Flowsheets (Taken 1/19/2023 2000)  Urinary catheter remains patent:   Assess patency of urinary catheter   Irrigate catheter per Licensed Independent Practitioner order if indicated and notify Licensed Independent Practitioner if unable to irrigate     Problem: Infection - Adult  Goal: Absence of infection at discharge  Outcome: Progressing  Flowsheets (Taken 1/19/2023 2000)  Absence of infection at discharge:   Assess and monitor for signs and symptoms of infection   Monitor lab/diagnostic results   Monitor all insertion sites i.e., indwelling lines, tubes and drains     Problem: Metabolic/Fluid and Electrolytes - Adult  Goal: Electrolytes maintained within normal limits  Outcome: Progressing  Flowsheets (Taken 1/19/2023 2000)  Electrolytes maintained within normal limits:   Monitor labs and assess patient for signs and symptoms of electrolyte imbalances   Administer electrolyte replacement as ordered   Monitor response to electrolyte replacements, including repeat lab results as appropriate  Goal: Glucose maintained within prescribed range  Outcome: Progressing  Flowsheets (Taken 1/19/2023 2000)  Glucose maintained within prescribed range:   Monitor blood glucose as ordered   Assess for signs and symptoms of hyperglycemia and hypoglycemia   Administer ordered medications to maintain glucose within target range     Problem: Hematologic - Adult  Goal: Maintains hematologic stability  Outcome: Progressing  Flowsheets (Taken 1/19/2023 2000)  Maintains hematologic stability:   Assess for signs and symptoms of bleeding or hemorrhage   Monitor labs for bleeding or clotting disorders

## 2023-01-20 NOTE — PROGRESS NOTES
Pulmonary & Critical Care Medicine ICU Progress Note    CC: Abdominal pain, perforated diverticulum, septic shock    Events of Last 24 hours:   Vapotherm 20 LPM 50% FiO2 ---> HFNC  Heparin drip     Invasive Lines:   Right IJ CVC 1/7/2023    MV: 1/7-1/12/23      Intake/Output Summary (Last 24 hours) at 1/20/2023 0736  Last data filed at 1/20/2023 0622  Gross per 24 hour   Intake 1783.56 ml   Output 1945 ml   Net -161.44 ml         Vitals:  Blood pressure (!) 130/96, pulse 91, temperature 98.6 °F (37 °C), temperature source Bladder, resp. rate 19, height 5' 7\" (1.702 m), weight 194 lb 1.6 oz (88 kg), SpO2 96 %, not currently breastfeeding. Vapotherm    Constitutional: Mild distress. Eyes: PERRL. Conjunctivae anicteric. ENT: Normal nose. Normal tongue. Neck:  Trachea is midline. No thyroid tenderness. Respiratory: + Accessory muscle usage. Few rhonchi.  + Decreased breath sounds. No wheezes. Few rales. Cardiovascular: Normal S1S2. No digit clubbing. No digit cyanosis. 1-2 + LE edema   GI: Soft, stool in ostomy bag. + MICHELLE drain in place  Psychiatric: No anxiety or Agitation. +  following commands.     Scheduled Meds:   furosemide  20 mg IntraVENous Q12H    citalopram  10 mg Oral Daily    metoprolol tartrate  12.5 mg Oral BID    insulin lispro  0-4 Units SubCUTAneous Q4H    piperacillin-tazobactam  3,375 mg IntraVENous Q8H    sodium chloride flush  5-40 mL IntraVENous 2 times per day    pantoprazole  40 mg IntraVENous Daily     PRN Meds:  potassium chloride, magnesium sulfate, heparin (porcine), heparin (porcine), HYDROmorphone, albuterol, dextrose, glucose, dextrose bolus **OR** dextrose bolus, glucagon (rDNA), prochlorperazine, sodium chloride flush, sodium chloride, acetaminophen **OR** acetaminophen, albuterol sulfate HFA    Results:  CBC:   Recent Labs     01/18/23  2034 01/19/23  0300 01/19/23  2200 01/20/23  0430   WBC 10.7 10.2  --  9.7   HGB 7.7* 7.7* 7.7* 7.7*   HCT 23.5* 23.2* 23.4* 23.6*   MCV 82.5 82.9  --  82.5   * 499*  --  571*     BMP:   Recent Labs     01/19/23  0300 01/19/23  1030 01/19/23  2200 01/20/23  0430   *  --  142 143   K 3.1* 3.4* 3.1* 3.2*     --  106 106   CO2 31  --  31 30   BUN 22*  --  24* 23*   CREATININE 0.8  --  0.7 0.7     LIVER PROFILE:   No results for input(s): AST, ALT, LIPASE, BILIDIR, BILITOT, ALKPHOS in the last 72 hours. Invalid input(s): AMYLASE,  ALB    Cultures:  1/6/2023 SARS-CoV-2 positive  1/6/2023 BCx Escherichia coli and Clostridium ramosum        Imaging:  CXR 1/18/23  images reviewed by me and showed:   New left upper lobe infiltrates with effusion    LE doppler 1/19  RLE DVT         ASSESSMENT:  Acute hypoxemic respiratory failure -worsening  Aspiration pneumonia/pneumonitis  E. coli bacteremia  Hypernatremia  Electrolyte disorder  Acute RLE DVT   Diverticulitis with perforated viscus in the sigmoid colon area, post op x-lap with sigmoid colectomy and small bowel resection and ostomy on 1/7/23  COVID-19 infection, incidental finding  Paroxysmal atrial fibrillation, now AFIB RVR    H/O esophageal stricture   Coagulopathy 2/2 warfarin- S/P 3 U FFP & vitamin K; give K-centra X 1 prior to emergent surgery    PLAN:  COVID-19 isolation, droplet plus   Vapotherm for life-threatening acute hypoxemic respiratory failure and titrate to maintain SaO2 >92%  D#12 Zosyn.   Completed 9 days of Diflucan -duration per general surgery  Repeat BCx  Lasix 40 mg IV Q12 hrs   Electrolytes replacement  TFs - resume and increase free water 100 Q4 hrs   Monitor H&H and transfuse for Hb < 7   Resumed home lopressor 12.5 BID   Resume home Celexa  PT OT   Blood sugar control ISS, with goal 150-180  Heparin drip- on Coumadin at home         Total critical care time caring for this patient with life threatening, unstable organ failure, including direct patient contact, management of life support systems, review of data including imaging and labs, discussions with other team members and physicians is 32 minutes, excluding procedures.

## 2023-01-20 NOTE — PROGRESS NOTES
Pharmacy - RE:  High-dose Heparin drip  Current rate = 12.8 ml/h  (1280 units/h)  Anti-Xa drawn @ 0430 = 0.37 IU   Goal anti-Xa = 0.3 - 0.7  Per protocol, continue the same heparin drip rate @ 1280 Units/h (12.8 ml/h). Obtain another anti-Xa 1/21 with AM lab draws.

## 2023-01-20 NOTE — PROGRESS NOTES
Comprehensive Nutrition Assessment    Type and Reason for Visit:  Reassess    Nutrition Recommendations/Plan:   Monitor need for long term feeding tube  Monitor swallow eval results if/when completed pending respiratory function  Jevity 1.5 infusing at goal rate of 50 ml/hr  May need to increase water flush if Na continues greater than 145 mmol/L  Will monitor TF intake and tolerance, blood sugar trends, and fluid and electrolyte balances     Malnutrition Assessment:  Malnutrition Status: At risk for malnutrition (Comment) (01/13/23 1405)    Context:  Acute Illness       Nutrition Assessment:    Follow up:  Jevity 1.5 continues at 50 ml/hr. Currently on Vaptherm; SLP holding off on eval due to increased risk for aspiration. Nutrition Related Findings:    Na 146 mmol/L this am; last BM on 1/19 Wound Type: Surgical Incision (abdominal incision from surgery on 1/7/23)       Current Nutrition Intake & Therapies:    Average Meal Intake: NPO  Average Supplements Intake: NPO  Diet NPO  ADULT TUBE FEEDING; Nasogastric; Standard with Fiber; Continuous; 50; No; 200; Q 4 hours  Current Tube Feeding (TF) Orders:  Feeding Route: Nasogastric  Formula: Standard without Fiber  Schedule: Continuous  Feeding Regimen: Jevity 1.5 at 50 ml/hr  Additives/Modulars: Protein (one proteinex P2Go TWICE daily)  Water Flushes: 50 ml water flush every 6 hours  Current TF & Flush Orders Provides: same as below  Goal TF & Flush Orders Provides: Jevity 1.5 at 50 ml/hr provides 1500 calories, 63 grams of protein, 760 ml free water. Water flush recommend increasing to 100 ml every 4 hours. Anthropometric Measures:  Height: 5' 7\" (170.2 cm)  Ideal Body Weight (IBW): 135 lbs (61 kg)    Admission Body Weight: 165 lb (74.8 kg) (obtained on 1/9/23; actual weight)  Current Body Weight: 194 lb 1 oz (88 kg), 168.4 % IBW.  Weight Source: Bed Scale  Current BMI (kg/m2): 30.4  Usual Body Weight:  (unable to assess d/t lack of actual weight hx in EMR)  % Weight Change (Calculated): -2.9  Weight Adjustment For: No Adjustment                 BMI Categories: Obese Class 2 (BMI 35.0 -39.9)    Estimated Daily Nutrient Needs:  Energy Requirements Based On: Kcal/kg  Weight Used for Energy Requirements: Ideal  Energy (kcal/day): 6841-3415 (25-30 kcal/61.4 kg)  Weight Used for Protein Requirements: Ideal  Protein (g/day): 74-80 (1.2-1.3 g/61.4 kg)  Method Used for Fluid Requirements: 1 ml/kcal  Fluid (ml/day): 1045 - 1330 ml    Nutrition Diagnosis:   Inadequate oral intake related to catabolic illness, altered GI structure, altered GI function, impaired respiratory function, inadequate protein-energy intake as evidenced by NPO or clear liquid status due to medical condition, intubation, wounds, lab values, GI abnormality    Nutrition Interventions:   Food and/or Nutrient Delivery: Continue Current Tube Feeding  Nutrition Education/Counseling: No recommendation at this time  Coordination of Nutrition Care: Continue to monitor while inpatient  Plan of Care discussed with: ICU Team    Goals:  Previous Goal Met: Progressing toward Goal(s)  Goals: Tolerate nutrition support at goal rate       Nutrition Monitoring and Evaluation:   Behavioral-Environmental Outcomes: None Identified  Food/Nutrient Intake Outcomes: Enteral Nutrition Intake/Tolerance  Physical Signs/Symptoms Outcomes: Nutrition Focused Physical Findings, Biochemical Data    Discharge Planning:     Too soon to determine     NOAH, [address] [facility], [address] [place] [address],   Contact: [phone]

## 2023-01-21 LAB
ANION GAP SERPL CALCULATED.3IONS-SCNC: 8 MMOL/L (ref 3–16)
ANTI-XA UNFRAC HEPARIN: 0.45 IU/ML (ref 0.3–0.7)
BUN BLDV-MCNC: 22 MG/DL (ref 7–20)
CALCIUM SERPL-MCNC: 8.5 MG/DL (ref 8.3–10.6)
CHLORIDE BLD-SCNC: 109 MMOL/L (ref 99–110)
CO2: 30 MMOL/L (ref 21–32)
CREAT SERPL-MCNC: 0.8 MG/DL (ref 0.6–1.2)
GFR SERPL CREATININE-BSD FRML MDRD: >60 ML/MIN/{1.73_M2}
GLUCOSE BLD-MCNC: 104 MG/DL (ref 70–99)
GLUCOSE BLD-MCNC: 118 MG/DL (ref 70–99)
GLUCOSE BLD-MCNC: 130 MG/DL (ref 70–99)
GLUCOSE BLD-MCNC: 144 MG/DL (ref 70–99)
GLUCOSE BLD-MCNC: 156 MG/DL (ref 70–99)
GLUCOSE BLD-MCNC: 80 MG/DL (ref 70–99)
GLUCOSE BLD-MCNC: 92 MG/DL (ref 70–99)
GLUCOSE BLD-MCNC: 94 MG/DL (ref 70–99)
HCT VFR BLD CALC: 23 % (ref 36–48)
HEMOGLOBIN: 7.3 G/DL (ref 12–16)
MCH RBC QN AUTO: 27 PG (ref 26–34)
MCHC RBC AUTO-ENTMCNC: 32 G/DL (ref 31–36)
MCV RBC AUTO: 84.4 FL (ref 80–100)
PDW BLD-RTO: 15.6 % (ref 12.4–15.4)
PERFORMED ON: ABNORMAL
PERFORMED ON: NORMAL
PLATELET # BLD: 567 K/UL (ref 135–450)
PMV BLD AUTO: 7.3 FL (ref 5–10.5)
POTASSIUM SERPL-SCNC: 2.8 MMOL/L (ref 3.5–5.1)
RBC # BLD: 2.72 M/UL (ref 4–5.2)
SODIUM BLD-SCNC: 147 MMOL/L (ref 136–145)
WBC # BLD: 8.8 K/UL (ref 4–11)

## 2023-01-21 PROCEDURE — 6360000002 HC RX W HCPCS: Performed by: INTERNAL MEDICINE

## 2023-01-21 PROCEDURE — 97535 SELF CARE MNGMENT TRAINING: CPT

## 2023-01-21 PROCEDURE — 2000000000 HC ICU R&B

## 2023-01-21 PROCEDURE — 92526 ORAL FUNCTION THERAPY: CPT

## 2023-01-21 PROCEDURE — 97110 THERAPEUTIC EXERCISES: CPT

## 2023-01-21 PROCEDURE — 6360000002 HC RX W HCPCS: Performed by: SURGERY

## 2023-01-21 PROCEDURE — 99024 POSTOP FOLLOW-UP VISIT: CPT | Performed by: SURGERY

## 2023-01-21 PROCEDURE — 80048 BASIC METABOLIC PNL TOTAL CA: CPT

## 2023-01-21 PROCEDURE — 2580000003 HC RX 258: Performed by: SURGERY

## 2023-01-21 PROCEDURE — 85520 HEPARIN ASSAY: CPT

## 2023-01-21 PROCEDURE — 51702 INSERT TEMP BLADDER CATH: CPT

## 2023-01-21 PROCEDURE — 6370000000 HC RX 637 (ALT 250 FOR IP): Performed by: INTERNAL MEDICINE

## 2023-01-21 PROCEDURE — 97530 THERAPEUTIC ACTIVITIES: CPT

## 2023-01-21 PROCEDURE — 2700000000 HC OXYGEN THERAPY PER DAY

## 2023-01-21 PROCEDURE — 85027 COMPLETE CBC AUTOMATED: CPT

## 2023-01-21 PROCEDURE — C9113 INJ PANTOPRAZOLE SODIUM, VIA: HCPCS | Performed by: SURGERY

## 2023-01-21 PROCEDURE — 94761 N-INVAS EAR/PLS OXIMETRY MLT: CPT

## 2023-01-21 PROCEDURE — 99233 SBSQ HOSP IP/OBS HIGH 50: CPT | Performed by: INTERNAL MEDICINE

## 2023-01-21 RX ORDER — FUROSEMIDE 10 MG/ML
40 INJECTION INTRAMUSCULAR; INTRAVENOUS ONCE
Status: COMPLETED | OUTPATIENT
Start: 2023-01-21 | End: 2023-01-21

## 2023-01-21 RX ORDER — FUROSEMIDE 10 MG/ML
40 INJECTION INTRAMUSCULAR; INTRAVENOUS DAILY
Status: DISCONTINUED | OUTPATIENT
Start: 2023-01-22 | End: 2023-01-26 | Stop reason: HOSPADM

## 2023-01-21 RX ADMIN — POTASSIUM CHLORIDE 20 MEQ: 400 INJECTION, SOLUTION INTRAVENOUS at 06:37

## 2023-01-21 RX ADMIN — PIPERACILLIN SODIUM,TAZOBACTAM SODIUM 3375 MG: 3; .375 INJECTION, POWDER, FOR SOLUTION INTRAVENOUS at 23:18

## 2023-01-21 RX ADMIN — HYDROMORPHONE HYDROCHLORIDE 0.25 MG: 1 INJECTION, SOLUTION INTRAMUSCULAR; INTRAVENOUS; SUBCUTANEOUS at 19:59

## 2023-01-21 RX ADMIN — CITALOPRAM HYDROBROMIDE 10 MG: 20 TABLET ORAL at 08:16

## 2023-01-21 RX ADMIN — SODIUM CHLORIDE, PRESERVATIVE FREE 10 ML: 5 INJECTION INTRAVENOUS at 08:16

## 2023-01-21 RX ADMIN — HYDROMORPHONE HYDROCHLORIDE 0.25 MG: 1 INJECTION, SOLUTION INTRAMUSCULAR; INTRAVENOUS; SUBCUTANEOUS at 11:22

## 2023-01-21 RX ADMIN — HEPARIN SODIUM 1280 UNITS/HR: 10000 INJECTION, SOLUTION INTRAVENOUS at 06:17

## 2023-01-21 RX ADMIN — PIPERACILLIN SODIUM,TAZOBACTAM SODIUM 3375 MG: 3; .375 INJECTION, POWDER, FOR SOLUTION INTRAVENOUS at 14:41

## 2023-01-21 RX ADMIN — PIPERACILLIN SODIUM,TAZOBACTAM SODIUM 3375 MG: 3; .375 INJECTION, POWDER, FOR SOLUTION INTRAVENOUS at 06:02

## 2023-01-21 RX ADMIN — POTASSIUM CHLORIDE 20 MEQ: 400 INJECTION, SOLUTION INTRAVENOUS at 08:16

## 2023-01-21 RX ADMIN — FUROSEMIDE 40 MG: 10 INJECTION, SOLUTION INTRAMUSCULAR; INTRAVENOUS at 11:32

## 2023-01-21 RX ADMIN — METOPROLOL TARTRATE 12.5 MG: 25 TABLET, FILM COATED ORAL at 19:59

## 2023-01-21 RX ADMIN — SODIUM CHLORIDE, PRESERVATIVE FREE 10 ML: 5 INJECTION INTRAVENOUS at 20:00

## 2023-01-21 RX ADMIN — PANTOPRAZOLE SODIUM 40 MG: 40 INJECTION, POWDER, FOR SOLUTION INTRAVENOUS at 08:16

## 2023-01-21 RX ADMIN — POTASSIUM CHLORIDE 20 MEQ: 400 INJECTION, SOLUTION INTRAVENOUS at 10:12

## 2023-01-21 RX ADMIN — METOPROLOL TARTRATE 12.5 MG: 25 TABLET, FILM COATED ORAL at 08:16

## 2023-01-21 ASSESSMENT — PAIN - FUNCTIONAL ASSESSMENT: PAIN_FUNCTIONAL_ASSESSMENT: PREVENTS OR INTERFERES SOME ACTIVE ACTIVITIES AND ADLS

## 2023-01-21 ASSESSMENT — PAIN SCALES - GENERAL
PAINLEVEL_OUTOF10: 4
PAINLEVEL_OUTOF10: 0
PAINLEVEL_OUTOF10: 3
PAINLEVEL_OUTOF10: 4
PAINLEVEL_OUTOF10: 3
PAINLEVEL_OUTOF10: 8
PAINLEVEL_OUTOF10: 0
PAINLEVEL_OUTOF10: 2

## 2023-01-21 ASSESSMENT — PAIN DESCRIPTION - DESCRIPTORS
DESCRIPTORS: ACHING
DESCRIPTORS: CRAMPING

## 2023-01-21 ASSESSMENT — PAIN DESCRIPTION - LOCATION
LOCATION: ABDOMEN
LOCATION: ABDOMEN

## 2023-01-21 ASSESSMENT — PAIN DESCRIPTION - ORIENTATION
ORIENTATION: MID
ORIENTATION: RIGHT

## 2023-01-21 NOTE — PROGRESS NOTES
Internal Medicine ICU Progress Note      Events of Last 24 hours:-    perforated diverticular disease. . S.p emergency ex lap with sigmoid colectomy , small bowel resection and ostomy placement     Off vent support, off pressors    Patient was transferred back to the ICU  for worsening oxygenation post SLP eval  Likely secondary to aspiration    Pt seen up in bed, awake, alert  slowly improving strength  Placed on Vapotherm. Currently on 50% FiO2 and 25 L/min  Asking for food    Invasive Lines: right IJ CVC    MV: Intubated on 2023, extubated     Recent Labs     23  1535   PHART 7.526*   ITU3BIV 36.9   PO2ART 54.4*         MV Settings:  Vent Mode: AC/VC Resp Rate (Set): 16 bmp/Vt (Set, mL): 370 mL/ /FiO2 : (S) 70 %    IV:   heparin (PORCINE) Infusion 1,280 Units/hr (23 0617)    dextrose Stopped (23 1048)    sodium chloride Stopped (23 1401)       Vitals:  Temp  Av.6 °F (37 °C)  Min: 97.9 °F (36.6 °C)  Max: 98.9 °F (37.2 °C)  Pulse  Av.9  Min: 77  Max: 100  BP  Min: 100/78  Max: 128/62  SpO2  Av.3 %  Min: 85 %  Max: 98 %  FiO2   Av.2 %  Min: 50 %  Max: 100 %  Patient Vitals for the past 4 hrs:   BP Temp Temp src Pulse Resp SpO2   23 0700 100/78 -- -- 82 21 94 %   23 0600 106/70 -- -- 82 16 --   23 0500 102/72 -- -- 85 19 --   23 0400 128/62 97.9 °F (36.6 °C) Bladder 80 17 --         CVP: CVP (Mean): 217 mmHg      Intake/Output Summary (Last 24 hours) at 2023 0725  Last data filed at 2023 8879  Gross per 24 hour   Intake 2907.3 ml   Output 5685 ml   Net -2777.7 ml         EXAM:    General: elderly female   ,   remains  fatigued  but slowly improving   Eyes: PERRL. No sclera icterus. No conjunctiva injected. ENT: No discharge. NG in place  Neck: Trachea midline. Normal thyroid. Resp: No accessory muscle use. Diminished in bases with resolving crackles   CV: Regular rate. Regular rhythm. No mumur or rub.  Resolving massive peripheral  edema. No JVD. Palpable pedal pulses. GI: Non-tender. Mid abd surgical dressing and right LQ Bobby drain in place  left  sided ostomy in place with liquid stool   Non-distended. No masses. No organmegaly. sluggish bowel sounds. No hernia. Ext - resolving + edema to all ext  Neuro - improving mentation moving ext , able  to follow commands    Medications:  Scheduled Meds:   furosemide  40 mg IntraVENous Q12H    citalopram  10 mg Oral Daily    metoprolol tartrate  12.5 mg Oral BID    insulin lispro  0-4 Units SubCUTAneous Q4H    piperacillin-tazobactam  3,375 mg IntraVENous Q8H    sodium chloride flush  5-40 mL IntraVENous 2 times per day    pantoprazole  40 mg IntraVENous Daily       PRN Meds:  potassium chloride, magnesium sulfate, heparin (porcine), heparin (porcine), HYDROmorphone, albuterol, dextrose, glucose, dextrose bolus **OR** dextrose bolus, glucagon (rDNA), prochlorperazine, sodium chloride flush, sodium chloride, acetaminophen **OR** acetaminophen, albuterol sulfate HFA    Results:  CBC:   Recent Labs     01/19/23  0300 01/19/23  2200 01/20/23  0430 01/20/23  0945 01/21/23  0455   WBC 10.2  --  9.7  --  8.8   HGB 7.7*   < > 7.7* 7.6* 7.3*   HCT 23.2*   < > 23.6* 23.4* 23.0*   MCV 82.9  --  82.5  --  84.4   *  --  571*  --  567*    < > = values in this interval not displayed. BMP:   Recent Labs     01/20/23  0430 01/20/23  0945 01/21/23  0455    146* 147*   K 3.2* 3.7 2.8*    110 109   CO2 30 29 30   BUN 23* 24* 22*   CREATININE 0.7 0.8 0.8       LIVER PROFILE:   No results for input(s): AST, ALT, LIPASE, BILIDIR, BILITOT, ALKPHOS in the last 72 hours. Invalid input(s): AMYLASE,  ALB    PT/INR:   No results for input(s): PROTIME, INR in the last 72 hours.     APTT:   Recent Labs     01/18/23 2034   APTT 29.5       UA:  No results for input(s): NITRITE, COLORU, PHUR, LABCAST, WBCUA, RBCUA, MUCUS, TRICHOMONAS, YEAST, BACTERIA, CLARITYU, SPECGRAV, LEUKOCYTESUR, UROBILINOGEN, BILIRUBINUR, BLOODU, GLUCOSEU, AMORPHOUS in the last 72 hours. Invalid input(s): Franc Hamel      Cultures:  [id number]  detected  Blood Culture positive for E. Coli and clostridium       Films:    VL Extremity Venous Bilateral   Final Result      XR CHEST PORTABLE   Final Result   New infiltrates seen within the left upper lung, compatible with pneumonia   versus aspiration. Increasing pleural effusions. XR ABDOMEN FOR NG/OG/NE TUBE PLACEMENT   Final Result   Tip of the feeding tube is in the expected position, projecting over the   gastric body. Continued pleuroparenchymal disease in the lower lungs bilaterally. XR CHEST PORTABLE   Final Result   Bibasilar effusions with mild congestion. Support tubes as described above. XR CHEST PORTABLE   Final Result   Small bilateral pleural effusions and bibasilar atelectasis or airspace   disease, increased on the left as compared to prior. XR CHEST PORTABLE   Final Result   No significant interval change in right greater than left pleural effusions   and bibasilar atelectasis or airspace disease as compared to prior. Tip of endotracheal tube is approximately 1.2 cm proximal to the kori and   could be retracted approximately 2 cm. XR CHEST PORTABLE   Final Result   Bilateral pleural effusions and adjacent lung consolidation, similar to prior         XR CHEST PORTABLE   Final Result   1. Stable lines, tubes and support devices. 2. Stable basilar opacities with pleural effusions. XR CHEST PORTABLE   Final Result   Increased pleural-parenchymal disease bilaterally         XR CHEST PORTABLE   Final Result   1. Endotracheal tube terminates in appropriate position above the kori. 2.  The enteric tube courses off the field of view in the upper abdomen. 3.  Right internal jugular line terminates at the level of the mid SVC.          CT ABDOMEN PELVIS W IV CONTRAST Additional Contrast? None   Final Result   1. Small volume pneumoperitoneum and extraluminal fecal material appears to   arise from the sigmoid colon, presumably as a complication of diverticulitis. 2.  Small volume abdominopelvic ascites. No loculated fluid collection   identified. Findings were discussed with Ruby Ferreira at 10:25 pm on 1/6/2023. Assessment:    Principal Problem: Bowel perforation ([address]  Active Problems:    Septic shock (HCC)    Acute hypoxemic respiratory failure (HCC)    COVID-19    Coagulopathy (HCC)    Diverticulitis of colon    Paroxysmal atrial fibrillation (HCC)    E coli bacteremia    Acute respiratory failure with hypoxia (HCC)    Hypernatremia    Aspiration into airway    Disorder of electrolytes    Leg edema    Acute deep vein thrombosis (DVT) of proximal vein of right lower extremity ([address]  Resolved Problems:    * No resolved hospital problems. *       Plan:    #Perforated diverticulum. Gram neg bacteremia    S.p emergency ex lap with sigmoid colectomy , small bowel resection and ostomy placement   Surgery managing    On Zosyn   Fevers resolved   Improved sepsis and off pressors   Recovered bowel function, started TF -tolerating well    #Septic shock due to perforated diverticulum. Ecoli  bacteremia. On Zosyn and Diflucan. Severe hypotension , no improvement with IVF boluses  was On IV vasopressors - vaso and levophed- improved Bp  Now off pressors     #Acute respiratory failure on the ventilator. Sec to perforated viscus - remained on vent post surgery   Management per pulmonary improved . Extubated to NC 5 L  Worsened again with aspiration event during SLP  Now on vapotherm 50 %, wean as able  Lasix as tolerated     #Paroxysmal A. fib. With RVR, started . Off IV amiodarone. Coumadin on hold, INR reversed perioperatively with vit k , ffp  Currently on heparin drip  Continue metoprolol    #COVID-19   Incidental finding.  No covid related issues    # Anemia  - multifactorial - surgery, iatrogenic, nutritional. Monitor and transfuse less than 7    # Edema - good UOP. Added lasix 40 IV twice daily, tolerating well with resolution of peripheral edema      Hypernatremia  Increase water boluses     Hypokalemia - replace     for DVT prophylaxis.   On TF   Out of bed     Romana Just, MD 7:25 AM 1/21/2023

## 2023-01-21 NOTE — PROGRESS NOTES
Bedside report and Pt care transferred to [facility]. Pt denies any assistance at this time.

## 2023-01-21 NOTE — PLAN OF CARE
Problem: Discharge Planning  Goal: Discharge to home or other facility with appropriate resources  Outcome: Progressing     Problem: Pain  Goal: Verbalizes/displays adequate comfort level or baseline comfort level  Outcome: Progressing     Problem: Skin/Tissue Integrity  Goal: Absence of new skin breakdown  Description: 1. Monitor for areas of redness and/or skin breakdown  2. Assess vascular access sites hourly  3. Every 4-6 hours minimum:  Change oxygen saturation probe site  4. Every 4-6 hours:  If on nasal continuous positive airway pressure, respiratory therapy assess nares and determine need for appliance change or resting period.   Outcome: Progressing     Problem: Nutrition Deficit:  Goal: Optimize nutritional status  Outcome: Progressing  Flowsheets (Taken 1/20/2023 1232 by Tiburcio Ignacio RD, LD)  Nutrient intake appropriate for improving, restoring, or maintaining nutritional needs: Recommend, monitor, and adjust tube feedings and TPN/PPN based on assessed needs     Problem: Neurosensory - Adult  Goal: Achieves stable or improved neurological status  Outcome: Progressing     Problem: Respiratory - Adult  Goal: Achieves optimal ventilation and oxygenation  Outcome: Progressing     Problem: Cardiovascular - Adult  Goal: Maintains optimal cardiac output and hemodynamic stability  Outcome: Progressing     Problem: Skin/Tissue Integrity - Adult  Goal: Skin integrity remains intact  Outcome: Progressing  Goal: Incisions, wounds, or drain sites healing without S/S of infection  Outcome: Progressing  Goal: Oral mucous membranes remain intact  Outcome: Progressing     Problem: Musculoskeletal - Adult  Goal: Return mobility to safest level of function  Outcome: Progressing     Problem: Gastrointestinal - Adult  Goal: Establish and maintain optimal ostomy function  Outcome: Progressing     Problem: Genitourinary - Adult  Goal: Absence of urinary retention  Outcome: Progressing  Goal: Urinary catheter remains patent  Outcome: Progressing     Problem: Infection - Adult  Goal: Absence of infection at discharge  Outcome: Progressing     Problem: Metabolic/Fluid and Electrolytes - Adult  Goal: Electrolytes maintained within normal limits  Outcome: Progressing  Goal: Glucose maintained within prescribed range  Outcome: Progressing     Problem: Hematologic - Adult  Goal: Maintains hematologic stability  Outcome: Progressing

## 2023-01-21 NOTE — PROGRESS NOTES
Shift assessment complete. Patient seen awake in bed. Patient is alert and oriented to person and place only. Patient seen on vapotherm at 35 L 65%. Patient with no c/o pain at this time. Patient with no s/s of distress noted. Physical assessment as charted in flow sheets. Midline incision covered with ABD, small amount of drainage noted. Scheduled medications given per orders. Central line dressing is clean, dry and intact with no signs of drainage. All tubing is current and dated. IPA caps applied to all access hubs. Heparin infusing at 12.8 mL/hr. Rick intact and secure, clear yellow urine draining. Dobhoff intact and secure, tube feed running at goal rate of 50 mL/hr with 200 q4h water flushes. Patient repositioned for comfort. Patient educated on use of call light and to call out with needs, verbalized understanding. Call light within reach.

## 2023-01-21 NOTE — PROGRESS NOTES
Speech Language Pathology    Speech Language Pathology  Dysphagia Treatment/Follow-Up Note  [facility]     Recommendations:  Solid Consistency: NPO  Liquid Consistency: NPO with ice chips sparingly after oral cares  Medication: Meds via alt means of nutrition  Recommend ice chips sparingly after oral cares to promote swallow recovery and clearance of dry, sticky secretions. FEES instrumental swallow evaluation is recommended once supported by stable respiratory status. Fatoumata Sanchez  : 4/3/1932 (80 y.o.)   MRN: [id number]  ROOM: [id number]  ADMISSION DATE: 2023  PATIENT DIAGNOSIS(ES): Diverticulitis of colon [K57.32]  Prolonged Q-T interval on ECG [R94.31]  Perforated viscus [R19.8]  Perforated diverticulum [K57.80]       Allergies   Allergen Reactions    Morphine Nausea And Vomiting         DATE ONSET: 2023     Pain: The patient does not complain of pain     Current Diet: Diet NPO with continuous DHT in use  ADULT TUBE FEEDING; Nasogastric; Standard with Fiber; Continuous; 50; No; 100; Q 4 hours     Dysphagia Treatment and Impressions:  Subjective: Pt seen in room at bedside with RN Eneida permission  Behavior / Cognition: oriented to self only, knows the [employer] play this weekend  RN Report/Chart Review:   : Admit to ICU, : bowel resection surgery, intubated 23-23. Covid +.    BSE: Rec NPO with 1/2 tsp pleasure feeds of puree and sips of water. : Transfer to ICU, concern for aspiration of pleasure feeds and made NPO    Baseline Respiratory Status Measures: Trial of HFNC yesterday was not tolerated and pt returned to 100% FiO2 via Vapotherm.  Currently receiving 35 lpm at 65% Fi02 with RR 18-20 across the visit     Liquid PO Trials:    IDDSI 0 Thin:  Assessed via tsp (5cc): suspect premature bolus loss into pharynx, swallow initiated but appears delayed in onset, and immediate and delayed coughing  Ice chips: trialed X3, Repeat Respiratory Status Measures: Pt O2 sat could not be obtained at bedside, pt on 3lpm NC with RR of 24/min     Impressions:    Dry thick blood-tinged secretions coated the entire palate, softened and removed with oral swabs. Small patchy lingual secretions also cleared. Pt reports she has dentures which were not donned for this assessment. Full lingual ROM is appreciated. Oral containment of ice chips was functional without bolus loss, small anterior loss with tsp of water. Swallow response was not palpable or absent with first ice chip trial. Swallow is elicited with 2 more trials with delayed weak cough response. Swallow onset with thin liquid appears delayed in onset with immediate weak cough post swallow. Given pt's prolonged intubation and high O2 needs as well as not clinically tolerating PO trials last week, instrumental swallow assessment is recommended prior to additional PO trials. FEES is preferred at this time due to ICU status, to be completed once respiratory status is stable. Recommend ice chips sparingly after oral cares to promote swallow recovery and clearance of dry, sticky secretions. Dysphagia Goals:  Timeframe for Long-term Goals: 10 days, 1/27/23  Goal 1: The pt will tolerate safest and least restrictive diet without clinical s/s of aspiration or change in respiratory status. Short-term Goals  Timeframe for Short-term Goals: 7 days, 1/24/23  Dysphagia Goals: The patient will tolerate recommended diet without observed clinical signs of aspiration, The patient will tolerate instrumental swallowing procedure, The patient/caregiver will demonstrate understanding of compensatory strategies for improved swallowing safety. , The patient will tolerate repeat bedside swallowing evaluation when able.      Speech/Language/Cog Goals: N/A     Recommendations:  Solid Consistency: NPO  Liquid Consistency: NPO with ice chips sparingly after oral cares  Medication: Meds via alt means of nutrition  Recommend ice chips sparingly after oral cares to promote swallow recovery and clearance of dry, sticky secretions. FEES instrumental swallow evaluation is recommended once supported by stable respiratory status. Education: SLP edu pt re: Role of SLP, Rationale for dysphagia tx, Aspiration precautions, FEES procedure. Pt verbalized understanding, would benefit from ongoing education, and RN aware of recommendations            Plan:    Continued Dysphagia treatment with goals per plan of care. FEES when appropriate     Discharge Recommendations: At this time anticipate pt would benefit from continued ST at next level of care   If pt discharges from hospital prior to Speech/Swallowing discharge, this note serves as tx and discharge summary. Total Treatment Time / Charges     Time in Time out Total Time / units   Cognitive Tx         Speech Tx         Dysphagia Tx  [id number] 15 min/ 1 unit      Signature: Rehana Jorge MS CCC-SLP  Speech Language Pathologist

## 2023-01-21 NOTE — PROGRESS NOTES
Pt has continuously taken her blanket off and keeps pulling at her lines and tubes. Pt reminded she cannot do that. Midline incision still noted to have a small amount of drainage on the new dressing placed. Colostomy output high.

## 2023-01-21 NOTE — PROGRESS NOTES
Pt shift assessment complete. See flowsheet. Pt is alert and oriented to person and place, she knows she is in the hospital, but disoriented to time and situation. Pt keeps requesting food. I reminded the pt she had a belly surgery and we are giving her food through a tube for right now until her breathing gets better. Pt has R IJ CVC. All lumens flush and draw appropriately. Dobhoff in right nare at 60 cm. Strict I&Os to be completed throughout he shift. Midline incision ABD pad changed. Drainage at the very bottom of the incision noted. , no coverage needed. VSS. Pt side rails up x3 and call light within reach.

## 2023-01-21 NOTE — PROGRESS NOTES
Reassessment complete. Patient seen awake in bed. Physical assessment unchanged from jonelle. Patient remains on vapotherm 35 L 65%. Patient with no current needs voiced. Call light is within reach.

## 2023-01-21 NOTE — PROGRESS NOTES
Pharmacy - RE:  High-dose Heparin drip  Current rate = 12.8 ml/h  (1280 units/h)  Anti-Xa drawn @ 0455 = 0.45 IU   Goal anti-Xa = 0.3 - 0.7  Per protocol, continue the same heparin drip rate @ 1280 Units/h (12.8 ml/h). Obtain another anti-Xa 1/22 with AM lab draws.

## 2023-01-21 NOTE — PROGRESS NOTES
Pt requested country music be turned on. Pt colostomy bag also emptied again. Copious amounts of output from it overnight.

## 2023-01-21 NOTE — PROGRESS NOTES
[facility] GENERAL SURGERY    Surgery Progress Note           POD # 4    PATIENT NAME: Shania Garner     TODAY'S DATE: 1/21/2023    INTERVAL HISTORY:    Mild abd pain     OBJECTIVE:   VITALS:  /88   Pulse 88   Temp 97.9 °F (36.6 °C) (Bladder)   Resp 21   Ht 5' 7\" (1.702 m)   Wt 194 lb 1.6 oz (88 kg)   SpO2 93%   BMI 30.40 kg/m²     INTAKE/OUTPUT:    I/O last 3 completed shifts: In: 4400.9 [I.V.:760.3; NG/GT:2808; IV Piggyback:832.5]  Out: 1891 [Urine:3800; Drains:70; BCETJ:9302]  I/O this shift: In: 521 [NG/GT:521]  Out: 630 [Urine:55; Stool:575]              CONSTITUTIONAL:  awake and alert  ABDOMEN:   normal bowel sounds, soft, non-distended, ostomy functional, ward serous  INCISION: some drainage from the superior and inferior aspect    Data:  CBC:   Recent Labs     01/19/23  0300 01/19/23  2200 01/20/23  0430 01/20/23  0945 01/21/23  0455   WBC 10.2  --  9.7  --  8.8   HGB 7.7*   < > 7.7* 7.6* 7.3*   HCT 23.2*   < > 23.6* 23.4* 23.0*   *  --  571*  --  567*    < > = values in this interval not displayed. BMP:    Recent Labs     01/20/23  0430 01/20/23  0945 01/21/23  0455    146* 147*   K 3.2* 3.7 2.8*    110 109   CO2 30 29 30   BUN 23* 24* 22*   CREATININE 0.7 0.8 0.8   GLUCOSE 149* 153* 144*       Hepatic: No results for input(s): AST, ALT, ALB, BILITOT, ALKPHOS in the last 72 hours. Mag:      Recent Labs     01/19/23  0300   MG 2.00        Phos:   No results for input(s): PHOS in the last 72 hours. INR: No results for input(s): INR in the last 72 hours. ASSESSMENT AND PLAN:  80 y.o. female status post Mann's and small bowel resection.   Continue antibiotics, tube feeds, and supportive care        Electronically signed by Trenton Mcgraw MD

## 2023-01-21 NOTE — PROGRESS NOTES
Inpatient Physical Therapy Daily Treatment Note    Unit: ICU  Date:  1/21/2023  Patient Name:    Zac Armijo  Admitting diagnosis:  Diverticulitis of colon [K57.32]  Prolonged Q-T interval on ECG [R94.31]  Perforated viscus [R19.8]  Perforated diverticulum [K57.80]  Admit Date:  1/6/2023  Precautions/Restrictions:  Fall risk, Bed/chair alarm, Lines -IV, Supplemental O2 (Vapotherm 35L, 65% O2), Rick catheter, and Drains (Dobhoff NG, RIJ CVC), Telemetry, Continuous pulse oximetry, and Isolation Precautions: Droplet Plus - COVID, NPO, heparin drip  LAPAROTOMY EXPLORATORY, SIGMOID COLECTOMY, SMALL BOWEL RESECTION,OSTOMY 1/7/23     Intubated 1/7/23-1/12/23 1/18/23 transferred to ICU for worsening oxygenation  1/21/23 new orders received and therapy resumed       Discharge Recommendations: SNF  DME needs for discharge: defer to facility       Therapy recommendation for EMS Transport: requires transport by cot due to total assist for transfers    Therapy recommendations for staff:   Assist of 2 with use of MAXI-Move for all transfers to/from chair    History of Present Illness: per Dr Rosellen Kehr consult 1/7/23:  \"The patient is a 80 y.o. female who presented with severe, sharp lower abdominal pain that started yesterday. She is intubated in the ICU on pressors. History obtained from her daughter with whom the patient lives. The patient fell on [place] and was thought to be having some residual pain from the fall. Things worsened yesterday with no alleviating or modifying factors. She had associated nausea. Last colonoscopy was a few years ago with benign polyps removed. \"    Home Health S4 Level Recommendation: NA  AM-PAC Mobility Score   AM-PAC Inpatient Mobility Raw Score : 6  AM-PAC Inpatient Mobility without Stair Climbing Raw Score : 6    Treatment Time:  3052-8890; 4567-7689  Treatment number: 3  Timed Code Treatment Minutes: 29 minutes  Total Treatment Minutes:  29  minutes    Cognition    A&O Person and Place   Able to follow 1 step commands    Subjective  Patient lying supine in bed with no family present   Pt agreeable to this PT tx. Pain   No  Location:   Rating:    NA/10  Pain Medicine Status: No request made     Bed Mobility   Supine to Sit:    Not Tested  Sit to Supine:   Not Tested  Rolling: Max A  to R/L, decreased ability to attain hook lying positioning to initiate rolling  Scooting:   Not Tested    Transfer Training     Sit to stand:   Not Tested  Stand to sit:   Not Tested  Bed to Chair:   Not Tested with use of N/A    Gait Training gait deferred due to poor tolerance for bed mobility; pt ambulated 0 ft. Distance:      0 ft  Deviations (firm surface/linoleum):  N/A  Assistive Device Used:    N/A  Level of Assist:    Not Tested  Comment:     Stair Training deferred, pt unsafe/not appropriate to complete stairs at this time    Therapeutic Exercise all completed bilaterally unless indicated  Ankle Pumps x 10 reps, 2 sets  Quad sets: x 10 reps  Heel slides: x 10 reps, mod A, decrease ROM LLE  Hip abd/add: x 10 reps, mod A    Balance  Sitting:  Not tested; Not Tested  Comments:     Standing: Not tested; Not Tested  Comments:     Patient Education      Role of PT, POC, Discharge recommendations, HEP and calling for assist with mobility. Positioning Needs       Pt in bed, alarm set, positioned in proper neutral alignment and pressure relief provided. Call light provided and all needs within reach    Activity Tolerance   Pt completed therapy session with No adverse symptoms noted w/activity. Vitals stable, SpO2 > 93% on Vapotherm, HR 78-83 bpm    Other  Noted pt to have urine on dry-flow pad, notified RN and assisted pt with bed mobility to change linens    Assessment :  Patient demonstrates decreased activity tolerance this date from previous session, although noted pt transferred to ICU for worsening oxygenation 1/18/23.  Pt able to consistently follow 1 step directions to participate in be mobility and LE exercises this date. Recommending SNF upon discharge as patient functioning well below baseline, demonstrates good rehab potential and unable to return home due to inability to negotiate stairs to enter home/bedroom/bathroom, burden of care beyond caregiver ability, home environment not conducive to patient recovery, and limited safety awareness. Goals (all goals ongoing unless otherwise indicated)  To be met in 3 visits:  1). Roll S-S with Max A  2.) Patient will perform AAROM BLE's     To be met in 6 visits:  1.) Tolerate chair position x 5 minutes with midline head control  2). Bed to chair:  Assess when appropriate  New Goal 1/18/23: Will tolerate sitting EOB ~ 5 min with CGA    Plan   Continue with plan of care. Signature: Maria G Combs, PT, DPT #[id number]    If patient discharges from this facility prior to next visit, this note will serve as the Discharge Summary.

## 2023-01-21 NOTE — PROGRESS NOTES
Occupational Therapy Daily Treatment Note    Unit: ICU  Date:  1/21/2023  Patient Name:    Serenity Cesar  Admitting diagnosis:  Diverticulitis of colon [K57.32]  Prolonged Q-T interval on ECG [R94.31]  Perforated viscus [R19.8]  Perforated diverticulum [K57.80]  Admit Date:  1/6/2023  Precautions/Restrictions:  fall risk, IV, bed/chair alarm, de jesus catheter, supplemental O2 (Vapotherm 35lpm, 65%O2), colostomy, incision (drain R side), NGT, telemetry, continuous pulse ox, ICU monitoring, NPO, isolation precautions (Droplet +), code status (Full), and PICC    LAPAROTOMY EXPLORATORY, SIGMOID COLECTOMY, SMALL BOWEL RESECTION,OSTOMY 1/7/23     Intubated 1/7/23-1/12/23 1/18/23 transferred to ICU  1/21/23 new orders received and therapy resumed     Treatment Time:  9927-6755  Treatment number:  4   Total Treatment Time:   40 minutes    Patient Goals for Session:  \" to get stronger \"      Discharge Recommendations: SNF  DME needs for discharge: defer to facility       Therapy recommendations for staff:  Encourage bed mobility and exercises     History of Present Illness: per Dr Yumiko Hawthorne consult 1/7/23:  \"The patient is a 80 y.o. female who presented with severe, sharp lower abdominal pain that started yesterday. She is intubated in the ICU on pressors. History obtained from her daughter with whom the patient lives. The patient fell on [place] and was thought to be having some residual pain from the fall. Things worsened yesterday with no alleviating or modifying factors. She had associated nausea. Last colonoscopy was a few years ago with benign polyps removed. \"    Home Health S4 Level Recommendation:  NA    AM-PAC Score: AM-PAC Inpatient Daily Activity Raw Score: 8  Pt scored a 8/24 on the AM-PAC ADL Inpatient form. Current research shows that an AM-PAC score of 17 or less is typically not associated with a discharge to the patient's home setting.     Subjective:  Pt supine in bed upon therapist arrival. Pt agreeable to work with therapy this date. Pain   No  Rating: NA  Location:   Pain Medicine Status: RN notified      Cognition:    A&O Person   Able to follow 1 step commands, consistently. Balance:  Functional Sitting Balance:  NT   Comments:    Functional Standing Balance:NT   Comments:      Bed mobility:    Supine to sit:   Not Tested  Sit to supine:   Not Tested  Rolling:    Not Tested  Scooting in sitting:  Not Tested  Scooting to head of bed:   total assistance (100%)   Bridging:   No    Transfers:    Sit to stand:  Not Tested  Stand to sit:  Not Tested  Bed to chair:   Not Tested  Standard toilet: Not Tested  Bed to [facility]:  Not Tested    Activities of Daily Living:   UB Dressing:   Not Tested  LB Dressing:    Not Tested  UB Bathing:  minimal assistance (25%) and face   LB Bathing:  Not Tested  Feeding:  Indpt to use swab   Grooming:   Not Tested  Toileting:  Not Tested    Activity Tolerance:   Pt completed therapy session with No adverse symptoms noted w/activity  A fib, getting potassium   121/81   Vapotherm 35lpm, 65%O2    Therapeutic Exercise:   Forearm sup/pronation:  x10  Elbow flex/ext  Shoulder flex/ext:  x10  Scapular retraction:  x10  Scapular protraction:  x10  Shoulder shrugs:  x10  IR/ER: x10     Patient Education:   Role of OT  Recommendations for DC    Positioning Needs: In bed, call light and needs in reach. Alarm Set    Family Present:  No    Assessment: Pt may benefit from continued skilled occupational therapy while in the hospital and upon d/c to SNF in order to progress to a safe and more indpt level of functioning. GOALS  To be met in 3 Visits:  1).  Bed to toilet/BSC: Mod A with STEDY     To be met in 5 Visits:  1.) Pt will perform supine to sit: mod A  2.) pt will maintain unsupported sitting for 15 minutes   3.) pt  will perform upper body ADLs: min A      Plan: cont with PANKAJ Wilson, MOT, OTR/L   [id number]       If patient discharges from this facility prior to next visit, this note will serve as the Discharge Summary

## 2023-01-21 NOTE — PROGRESS NOTES
Pulmonary & Critical Care Medicine ICU Progress Note    CC: Abdominal pain, perforated diverticulum, septic shock    Events of Last 24 hours:   Back on Vapotherm yesterday   Vapotherm 35 LPM 65% FiO2  Heparin drip   Dysphagia   More coherent  Responding well to diuresis  Invasive Lines:   Right IJ CVC 1/7/2023    MV: 1/7-1/12/23      Intake/Output Summary (Last 24 hours) at 1/21/2023 0803  Last data filed at 1/21/2023 0656  Gross per 24 hour   Intake 2907.3 ml   Output 5685 ml   Net -2777.7 ml         Vitals:  Blood pressure 100/78, pulse 82, temperature 97.9 °F (36.6 °C), temperature source Bladder, resp. rate 21, height 5' 7\" (1.702 m), weight 194 lb 1.6 oz (88 kg), SpO2 94 %, not currently breastfeeding. Vapotherm    Constitutional: Mild  distress. Eyes: PERRL. Conjunctivae anicteric. ENT: Normal nose. Normal tongue. Neck:  Trachea is midline. No thyroid tenderness. Respiratory: + Accessory muscle usage. Few rhonchi.  + Decreased breath sounds. No wheezes. Few rales. Cardiovascular: Normal S1S2. No digit clubbing. No digit cyanosis. 1+ LE edema -improving  GI: Soft, stool in ostomy bag. + MICHELLE drain in place  Psychiatric: No anxiety or Agitation. +  following commands.     Scheduled Meds:   furosemide  40 mg IntraVENous Q12H    citalopram  10 mg Oral Daily    metoprolol tartrate  12.5 mg Oral BID    insulin lispro  0-4 Units SubCUTAneous Q4H    piperacillin-tazobactam  3,375 mg IntraVENous Q8H    sodium chloride flush  5-40 mL IntraVENous 2 times per day    pantoprazole  40 mg IntraVENous Daily     PRN Meds:  potassium chloride, magnesium sulfate, heparin (porcine), heparin (porcine), HYDROmorphone, albuterol, dextrose, glucose, dextrose bolus **OR** dextrose bolus, glucagon (rDNA), prochlorperazine, sodium chloride flush, sodium chloride, acetaminophen **OR** acetaminophen, albuterol sulfate HFA    Results:  CBC:   Recent Labs     01/19/23  0300 01/19/23  2200 01/20/23  0430 01/20/23  0945 01/21/23  0455   WBC 10.2  --  9.7  --  8.8   HGB 7.7*   < > 7.7* 7.6* 7.3*   HCT 23.2*   < > 23.6* 23.4* 23.0*   MCV 82.9  --  82.5  --  84.4   *  --  571*  --  567*    < > = values in this interval not displayed. BMP:   Recent Labs     01/20/23  0430 01/20/23  0945 01/21/23  0455    146* 147*   K 3.2* 3.7 2.8*    110 109   CO2 30 29 30   BUN 23* 24* 22*   CREATININE 0.7 0.8 0.8     LIVER PROFILE:   No results for input(s): AST, ALT, LIPASE, BILIDIR, BILITOT, ALKPHOS in the last 72 hours. Invalid input(s): AMYLASE,  ALB    Cultures:  1/6/2023 SARS-CoV-2 positive  1/6/2023 BCx Escherichia coli and Clostridium ramosum        Imaging:  CXR 1/18/23  images reviewed by me and showed:   New left upper lobe infiltrates with effusion    LE doppler 1/19  RLE DVT         ASSESSMENT:  Acute hypoxemic respiratory failure -worsening  Aspiration pneumonia/pneumonitis  E. coli bacteremia  Hypernatremia  Electrolyte disorder  Acute RLE DVT   Diverticulitis with perforated viscus in the sigmoid colon area, post op x-lap with sigmoid colectomy and small bowel resection and ostomy on 1/7/23  COVID-19 infection, incidental finding  Paroxysmal atrial fibrillation, now AFIB RVR    H/O esophageal stricture   Coagulopathy 2/2 warfarin- S/P 3 U FFP & vitamin K; give K-centra X 1 prior to emergent surgery    PLAN:  COVID-19 isolation, droplet plus   Continue Vapotherm for life-threatening acute hypoxemic respiratory failure and titrate to maintain SaO2 >92%  D#13 Zosyn. Completed 9 days of Diflucan -duration per general surgery  Follow up repeat BCx  Lasix 40 mg IV Q12 hrs ---.  Daily   Electrolytes replacement  TFs at goal - resume and increase free water 200 Q4 hrs   Monitor H&H and transfuse for Hb < 7   Resumed home lopressor 12.5 BID   Resume home Celexa  PT OT   Blood sugar control ISS, with goal 150-180  Heparin drip- on Coumadin at home         Total critical care time caring for this patient with life threatening, unstable organ failure, including direct patient contact, management of life support systems, review of data including imaging and labs, discussions with other team members and physicians is 31 minutes, excluding procedures.

## 2023-01-22 LAB
ANION GAP SERPL CALCULATED.3IONS-SCNC: 7 MMOL/L (ref 3–16)
ANTI-XA UNFRAC HEPARIN: 0.37 IU/ML (ref 0.3–0.7)
BUN BLDV-MCNC: 24 MG/DL (ref 7–20)
CALCIUM SERPL-MCNC: 8.6 MG/DL (ref 8.3–10.6)
CHLORIDE BLD-SCNC: 108 MMOL/L (ref 99–110)
CO2: 29 MMOL/L (ref 21–32)
CREAT SERPL-MCNC: 0.8 MG/DL (ref 0.6–1.2)
GFR SERPL CREATININE-BSD FRML MDRD: >60 ML/MIN/{1.73_M2}
GLUCOSE BLD-MCNC: 117 MG/DL (ref 70–99)
GLUCOSE BLD-MCNC: 133 MG/DL (ref 70–99)
GLUCOSE BLD-MCNC: 136 MG/DL (ref 70–99)
GLUCOSE BLD-MCNC: 73 MG/DL (ref 70–99)
GLUCOSE BLD-MCNC: 78 MG/DL (ref 70–99)
GLUCOSE BLD-MCNC: 96 MG/DL (ref 70–99)
GLUCOSE BLD-MCNC: 97 MG/DL (ref 70–99)
HCT VFR BLD CALC: 22.8 % (ref 36–48)
HEMOGLOBIN: 7.2 G/DL (ref 12–16)
MAGNESIUM: 2 MG/DL (ref 1.8–2.4)
MCH RBC QN AUTO: 26.6 PG (ref 26–34)
MCHC RBC AUTO-ENTMCNC: 31.7 G/DL (ref 31–36)
MCV RBC AUTO: 83.9 FL (ref 80–100)
PDW BLD-RTO: 16.1 % (ref 12.4–15.4)
PERFORMED ON: ABNORMAL
PERFORMED ON: ABNORMAL
PERFORMED ON: NORMAL
PLATELET # BLD: 618 K/UL (ref 135–450)
PMV BLD AUTO: 7.3 FL (ref 5–10.5)
POTASSIUM REFLEX MAGNESIUM: 2.8 MMOL/L (ref 3.5–5.1)
RBC # BLD: 2.72 M/UL (ref 4–5.2)
SODIUM BLD-SCNC: 144 MMOL/L (ref 136–145)
WBC # BLD: 8.9 K/UL (ref 4–11)

## 2023-01-22 PROCEDURE — 80048 BASIC METABOLIC PNL TOTAL CA: CPT

## 2023-01-22 PROCEDURE — 6370000000 HC RX 637 (ALT 250 FOR IP): Performed by: INTERNAL MEDICINE

## 2023-01-22 PROCEDURE — 6360000002 HC RX W HCPCS: Performed by: SURGERY

## 2023-01-22 PROCEDURE — 2580000003 HC RX 258: Performed by: SURGERY

## 2023-01-22 PROCEDURE — C9113 INJ PANTOPRAZOLE SODIUM, VIA: HCPCS | Performed by: SURGERY

## 2023-01-22 PROCEDURE — 99232 SBSQ HOSP IP/OBS MODERATE 35: CPT | Performed by: INTERNAL MEDICINE

## 2023-01-22 PROCEDURE — 85027 COMPLETE CBC AUTOMATED: CPT

## 2023-01-22 PROCEDURE — 2000000000 HC ICU R&B

## 2023-01-22 PROCEDURE — 83735 ASSAY OF MAGNESIUM: CPT

## 2023-01-22 PROCEDURE — 99024 POSTOP FOLLOW-UP VISIT: CPT | Performed by: SURGERY

## 2023-01-22 PROCEDURE — 6360000002 HC RX W HCPCS: Performed by: INTERNAL MEDICINE

## 2023-01-22 PROCEDURE — 36592 COLLECT BLOOD FROM PICC: CPT

## 2023-01-22 PROCEDURE — 94761 N-INVAS EAR/PLS OXIMETRY MLT: CPT

## 2023-01-22 PROCEDURE — 85520 HEPARIN ASSAY: CPT

## 2023-01-22 PROCEDURE — 2700000000 HC OXYGEN THERAPY PER DAY

## 2023-01-22 RX ORDER — CASTOR OIL AND BALSAM, PERU 788; 87 MG/G; MG/G
OINTMENT TOPICAL 2 TIMES DAILY
Status: DISCONTINUED | OUTPATIENT
Start: 2023-01-22 | End: 2023-01-26 | Stop reason: HOSPADM

## 2023-01-22 RX ORDER — POTASSIUM CHLORIDE 29.8 MG/ML
20 INJECTION INTRAVENOUS
Status: COMPLETED | OUTPATIENT
Start: 2023-01-22 | End: 2023-01-23

## 2023-01-22 RX ADMIN — CITALOPRAM HYDROBROMIDE 10 MG: 20 TABLET ORAL at 08:10

## 2023-01-22 RX ADMIN — POTASSIUM CHLORIDE 20 MEQ: 400 INJECTION, SOLUTION INTRAVENOUS at 05:45

## 2023-01-22 RX ADMIN — Medication: at 20:55

## 2023-01-22 RX ADMIN — SODIUM CHLORIDE 5 ML/HR: 9 INJECTION, SOLUTION INTRAVENOUS at 05:02

## 2023-01-22 RX ADMIN — METOPROLOL TARTRATE 12.5 MG: 25 TABLET, FILM COATED ORAL at 20:54

## 2023-01-22 RX ADMIN — HEPARIN SODIUM 1280 UNITS/HR: 10000 INJECTION, SOLUTION INTRAVENOUS at 04:05

## 2023-01-22 RX ADMIN — METOPROLOL TARTRATE 12.5 MG: 25 TABLET, FILM COATED ORAL at 08:10

## 2023-01-22 RX ADMIN — PIPERACILLIN SODIUM,TAZOBACTAM SODIUM 3375 MG: 3; .375 INJECTION, POWDER, FOR SOLUTION INTRAVENOUS at 14:53

## 2023-01-22 RX ADMIN — POTASSIUM CHLORIDE 20 MEQ: 400 INJECTION, SOLUTION INTRAVENOUS at 08:19

## 2023-01-22 RX ADMIN — PIPERACILLIN SODIUM,TAZOBACTAM SODIUM 3375 MG: 3; .375 INJECTION, POWDER, FOR SOLUTION INTRAVENOUS at 22:40

## 2023-01-22 RX ADMIN — POTASSIUM CHLORIDE 20 MEQ: 400 INJECTION, SOLUTION INTRAVENOUS at 07:01

## 2023-01-22 RX ADMIN — PANTOPRAZOLE SODIUM 40 MG: 40 INJECTION, POWDER, FOR SOLUTION INTRAVENOUS at 08:10

## 2023-01-22 RX ADMIN — FUROSEMIDE 40 MG: 10 INJECTION, SOLUTION INTRAMUSCULAR; INTRAVENOUS at 08:11

## 2023-01-22 RX ADMIN — PIPERACILLIN SODIUM,TAZOBACTAM SODIUM 3375 MG: 3; .375 INJECTION, POWDER, FOR SOLUTION INTRAVENOUS at 06:38

## 2023-01-22 RX ADMIN — POTASSIUM CHLORIDE 20 MEQ: 29.8 INJECTION, SOLUTION INTRAVENOUS at 13:04

## 2023-01-22 RX ADMIN — HYDROMORPHONE HYDROCHLORIDE 0.25 MG: 1 INJECTION, SOLUTION INTRAMUSCULAR; INTRAVENOUS; SUBCUTANEOUS at 21:00

## 2023-01-22 RX ADMIN — POTASSIUM CHLORIDE 20 MEQ: 29.8 INJECTION, SOLUTION INTRAVENOUS at 11:54

## 2023-01-22 RX ADMIN — SODIUM CHLORIDE, PRESERVATIVE FREE 10 ML: 5 INJECTION INTRAVENOUS at 20:55

## 2023-01-22 RX ADMIN — HYDROMORPHONE HYDROCHLORIDE 0.25 MG: 1 INJECTION, SOLUTION INTRAMUSCULAR; INTRAVENOUS; SUBCUTANEOUS at 05:11

## 2023-01-22 RX ADMIN — Medication: at 11:54

## 2023-01-22 RX ADMIN — SODIUM CHLORIDE, PRESERVATIVE FREE 10 ML: 5 INJECTION INTRAVENOUS at 08:10

## 2023-01-22 RX ADMIN — HEPARIN SODIUM 1280 UNITS/HR: 10000 INJECTION, SOLUTION INTRAVENOUS at 22:38

## 2023-01-22 ASSESSMENT — PAIN - FUNCTIONAL ASSESSMENT
PAIN_FUNCTIONAL_ASSESSMENT: PREVENTS OR INTERFERES SOME ACTIVE ACTIVITIES AND ADLS

## 2023-01-22 ASSESSMENT — PAIN DESCRIPTION - PAIN TYPE: TYPE: ACUTE PAIN

## 2023-01-22 ASSESSMENT — PAIN DESCRIPTION - DESCRIPTORS
DESCRIPTORS: ACHING

## 2023-01-22 ASSESSMENT — PAIN DESCRIPTION - LOCATION
LOCATION: ABDOMEN

## 2023-01-22 ASSESSMENT — PAIN SCALES - GENERAL
PAINLEVEL_OUTOF10: 2
PAINLEVEL_OUTOF10: 4
PAINLEVEL_OUTOF10: 4
PAINLEVEL_OUTOF10: 3
PAINLEVEL_OUTOF10: 4

## 2023-01-22 ASSESSMENT — PAIN DESCRIPTION - ORIENTATION
ORIENTATION: MID;LOWER
ORIENTATION: MID
ORIENTATION: MID

## 2023-01-22 ASSESSMENT — PAIN DESCRIPTION - FREQUENCY: FREQUENCY: CONTINUOUS

## 2023-01-22 NOTE — PROGRESS NOTES
Pharmacy - RE:  High-dose Heparin drip  Current rate = 12.8 ml/h  (1280 units/h)  Anti-Xa drawn @ 0442 = 0.37 IU   Goal anti-Xa = 0.3 - 0.7  Per protocol, continue the same heparin drip rate @ 1280 Units/h (12.8 ml/h). Obtain another anti-Xa 1/23 with AM lab draws.

## 2023-01-22 NOTE — PROGRESS NOTES
Reassessment complete. Patient remains awake in bed with physical assessment unchanged. Patient repositioned for comfort.

## 2023-01-22 NOTE — PROGRESS NOTES
Reassessment complete. Patient remains awake in bed. Physical assessment unchanged from previous. Patient with no c/o pain at this time. Patient repositioned for comfort. Call light is within reach.

## 2023-01-22 NOTE — PROGRESS NOTES
Patient with 2 episodes of de jesus leaking this shift. De Jesus flushed, water added to balloon, no further signes of leaking.

## 2023-01-22 NOTE — PROGRESS NOTES
Pt Alert and oriented, disoriented to time. VSS. Assessment complete as charted. Repositioned for comfort. Call light in reach. Denies needs. Will continue to monitor. Pt on Vapo therm 35 Lt FiO2 65%. MLAI with dressing. Wet to dry dressing done on superior and inferior incision. J/P drain has serosanguineous drainage with dry dressing. Left colostomy has frequent drainage out, greenish brown. Rick in place. Pt on TF Jevity 1.5 at 50 ml/hr with water bolus 200 ml Q 4 H. Right I/J TLC in place. All lumen flushes well. dressing dry and intact.

## 2023-01-22 NOTE — PROGRESS NOTES
Care rounds complete with Dr. Nitza Jc. Updated potassium replaced with 60 mEq, new order for 40 mEq more of potassium IVPB with repeat back.

## 2023-01-22 NOTE — PROGRESS NOTES
Shift assessment complete. Patient seen awake in bed. Patient is oriented to person and place only. Patient seen on vapotherm at 35 L 65%, saturations 100%. RT notified. Patient denies pain at this time. Patient with no s/s of distress noted. Physical assessment as charted in flow sheets. Scheduled medications given per orders. Heparin infusing at 12.8 mL/hr. Central line dressing is clean, dry and intact with no signs of drainage. All tubing is current and dated. IPA caps applied to all access hubs. Peripheral IV C/D/I and functioning properly. Rick intact and secure, clear yellow urine draining. Dobhoff intact and secure, tube feed running at goal rate of 50 mL/hr with 200 q4h water flushes. Patient repositioned for comfort. Patient educated on use of call light and to call out with needs, verbalize understanding. Call light within reach.

## 2023-01-22 NOTE — PROGRESS NOTES
[facility] GENERAL SURGERY    Surgery Progress Note           POD # 5    PATIENT NAME: Renny Zacarias     TODAY'S DATE: 1/22/2023    INTERVAL HISTORY:    Mild abd pain, thirsty     OBJECTIVE:   VITALS:  BP 90/74   Pulse 79   Temp 99.4 °F (37.4 °C) (Axillary)   Resp 16   Ht 5' 7\" (1.702 m)   Wt 184 lb 12.8 oz (83.8 kg)   SpO2 98%   BMI 28.94 kg/m²     INTAKE/OUTPUT:    I/O last 3 completed shifts: In: 4474.4 [I.V.:586.1; NG/GT:3112; IV Piggyback:776.3]  Out: 6300 [Urine:2915; Drains:40; TDVLN:9856]  I/O this shift: In: 813 [NG/GT:813]  Out: 1100 [Urine:675; Stool:425]              CONSTITUTIONAL:  awake and alert  ABDOMEN:   normal bowel sounds, soft, non-distended, ostomy functional, ward serous  INCISION: some drainage from wound    Data:  CBC:   Recent Labs     01/20/23  0430 01/20/23  0945 01/21/23  0455 01/22/23  0442   WBC 9.7  --  8.8 8.9   HGB 7.7* 7.6* 7.3* 7.2*   HCT 23.6* 23.4* 23.0* 22.8*   *  --  567* 618*       BMP:    Recent Labs     01/20/23  0945 01/21/23  0455 01/22/23  0442   * 147* 144   K 3.7 2.8* 2.8*    109 108   CO2 29 30 29   BUN 24* 22* 24*   CREATININE 0.8 0.8 0.8   GLUCOSE 153* 144* 136*       Hepatic: No results for input(s): AST, ALT, ALB, BILITOT, ALKPHOS in the last 72 hours. Mag:      Recent Labs     01/22/23  0442   MG 2.00        Phos:   No results for input(s): PHOS in the last 72 hours. INR: No results for input(s): INR in the last 72 hours. ASSESSMENT AND PLAN:  80 y.o. female status post Mann's and small bowel resection.   Continue antibiotics, tube feeds, and supportive care        Electronically signed by Marely Valerio MD

## 2023-01-22 NOTE — PROGRESS NOTES
Pulmonary & Critical Care Medicine ICU Progress Note    CC: Abdominal pain, perforated diverticulum, septic shock    Events of Last 24 hours:   Off Vapotherm ---> 7L   Heparin drip   Dysphagia           Invasive Lines:   Right IJ CVC 1/7/2023    MV: 1/7-1/12/23      Intake/Output Summary (Last 24 hours) at 1/22/2023 0806  Last data filed at 1/22/2023 0430  Gross per 24 hour   Intake 2619.12 ml   Output 2680 ml   Net -60.88 ml         Vitals:  Blood pressure 105/61, pulse 78, temperature 98.2 °F (36.8 °C), temperature source Axillary, resp. rate 20, height 5' 7\" (1.702 m), weight 184 lb 12.8 oz (83.8 kg), SpO2 94 %, not currently breastfeeding. NC 7L   Constitutional: Mild distress. Eyes: PERRL. Conjunctivae anicteric. ENT: Normal nose. Normal tongue. Neck:  Trachea is midline. No thyroid tenderness. Respiratory: + Accessory muscle usage. Few rhonchi.  + Decreased breath sounds. No wheezes. Few rales. Cardiovascular: Normal S1S2. No digit clubbing. No digit cyanosis. 1+ LE edema - better   GI: Soft, stool in ostomy bag. Psychiatric: No anxiety or Agitation. +  following commands.     Scheduled Meds:   furosemide  40 mg IntraVENous Daily    citalopram  10 mg Oral Daily    metoprolol tartrate  12.5 mg Oral BID    insulin lispro  0-4 Units SubCUTAneous Q4H    piperacillin-tazobactam  3,375 mg IntraVENous Q8H    sodium chloride flush  5-40 mL IntraVENous 2 times per day    pantoprazole  40 mg IntraVENous Daily     PRN Meds:  potassium chloride, magnesium sulfate, heparin (porcine), heparin (porcine), HYDROmorphone, albuterol, dextrose, glucose, dextrose bolus **OR** dextrose bolus, glucagon (rDNA), prochlorperazine, sodium chloride flush, sodium chloride, acetaminophen **OR** acetaminophen, albuterol sulfate HFA    Results:  CBC:   Recent Labs     01/20/23  0430 01/20/23  0945 01/21/23  0455 01/22/23  0442   WBC 9.7  --  8.8 8.9   HGB 7.7* 7.6* 7.3* 7.2*   HCT 23.6* 23.4* 23.0* 22.8*   MCV 82.5  --  84.4 83.9   *  --  567* 618*     BMP:   Recent Labs     01/20/23  0945 01/21/23  0455 01/22/23  0442   * 147* 144   K 3.7 2.8* 2.8*    109 108   CO2 29 30 29   BUN 24* 22* 24*   CREATININE 0.8 0.8 0.8     LIVER PROFILE:   No results for input(s): AST, ALT, LIPASE, BILIDIR, BILITOT, ALKPHOS in the last 72 hours. Invalid input(s): AMYLASE,  ALB    Cultures:  1/6/2023 SARS-CoV-2 positive  1/6/2023 BCx Escherichia coli and Clostridium ramosum   1/20 BC NGTD     Imaging:  CXR 1/18/23  images reviewed by me and showed:   New left upper lobe infiltrates with effusion    LE doppler 1/19  RLE DVT         ASSESSMENT:  Acute hypoxemic respiratory failure -worsening  Aspiration pneumonia/pneumonitis  E. coli bacteremia  Hypernatremia  Electrolyte disorder  Acute RLE DVT   Diverticulitis with perforated viscus in the sigmoid colon area, post op x-lap with sigmoid colectomy and small bowel resection and ostomy on 1/7/23  COVID-19 infection, incidental finding  Paroxysmal atrial fibrillation, now AFIB RVR    H/O esophageal stricture   Coagulopathy 2/2 warfarin- S/P 3 U FFP & vitamin K; give K-centra X 1 prior to emergent surgery    PLAN:  COVID-19 isolation, droplet plus   HFNC/Vapotherm for life-threatening acute hypoxemic respiratory failure and titrate to maintain SaO2 >92%  D#14 Zosyn.   Completed 9 days of Diflucan -duration per general surgery  Follow up repeat BCx  Lasix 40 mg IV daily   Electrolytes replacement  TFs at goal- resume and increase free water 200 Q4 hrs   Monitor H&H and transfuse for Hb < 7   Resumed home lopressor 12.5 BID   Resumed home Celexa  PT OT   Blood sugar control ISS, with goal 150-180  Heparin drip- on Coumadin at home

## 2023-01-22 NOTE — PROGRESS NOTES
Internal Medicine ICU Progress Note      Events of Last 24 hours:-    perforated diverticular disease. . S.p emergency ex lap with sigmoid colectomy , small bowel resection and ostomy placement     Off vent support, off pressors    Patient was transferred back to the ICU  for worsening oxygenation post SLP eval  Likely secondary to aspiration    Pt seen up in bed, awake, alert  slowly improving strength  Placed on Vapotherm. Currently on 50% FiO2 and 25 L/min    Invasive Lines: right IJ CVC    MV: Intubated on 2023, extubated     No results for input(s): PHART, DYQ1GHT, PO2ART in the last 72 hours. MV Settings:  Vent Mode: AC/VC Resp Rate (Set): 16 bmp/Vt (Set, mL): 370 mL/ /FiO2 : 65 %    IV:   heparin (PORCINE) Infusion 1,280 Units/hr (23 0420)    dextrose Stopped (23 1048)    sodium chloride 5 mL/hr (23 0502)       Vitals:  Temp  Av.3 °F (36.3 °C)  Min: 96.6 °F (35.9 °C)  Max: 98.2 °F (36.8 °C)  Pulse  Av.4  Min: 75  Max: 91  BP  Min: 85/69  Max: 122/67  SpO2  Av.8 %  Min: 85 %  Max: 100 %  FiO2   Av %  Min: 65 %  Max: 65 %  Patient Vitals for the past 4 hrs:   BP Temp Temp src Pulse Resp SpO2 Weight   23 0511 -- -- -- -- 16 -- 184 lb 12.8 oz (83.8 kg)   23 0500 (!) 106/57 -- -- 87 14 96 % --   23 0400 112/63 98.2 °F (36.8 °C) Axillary 86 20 99 % --         CVP: CVP (Mean): 217 mmHg      Intake/Output Summary (Last 24 hours) at 2023 2624  Last data filed at 2023 0430  Gross per 24 hour   Intake 2619.12 ml   Output 2680 ml   Net -60.88 ml         EXAM:    General: elderly female   ,   remains  fatigued  but slowly improving   Eyes: PERRL. No sclera icterus. No conjunctiva injected. ENT: No discharge. NG in place  Neck: Trachea midline. Normal thyroid. Resp: No accessory muscle use. Diminished in bases with resolving crackles   CV: Regular rate. Regular rhythm. No mumur or rub. Resolving massive peripheral  edema. No JVD.  Palpable pedal pulses. GI: Non-tender. Mid abd surgical dressing and right LQ Bobby drain in place  left  sided ostomy in place with liquid stool   Non-distended. No masses. No organmegaly. sluggish bowel sounds. No hernia. Ext - resolving + edema to all ext  Neuro - improving mentation moving ext , able  to follow commands    Medications:  Scheduled Meds:   furosemide  40 mg IntraVENous Daily    citalopram  10 mg Oral Daily    metoprolol tartrate  12.5 mg Oral BID    insulin lispro  0-4 Units SubCUTAneous Q4H    piperacillin-tazobactam  3,375 mg IntraVENous Q8H    sodium chloride flush  5-40 mL IntraVENous 2 times per day    pantoprazole  40 mg IntraVENous Daily       PRN Meds:  potassium chloride, magnesium sulfate, heparin (porcine), heparin (porcine), HYDROmorphone, albuterol, dextrose, glucose, dextrose bolus **OR** dextrose bolus, glucagon (rDNA), prochlorperazine, sodium chloride flush, sodium chloride, acetaminophen **OR** acetaminophen, albuterol sulfate HFA    Results:  CBC:   Recent Labs     01/20/23  0430 01/20/23  0945 01/21/23  0455 01/22/23  0442   WBC 9.7  --  8.8 8.9   HGB 7.7* 7.6* 7.3* 7.2*   HCT 23.6* 23.4* 23.0* 22.8*   MCV 82.5  --  84.4 83.9   *  --  567* 618*       BMP:   Recent Labs     01/20/23  0945 01/21/23  0455 01/22/23  0442   * 147* 144   K 3.7 2.8* 2.8*    109 108   CO2 29 30 29   BUN 24* 22* 24*   CREATININE 0.8 0.8 0.8       LIVER PROFILE:   No results for input(s): AST, ALT, LIPASE, BILIDIR, BILITOT, ALKPHOS in the last 72 hours. Invalid input(s): AMYLASE,  ALB    PT/INR:   No results for input(s): PROTIME, INR in the last 72 hours. APTT:   No results for input(s): APTT in the last 72 hours. UA:  No results for input(s): NITRITE, COLORU, PHUR, LABCAST, WBCUA, RBCUA, MUCUS, TRICHOMONAS, YEAST, BACTERIA, CLARITYU, SPECGRAV, LEUKOCYTESUR, UROBILINOGEN, BILIRUBINUR, BLOODU, GLUCOSEU, AMORPHOUS in the last 72 hours.     Invalid input(s): KETONESU      Cultures:  [id number]  detected  Blood Culture positive for E. Coli and clostridium       Films:    VL Extremity Venous Bilateral   Final Result      XR CHEST PORTABLE   Final Result   New infiltrates seen within the left upper lung, compatible with pneumonia   versus aspiration. Increasing pleural effusions. XR ABDOMEN FOR NG/OG/NE TUBE PLACEMENT   Final Result   Tip of the feeding tube is in the expected position, projecting over the   gastric body. Continued pleuroparenchymal disease in the lower lungs bilaterally. XR CHEST PORTABLE   Final Result   Bibasilar effusions with mild congestion. Support tubes as described above. XR CHEST PORTABLE   Final Result   Small bilateral pleural effusions and bibasilar atelectasis or airspace   disease, increased on the left as compared to prior. XR CHEST PORTABLE   Final Result   No significant interval change in right greater than left pleural effusions   and bibasilar atelectasis or airspace disease as compared to prior. Tip of endotracheal tube is approximately 1.2 cm proximal to the kori and   could be retracted approximately 2 cm. XR CHEST PORTABLE   Final Result   Bilateral pleural effusions and adjacent lung consolidation, similar to prior         XR CHEST PORTABLE   Final Result   1. Stable lines, tubes and support devices. 2. Stable basilar opacities with pleural effusions. XR CHEST PORTABLE   Final Result   Increased pleural-parenchymal disease bilaterally         XR CHEST PORTABLE   Final Result   1. Endotracheal tube terminates in appropriate position above the kori. 2.  The enteric tube courses off the field of view in the upper abdomen. 3.  Right internal jugular line terminates at the level of the mid SVC. CT ABDOMEN PELVIS W IV CONTRAST Additional Contrast? None   Final Result   1.   Small volume pneumoperitoneum and extraluminal fecal material appears to arise from the sigmoid colon, presumably as a complication of diverticulitis. 2.  Small volume abdominopelvic ascites. No loculated fluid collection   identified. Findings were discussed with Claudia Mattson at 10:25 pm on 1/6/2023. Assessment:    Principal Problem: Bowel perforation ([address]  Active Problems:    Septic shock (HCC)    Acute hypoxemic respiratory failure (HCC)    COVID-19    Coagulopathy (HCC)    Diverticulitis of colon    Paroxysmal atrial fibrillation (HCC)    E coli bacteremia    Acute respiratory failure with hypoxia (HCC)    Hypernatremia    Aspiration into airway    Disorder of electrolytes    Leg edema    Acute deep vein thrombosis (DVT) of proximal vein of right lower extremity ([address]  Resolved Problems:    * No resolved hospital problems. *       Plan:    #Perforated diverticulum. Gram neg bacteremia    S.p emergency ex lap with sigmoid colectomy , small bowel resection and ostomy placement   Surgery managing    On Zosyn   Fevers resolved   Improved sepsis and off pressors   Recovered bowel function, started TF -tolerating well    #Septic shock due to perforated diverticulum. Ecoli  bacteremia. On Zosyn and Diflucan. Severe hypotension , no improvement with IVF boluses  was On IV vasopressors - vaso and levophed- improved Bp  Now off pressors     #Acute respiratory failure on the ventilator. Sec to perforated viscus - remained on vent post surgery   Management per pulmonary improved . Extubated to NC 5 L  Worsened again with aspiration event during SLP  Now on vapotherm 50 %, wean as able  Lasix as tolerated     #Paroxysmal A. fib. With RVR, started . Off IV amiodarone. Coumadin on hold, INR reversed perioperatively with vit k , ffp  Currently on heparin drip  Continue metoprolol    #COVID-19   Incidental finding.  No covid related issues    # Anemia  - multifactorial - surgery, iatrogenic, nutritional. Monitor and transfuse less than 7    # Edema - good UOP. Added lasix 40 IV twice daily, tolerating well with resolution of peripheral edema      Hypernatremia  Increase water boluses     Hypokalemia - replace     for DVT prophylaxis.   On TF   Out of bed     Clinton Khalil MD 7:27 AM 1/22/2023

## 2023-01-23 LAB
ANION GAP SERPL CALCULATED.3IONS-SCNC: 8 MMOL/L (ref 3–16)
ANTI-XA UNFRAC HEPARIN: 0.5 IU/ML (ref 0.3–0.7)
BUN BLDV-MCNC: 25 MG/DL (ref 7–20)
CALCIUM SERPL-MCNC: 8.6 MG/DL (ref 8.3–10.6)
CHLORIDE BLD-SCNC: 110 MMOL/L (ref 99–110)
CO2: 27 MMOL/L (ref 21–32)
CREAT SERPL-MCNC: 0.8 MG/DL (ref 0.6–1.2)
GFR SERPL CREATININE-BSD FRML MDRD: >60 ML/MIN/{1.73_M2}
GLUCOSE BLD-MCNC: 107 MG/DL (ref 70–99)
GLUCOSE BLD-MCNC: 134 MG/DL (ref 70–99)
GLUCOSE BLD-MCNC: 138 MG/DL (ref 70–99)
GLUCOSE BLD-MCNC: 140 MG/DL (ref 70–99)
GLUCOSE BLD-MCNC: 154 MG/DL (ref 70–99)
GLUCOSE BLD-MCNC: 179 MG/DL (ref 70–99)
GLUCOSE BLD-MCNC: 61 MG/DL (ref 70–99)
GLUCOSE BLD-MCNC: 95 MG/DL (ref 70–99)
HCT VFR BLD CALC: 22.3 % (ref 36–48)
HEMOGLOBIN: 7.1 G/DL (ref 12–16)
MAGNESIUM: 2 MG/DL (ref 1.8–2.4)
MCH RBC QN AUTO: 26.6 PG (ref 26–34)
MCHC RBC AUTO-ENTMCNC: 31.9 G/DL (ref 31–36)
MCV RBC AUTO: 83.4 FL (ref 80–100)
PDW BLD-RTO: 15.7 % (ref 12.4–15.4)
PERFORMED ON: ABNORMAL
PERFORMED ON: NORMAL
PLATELET # BLD: 616 K/UL (ref 135–450)
PMV BLD AUTO: 7.3 FL (ref 5–10.5)
POTASSIUM REFLEX MAGNESIUM: 3.2 MMOL/L (ref 3.5–5.1)
RBC # BLD: 2.68 M/UL (ref 4–5.2)
SODIUM BLD-SCNC: 145 MMOL/L (ref 136–145)
WBC # BLD: 8 K/UL (ref 4–11)

## 2023-01-23 PROCEDURE — 6360000002 HC RX W HCPCS: Performed by: SURGERY

## 2023-01-23 PROCEDURE — 2700000000 HC OXYGEN THERAPY PER DAY

## 2023-01-23 PROCEDURE — 85520 HEPARIN ASSAY: CPT

## 2023-01-23 PROCEDURE — 36592 COLLECT BLOOD FROM PICC: CPT

## 2023-01-23 PROCEDURE — 92526 ORAL FUNCTION THERAPY: CPT

## 2023-01-23 PROCEDURE — 2580000003 HC RX 258: Performed by: INTERNAL MEDICINE

## 2023-01-23 PROCEDURE — 99233 SBSQ HOSP IP/OBS HIGH 50: CPT | Performed by: INTERNAL MEDICINE

## 2023-01-23 PROCEDURE — 6370000000 HC RX 637 (ALT 250 FOR IP): Performed by: INTERNAL MEDICINE

## 2023-01-23 PROCEDURE — C9113 INJ PANTOPRAZOLE SODIUM, VIA: HCPCS | Performed by: SURGERY

## 2023-01-23 PROCEDURE — 2580000003 HC RX 258: Performed by: SURGERY

## 2023-01-23 PROCEDURE — 85027 COMPLETE CBC AUTOMATED: CPT

## 2023-01-23 PROCEDURE — 6360000002 HC RX W HCPCS: Performed by: INTERNAL MEDICINE

## 2023-01-23 PROCEDURE — 83735 ASSAY OF MAGNESIUM: CPT

## 2023-01-23 PROCEDURE — 80048 BASIC METABOLIC PNL TOTAL CA: CPT

## 2023-01-23 PROCEDURE — 99232 SBSQ HOSP IP/OBS MODERATE 35: CPT | Performed by: INTERNAL MEDICINE

## 2023-01-23 PROCEDURE — 94761 N-INVAS EAR/PLS OXIMETRY MLT: CPT

## 2023-01-23 PROCEDURE — 2060000000 HC ICU INTERMEDIATE R&B

## 2023-01-23 RX ORDER — QUETIAPINE FUMARATE 25 MG/1
25 TABLET, FILM COATED ORAL NIGHTLY
Status: DISCONTINUED | OUTPATIENT
Start: 2023-01-23 | End: 2023-01-26 | Stop reason: HOSPADM

## 2023-01-23 RX ADMIN — Medication: at 20:52

## 2023-01-23 RX ADMIN — PIPERACILLIN SODIUM,TAZOBACTAM SODIUM 3375 MG: 3; .375 INJECTION, POWDER, FOR SOLUTION INTRAVENOUS at 06:08

## 2023-01-23 RX ADMIN — METOPROLOL TARTRATE 12.5 MG: 25 TABLET, FILM COATED ORAL at 20:51

## 2023-01-23 RX ADMIN — HYDROMORPHONE HYDROCHLORIDE 0.25 MG: 1 INJECTION, SOLUTION INTRAMUSCULAR; INTRAVENOUS; SUBCUTANEOUS at 08:50

## 2023-01-23 RX ADMIN — PIPERACILLIN SODIUM,TAZOBACTAM SODIUM 3375 MG: 3; .375 INJECTION, POWDER, FOR SOLUTION INTRAVENOUS at 15:06

## 2023-01-23 RX ADMIN — CITALOPRAM HYDROBROMIDE 10 MG: 20 TABLET ORAL at 08:40

## 2023-01-23 RX ADMIN — METOPROLOL TARTRATE 12.5 MG: 25 TABLET, FILM COATED ORAL at 08:40

## 2023-01-23 RX ADMIN — HEPARIN SODIUM 1280 UNITS/HR: 10000 INJECTION, SOLUTION INTRAVENOUS at 19:31

## 2023-01-23 RX ADMIN — HYDROMORPHONE HYDROCHLORIDE 0.25 MG: 1 INJECTION, SOLUTION INTRAMUSCULAR; INTRAVENOUS; SUBCUTANEOUS at 23:04

## 2023-01-23 RX ADMIN — Medication: at 08:39

## 2023-01-23 RX ADMIN — PANTOPRAZOLE SODIUM 40 MG: 40 INJECTION, POWDER, FOR SOLUTION INTRAVENOUS at 08:40

## 2023-01-23 RX ADMIN — POTASSIUM CHLORIDE 20 MEQ: 400 INJECTION, SOLUTION INTRAVENOUS at 07:25

## 2023-01-23 RX ADMIN — SODIUM CHLORIDE, PRESERVATIVE FREE 10 ML: 5 INJECTION INTRAVENOUS at 20:52

## 2023-01-23 RX ADMIN — QUETIAPINE FUMARATE 25 MG: 25 TABLET ORAL at 20:51

## 2023-01-23 RX ADMIN — POTASSIUM BICARBONATE 40 MEQ: 782 TABLET, EFFERVESCENT ORAL at 10:25

## 2023-01-23 RX ADMIN — HYDROMORPHONE HYDROCHLORIDE 0.25 MG: 1 INJECTION, SOLUTION INTRAMUSCULAR; INTRAVENOUS; SUBCUTANEOUS at 02:51

## 2023-01-23 RX ADMIN — FUROSEMIDE 40 MG: 10 INJECTION, SOLUTION INTRAMUSCULAR; INTRAVENOUS at 08:40

## 2023-01-23 RX ADMIN — POTASSIUM CHLORIDE 20 MEQ: 400 INJECTION, SOLUTION INTRAVENOUS at 06:09

## 2023-01-23 RX ADMIN — PIPERACILLIN SODIUM,TAZOBACTAM SODIUM 3375 MG: 3; .375 INJECTION, POWDER, FOR SOLUTION INTRAVENOUS at 23:08

## 2023-01-23 ASSESSMENT — PAIN SCALES - GENERAL
PAINLEVEL_OUTOF10: 0
PAINLEVEL_OUTOF10: 3
PAINLEVEL_OUTOF10: 0
PAINLEVEL_OUTOF10: 2
PAINLEVEL_OUTOF10: 9
PAINLEVEL_OUTOF10: 4

## 2023-01-23 ASSESSMENT — PAIN DESCRIPTION - DESCRIPTORS
DESCRIPTORS: ACHING
DESCRIPTORS: CRAMPING;DISCOMFORT

## 2023-01-23 ASSESSMENT — PAIN DESCRIPTION - LOCATION
LOCATION: ABDOMEN

## 2023-01-23 ASSESSMENT — PAIN DESCRIPTION - ORIENTATION
ORIENTATION: MID;LOWER
ORIENTATION: MID

## 2023-01-23 ASSESSMENT — PAIN - FUNCTIONAL ASSESSMENT: PAIN_FUNCTIONAL_ASSESSMENT: PREVENTS OR INTERFERES SOME ACTIVE ACTIVITIES AND ADLS

## 2023-01-23 NOTE — PROGRESS NOTES
Pt alert to self, with intermittent confusion. VSS. Assessment complete as charted. Repositioned for comfort. Call light in reach. Denies needs. Will continue to monitor. MLAI dressing D and I. Wet to dry dressing done on superior and inferior incision. J/P drainage has minimal output. Left colostomy intact. Rick draining adequate amount of urine. Pt on 7 Lt high flow oxygen.  TF infusing Jevity 1.5 at 50 ml/hr with water flushes 200 ml Q 4 H.

## 2023-01-23 NOTE — CARE COORDINATION
INTERDISCIPLINARY PLAN OF CARE CONFERENCE    Date/Time: 1/23/2023 9:55 AM  Completed by: Philly Rendon RN, Case Management      Patient Name:  Tiffany Martinez  YOB: 1932  Admitting Diagnosis: Diverticulitis of colon [K57.32]  Prolonged Q-T interval on ECG [R94.31]  Perforated viscus [R19.8]  Perforated diverticulum [K57.80]     Admit Date/Time:  1/6/2023  8:46 PM    Chart reviewed. Interdisciplinary team contacted or reviewed plan related to patient progress and discharge plans. Disciplines included Case Management, Nursing, and Dietitian. Current Status:inpt  PT/OT recommendation for discharge plan of care: SNF    Expected D/C Disposition:  Skilled nursing facility    Discharge Plan Comments: Reviewed chart. Writer left  for [facility] with Velma Garcia to f/u on referral sent Friday. Awaiting swallow evaluation today and possible PEG placement. Following.     Home O2 in place on admit: No

## 2023-01-23 NOTE — PROGRESS NOTES
Morning assessment complete. Patient seen awake in bed. Patient is alert to person and place only. Patient seen on room air, saturations 94%. Patient yelling out and pulling at lines, patient does state she is in pain - prn medications given. Patient with no s/s of immediate distress noted. Physical assessment as charted in flow sheets. Scheduled medications given per order. Heparin infusing at 12.8 mL/hr. Central line dressing is clean, dry and intact with no signs of drainage. All tubing is current and dated. IPA caps applied to all access hubs. Peripheral IV C/D/I and functioning properly. Rick intact and secure, clear yellow urine draining. Dobhoff intact and secure, tube feed running at goal rate of 50 mL/hr with 200 q4h water flushes. Patient repositioned for comfort with call light within reach.

## 2023-01-23 NOTE — PROGRESS NOTES
Pharmacy - RE:  High-dose Heparin drip  Current rate = 12.8 ml/h  (1280 units/h)  Anti-Xa drawn @ 0453 = 0.50 IU/ml  Goal Anti-Xa = 0.3 - 0.7 IU/ml  Per protocol, continue current rate and obtain another Anti-Xa 1/24 @ 0600.     Teressa SevillaD, [facility], 1/23/2023 6:01 AM

## 2023-01-23 NOTE — CONSULTS
Pt seen for ostomy care. Daughter at bedside. CWOCN role explained to daughter. Current appliance intact but soiled. Daughter agreeable to pouch change lesson. Daughter shown how to empty and clean, open and close need of pouch. Old appliance removed, skin cleansed and patted dry. Stoma is pink and viable in center, 25% yellow and brown necrotic tissue noted from 7-9 o'clock with 1cm deep mucocutaneous separation at 9 o'clock. Loose sutures noted. Peristomal skin intact. Stool is brown and watery. Small barrier seal applied around stoma and then pt repouched using 2 1/4\" flat wafer and drainable pouch, Coloplast.  Good seal obtained. Discussed soaps, change frequency and supplies with daughter. Additional supplies placed in room. Discharge planning for rehab then home per daughter. Pt lives with daughter. Teaching folder given to daughter for review.

## 2023-01-23 NOTE — PROGRESS NOTES
Pt found several times with out her oxygen, oxygen saturation not less than 95%, will leave her on Room air for now.

## 2023-01-23 NOTE — FLOWSHEET NOTE
01/23/23 1857   Vital Signs   Temp 97.5 °F (36.4 °C)   Temp Source Axillary   Heart Rate 88   Heart Rate Source Monitor   Resp 18   /71   MAP (Calculated) 81   BP Location Right upper arm   BP Method Automatic   Patient Position Semi fowlers   Level of Consciousness 0   MEWS Score 2   Oxygen Therapy   SpO2 90 %   Pulse Oximetry Type Intermittent   O2 Device None (Room air)   Patient transferred from ICU. Tele placed. Patient denies any needs. Visitor at bedside.

## 2023-01-23 NOTE — PROGRESS NOTES
Reassessment complete. Patient remains awake in bed on room air. Physical assessment unchanged from previous. Patient with no s/s of distress at this time. Patient repositioned for comfort.

## 2023-01-23 NOTE — PROGRESS NOTES
Occupational/Physical Therapy  Attempted to see patient this pm, patient working with SLP and unavailable at this time. Will continue to follow.  Altagracia Sanches, OTR/L [id number]  Marybel Berman, PT, DPT

## 2023-01-23 NOTE — PROGRESS NOTES
Pulmonary & Critical Care Medicine ICU Progress Note    CC: Perforated diverticulum with septic shock    Events of Last 24 hours:   Now on RA   Asking for water    Invasive Lines:   Right IJ CVC 1/7/2023    MV: 1/7/23-1/12/23      Intake/Output Summary (Last 24 hours) at 1/23/2023 0742  Last data filed at 1/23/2023 0429  Gross per 24 hour   Intake 3449.6 ml   Output 2357.5 ml   Net 1092.1 ml         Vitals:  Blood pressure 110/67, pulse 82, temperature 98.8 °F (37.1 °C), temperature source Axillary, resp. rate 22, height 5' 7\" (1.702 m), weight 185 lb 11.2 oz (84.2 kg), SpO2 94 %, not currently breastfeeding. RA   Constitutional:  Mild acute distress. HENT:  Oropharynx is clear and moist.   Neck: No tracheal deviation present. Cardiovascular: Normal heart sounds. No lower extremity edema. Pulmonary/Chest: No wheezes. No rhonchi. No rales. + decreased breath sounds. No accessory muscle usage or stridor. Musculoskeletal: No cyanosis. No clubbing. Skin: Skin is warm and dry. Psychiatric: Normal mood and affect.   Neurologic: speech fluent but a little difficult to understand, alert and oriented to person, [facility] & strength symmetric      Scheduled Meds:   Venelex   Topical BID    furosemide  40 mg IntraVENous Daily    citalopram  10 mg Oral Daily    metoprolol tartrate  12.5 mg Oral BID    insulin lispro  0-4 Units SubCUTAneous Q4H    piperacillin-tazobactam  3,375 mg IntraVENous Q8H    sodium chloride flush  5-40 mL IntraVENous 2 times per day    pantoprazole  40 mg IntraVENous Daily     PRN Meds:  HYDROmorphone, potassium chloride, magnesium sulfate, heparin (porcine), heparin (porcine), albuterol, dextrose, glucose, dextrose bolus **OR** dextrose bolus, glucagon (rDNA), prochlorperazine, sodium chloride flush, sodium chloride, acetaminophen **OR** acetaminophen, albuterol sulfate HFA    Results:  CBC:   Recent Labs     01/21/23  0455 01/22/23  0442 01/23/23  0453   WBC 8.8 8.9 8.0   HGB 7.3* 7.2* 7.1*   HCT 23.0* 22.8* 22.3*   MCV 84.4 83.9 83.4   * 618* 616*     BMP:   Recent Labs     01/21/23  0455 01/22/23  0442 01/23/23  0453   * 144 145   K 2.8* 2.8* 3.2*    108 110   CO2 30 29 27   BUN 22* 24* 25*   CREATININE 0.8 0.8 0.8     LIVER PROFILE:   No results for input(s): AST, ALT, LIPASE, BILIDIR, BILITOT, ALKPHOS in the last 72 hours. Invalid input(s): AMYLASE,  ALB    Cultures:  1/6/2023 SARS-CoV-2 positive  1/6/2023 BCx Escherichia coli and Clostridium ramosum   1/20/2023 BC NGTD     Imaging:  CXR 1/18/23 personally reviewed: New left upper lobe infiltrates with effusion    LE doppler 1/19/23  RLE DVT     ASSESSMENT:  Acute hypoxemic respiratory failure   Aspiration pneumonia/pneumonitis  E. coli bacteremia  Hypernatremia  Electrolyte disorder  Acute RLE DVT   Diverticulitis with perforated viscus in the sigmoid colon, post op Mann's sigmoid colectomy and small bowel resection and ostomy on 1/7/23  COVID-19 infection, incidental finding  Paroxysmal atrial fibrillation   H/O esophageal stricture   Coagulopathy 2/2 warfarin- S/P 3 U FFP & vitamin K; given K-centra X 1 prior to emergent surgery    PLAN:  D#15 Zosyn.   Completed 9 days of Diflucan - ABX duration per general surgery  Follow up repeat BCx  Lasix 40 mg IV vanna   Electrolytes replacement  TFs at goal, speech planning FEES  PT OT   Blood sugar control ISS, with goal 150-180  Seroquel 25 HS  Heparin drip - on Coumadin at home

## 2023-01-23 NOTE — PROGRESS NOTES
RT Nebulizer Bronchodilator Protocol Note    There is a bronchodilator order in the chart from a provider indicating to follow the RT Bronchodilator Protocol and there is an Initiate RT Bronchodilator Protocol order as well (see protocol at bottom of note). CXR Findings:  No results found. The findings from the last RT Protocol Assessment were as follows:  Smoking: (P) Smoker 15 pack years or more  Respiratory Pattern: (P) Regular pattern and RR 12-20 bpm  Breath Sounds: (P) Slightly diminished and/or crackles  Cough: (P) Strong, spontaneous, non-productive  Indication for Bronchodilator Therapy: (P) Decreased or absent breath sounds  Bronchodilator Assessment Score: (P) 3    Aerosolized bronchodilator medication orders have been revised according to the RT Nebulizer Bronchodilator Protocol below. Respiratory Therapist to perform RT Therapy Protocol Assessment initially then follow the protocol. Repeat RT Therapy Protocol Assessment PRN for score 0-3 or on second treatment, BID, and PRN for scores above 3. No Indications - adjust the frequency to every 6 hours PRN wheezing or bronchospasm, if no treatments needed after 48 hours then discontinue using Per Protocol order mode. If indication present, adjust the RT bronchodilator orders based on the Bronchodilator Assessment Score as indicated below. If a patient is on this medication at home then do not decrease Frequency below that used at home. 0-3 - enter or revise RT bronchodilator order(s) to equivalent RT Bronchodilator order with Frequency of every 4 hours PRN for wheezing or increased work of breathing using Per Protocol order mode. 4-6 - enter or revise RT Bronchodilator order(s) to two equivalent RT bronchodilator orders with one order with BID Frequency and one order with Frequency of every 4 hours PRN wheezing or increased work of breathing using Per Protocol order mode.          7-10 - enter or revise RT Bronchodilator order(s) to two equivalent RT bronchodilator orders with one order with TID Frequency and one order with Frequency of every 4 hours PRN wheezing or increased work of breathing using Per Protocol order mode. 11-13 - enter or revise RT Bronchodilator order(s) to one equivalent RT bronchodilator order with QID Frequency and an Albuterol order with Frequency of every 4 hours PRN wheezing or increased work of breathing using Per Protocol order mode. Greater than 13 - enter or revise RT Bronchodilator order(s) to one equivalent RT bronchodilator order with every 4 hours Frequency and an Albuterol order with Frequency of every 2 hours PRN wheezing or increased work of breathing using Per Protocol order mode. RT to enter RT Home Evaluation for COPD & MDI Assessment order using Per Protocol order mode.     Electronically signed by Junior Waters RCP on 1/23/2023 at 2:09 PM

## 2023-01-23 NOTE — PROGRESS NOTES
Speech Language Pathology    Speech Language Pathology  Dysphagia Treatment/Follow-Up Note  Facility/Department: [facility] TELEMETRY     Recommendations:  Solid Consistency: NPO  Liquid Consistency: NPO with ice chips sparingly after oral cares  Medication: Meds via alt means of nutrition    Recommend ice chips- oral care before and after. FEES instrumental swallow evaluation is recommended. RN reported she will place order. Marques Fulling  : 4/3/1932 (80 y.o.)   MRN: [id number]  ROOM: [place] [address]  ADMISSION DATE: 2023  PATIENT DIAGNOSIS(ES): Diverticulitis of colon [K57.32]  Prolonged Q-T interval on ECG [R94.31]  Perforated viscus [R19.8]  Perforated diverticulum [K57.80]       Allergies   Allergen Reactions    Morphine Nausea And Vomiting         DATE ONSET: 2023     Pain: The patient does not complain of pain      Current Diet: Diet NPO with continuous DHT in use  ADULT TUBE FEEDING; Nasogastric; Standard with Fiber; Continuous; 50; No; 100; Q 4 hours     Dysphagia Treatment and Impressions:  Subjective: Pt seen in room at bedside with RENE Art permission  Behavior / Cognition: pt alert, cooperative and pleasant for tx session   RN Report/Chart Review:   : Admit to ICU, : bowel resection surgery, intubated 23-23. Covid +.    BSE: Rec NPO with 1/2 tsp pleasure feeds of puree and sips of water. : Transfer to ICU, concern for aspiration of pleasure feeds and made NPO. RN reported pt on RA. Baseline Respiratory Status Measures: pt SpO2 94% on RA with RR of 18      Liquid PO Trials:    Ice Chips: Assessed via tsp (5cc): no anterior bolus loss , suspect functional A-P bolus transit, swallow timing subjectively appears timely, no clinical s/s of aspiration, and vitals stable  IDDSI 0 Thin:  Assessed via tsp (5cc) and cup: no anterior bolus loss , suspect functional A-P bolus transit, swallow timing subjectively appears timely, no clinical s/s of aspiration, and vitals stable  IDDSI 0 Thin:  Assessed via straw: no anterior bolus loss , suspect functional A-P bolus transit, swallow timing subjectively appears timely, and coughing after the swallow    Solid PO Trials  IDDSI 4 Puree:   no anterior bolus loss , suspect functional A-P bolus transit, swallow timing subjectively appears timely, oral clearance grossly WFL, no clinical s/s of aspiration, and vitals stable     Repeat Respiratory Status Measures: pt SpO2 93% on RA with RR of 20      Impressions:   RN ok'd SLP entry. RN reported pt on RA. SLP completed oral care before PO trials. Dry secretions on pt's lingual surface. After oral care- secretions softened. Pt trialed with ice chips and thin liquids via tsp and cup with no overt s/s of aspiration. No anterior bolus loss, suspect functional A-P bolus transit, and swallow timing subjectively appears timely. Pt trialed with thin liquids via straw. Pt with immediate wet non-productive cough. Pt trialed with puree with no overt s/s of aspiration. No anterior bolus loss, suspect functional A-P bolus transit, swallow timing subjectively appears timely, and oral clearance grossly WFL. Pt's vitals remained stable throughout PO trials. SpO2 93-95% and RR 18-20. Instrumental swallow assessment is recommended prior to advancing pt to diet. FEES is preferred at this time due to ICU status. RN reported she will place FEES order. SLP rec NPO- ice chips with oral care before and after. FEES assessment for 01/24. Pt verbalized understadning of procedure and protocols. RN aware and in agreement. ST to follow. Dysphagia Goals:  Timeframe for Long-term Goals: 10 days, 1/27/23  Goal 1: The pt will tolerate safest and least restrictive diet without clinical s/s of aspiration or change in respiratory status. 1/23/2023 : Ongoing, progressing. Pt NPO.      Short-term Goals  Timeframe for Short-term Goals: 7 days, 1/24/23  Goal 1: The patient will tolerate recommended diet without observed clinical signs of aspiration  1/23/2023 : Pt NPO     Goal 2: The patient will tolerate instrumental swallowing procedure  1/23/2023 : RN Jennifer Alberto reported she will place FEES order. Goal 3: The patient/caregiver will demonstrate understanding of compensatory strategies for improved swallowing safety. 1/23/2023 : Goal addressed, see above. Ongoing, progressing. Goal 4: The patient will tolerate repeat bedside swallowing evaluation when able. 1/23/2023 : Goal met. Speech/Language/Cog Goals: N/A     Recommendations:  Solid Consistency: NPO  Liquid Consistency: NPO with ice chips sparingly after oral cares  Medication: Meds via alt means of nutrition    Recommend ice chips- oral care before and after. FEES instrumental swallow evaluation is recommended. RN reported she will place order. Education: SLP edu pt re: Role of SLP, Rationale for dysphagia tx, Aspiration precautions, FEES procedure. Pt verbalized understanding, would benefit from ongoing education, and RN aware of recommendations            Plan:    Continued Dysphagia treatment with goals per plan of care. FEES when appropriate     Discharge Recommendations: At this time anticipate pt would benefit from continued ST at next level of care   If pt discharges from hospital prior to Speech/Swallowing discharge, this note serves as tx and discharge summary.       Total Treatment Time / Charges     Time in Time out Total Time / units   Cognitive Tx         Speech Tx         Dysphagia Tx 1340 1406 26 min/ 1 unit      Signature:  Dong DOYLE-SLP  Clinical Fellow  [id number]

## 2023-01-23 NOTE — PROGRESS NOTES
Internal Medicine ICU Progress Note      Events of Last 24 hours:-    perforated diverticular disease. . S.p emergency ex lap with sigmoid colectomy , small bowel resection and ostomy placement     Off vent support, off pressors    Patient was transferred back to the ICU  for worsening oxygenation post SLP eval  Likely secondary to aspiration    Pt seen up in bed, awake, alert  slowly improving strength  Placed on Vapotherm. Currently on 50% FiO2 and 25 L/min    - on room air. ST to see. She is hungry. Invasive Lines: right IJ CVC    MV: Intubated on 2023, extubated     No results for input(s): PHART, PUR4ODI, PO2ART in the last 72 hours. MV Settings:  Vent Mode: AC/VC Resp Rate (Set): 16 bmp/Vt (Set, mL): 370 mL/ /FiO2 : 55 %    IV:   heparin (PORCINE) Infusion 1,280 Units/hr (23)    dextrose Stopped (23 1048)    sodium chloride 5 mL/hr at 23       Vitals:  Temp  Av.5 °F (36.9 °C)  Min: 97.1 °F (36.2 °C)  Max: 99.1 °F (37.3 °C)  Pulse  Av.1  Min: 75  Max: 102  BP  Min: 87/64  Max: 125/95  SpO2  Av.3 %  Min: 90 %  Max: 100 %  Patient Vitals for the past 4 hrs:   BP Pulse Resp SpO2   23 1500 (!) 113/91 87 11 96 %   23 1400 110/65 93 15 94 %   23 1300 90/63 84 19 --   23 1200 103/64 88 24 93 %         CVP: CVP (Mean): 217 mmHg      Intake/Output Summary (Last 24 hours) at 2023 1511  Last data filed at 2023 1136  Gross per 24 hour   Intake 3035.6 ml   Output 1957.5 ml   Net 1078.1 ml         EXAM:    General: elderly female, remains  fatigued  but slowly improving   Eyes: PERRL. No sclera icterus. No conjunctiva injected. ENT: No discharge. NG in place  Neck: Trachea midline. Normal thyroid. Resp: No accessory muscle use. Diminished in bases with resolving crackles   CV: Regular rate. Regular rhythm. No mumur or rub. Resolving massive peripheral  edema. No JVD. Palpable pedal pulses. GI: Non-tender.  Mid abd surgical dressing and right LQ Bobby drain in place  left sided ostomy in place with liquid stool   Non-distended. No masses. No organmegaly. sluggish bowel sounds. No hernia. Ext - resolving + edema to all ext  Neuro - improving mentation moving ext , able  to follow commands    Medications:  Scheduled Meds:   QUEtiapine  25 mg Oral Nightly    Venelex   Topical BID    furosemide  40 mg IntraVENous Daily    citalopram  10 mg Oral Daily    metoprolol tartrate  12.5 mg Oral BID    insulin lispro  0-4 Units SubCUTAneous Q4H    piperacillin-tazobactam  3,375 mg IntraVENous Q8H    sodium chloride flush  5-40 mL IntraVENous 2 times per day    pantoprazole  40 mg IntraVENous Daily       PRN Meds:  HYDROmorphone, potassium chloride, magnesium sulfate, heparin (porcine), heparin (porcine), albuterol, dextrose, glucose, dextrose bolus **OR** dextrose bolus, glucagon (rDNA), prochlorperazine, sodium chloride flush, sodium chloride, acetaminophen **OR** acetaminophen    Results:  CBC:   Recent Labs     01/21/23  0455 01/22/23  0442 01/23/23  0453   WBC 8.8 8.9 8.0   HGB 7.3* 7.2* 7.1*   HCT 23.0* 22.8* 22.3*   MCV 84.4 83.9 83.4   * 618* 616*       BMP:   Recent Labs     01/21/23  0455 01/22/23  0442 01/23/23  0453   * 144 145   K 2.8* 2.8* 3.2*    108 110   CO2 30 29 27   BUN 22* 24* 25*   CREATININE 0.8 0.8 0.8         Cultures:  COVID-19  detected  Blood Culture positive for E. Coli and clostridium       Films:    VL Extremity Venous Bilateral   Final Result      XR CHEST PORTABLE   Final Result   New infiltrates seen within the left upper lung, compatible with pneumonia   versus aspiration. Increasing pleural effusions. XR ABDOMEN FOR NG/OG/NE TUBE PLACEMENT   Final Result   Tip of the feeding tube is in the expected position, projecting over the   gastric body. Continued pleuroparenchymal disease in the lower lungs bilaterally.          XR CHEST PORTABLE   Final Result   Bibasilar effusions with mild congestion. Support tubes as described above. XR CHEST PORTABLE   Final Result   Small bilateral pleural effusions and bibasilar atelectasis or airspace   disease, increased on the left as compared to prior. XR CHEST PORTABLE   Final Result   No significant interval change in right greater than left pleural effusions   and bibasilar atelectasis or airspace disease as compared to prior. Tip of endotracheal tube is approximately 1.2 cm proximal to the kori and   could be retracted approximately 2 cm. XR CHEST PORTABLE   Final Result   Bilateral pleural effusions and adjacent lung consolidation, similar to prior         XR CHEST PORTABLE   Final Result   1. Stable lines, tubes and support devices. 2. Stable basilar opacities with pleural effusions. XR CHEST PORTABLE   Final Result   Increased pleural-parenchymal disease bilaterally         XR CHEST PORTABLE   Final Result   1. Endotracheal tube terminates in appropriate position above the kori. 2.  The enteric tube courses off the field of view in the upper abdomen. 3.  Right internal jugular line terminates at the level of the mid SVC. CT ABDOMEN PELVIS W IV CONTRAST Additional Contrast? None   Final Result   1. Small volume pneumoperitoneum and extraluminal fecal material appears to   arise from the sigmoid colon, presumably as a complication of diverticulitis. 2.  Small volume abdominopelvic ascites. No loculated fluid collection   identified. Findings were discussed with Ruby Ferreira at 10:25 pm on 1/6/2023. Assessment:    Principal Problem:     Bowel perforation ([address]  Active Problems:    Septic shock (HCC)    Acute hypoxemic respiratory failure (HCC)    COVID-19    Coagulopathy (HCC)    Diverticulitis of colon    Paroxysmal atrial fibrillation (HCC)    E coli bacteremia    Acute respiratory failure with hypoxia (HCC)    Hypernatremia    Aspiration into airway Disorder of electrolytes    Leg edema    Acute deep vein thrombosis (DVT) of proximal vein of right lower extremity (HCC)    Perforated viscus  Resolved Problems:    * No resolved hospital problems. *       Plan:    #Perforated diverticulum. Gram neg bacteremia    S.p emergency ex lap with sigmoid colectomy , small bowel resection and ostomy placement   Surgery managing    On Zosyn   Fevers resolved   Improved sepsis and off pressors   Recovered bowel function, started TF -tolerating well    #Septic shock due to perforated diverticulum. Ecoli  bacteremia. On Zosyn and Diflucan. Severe hypotension , no improvement with IVF boluses  was On IV vasopressors - vaso and levophed- improved Bp  Now off pressors     #Acute respiratory failure on the ventilator. Sec to perforated viscus - remained on vent post surgery   Management per pulmonary improved . Extubated to NC 5 L  Worsened again with aspiration event during SLP  Was on Vapo therm. Now weaned down to room air. Lasix as tolerated     #Paroxysmal A. fib. With RVR, started . Off IV amiodarone. Coumadin on hold, INR reversed perioperatively with vit k , ffp  Currently on heparin drip  Continue metoprolol    #COVID-19   Incidental finding. No covid related issues    # Anemia  - multifactorial - surgery, iatrogenic, nutritional. Monitor and transfuse less than 7    # Edema - good UOP. Added lasix 40 IV twice daily, tolerating well with resolution of peripheral edema      Hypernatremia  Increase water boluses     Hypokalemia - replace     for DVT prophylaxis.   On TF   Out of bed     Ge Carrasquillo MD 3:11 PM 1/23/2023

## 2023-01-23 NOTE — PLAN OF CARE
Problem: Pain  Goal: Verbalizes/displays adequate comfort level or baseline comfort level  Outcome: Progressing     Problem: Skin/Tissue Integrity  Goal: Absence of new skin breakdown  Description: 1. Monitor for areas of redness and/or skin breakdown  2. Assess vascular access sites hourly  3. Every 4-6 hours minimum:  Change oxygen saturation probe site  4. Every 4-6 hours:  If on nasal continuous positive airway pressure, respiratory therapy assess nares and determine need for appliance change or resting period.   Outcome: Progressing     Problem: Nutrition Deficit:  Goal: Optimize nutritional status  Outcome: Progressing     Problem: Respiratory - Adult  Goal: Achieves optimal ventilation and oxygenation  Outcome: Progressing     Problem: Cardiovascular - Adult  Goal: Maintains optimal cardiac output and hemodynamic stability  Outcome: Progressing     Problem: Skin/Tissue Integrity - Adult  Goal: Skin integrity remains intact  Outcome: Progressing  Flowsheets (Taken 1/22/2023 2135)  Skin Integrity Remains Intact:   Monitor for areas of redness and/or skin breakdown   Assess vascular access sites hourly   Every 4-6 hours minimum: Change oxygen saturation probe site     Problem: Skin/Tissue Integrity - Adult  Goal: Incisions, wounds, or drain sites healing without S/S of infection  Outcome: Progressing  Flowsheets (Taken 1/22/2023 2135)  Incisions, Wounds, or Drain Sites Healing Without Sign and Symptoms of Infection: TWICE DAILY: Assess and document skin integrity     Problem: Metabolic/Fluid and Electrolytes - Adult  Goal: Electrolytes maintained within normal limits  Outcome: Progressing     Problem: Hematologic - Adult  Goal: Maintains hematologic stability  Outcome: Progressing

## 2023-01-23 NOTE — PROGRESS NOTES
RT Nebulizer Bronchodilator Protocol Note    There is a bronchodilator order in the chart from a provider indicating to follow the RT Bronchodilator Protocol and there is an Initiate RT Bronchodilator Protocol order as well (see protocol at bottom of note). CXR Findings:  No results found. The findings from the last RT Protocol Assessment were as follows:  Smoking: (P) Smoker 15 pack years or more  Respiratory Pattern: (P) Regular pattern and RR 12-20 bpm  Breath Sounds: (P) Slightly diminished and/or crackles  Cough: (P) Strong, productive  Indication for Bronchodilator Therapy: (P) Decreased or absent breath sounds  Bronchodilator Assessment Score: (P) 4    Aerosolized bronchodilator medication orders have been revised according to the RT Nebulizer Bronchodilator Protocol below. Respiratory Therapist to perform RT Therapy Protocol Assessment initially then follow the protocol. Repeat RT Therapy Protocol Assessment PRN for score 0-3 or on second treatment, BID, and PRN for scores above 3. No Indications - adjust the frequency to every 6 hours PRN wheezing or bronchospasm, if no treatments needed after 48 hours then discontinue using Per Protocol order mode. If indication present, adjust the RT bronchodilator orders based on the Bronchodilator Assessment Score as indicated below. If a patient is on this medication at home then do not decrease Frequency below that used at home. 0-3 - enter or revise RT bronchodilator order(s) to equivalent RT Bronchodilator order with Frequency of every 4 hours PRN for wheezing or increased work of breathing using Per Protocol order mode. 4-6 - enter or revise RT Bronchodilator order(s) to two equivalent RT bronchodilator orders with one order with BID Frequency and one order with Frequency of every 4 hours PRN wheezing or increased work of breathing using Per Protocol order mode.          7-10 - enter or revise RT Bronchodilator order(s) to two equivalent RT bronchodilator orders with one order with TID Frequency and one order with Frequency of every 4 hours PRN wheezing or increased work of breathing using Per Protocol order mode. 11-13 - enter or revise RT Bronchodilator order(s) to one equivalent RT bronchodilator order with QID Frequency and an Albuterol order with Frequency of every 4 hours PRN wheezing or increased work of breathing using Per Protocol order mode. Greater than 13 - enter or revise RT Bronchodilator order(s) to one equivalent RT bronchodilator order with every 4 hours Frequency and an Albuterol order with Frequency of every 2 hours PRN wheezing or increased work of breathing using Per Protocol order mode. RT to enter RT Home Evaluation for COPD & MDI Assessment order using Per Protocol order mode.     Electronically signed by Bryson Rodney RCP on 1/23/2023 at 1:40 PM

## 2023-01-23 NOTE — PROGRESS NOTES
[facility] GENERAL SURGERY DAILY PROGRESS NOTE    SUBJECTIVE: Awake, alert    OBJECTIVE: CURRENT VITALS:  BP (!) 113/91   Pulse 87   Temp 97.1 °F (36.2 °C) (Axillary)   Resp 11   Ht 5' 7\" (1.702 m)   Wt 185 lb 11.2 oz (84.2 kg)   SpO2 96%   BMI 29.08 kg/m²          ABD: Soft  COLOSTOMY:  Functional    LABS:    CBC:   Recent Labs     01/21/23  0455 01/22/23  0442 01/23/23 0453   WBC 8.8 8.9 8.0   RBC 2.72* 2.72* 2.68*   HGB 7.3* 7.2* 7.1*   HCT 23.0* 22.8* 22.3*   MCV 84.4 83.9 83.4   RDW 15.6* 16.1* 15.7*   * 618* 616*     BMP:   Recent Labs     01/21/23 0455 01/22/23 0442 01/23/23 0453   * 144 145   K 2.8* 2.8* 3.2*    108 110   CO2 30 29 27   BUN 22* 24* 25*   CREATININE 0.8 0.8 0.8     Recent Labs     01/22/23 0442 01/23/23 0453   MG 2.00 2.00             ASSESSMENT:   POD 16 [facility]  /  SBR        PLAN:   Continue nasogastric feeds until stronger and able to take PO.  PT/OT   D/C planning  Ok to stop antibiotics from surgery standpoint.          Parker Sanchez MD

## 2023-01-24 ENCOUNTER — APPOINTMENT (OUTPATIENT)
Dept: GENERAL RADIOLOGY | Age: 88
DRG: 853 | End: 2023-01-24
Payer: MEDICARE

## 2023-01-24 LAB
ABO/RH: NORMAL
ANION GAP SERPL CALCULATED.3IONS-SCNC: 6 MMOL/L (ref 3–16)
ANTI-XA UNFRAC HEPARIN: 0.35 IU/ML (ref 0.3–0.7)
ANTIBODY SCREEN: NORMAL
BLOOD BANK DISPENSE STATUS: NORMAL
BLOOD BANK PRODUCT CODE: NORMAL
BLOOD CULTURE, ROUTINE: NORMAL
BPU ID: NORMAL
BUN BLDV-MCNC: 27 MG/DL (ref 7–20)
CALCIUM SERPL-MCNC: 8.8 MG/DL (ref 8.3–10.6)
CHLORIDE BLD-SCNC: 112 MMOL/L (ref 99–110)
CO2: 27 MMOL/L (ref 21–32)
CREAT SERPL-MCNC: 0.7 MG/DL (ref 0.6–1.2)
CULTURE, BLOOD 2: NORMAL
DESCRIPTION BLOOD BANK: NORMAL
GFR SERPL CREATININE-BSD FRML MDRD: >60 ML/MIN/{1.73_M2}
GLUCOSE BLD-MCNC: 124 MG/DL (ref 70–99)
GLUCOSE BLD-MCNC: 130 MG/DL (ref 70–99)
GLUCOSE BLD-MCNC: 138 MG/DL (ref 70–99)
GLUCOSE BLD-MCNC: 139 MG/DL (ref 70–99)
GLUCOSE BLD-MCNC: 140 MG/DL (ref 70–99)
GLUCOSE BLD-MCNC: 140 MG/DL (ref 70–99)
HCT VFR BLD CALC: 21.3 % (ref 36–48)
HCT VFR BLD CALC: 23.8 % (ref 36–48)
HEMOGLOBIN: 6.7 G/DL (ref 12–16)
HEMOGLOBIN: 7.8 G/DL (ref 12–16)
MCH RBC QN AUTO: 26.6 PG (ref 26–34)
MCHC RBC AUTO-ENTMCNC: 31.6 G/DL (ref 31–36)
MCV RBC AUTO: 84.4 FL (ref 80–100)
PDW BLD-RTO: 16.3 % (ref 12.4–15.4)
PERFORMED ON: ABNORMAL
PLATELET # BLD: 600 K/UL (ref 135–450)
PMV BLD AUTO: 6.8 FL (ref 5–10.5)
POTASSIUM REFLEX MAGNESIUM: 3.6 MMOL/L (ref 3.5–5.1)
RBC # BLD: 2.53 M/UL (ref 4–5.2)
SODIUM BLD-SCNC: 145 MMOL/L (ref 136–145)
WBC # BLD: 6.2 K/UL (ref 4–11)

## 2023-01-24 PROCEDURE — 85520 HEPARIN ASSAY: CPT

## 2023-01-24 PROCEDURE — 74230 X-RAY XM SWLNG FUNCJ C+: CPT

## 2023-01-24 PROCEDURE — 85027 COMPLETE CBC AUTOMATED: CPT

## 2023-01-24 PROCEDURE — 86850 RBC ANTIBODY SCREEN: CPT

## 2023-01-24 PROCEDURE — 6370000000 HC RX 637 (ALT 250 FOR IP): Performed by: INTERNAL MEDICINE

## 2023-01-24 PROCEDURE — 2580000003 HC RX 258: Performed by: INTERNAL MEDICINE

## 2023-01-24 PROCEDURE — P9016 RBC LEUKOCYTES REDUCED: HCPCS

## 2023-01-24 PROCEDURE — 6360000002 HC RX W HCPCS: Performed by: INTERNAL MEDICINE

## 2023-01-24 PROCEDURE — 80048 BASIC METABOLIC PNL TOTAL CA: CPT

## 2023-01-24 PROCEDURE — 2060000000 HC ICU INTERMEDIATE R&B

## 2023-01-24 PROCEDURE — 97110 THERAPEUTIC EXERCISES: CPT

## 2023-01-24 PROCEDURE — 99232 SBSQ HOSP IP/OBS MODERATE 35: CPT

## 2023-01-24 PROCEDURE — 36592 COLLECT BLOOD FROM PICC: CPT

## 2023-01-24 PROCEDURE — 86901 BLOOD TYPING SEROLOGIC RH(D): CPT

## 2023-01-24 PROCEDURE — 86923 COMPATIBILITY TEST ELECTRIC: CPT

## 2023-01-24 PROCEDURE — 36430 TRANSFUSION BLD/BLD COMPNT: CPT

## 2023-01-24 PROCEDURE — C9113 INJ PANTOPRAZOLE SODIUM, VIA: HCPCS | Performed by: INTERNAL MEDICINE

## 2023-01-24 PROCEDURE — 86900 BLOOD TYPING SEROLOGIC ABO: CPT

## 2023-01-24 PROCEDURE — 85018 HEMOGLOBIN: CPT

## 2023-01-24 PROCEDURE — 85014 HEMATOCRIT: CPT

## 2023-01-24 PROCEDURE — 92526 ORAL FUNCTION THERAPY: CPT

## 2023-01-24 PROCEDURE — 92611 MOTION FLUOROSCOPY/SWALLOW: CPT

## 2023-01-24 PROCEDURE — 99232 SBSQ HOSP IP/OBS MODERATE 35: CPT | Performed by: INTERNAL MEDICINE

## 2023-01-24 RX ORDER — SODIUM CHLORIDE 9 MG/ML
INJECTION, SOLUTION INTRAVENOUS PRN
Status: DISCONTINUED | OUTPATIENT
Start: 2023-01-24 | End: 2023-01-26 | Stop reason: HOSPADM

## 2023-01-24 RX ADMIN — METOPROLOL TARTRATE 12.5 MG: 25 TABLET, FILM COATED ORAL at 10:14

## 2023-01-24 RX ADMIN — METOPROLOL TARTRATE 12.5 MG: 25 TABLET, FILM COATED ORAL at 21:02

## 2023-01-24 RX ADMIN — PANTOPRAZOLE SODIUM 40 MG: 40 INJECTION, POWDER, FOR SOLUTION INTRAVENOUS at 09:56

## 2023-01-24 RX ADMIN — SODIUM CHLORIDE, PRESERVATIVE FREE 10 ML: 5 INJECTION INTRAVENOUS at 21:03

## 2023-01-24 RX ADMIN — PIPERACILLIN SODIUM,TAZOBACTAM SODIUM 3375 MG: 3; .375 INJECTION, POWDER, FOR SOLUTION INTRAVENOUS at 06:22

## 2023-01-24 RX ADMIN — SODIUM CHLORIDE, PRESERVATIVE FREE 10 ML: 5 INJECTION INTRAVENOUS at 09:57

## 2023-01-24 RX ADMIN — Medication: at 21:05

## 2023-01-24 RX ADMIN — FUROSEMIDE 40 MG: 10 INJECTION, SOLUTION INTRAMUSCULAR; INTRAVENOUS at 10:00

## 2023-01-24 RX ADMIN — Medication: at 10:40

## 2023-01-24 RX ADMIN — CITALOPRAM HYDROBROMIDE 10 MG: 20 TABLET ORAL at 10:15

## 2023-01-24 RX ADMIN — HYDROMORPHONE HYDROCHLORIDE 0.25 MG: 1 INJECTION, SOLUTION INTRAMUSCULAR; INTRAVENOUS; SUBCUTANEOUS at 03:53

## 2023-01-24 RX ADMIN — APIXABAN 2.5 MG: 5 TABLET, FILM COATED ORAL at 21:02

## 2023-01-24 RX ADMIN — QUETIAPINE FUMARATE 25 MG: 25 TABLET ORAL at 21:02

## 2023-01-24 ASSESSMENT — PAIN DESCRIPTION - DESCRIPTORS: DESCRIPTORS: ACHING

## 2023-01-24 ASSESSMENT — PAIN DESCRIPTION - LOCATION: LOCATION: ABDOMEN

## 2023-01-24 ASSESSMENT — PAIN SCALES - GENERAL
PAINLEVEL_OUTOF10: 0
PAINLEVEL_OUTOF10: 0
PAINLEVEL_OUTOF10: 3

## 2023-01-24 ASSESSMENT — PAIN - FUNCTIONAL ASSESSMENT: PAIN_FUNCTIONAL_ASSESSMENT: PREVENTS OR INTERFERES SOME ACTIVE ACTIVITIES AND ADLS

## 2023-01-24 ASSESSMENT — PAIN DESCRIPTION - ORIENTATION: ORIENTATION: MID

## 2023-01-24 NOTE — PLAN OF CARE
Problem: Discharge Planning  Goal: Discharge to home or other facility with appropriate resources  Outcome: Progressing     Problem: Pain  Goal: Verbalizes/displays adequate comfort level or baseline comfort level  Outcome: Progressing     Problem: Skin/Tissue Integrity  Goal: Absence of new skin breakdown  Description: 1. Monitor for areas of redness and/or skin breakdown  2. Assess vascular access sites hourly  3. Every 4-6 hours minimum:  Change oxygen saturation probe site  4. Every 4-6 hours:  If on nasal continuous positive airway pressure, respiratory therapy assess nares and determine need for appliance change or resting period.   Outcome: Progressing     Problem: Nutrition Deficit:  Goal: Optimize nutritional status  Outcome: Progressing     Problem: Neurosensory - Adult  Goal: Achieves stable or improved neurological status  Outcome: Progressing     Problem: Respiratory - Adult  Goal: Achieves optimal ventilation and oxygenation  Outcome: Progressing     Problem: Cardiovascular - Adult  Goal: Maintains optimal cardiac output and hemodynamic stability  Outcome: Progressing     Problem: Skin/Tissue Integrity - Adult  Goal: Skin integrity remains intact  Outcome: Progressing  Goal: Incisions, wounds, or drain sites healing without S/S of infection  Outcome: Progressing  Goal: Oral mucous membranes remain intact  Outcome: Progressing     Problem: Musculoskeletal - Adult  Goal: Return mobility to safest level of function  Outcome: Progressing     Problem: Gastrointestinal - Adult  Goal: Establish and maintain optimal ostomy function  Outcome: Progressing     Problem: Genitourinary - Adult  Goal: Absence of urinary retention  Outcome: Progressing  Goal: Urinary catheter remains patent  Outcome: Progressing     Problem: Infection - Adult  Goal: Absence of infection at discharge  Outcome: Progressing     Problem: Metabolic/Fluid and Electrolytes - Adult  Goal: Electrolytes maintained within normal limits  Outcome: Progressing  Goal: Glucose maintained within prescribed range  Outcome: Progressing     Problem: Hematologic - Adult  Goal: Maintains hematologic stability  Outcome: Progressing     Problem: Confusion  Goal: Confusion, delirium, dementia, or psychosis is improved or at baseline  Description: INTERVENTIONS:  1. Assess for possible contributors to thought disturbance, including medications, impaired vision or hearing, underlying metabolic abnormalities, dehydration, psychiatric diagnoses, and notify attending LIP  2. [place] high risk fall precautions, as indicated  3. Provide frequent short contacts to provide reality reorientation, refocusing and direction  4. Decrease environmental stimuli, including noise as appropriate  5. Monitor and intervene to maintain adequate nutrition, hydration, elimination, sleep and activity  6. If unable to ensure safety without constant attention obtain sitter and review sitter guidelines with assigned personnel  7.  Initiate Psychosocial CNS and Spiritual Care consult, as indicated  Outcome: Progressing

## 2023-01-24 NOTE — PROGRESS NOTES
Blood reached vein at 1013. Started infusion at 60ml/hr. No s/s of reaction noted as this writer stayed with pt for the first 15min. Rate increased to 100ml/hr. Will continue to monitor.

## 2023-01-24 NOTE — PROGRESS NOTES
Speech Language Pathology    SPEECH/LANGUAGE PATHOLOGY  VIDEOFLUOROSCOPIC STUDY OF SWALLOWING (MBS)      Recommendations:  Diet Recommendation: NPO exception of low volume ice chips for comfort and to combat disuse atrophy, puree pleasure feeds with ST only; meds via alternative means   Risk Management: oral care q4 hrs to reduce adverse affects in the event of aspiration and general aspiration precautions  Specialist Referrals: na  Ancillary Tests: n/a  Goals: Tolerate LRD  Follow-up exam: n/a    Silent aspiration noted with thin liquid trials, as well as, deep penetration to level of the vocal folds with mildly thick (nectar) and moderately thick (honey) liquids despite use of strategies. Pt unable to successfully clear aspirated and/or penetrated material despite cued cough    Recommend ongoing discussion regarding pt's POC/wishes. If PO intake is desired despite risk of aspiration, safest and least restrictive diet would be pureed with thin liquids/no straws, meds PO as tolerated, strict aspiration precautions, oral care q4 hours as able, occasional purposeful cough during meals. PATIENT NAME:  Tony Christensen      :  4/3/1932    Room: /6320-22   TODAY'S DATE:  2023    [x]The admitting diagnosis and active problem list, as listed below have been reviewed prior to initiation of this evaluation.      ADMITTING DIAGNOSIS: Diverticulitis of colon [K57.32]  Prolonged Q-T interval on ECG [R94.31]  Perforated viscus [R19.8]  Perforated diverticulum [K57.80]     ACTIVE PROBLEM LIST:   Patient Active Problem List   Diagnosis    Pelvic mass    Uterine fibroid    Diverticular disease of colon    Chronic diarrhea of unknown origin    Abdominal pain, RLQ    Perforated viscus    Neoplasm of uncertain behavior of skin    Bowel perforation (HCC)    Septic shock (HCC)    Acute hypoxemic respiratory failure (HCC)    COVID-19    Coagulopathy (HCC)    Diverticulitis of colon    Paroxysmal atrial fibrillation (HCC)    E coli bacteremia    Acute respiratory failure with hypoxia (HCC)    Hypernatremia    Aspiration into airway    Disorder of electrolytes    Leg edema    Acute deep vein thrombosis (DVT) of proximal vein of right lower extremity ([facility] [address]           PAST MEDICAL HISTORY      Diagnosis Date    A-fib ([facility] [address]     Acid reflux     Hypertension        PAST SURGICAL HISTORY  Past Surgical History:   Procedure Laterality Date    ANKLE FRACTURE SURGERY      LEFT ANKLE    APPENDECTOMY      ARM SURGERY Right 08/04/2020    EXCISION OF SKIN CANCER RIGHT ARM performed by Varinder Iyer MD at [address] [place] [address]  07/12/2017    cecal polyp; random colon biopsies    FOOT AMPUTATION Right 11/09/2022    AMPUTATION OF SECOND TOE RIGHT FOOT performed by Rosanna Hillman DPM at [facility] ([address] [place] [address])      LAPAROTOMY N/A 1/7/2023    LAPAROTOMY EXPLORATORY, SIGMOID COLECTOMY, SMALL BOWEL RESECTION,OSTOMY performed by Sg Watson MD at [address] [place] [address] 04/20/2016    EXCISION LESION LEFT UPPER EXTREMITY LEFT BACK AND RIGHT SHOULDER    UPPER GASTROINTESTINAL ENDOSCOPY  09/15/2017    dilated, biopsies    VULVA SURGERY      x 2        ALLERGIES  Allergies   Allergen Reactions    Morphine Nausea And Vomiting       Referring Provider: Dr. Danya Liz  Radiologist: Dr. Adela Cruz      Reason For Assessment: to assess oropharyngeal swallow function, identify signs and symptoms of aspiration and make recommendations regarding safe dietary consistencies, effective compensatory strategies, and safe eating environment. Recent Chest Radiography: [x] Chest XR   [] CT of Chest  [] No recent chest  radiography on file  Date: 1/18/23  Impressions  Impression   New infiltrates seen within the left upper lung, compatible with pneumonia   versus aspiration. Increasing pleural effusions.        Medical record review/interview: Per MD H&P: \"The patient is a 80 y.o. female with PMH below, presented to [place] [address] w/ lower abd pain. Pt arrived via helicopter. She is unable to give me any Hx during the brief time that I saw her priior to sedating her so a central line could be placed. She appeared to be very uncomfortable despite multiple doses of pain medicine including 100 mg She was subsequently preemptively intubated 2/2 hypoxia and increased WOB. She was found to have a perforated diverticulitis w/ small extraluminal stool at [place] [address]. She is on Coumadin and INR is ~3. She was given 10 SQ Vit K at [place] [address]. Dr. Nish Shrestha is planning on OR as soon as we can get her INR down. She is now on levophed. She was also found to be COVID +\". Patient Complaints: pt is an unreliable historian, oriented to self only.   Pt denied dysphagia, however unable to elaborate/answer follow-up questions regarding swallow function when asked by SLP  Odynophagia: [] Yes [] No  Globus Sensation: [] Yes [] No  SOB with PO intake: [] Yes [] No  Increased WOB with PO intake: [] Yes [] No  Reflux Sx's: [] Yes [] No  Weight loss: [] Yes [] No  Coughing/Choking with PO intake: [] Yes [] No  Reduced Appetite: [] Yes [] No      Predisposing dysphagia risk factors: Age, PMHx of reflux per chart, hx of dysphagia  Clinical signs of possible chronic dysphagia: hx of dysphagia  Precipitating dysphagia risk factors: reduced physical mobility, increased O2 demands, recent intubation, and suspected disuse atrophy      Current respiratory status:    [x] Room Air   [] Nasal cannula [] O2 mask           []  Non-rebreather mask  []  Tracheostomy  []  Vent dependent    Vocal quality prior to PO intake:  []  WFL  [x]  Weak  []  Wet    Cognitive status:    [x]  Alert    []  Lethargic  [] Functional   [] Confused  [] Aphasic   [] Dysarthric   [] Impulsive   [x] Cognitive Deficits                 DIET LEVEL PRIOR TO THIS EVALUATION/PRIOR LEVEL OF FUNCTION :  Diet NPO  ADULT TUBE FEEDING; Nasogastric; Standard with Fiber; Continuous; 50; No; 200; Q 4 hours      PROCEDURE   Patient positioning: Seated 70-90°  Viewing Plane(s): LATERAL ONLY   Contrast: commercially prepared, standardized barium viscosities via Varibar; graduated from thin liquid to pudding consistency. Administered via tsp (5 ml boluses), by cup or straw in single and sequentially swallowed boluses as tolerated. Solid evaluated with 1/2 erick cracker/3 ml barium pudding coated as tolerated. Consistencies Administered During the Evaluation   Liquids: [x] Thin (IDDSI 0)        [x] Mildly Thick (Nectar-IDDSI 2)         [x] Moderately Thick (Honey- IDDSI 3)   Solids:  [x] Pureed/Pudding (IDDSI 4)    [] Soft Solid      [] Regular Solid  [] Other:     Method of Intake:      []Self Fed       [x]Fed by Clinician     [x]Cup      [x]Spoon     [x]Straw               Oral Phase Severity: Moderate-Severe  Oral Phase Characteristics: weak lingual manipulation and reduced bolus control resulted in passive spillover over BOT to pharynx with all PO trials. Pt unable to form adequate labial seal on tsp/cup edge despite cues with anterior spillage noted. Significant lingual pumping noted with puree trials, as well as, consistent min oral/lingual residue post-swallow with all PO. Pharyngeal Phase Severity: Moderate-Severe  Pharyngeal Phase Characteristics: reduced hyolaryngeal excursion, significantly delayed swallow initiation (pooling to pyriforms prior to swallow initiation with thin liquids), and significantly reduced epiglottic retroversion (appears to contact NG during swallow initiation) resulted in episodes of deep penetration before and during the swallow (*to level of vocal folds at times) with all liquid consistencies. Penetrated material did not successfully clear despite cued cough (*weak ineffective cough produced by pt).   Silent aspiration observed with thin liquid trial via straw, pt unable to successfully clear aspirated material despite cued cough. No penetration/aspiration noted with puree trials (*although moderate-severe valleculae residue post-swallow). Reduced pharyngeal peristalsis and reduced tongue base retraction resulted in diffuse pharyngeal residue noted post-swallow with all PO. Pt successfully cleared minimal residue with cued double swallow, use of liquid wash, and/or chin tuck strategy. Pt required total feed assistance with all PO during study. Pt with NG in place during study. Recommend ongoing discussion regarding pt's POC/wishes. If PO intake is desired despite risk of aspiration, safest and least restrictive diet would be pureed with thin liquids/no straws, meds PO as tolerated, strict aspiration precautions. Recommend thin liquids despite episode of silent aspiration (via straw) d/t consistent deep penetration to the vocal folds with all consistencies, thin liquids likely easier to expel from the airway vs thickened liquids.       Esophageal Phase: esophageal phase of the swallow grossly functional from lateral view of cervical esophagus to oropharynx      LARYNGEAL PENETRATION/ASPIRATION  []Laryngeal penetration was not present during this evaluation    []Aspiration was not present during this evaluation      Response to aspiration:  [] N/A- no aspiration occurred  [] spontaneous cough  [] throat clearing  [] inconsistent/delayedcough   [x] absent cough          Aspiration Scale  []  1 Material does not enter the airway   []  2 Material enters the airway, remains above the vocal folds, and is ejected from the airway   []  3 Material enters the airway, remains above the vocal folds, and is not ejected from the airway   []  4 Material enters the airway, contacts the vocal folds, an is ejected from the airway   []  5 Material enters the airway, contacts the vocal folds, and is not ejected from the airway   []  6 Material enters the airway, passes below the vocal folds and is ejected into the larynx or out of the airway   []  7 Material enters the airway, passes below the vocal folds, and is not ejected from the trachea despite effort   [x]  8 Material enters the airway, passes below the vocal folds, and no effort is made to eject. Assessment: moderate-severe oropharyngeal dysphagia, likely acute-on-chronic related to reduced physical mobility, increased O2 demands, impaired cognition, acute infection, fatigue, disuse atrophy, generalized weakness, the aging swallow, intubation, and co-morbidities. Swallow safety is impaired; swallow efficiency is impaired Pt appears to be at moderate risk for aspiration PNA, and moderate risk for malnutrition/dehydration. non-oral nutrition is warranted indicated (*if consistent with pt's POC/wishes). Swallow prognosis is guarded given suspected disuse atrophy of swallow musculature, reduced physical mobility, poor oral care status, and immunocompromised state. Patient appears to be a fair candidate for behavioral swallow rehabilitation. Recommendations:  Diet Recommendation: NPO exception of low volume ice chips for comfort and to combat disuse atrophy, puree pleasure feeds with ST only; meds via alternative means   Risk Management: oral care q4 hrs to reduce adverse affects in the event of aspiration and general aspiration precautions  Specialist Referrals: na  Ancillary Tests: n/a  Goals:  Tolerate LRD  Follow-up exam: n/a      Education  SLP educated the patient re: Role of SLP, rationale for completion of assessment, anatomical components of swallow structures as they pertain to airway protection, results of assessment, recommendations, and POC  Response to education:   [] Verbalized Understanding  [] Demonstrated Understanding  [x] No evidence of learning  [x] Ongoing education is recommended  [x] RN aware of results and recommendations    Goals  Timeframe for Long-term Goals: 10 days, 1/27/23, addend to 10 days 2/3/23  Goal 1: The pt will tolerate safest and least restrictive diet without clinical s/s of aspiration or change in respiratory status. Short-term Goals  Timeframe for Short-term Goals: 7 days, 1/24/23; addend to 7 days, 1/31/23  Goal 1: The patient will tolerate recommended diet without observed clinical signs of aspiration  Goal 2: The patient will tolerate instrumental swallowing procedure  Goal 3: The patient/caregiver will demonstrate understanding of compensatory strategies for improved swallowing safety.   GOAL MET      Pain: The patient does not complain of pain       Therapy Time:   Individual   Time In  1440   Time Out  1505   Minutes  25      MBSS procedure and dysphagia tx    Signature:  Jodee Briseno M.S. [id number] [facility]  Speech-language pathologist  [id number]

## 2023-01-24 NOTE — PROGRESS NOTES
Spoke with Dr Felicitas Arroyo regarding heparin gtt. Made him aware of drop in hgb to 6.7 and new order for 1u PRBC by nocturnist. No obvious s/s of bleeding. He stated to continue to hold heparin until mbs completed so pt could hopefully transition to oral. Pt to receive mbs after blood has finished.

## 2023-01-24 NOTE — CONSULTS
Pt seen for ostomy care. Pt on stretcher at this time to go to test.  Current appliance intact. Will continue to follow. No family at bedside.

## 2023-01-24 NOTE — PROGRESS NOTES
ST called to inform this writer that pt failed MBS and had silent aspiration noted. [employer] sent to Dr Nabil Montano to inform him and to see if heparin gtt needed to be restarted. Awaiting return response.

## 2023-01-24 NOTE — FLOWSHEET NOTE
01/24/23 0931   Vital Signs   Temp 98.4 °F (36.9 °C)   Temp Source Axillary   Heart Rate 80   Heart Rate Source Monitor   Resp 18   BP (!) 111/53   MAP (Calculated) 72   BP Location Right upper arm   BP Method Automatic   Level of Consciousness 0   MEWS Score 1   Pain Assessment   Pain Assessment None - Denies Pain   Oxygen Therapy   SpO2 99 %   O2 Device None (Room air)     AM assessment complete. Pt a&o to self. NG tube in place at 60cm. TF infusing at ordered rate. Triple IJ to R neck. CHG wipes completed d/t central line. Ready cleanse wipes to de jesus. Colostomy draining brown watery stool. Dressing c,d, and intact to midline incision. Call light and bedside table within reach. Will continue to monitor.

## 2023-01-24 NOTE — PROGRESS NOTES
Returned call to son and daughter in law and gave them update on pt. Answered all questions that they had.

## 2023-01-24 NOTE — PROGRESS NOTES
Inpatient Physical Therapy Daily Treatment Note    Unit: ICU  Date:  1/24/2023  Patient Name:    Tina Ldeesma  Admitting diagnosis:  Diverticulitis of colon [K57.32]  Prolonged Q-T interval on ECG [R94.31]  Perforated viscus [R19.8]  Perforated diverticulum [K57.80]  Admit Date:  1/6/2023  Precautions/Restrictions:  Fall risk, Bed/chair alarm, Lines -Rick catheter and Drains (Dobhoff NG, RIJ CVC), Telemetry, and Continuous pulse oximetry, NPO, heparin drip  LAPAROTOMY EXPLORATORY, SIGMOID COLECTOMY, SMALL BOWEL RESECTION,OSTOMY 1/7/23     Intubated 1/7/23-1/12/23 1/18/23 transferred to ICU for worsening oxygenation  1/21/23 new orders received and therapy resumed       Discharge Recommendations: SNF  DME needs for discharge: defer to facility       Therapy recommendation for EMS Transport: requires transport by cot due to total assist for transfers    Therapy recommendations for staff:   Assist of 2 with use of MAXI-Move for all transfers to/from chair    History of Present Illness: per Dr Jordis Goldberg consult 1/7/23:  \"The patient is a 80 y.o. female who presented with severe, sharp lower abdominal pain that started yesterday. She is intubated in the ICU on pressors. History obtained from her daughter with whom the patient lives. The patient fell on [place] and was thought to be having some residual pain from the fall. Things worsened yesterday with no alleviating or modifying factors. She had associated nausea. Last colonoscopy was a few years ago with benign polyps removed. \"    Home Health S4 Level Recommendation: NA  AM-PAC Mobility Score   AM-PAC Inpatient Mobility Raw Score : 6  AM-PAC Inpatient Mobility without Stair Climbing Raw Score : 6    Treatment Time:  15:50-16:13  Treatment number: 4  Timed Code Treatment Minutes: 23 minutes  Total Treatment Minutes:  23  minutes    Cognition    A&O Person and Place   Able to follow 1 step commands    Subjective  Patient lying supine in bed with no family present   Pt initially declined therapy but willing to complete some ROM exercises once prompted. Asked for repositioning to right side. Pain   No  Location: NA  Rating:    NA/10  Pain Medicine Status: No request made     Bed Mobility   Supine to Sit:    Not Tested  Sit to Supine:   Not Tested  Rolling:   Heavy Max A  to R, decreased ability to attain hook lying positioning to initiate rolling  Scooting:   Not Tested    Transfer Training     Sit to stand:   Not Tested  Stand to sit:   Not Tested  Bed to Chair:   Not Tested with use of N/A    Gait Training gait deferred due to poor tolerance for bed mobility; pt ambulated 0 ft. Distance:      0 ft  Deviations (firm surface/linoleum):  N/A  Assistive Device Used:    N/A  Level of Assist:    Not Tested  Comment:     Stair Training deferred, pt unsafe/not appropriate to complete stairs at this time    Therapeutic Exercise all completed bilaterally unless indicated  Ankle Pumps x 10 reps, 2 sets with light manual resistance  Heel slides: x 10 reps, AAROM, decrease ROM LLE  Leg press with light manual resistance x 10 reps  Knee rocks x 15 reps, dec ROM  LE log roll x 15 reps    Balance  Sitting:  Not tested; Not Tested  Comments:     Standing: Not tested; Not Tested  Comments:     Patient Education      Role of PT, POC, Discharge recommendations, HEP and calling for assist with mobility. Positioning Needs       Pt in bed, alarm set, positioned in proper neutral alignment and pressure relief provided. Call light provided and all needs within reach  Repositioned for comfort in 1/4 turn to right, RN made aware. Heels floated on pillow. Activity Tolerance   Pt completed therapy session with No adverse symptoms noted w/activity. Other  None. Assessment :  Pt able to consistently follow 1 step directions to participate in bed mobility and LE exercises this date.  Will need two person assist to trial sitting up to EOB based on limited ability to participate in repositioning in bed. Recommending SNF upon discharge as patient functioning well below baseline, demonstrates good rehab potential and unable to return home due to inability to negotiate stairs to enter home/bedroom/bathroom, burden of care beyond caregiver ability, home environment not conducive to patient recovery, and limited safety awareness. Goals (all goals ongoing unless otherwise indicated)  To be met in 3 visits:  1). Roll S-S with Max A  2.) Patient will perform AAROM BLE's     To be met in 6 visits:  1.) Tolerate chair position x 5 minutes with midline head control  2). Bed to chair:  Assess when appropriate  New Goal 1/18/23: Will tolerate sitting EOB ~ 5 min with CGA    Plan   Continue with plan of care. Signature: Omega Kos, PT, DPT    If patient discharges from this facility prior to next visit, this note will serve as the Discharge Summary.

## 2023-01-24 NOTE — PROGRESS NOTES
Pt ate about 25% of dinner/puree. She tolerated well. Had thin liquids. Pt was sat straight up in bed. No coughing until completely finished. Spo2 maintained 99%. Denies any sob. Will continue to monitor.

## 2023-01-24 NOTE — PROGRESS NOTES
Pt alert to self, with intermittent confusion. VSS. Assessment complete as charted. Repositioned for comfort. Call light in reach. Denies needs. Will continue to monitor. MLAI dressing D and I. Wet to dry dressing done on superior and inferior incision. J/P drainage has minimal output. Left colostomy intact. Rick draining adequate amount of urine. Pt on Room air.  TF infusing Jevity 1.5 at 50 ml/hr with water flushes 200 ml Q 4 H.

## 2023-01-24 NOTE — PROGRESS NOTES
Pulmonary & Critical Care Medicine Progress Note  CC: Perforated diverticulum with septic shock    Subjective:   Transferred out of ICU. Remains stable on RA. Invasive Lines: Right IJ CVC 1/7/2023    MV: 1/7/23-1/12/23    PE:  Blood pressure 128/67, pulse 88, temperature 97.3 °F (36.3 °C), temperature source Oral, resp. rate 17, height 5' 7\" (1.702 m), weight 176 lb 5.9 oz (80 kg), SpO2 97 %, not currently breastfeeding. RA   Constitutional:  Mild acute distress. HENT:  Oropharynx is clear and moist.   Neck: No tracheal deviation present. Cardiovascular: Normal heart sounds. No lower extremity edema. Pulmonary/Chest: No wheezes. No rhonchi. No rales. + decreased breath sounds. No accessory muscle usage or stridor. Musculoskeletal: No cyanosis. No clubbing. Skin: Skin is warm and dry. Psychiatric: Normal mood and affect.   Neurologic: speech fluent but a little difficult to understand, alert and oriented to person, [facility] BECKI & strength symmetric      Scheduled Meds:   QUEtiapine  25 mg Oral Nightly    Venelex   Topical BID    furosemide  40 mg IntraVENous Daily    citalopram  10 mg Oral Daily    metoprolol tartrate  12.5 mg Oral BID    insulin lispro  0-4 Units SubCUTAneous Q4H    piperacillin-tazobactam  3,375 mg IntraVENous Q8H    sodium chloride flush  5-40 mL IntraVENous 2 times per day    pantoprazole  40 mg IntraVENous Daily     PRN Meds:  sodium chloride, HYDROmorphone, potassium chloride, magnesium sulfate, heparin (porcine), heparin (porcine), albuterol, dextrose, glucose, dextrose bolus **OR** dextrose bolus, glucagon (rDNA), prochlorperazine, sodium chloride flush, sodium chloride, acetaminophen **OR** acetaminophen    Results:  CBC:   Recent Labs     01/22/23  0442 01/23/23  0453 01/24/23  0400   WBC 8.9 8.0 6.2   HGB 7.2* 7.1* 6.7*   HCT 22.8* 22.3* 21.3*   MCV 83.9 83.4 84.4   * 616* 600*     BMP:   Recent Labs     01/22/23  0442 01/23/23  0453 01/24/23  0400    145 145   K 2.8* 3. 2* 3.6    110 112*   CO2 29 27 27   BUN 24* 25* 27*   CREATININE 0.8 0.8 0.7     LIVER PROFILE:   No results for input(s): AST, ALT, LIPASE, BILIDIR, BILITOT, ALKPHOS in the last 72 hours. Invalid input(s): AMYLASE,  ALB    Cultures:  1/6/2023 SARS-CoV-2 positive  1/6/2023 BCx Escherichia coli and Clostridium ramosum   1/20/2023 BC NGTD     Imaging:  CXR 1/18/23 personally reviewed: New left upper lobe infiltrates with effusion    LE doppler 1/19/23  RLE DVT     ASSESSMENT:  Acute hypoxemic respiratory failure   Aspiration pneumonia/pneumonitis  E. coli bacteremia  Hypernatremia  Electrolyte disorder  Acute RLE DVT   Diverticulitis with perforated viscus in the sigmoid colon, post op Mann's sigmoid colectomy and small bowel resection and ostomy on 1/7/23  COVID-19 infection, incidental finding  Paroxysmal atrial fibrillation   H/O esophageal stricture   Coagulopathy 2/2 warfarin- S/P 3 U FFP & vitamin K; given K-centra X 1 prior to emergent surgery    PLAN:  D#16 Zosyn. Completed 9 days of Diflucan - ABX duration per general surgery  Lasix 40 mg IV daily  Seroquel 25 HS  Heparin drip - on Coumadin at home   Call with questions    I spent a total of 7 minutes on this encounter personally obtaining the patient history, reviewing labs, imaging and other data. I independently performed the above physical exam and updated this encounter note, assessment and plan as needed. Zeinab Sethi MD on 1/24/23 at 8:43 AM EST    I spent a total of 9 minutes on this encounter on chart review, history taking and review of data. I independently performed a physical exam and updated this encounter note as needed.    Ernst Carter PA-C on 1/24/23 at 7:53 AM EST

## 2023-01-24 NOTE — PROGRESS NOTES
Comprehensive Nutrition Assessment    Type and Reason for Visit:  Reassess    Nutrition Recommendations/Plan:   Continue ADULT DIET; Dysphagia - Pureed diet order + no straws. Modified TF order to Jevity 1.5 with a goal rate of 50 ml/hr x 20 hours + 200 ml water flushes every 4 hours + one proteinex P2Go once daily. Monitor po intake and TF rate, intake, and tolerance + water flushes + administration of one proteinex P2Go once daily. Monitor nutrition-related labs, ostomy output + function, and weight trends. Malnutrition Assessment:  Malnutrition Status: At risk for malnutrition (01/24/23 1619)    Context:  Acute Illness     Findings of the 6 clinical characteristics of malnutrition:  Energy Intake:  No significant decrease in energy intake  Weight Loss:  No significant weight loss     Body Fat Loss:  Unable to assess (MBSS today)     Muscle Mass Loss:  Unable to assess (MBSS today)    Fluid Accumulation:  Mild Extremities (bilateral elbows + 1 pitting edema; BLE + 2 pitting edema)   Strength:  Not Performed    Nutrition Assessment:    patient remains unchanged from a nutritional standpoint since last RD assessment; she remains at risk for further compromise d/t need for EN as sole source of nutrition at this time, patient failed FEES and MBSS today, and altered nutrition-related labs; will continue ADULT DIET; Dysphagia - Pureed diet order (+ no straws) and will modify TF order to Jevity 1.5 at goal rate of 50 ml/hr x 20 hours + 200 ml water flushes every 4 hours for tube patency + add one proteinex P2Go once daily    Nutrition Related Findings:    Pt is oriented to self, but not time, situation or place. Pt is able to follow commands. Pt was administered FEES test this AM, but SLP was unable to pass scope; MBSS recommended and SLP recommended NPO with exception of low volume ice chips for comfort and to combat disuse atrophy, puree pleasure feeds with ST only + meds via alternative means;  Tube feedings continue at this time. Bowel sounds are active and last BM was on 1/20/23. Pt is down ~ 9 lbs from yesterday and remains at nutritional risk. Wound Type: Surgical Incision       Current Nutrition Intake & Therapies:    Average Meal Intake: 0%  Average Supplements Intake: None Ordered  ADULT DIET; Dysphagia - Pureed  ADULT TUBE FEEDING; Nasogastric; Standard with Fiber; Continuous; 50; No; 200; Q 4 hours; Protein; one proteinex P2Go once daily  Current Tube Feeding (TF) Orders:  Feeding Route: Nasogastric  Formula: Standard with Fiber  Schedule: Continuous  Feeding Regimen: Jevity 1.5 with a goal rate of 50 ml/hr x 20 hours  Additives/Modulars: Protein (one proteinex P2Go once daily via feeding tube)  Water Flushes: 200 ml every 4 hours  Current TF & Flush Orders Provides: TF infusing at goal rate without issue  Goal TF & Flush Orders Provides: Jevity 1.5 with a goal rate of 50 ml/hr x 20 hours = 1000 ml TV, 1500 kcals, 64 g protein, and 760 ml free water + 200 ml water flushes every 4 hours + 26 g protein and 104 kcals from one proteinex P2Go once daily (90 g protein and 1604 kcals total)    Anthropometric Measures:  Height: 5' 7\" (170.2 cm)  Ideal Body Weight (IBW): 135 lbs (61 kg)    Admission Body Weight: 165 lb (74.8 kg) (obtained on 1/9/23; actual weight)  Current Body Weight: 176 lb 5.9 oz (80 kg) (obtained on 1/24/23; actual weight), 130.6 % IBW. Weight Source: Bed Scale  Current BMI (kg/m2): 27.6  Usual Body Weight: 165 lb (74.8 kg) (obtained on 1/9/23; actual weight)  % Weight Change (Calculated): 6.9  Weight Adjustment For: No Adjustment                 BMI Categories: Overweight (BMI 25.0-29. 9)    Estimated Daily Nutrient Needs:  Energy Requirements Based On: Kcal/kg  Weight Used for Energy Requirements: Current  Energy (kcal/day): 1600 - 1840 kcals based on 20-23 kcals/kg/CBW  Weight Used for Protein Requirements: Ideal  Protein (g/day): 79 - 92 g protein based on 1.3-1.5 g/kg/IBW  Method Used for Fluid Requirements: 1 ml/kcal  Fluid (ml/day): 1600 - 1840 ml    Nutrition Diagnosis:   Inadequate oral intake related to catabolic illness, altered GI function, inadequate protein-energy intake, altered GI structure as evidenced by weight loss, GI abnormality, lab values, wounds, swallow study results    Nutrition Interventions:   Food and/or Nutrient Delivery: Continue Current Diet, Modify Tube Feeding  Nutrition Education/Counseling: No recommendation at this time  Coordination of Nutrition Care: Continue to monitor while inpatient, Coordination of Care, Speech Therapy  Plan of Care discussed with: ICU Team    Goals:  Previous Goal Met: Goal(s) Achieved  Goals: Tolerate nutrition support at goal rate, Initiate PO diet       Nutrition Monitoring and Evaluation:   Behavioral-Environmental Outcomes: None Identified  Food/Nutrient Intake Outcomes: Enteral Nutrition Intake/Tolerance, Food and Nutrient Intake, IVF Intake  Physical Signs/Symptoms Outcomes: Biochemical Data, GI Status, Chewing or Swallowing, Meal Time Behavior, Skin, Weight, Nutrition Focused Physical Findings    Discharge Planning:     Too soon to determine     Tanya Jiménez, [address] [place] [address],   Contact: [phone]

## 2023-01-24 NOTE — PROGRESS NOTES
Per Dr Nadine Padilla note from yesterday, okay to stop atb from surgery standpoint. [employer] sent to Dr Mamie Thibodeaux to inform him. New order to d/c.

## 2023-01-24 NOTE — PROGRESS NOTES
Physical Therapy/Occupational Therapy    Attempted to see pt for PT/OT tx. Pt with low H&H and receiving blood at this time. Will follow-up as PT/OT schedule and pt status permit. No Charge.     Gina Ibarra, PT, DPT, OMT-C # [id number]  Christine Jason OTR/L

## 2023-01-24 NOTE — PROGRESS NOTES
Speech Language Pathology  FEES    FEES attempted this AM-- unable to successfully pass scope d/t presence of dried blood-tinged secretions in nares, significant narrowing of nasal passages. MBSS recommended. SLP to [address] [place] [address] MD to request order for MBSS. RN aware. 1010- MBSS order in place. SLP spoke with radiology. MBSS tentatively scheduled for ~1300 this date. Results and recs to follow.       Dago Gomez M.S. [facility]  Speech-language pathologist  [id number]

## 2023-01-24 NOTE — PROGRESS NOTES
Internal Medicine ICU Progress Note      Events of Last 24 hours:-    perforated diverticular disease. . S.p emergency ex lap with sigmoid colectomy , small bowel resection and ostomy placement     Off vent support, off pressors    Patient was transferred back to the ICU  for worsening oxygenation post SLP eval  Likely secondary to aspiration    Pt seen up in bed, awake, alert  slowly improving strength  Placed on Vapotherm. Currently on 50% FiO2 and 25 L/min    - on room air. ST to see. She is hungry. - discussed with ST. [facility] and MBS done. Invasive Lines: right IJ CVC    MV: Intubated on 2023, extubated     No results for input(s): PHART, IYL0CPA, PO2ART in the last 72 hours. MV Settings:  Vent Mode: AC/VC Resp Rate (Set): 16 bmp/Vt (Set, mL): 370 mL/ /FiO2 : 55 %    IV:   sodium chloride      dextrose Stopped (23 1048)    sodium chloride Stopped (23 1506)       Vitals:  Temp  Av.1 °F (36.7 °C)  Min: 97.3 °F (36.3 °C)  Max: 98.6 °F (37 °C)  Pulse  Av.6  Min: 72  Max: 90  BP  Min: 91/69  Max: 128/67  SpO2  Av.2 %  Min: 90 %  Max: 100 %  Patient Vitals for the past 4 hrs:   BP Temp Temp src Pulse Resp SpO2   23 1525 107/75 97.7 °F (36.5 °C) Axillary 77 16 100 %   23 1328 (!) 103/55 98.1 °F (36.7 °C) Axillary 79 18 98 %         CVP: CVP (Mean): 217 mmHg      Intake/Output Summary (Last 24 hours) at 2023 1607  Last data filed at 2023 1552  Gross per 24 hour   Intake 2299.72 ml   Output 2675 ml   Net -375.28 ml         EXAM:    General: elderly female, remains  fatigued  but slowly improving   Eyes: PERRL. No sclera icterus. No conjunctiva injected. ENT: No discharge. NG in place  Neck: Trachea midline. Normal thyroid. Resp: No accessory muscle use. Diminished in bases with resolving crackles   CV: Regular rate. Regular rhythm. No mumur or rub. Resolving massive peripheral  edema. No JVD. Palpable pedal pulses. GI: Non-tender.  Mid abd surgical dressing and right LQ Bobby drain in place  left sided ostomy in place with liquid stool   Non-distended. No masses. No organmegaly. sluggish bowel sounds. No hernia. Ext - resolving + edema to all ext  Neuro - improving mentation moving ext , able  to follow commands    Medications:  Scheduled Meds:   apixaban  2.5 mg Oral BID    QUEtiapine  25 mg Oral Nightly    Venelex   Topical BID    furosemide  40 mg IntraVENous Daily    citalopram  10 mg Oral Daily    metoprolol tartrate  12.5 mg Oral BID    insulin lispro  0-4 Units SubCUTAneous Q4H    sodium chloride flush  5-40 mL IntraVENous 2 times per day    pantoprazole  40 mg IntraVENous Daily       PRN Meds:  sodium chloride, HYDROmorphone, potassium chloride, magnesium sulfate, albuterol, dextrose, glucose, dextrose bolus **OR** dextrose bolus, glucagon (rDNA), prochlorperazine, sodium chloride flush, sodium chloride, acetaminophen **OR** acetaminophen    Results:  CBC:   Recent Labs     01/22/23  0442 01/23/23  0453 01/24/23  0400 01/24/23  1438   WBC 8.9 8.0 6.2  --    HGB 7.2* 7.1* 6.7* 7.8*   HCT 22.8* 22.3* 21.3* 23.8*   MCV 83.9 83.4 84.4  --    * 616* 600*  --        BMP:   Recent Labs     01/22/23  0442 01/23/23  0453 01/24/23  0400    145 145   K 2.8* 3.2* 3.6    110 112*   CO2 29 27 27   BUN 24* 25* 27*   CREATININE 0.8 0.8 0.7         Cultures:  COVID-19  detected  Blood Culture positive for E. Coli and clostridium       Films:    FL MODIFIED BARIUM SWALLOW W VIDEO   Final Result   1. Positive deep laryngeal penetration to the cords, without onelia tracheal   aspiration, of thin liquids, nectar thickened liquids, and honey thickened   liquids, without spontaneous cough reflex. 2. No evidence of laryngeal penetration or tracheal aspiration of purees.    3. Poor oral motor control, with significant premature spillage of contrast   into the valleculae and piriform sinuses, with large residual boluses   contained within the valleculae and piriform sinuses throughout the exam.   Please see separate speech pathology report for full discussion of findings   and recommendations. VL Extremity Venous Bilateral   Final Result      XR CHEST PORTABLE   Final Result   New infiltrates seen within the left upper lung, compatible with pneumonia   versus aspiration. Increasing pleural effusions. XR ABDOMEN FOR NG/OG/NE TUBE PLACEMENT   Final Result   Tip of the feeding tube is in the expected position, projecting over the   gastric body. Continued pleuroparenchymal disease in the lower lungs bilaterally. XR CHEST PORTABLE   Final Result   Bibasilar effusions with mild congestion. Support tubes as described above. XR CHEST PORTABLE   Final Result   Small bilateral pleural effusions and bibasilar atelectasis or airspace   disease, increased on the left as compared to prior. XR CHEST PORTABLE   Final Result   No significant interval change in right greater than left pleural effusions   and bibasilar atelectasis or airspace disease as compared to prior. Tip of endotracheal tube is approximately 1.2 cm proximal to the kori and   could be retracted approximately 2 cm. XR CHEST PORTABLE   Final Result   Bilateral pleural effusions and adjacent lung consolidation, similar to prior         XR CHEST PORTABLE   Final Result   1. Stable lines, tubes and support devices. 2. Stable basilar opacities with pleural effusions. XR CHEST PORTABLE   Final Result   Increased pleural-parenchymal disease bilaterally         XR CHEST PORTABLE   Final Result   1. Endotracheal tube terminates in appropriate position above the kori. 2.  The enteric tube courses off the field of view in the upper abdomen. 3.  Right internal jugular line terminates at the level of the mid SVC. CT ABDOMEN PELVIS W IV CONTRAST Additional Contrast? None   Final Result   1.   Small volume pneumoperitoneum and extraluminal fecal material appears to   arise from the sigmoid colon, presumably as a complication of diverticulitis. 2.  Small volume abdominopelvic ascites. No loculated fluid collection   identified. Findings were discussed with Yvan Day at 10:25 pm on 1/6/2023. Assessment:    Principal Problem: Bowel perforation ([address]  Active Problems:    Septic shock (HCC)    Acute hypoxemic respiratory failure (HCC)    COVID-19    Coagulopathy (HCC)    Diverticulitis of colon    Paroxysmal atrial fibrillation (HCC)    E coli bacteremia    Acute respiratory failure with hypoxia (HCC)    Hypernatremia    Aspiration into airway    Disorder of electrolytes    Leg edema    Acute deep vein thrombosis (DVT) of proximal vein of right lower extremity (HCC)    Perforated viscus  Resolved Problems:    * No resolved hospital problems. *       Plan:    #Perforated diverticulum. Gram neg bacteremia    S.p emergency ex lap with sigmoid colectomy , small bowel resection and ostomy placement   Surgery managing  Stop Zosyn. Fevers resolved   Improved sepsis and off pressors   Recovered bowel function, started TF -tolerating well  - start pureed diet. #Septic shock due to perforated diverticulum. Ecoli  bacteremia. On Zosyn and Diflucan. Severe hypotension , no improvement with IVF boluses  was On IV vasopressors - vaso and levophed- improved Bp  Now off pressors     #Acute respiratory failure on the ventilator. Sec to perforated viscus - remained on vent post surgery   Management per pulmonary improved . Extubated to NC 5 L  Worsened again with aspiration event during SLP  Was on Vapo therm. Now weaned down to room air. Lasix as tolerated     #Paroxysmal A. fib. With RVR, started . Off IV amiodarone. Coumadin on hold, INR reversed perioperatively with vit k , ffp  Currently on heparin drip- stop and start Eliquis   Continue metoprolol    #COVID-19   Incidental finding. No covid related issues    # Anemia  - multifactorial - surgery, iatrogenic, nutritional. Monitor and transfuse less than 7    # Edema - good UOP. Added lasix 40 IV twice daily, tolerating well with resolution of peripheral edema      Hypernatremia  Increase water boluses     Hypokalemia - replace     for DVT prophylaxis. On TF - start oral diet  Out of bed       Discharge planning.      Noah Melgar MD 4:07 PM 1/24/2023

## 2023-01-24 NOTE — CARE COORDINATION
INTERDISCIPLINARY PLAN OF CARE CONFERENCE    Date/Time: 1/24/2023 11:57 AM  Completed by: Beth Ramirez RN, Case Management      Patient Name:  Elisha Espinoza  YOB: 1932  Admitting Diagnosis: Diverticulitis of colon [K57.32]  Prolonged Q-T interval on ECG [R94.31]  Perforated viscus [R19.8]  Perforated diverticulum [K57.80]     Admit Date/Time:  1/6/2023  8:46 PM    Chart reviewed. Interdisciplinary team contacted or reviewed plan related to patient progress and discharge plans.   Disciplines included Case Management, Nursing, and Dietitian.    Current Status: IP 01/07/2023  PT/OT recommendation for discharge plan of care: SNF (updates needed for ins approval). Accepted for admission to [facility] when medically stable for rehab.  Getting blood today for low H/H. PT/OT to see her this afternoon.  +Cm following      Home O2 in place on admit: No  Pt informed of need to bring portable home O2 tank on day of discharge for nursing to connect prior to leaving:  No  Verbalized agreement/Understanding:  Not Indicated

## 2023-01-25 LAB
ANION GAP SERPL CALCULATED.3IONS-SCNC: 8 MMOL/L (ref 3–16)
BUN BLDV-MCNC: 28 MG/DL (ref 7–20)
CALCIUM SERPL-MCNC: 8.5 MG/DL (ref 8.3–10.6)
CHLORIDE BLD-SCNC: 111 MMOL/L (ref 99–110)
CO2: 27 MMOL/L (ref 21–32)
CREAT SERPL-MCNC: 0.8 MG/DL (ref 0.6–1.2)
GFR SERPL CREATININE-BSD FRML MDRD: >60 ML/MIN/{1.73_M2}
GLUCOSE BLD-MCNC: 105 MG/DL (ref 70–99)
GLUCOSE BLD-MCNC: 113 MG/DL (ref 70–99)
GLUCOSE BLD-MCNC: 116 MG/DL (ref 70–99)
GLUCOSE BLD-MCNC: 131 MG/DL (ref 70–99)
GLUCOSE BLD-MCNC: 135 MG/DL (ref 70–99)
GLUCOSE BLD-MCNC: 137 MG/DL (ref 70–99)
GLUCOSE BLD-MCNC: 143 MG/DL (ref 70–99)
GLUCOSE BLD-MCNC: 96 MG/DL (ref 70–99)
HCT VFR BLD CALC: 22 % (ref 36–48)
HEMOGLOBIN: 7.2 G/DL (ref 12–16)
MAGNESIUM: 2.1 MG/DL (ref 1.8–2.4)
PERFORMED ON: ABNORMAL
PERFORMED ON: NORMAL
POTASSIUM REFLEX MAGNESIUM: 2.8 MMOL/L (ref 3.5–5.1)
POTASSIUM SERPL-SCNC: 3.1 MMOL/L (ref 3.5–5.1)
POTASSIUM SERPL-SCNC: 3.6 MMOL/L (ref 3.5–5.1)
SODIUM BLD-SCNC: 146 MMOL/L (ref 136–145)

## 2023-01-25 PROCEDURE — 6370000000 HC RX 637 (ALT 250 FOR IP): Performed by: INTERNAL MEDICINE

## 2023-01-25 PROCEDURE — 6360000002 HC RX W HCPCS: Performed by: INTERNAL MEDICINE

## 2023-01-25 PROCEDURE — 2580000003 HC RX 258: Performed by: INTERNAL MEDICINE

## 2023-01-25 PROCEDURE — 85014 HEMATOCRIT: CPT

## 2023-01-25 PROCEDURE — 85018 HEMOGLOBIN: CPT

## 2023-01-25 PROCEDURE — 99232 SBSQ HOSP IP/OBS MODERATE 35: CPT | Performed by: INTERNAL MEDICINE

## 2023-01-25 PROCEDURE — 2060000000 HC ICU INTERMEDIATE R&B

## 2023-01-25 PROCEDURE — 97530 THERAPEUTIC ACTIVITIES: CPT

## 2023-01-25 PROCEDURE — 97535 SELF CARE MNGMENT TRAINING: CPT

## 2023-01-25 PROCEDURE — C9113 INJ PANTOPRAZOLE SODIUM, VIA: HCPCS | Performed by: INTERNAL MEDICINE

## 2023-01-25 PROCEDURE — 80048 BASIC METABOLIC PNL TOTAL CA: CPT

## 2023-01-25 PROCEDURE — 84132 ASSAY OF SERUM POTASSIUM: CPT

## 2023-01-25 PROCEDURE — 97110 THERAPEUTIC EXERCISES: CPT

## 2023-01-25 PROCEDURE — 83735 ASSAY OF MAGNESIUM: CPT

## 2023-01-25 PROCEDURE — 97112 NEUROMUSCULAR REEDUCATION: CPT

## 2023-01-25 PROCEDURE — 92526 ORAL FUNCTION THERAPY: CPT

## 2023-01-25 PROCEDURE — 36592 COLLECT BLOOD FROM PICC: CPT

## 2023-01-25 RX ORDER — FUROSEMIDE 40 MG/1
40 TABLET ORAL DAILY
Qty: 60 TABLET | Refills: 3
Start: 2023-01-25

## 2023-01-25 RX ORDER — QUETIAPINE FUMARATE 25 MG/1
25 TABLET, FILM COATED ORAL NIGHTLY
Qty: 60 TABLET | Refills: 3
Start: 2023-01-25

## 2023-01-25 RX ORDER — PANTOPRAZOLE SODIUM 40 MG/1
40 TABLET, DELAYED RELEASE ORAL
Qty: 30 TABLET | Refills: 1
Start: 2023-01-25

## 2023-01-25 RX ADMIN — PANTOPRAZOLE SODIUM 40 MG: 40 INJECTION, POWDER, FOR SOLUTION INTRAVENOUS at 09:46

## 2023-01-25 RX ADMIN — METOPROLOL TARTRATE 12.5 MG: 25 TABLET, FILM COATED ORAL at 09:43

## 2023-01-25 RX ADMIN — SODIUM CHLORIDE, PRESERVATIVE FREE 10 ML: 5 INJECTION INTRAVENOUS at 09:51

## 2023-01-25 RX ADMIN — SODIUM CHLORIDE: 9 INJECTION, SOLUTION INTRAVENOUS at 05:58

## 2023-01-25 RX ADMIN — QUETIAPINE FUMARATE 25 MG: 25 TABLET ORAL at 20:03

## 2023-01-25 RX ADMIN — APIXABAN 2.5 MG: 5 TABLET, FILM COATED ORAL at 20:03

## 2023-01-25 RX ADMIN — POTASSIUM CHLORIDE 20 MEQ: 400 INJECTION, SOLUTION INTRAVENOUS at 09:33

## 2023-01-25 RX ADMIN — POTASSIUM CHLORIDE 20 MEQ: 400 INJECTION, SOLUTION INTRAVENOUS at 05:59

## 2023-01-25 RX ADMIN — FUROSEMIDE 40 MG: 10 INJECTION, SOLUTION INTRAMUSCULAR; INTRAVENOUS at 09:46

## 2023-01-25 RX ADMIN — APIXABAN 2.5 MG: 5 TABLET, FILM COATED ORAL at 10:37

## 2023-01-25 RX ADMIN — CITALOPRAM HYDROBROMIDE 10 MG: 20 TABLET ORAL at 09:44

## 2023-01-25 RX ADMIN — POTASSIUM CHLORIDE 20 MEQ: 400 INJECTION, SOLUTION INTRAVENOUS at 07:47

## 2023-01-25 RX ADMIN — POTASSIUM CHLORIDE 20 MEQ: 400 INJECTION, SOLUTION INTRAVENOUS at 14:38

## 2023-01-25 RX ADMIN — METOPROLOL TARTRATE 12.5 MG: 25 TABLET, FILM COATED ORAL at 20:03

## 2023-01-25 RX ADMIN — POTASSIUM CHLORIDE 20 MEQ: 400 INJECTION, SOLUTION INTRAVENOUS at 16:00

## 2023-01-25 RX ADMIN — SODIUM CHLORIDE, PRESERVATIVE FREE 10 ML: 5 INJECTION INTRAVENOUS at 20:50

## 2023-01-25 RX ADMIN — Medication: at 09:49

## 2023-01-25 RX ADMIN — SODIUM CHLORIDE: 9 INJECTION, SOLUTION INTRAVENOUS at 14:38

## 2023-01-25 RX ADMIN — Medication: at 20:29

## 2023-01-25 NOTE — CONSULTS
Pt seen for ostomy care. More alert today. No family in room. Current appliance intact from 1/23. Large amount of loose stool output per staff RN. Pt being discharged to [facility] today. Below ostomy pouch change instructions placed on JAISON. Jonathan Josse  Colostomy Care Instructions    Gather supplies listed below:  Scissors Throw away bag   Washcloths  Adapt Barrier seal, stock #[id number] Coloplast Flex cut to fit wafer, stock #: [id number]   Pattern or measuring guide Coloplast Flex drainable pouch, stock #: [id number]     Using measurement guide, measure stoma. Trace measurement on back of wafer. Cut out tracing on barrier. Remember to remeasure stoma for the first 4-6 weeks after surgery and adjust size of hole as needed. Remove old appliance and place in disposable bag, throw away. Cleanse skin around stoma using plain water and a washcloth. If you choose to use soap, use Dial or [place] and rinse all residue off skin. Pat dry. Apply barrier ring all the way around stoma and crimp edges with nail to seal down. Remove backing on wafer by pulling the turquoise release tab away from the barrier. Center barrier around stoma. Rub finger around stoma on wafer to help seal.    Remove backing release paper on pouch. Line up adhesive to landing zone (turquoise [place]) on wafer and attach.     Hold hand over wafer x 5 minutes to assist with seal.        Call Ostomy Nurse at [facility] for questions or problems  Ethel Scale [phone]

## 2023-01-25 NOTE — DISCHARGE INSTRUCTIONS
Call [phone], Dr. Reilly (surgeon), for follow up in 1-2 weeks.  NS wet to dry packing to open areas midline wound daily.   Diet as tolerated.

## 2023-01-25 NOTE — PLAN OF CARE
Problem: Discharge Planning  Goal: Discharge to home or other facility with appropriate resources  Outcome: Progressing     Problem: Pain  Goal: Verbalizes/displays adequate comfort level or baseline comfort level  Outcome: Progressing     Problem: Skin/Tissue Integrity  Goal: Absence of new skin breakdown  Description: 1. Monitor for areas of redness and/or skin breakdown  2. Assess vascular access sites hourly  3. Every 4-6 hours minimum:  Change oxygen saturation probe site  4. Every 4-6 hours:  If on nasal continuous positive airway pressure, respiratory therapy assess nares and determine need for appliance change or resting period.   Outcome: Progressing     Problem: Nutrition Deficit:  Goal: Optimize nutritional status  Outcome: Progressing  Flowsheets (Taken 1/24/2023 1430 by Joseph Weston, RD, LD)  Nutrient intake appropriate for improving, restoring, or maintaining nutritional needs:   Recommend, monitor, and adjust tube feedings and TPN/PPN based on assessed needs   Assess nutritional status and recommend course of action   Monitor oral intake, labs, and treatment plans   Recommend appropriate diets, oral nutritional supplements, and vitamin/mineral supplements     Problem: Neurosensory - Adult  Goal: Achieves stable or improved neurological status  Outcome: Progressing     Problem: Respiratory - Adult  Goal: Achieves optimal ventilation and oxygenation  Outcome: Progressing     Problem: Skin/Tissue Integrity - Adult  Goal: Skin integrity remains intact  Outcome: Progressing  Goal: Incisions, wounds, or drain sites healing without S/S of infection  Outcome: Progressing  Goal: Oral mucous membranes remain intact  Outcome: Progressing     Problem: Musculoskeletal - Adult  Goal: Return mobility to safest level of function  Outcome: Progressing     Problem: Genitourinary - Adult  Goal: Absence of urinary retention  Outcome: Progressing  Goal: Urinary catheter remains patent  Outcome: Progressing Problem: Infection - Adult  Goal: Absence of infection at discharge  Outcome: Progressing     Problem: Hematologic - Adult  Goal: Maintains hematologic stability  Outcome: Progressing     Problem: Confusion  Goal: Confusion, delirium, dementia, or psychosis is improved or at baseline  Description: INTERVENTIONS:  1. Assess for possible contributors to thought disturbance, including medications, impaired vision or hearing, underlying metabolic abnormalities, dehydration, psychiatric diagnoses, and notify attending LIP  2. [place] high risk fall precautions, as indicated  3. Provide frequent short contacts to provide reality reorientation, refocusing and direction  4. Decrease environmental stimuli, including noise as appropriate  5. Monitor and intervene to maintain adequate nutrition, hydration, elimination, sleep and activity  6. If unable to ensure safety without constant attention obtain sitter and review sitter guidelines with assigned personnel  7.  Initiate Psychosocial CNS and Spiritual Care consult, as indicated  Outcome: Progressing

## 2023-01-25 NOTE — FLOWSHEET NOTE
01/25/23 0942   Vital Signs   Temp 98.6 °F (37 °C)   Temp Source Axillary   Heart Rate 79   Heart Rate Source Monitor   Resp 18   /67   MAP (Calculated) 81   BP Location Right upper arm   BP Method Automatic   Patient Position High fowlers   Level of Consciousness 0   MEWS Score 1   Pain Assessment   Pain Assessment None - Denies Pain   Oxygen Therapy   SpO2 99 %   O2 Device None (Room air)     AM assessment complete. Pt alert to self. Took medications well. Doing well with oral feeds and drinks. Continues with pitting edema to ble and bilateral elbows. Continues IV lasix. Ng continues in place at [address] [place] [address]. Call light and bedside table within reach. Will continue to monitor.

## 2023-01-25 NOTE — PROGRESS NOTES
Occupational Therapy Daily Treatment Note    Unit: PCU  Date:  1/25/2023  Patient Name:    Charlotte Campa  Admitting diagnosis:  Diverticulitis of colon [K57.32]  Prolonged Q-T interval on ECG [R94.31]  Perforated viscus [R19.8]  Perforated diverticulum [K57.80]  Admit Date:  1/6/2023  Precautions/Restrictions:  fall risk, IV, bed/chair alarm, de jesus catheter, colostomy, incision (drain R side), NGT, telemetry, continuous pulse ox, code status (Full), and PICC    LAPAROTOMY EXPLORATORY, SIGMOID COLECTOMY, SMALL BOWEL RESECTION,OSTOMY 1/7/23     Intubated 1/7/23-1/12/23 1/18/23 transferred to ICU  1/21/23 new orders received and therapy resumed     Treatment Time:  10:00-10:56  Treatment number:  5  Total Treatment Time:  56 minutes    Patient Goals for Session:  \" to get stronger \"      Discharge Recommendations: SNF  DME needs for discharge: defer to facility       Therapy recommendations for staff:  Assist of 2 to edge of bed. Maxi-lift to chair    History of Present Illness: per Dr Abhi Gifford consult 1/7/23:  \"The patient is a 80 y.o. female who presented with severe, sharp lower abdominal pain that started yesterday. She is intubated in the ICU on pressors. History obtained from her daughter with whom the patient lives. The patient fell on [place] and was thought to be having some residual pain from the fall. Things worsened yesterday with no alleviating or modifying factors. She had associated nausea. Last colonoscopy was a few years ago with benign polyps removed. \"    Home Health S4 Level Recommendation:  NA    AM-PAC Score: AM-PAC Inpatient Daily Activity Raw Score: 8  Pt scored a 8/24 on the AM-PAC ADL Inpatient form. Current research shows that an AM-PAC score of 17 or less is typically not associated with a discharge to the patient's home setting. Subjective:  Pt supine in bed upon therapist arrival. Pt agreeable to work with therapy this date.       Pain   No    Rating: NA  Location:   Pain Medicine Status: RN notified      Cognition:    A&O Person   Able to follow 1 step commands, consistently. Balance:  Functional Sitting Balance:  Diminished- min A/CGA   Comments:    Functional Standing Balance:NT   Comments:      Bed mobility:    Supine to sit:   moderate assistance (50%)  Sit to supine:   maximal assistance (75%)  Rolling:    moderate assistance (50%)  Scooting in sitting:  maximal assistance (75%)  Scooting to head of bed:   total assistance (100%)   Bridging:   No    Transfers:    Sit to stand:  Not Tested  Stand to sit:  Not Tested  Bed to chair:   Not Tested  Standard toilet: Not Tested  Bed to [facility]:  Not Tested    Activities of Daily Living:   UB Dressing:   Not Tested  LB Dressing:    Not Tested  UB Bathing:  minimal assistance (25%) and face   LB Bathing:  Not Tested  Feeding:  setup to bring ice chips to mouth  Grooming:   minimal assistance (25%) to brush hair   Toileting:  Not Tested    Activity Tolerance:   Pt completed therapy session with No adverse symptoms noted w/activity  SpO2: >93%  HR: 60s  BP at EOB: 95/71    Therapeutic Exercise:   Forearm sup/pronation:  x10  Elbow flex/ext  Shoulder flex/ext:  x10  Shoulder abd/add:  x10  Shoulder horizontal abd/add:  x10  Scapular retraction:  x10  Cervical stretches      Patient Education:   Role of OT  Recommendations for DC    Positioning Needs: In bed, call light and needs in reach. Alarm Set    Family Present:  No    Assessment: Pt may benefit from continued skilled occupational therapy while in the hospital and upon d/c to SNF in order to progress to a safe and more indpt level of functioning. GOALS  To be met in 3 Visits:  1).  Bed to toilet/BSC: Mod A with LIZETHDY     To be met in 5 Visits:  1.) Pt will perform supine to sit: mod A  2.) pt will maintain unsupported sitting for 15 minutes   3.) pt  will perform upper body ADLs: min A      Plan: cont with [address] [place] [address], OTR/L #[id number]      If patient discharges from this facility prior to next visit, this note will serve as the Discharge Summary

## 2023-01-25 NOTE — DISCHARGE SUMMARY
Name:  Judie Baez  Room:  /6798-75  MRN:    [id number]    Discharge Summary      This discharge summary is in conjunction with a complete physical exam done on the day of discharge. Discharging Physician: Dr. Candice Gitelman, MD     Admit: 1/6/2023  Discharge:  1/26/2023     HPI taken from admission H&P:    The patient is a 80 y.o. female with PMH below, presented to [place] [address] w/ lower abd pain. Pt arrived via helicopter. She is unable to give me any Hx during the brief time that I saw her priior to sedating her so a central line could be placed. She appeared to be very uncomfortable despite multiple doses of pain medicine including 100 mg She was subsequently preemptively intubated 2/2 hypoxia and increased WOB. She was found to have a perforated diverticulitis w/ small extraluminal stool at [place] [address]. She is on Coumadin and INR is ~3. She was given 10 SQ Vit K at [place] [address]. Dr. Reynaldo Naqvi is planning on OR as soon as we can get her INR down. She is now on levophed. She was also found to be COVID +. Diagnoses this Admission and Hospital Course     #Perforated diverticulum. #Gram neg bacteremia  S.p emergency ex lap with sigmoid colectomy , small bowel resection and ostomy placement   Surgery managing  Stop Zosyn. Fevers resolved   Improved sepsis and off pressors   Recovered bowel function, started TF -tolerating well  - start pureed diet.   -Discussed with nursing at the time of discharge. Not requiring any pain medicine. #Septic shock due to perforated diverticulum. Ecoli  bacteremia. On Zosyn and Diflucan. Severe hypotension , no improvement with IVF boluses  was On IV vasopressors - vaso and levophed- improved Bp  Now off pressors      #Acute respiratory failure on the ventilator. Sec to perforated viscus - remained on vent post surgery   Management per pulmonary improved . Extubated to NC 5 L  Worsened again with aspiration event during SLP  Was on Vapo therm.  Now weaned down to room air. Lasix as tolerated      #Paroxysmal A. fib. With RVR, started . Off IV amiodarone. Coumadin on hold, INR reversed perioperatively with vit k , ffp  Currently on heparin drip- stop and start Eliquis   Continue metoprolol     #COVID-19   Incidental finding. No covid related issues     #Anemia  - multifactorial - surgery, iatrogenic, nutritional. Monitor and transfuse less than 7     #Edema - good UOP. Added lasix 40 IV twice daily, tolerating well with resolution of peripheral edema       #Hypernatremia  Increase water boluses      #Hypokalemia - replace     Procedures (Please Review Full Report for Details)  Procedure(s): 1/7  LAPAROTOMY EXPLORATORY, SIGMOID COLECTOMY, SMALL BOWEL RESECTION,OSTOMY    Consults    Pulmonology  General surgery  Wound ostomy    Physical Exam at Discharge:    BP (!) 107/47   Pulse 91   Temp 97.1 °F (36.2 °C) (Oral)   Resp 14   Ht 5' 7\" (1.702 m)   Wt 170 lb 3.2 oz (77.2 kg)   SpO2 94%   BMI 26.66 kg/m²     General: elderly female, remains  fatigued  but slowly improving   Eyes: PERRL. No sclera icterus. No conjunctiva injected. ENT: No discharge. NG in place  Neck: Trachea midline. Normal thyroid. Resp: No accessory muscle use. Diminished in bases with resolving crackles   CV: Regular rate. Regular rhythm. No mumur or rub. Resolving massive peripheral  edema. No JVD. Palpable pedal pulses. GI: Non-tender. Mid abd surgical dressing and right LQ Bobby drain in place  left sided ostomy in place with liquid stool   Non-distended. No masses. No organmegaly. sluggish bowel sounds. No hernia.   Ext - resolving + edema to all ext  Neuro - improving mentation moving ext , able  to follow commands    CBC:   Recent Labs     01/24/23  0400 01/24/23  1438 01/25/23  0530   WBC 6.2  --   --    HGB 6.7* 7.8* 7.2*   HCT 21.3* 23.8* 22.0*   MCV 84.4  --   --    *  --   --      BMP:   Recent Labs     01/24/23  0400 01/25/23  0530 01/25/23  1354 01/25/23  1841 01/26/23  0515   NA 145 146*  --   --  145   K 3.6 2.8* 3.1* 3.6 3.6   * 111*  --   --  111*   CO2 27 27  --   --  26   BUN 27* 28*  --   --  27*   CREATININE 0.7 0.8  --   --  0.8       CULTURES  COVID-19  detected  Blood Culture positive for E. Coli and clostridium     RADIOLOGY  FL MODIFIED BARIUM SWALLOW W VIDEO   Final Result   1. Positive deep laryngeal penetration to the cords, without onelia tracheal   aspiration, of thin liquids, nectar thickened liquids, and honey thickened   liquids, without spontaneous cough reflex. 2. No evidence of laryngeal penetration or tracheal aspiration of purees. 3. Poor oral motor control, with significant premature spillage of contrast   into the valleculae and piriform sinuses, with large residual boluses   contained within the valleculae and piriform sinuses throughout the exam.   Please see separate speech pathology report for full discussion of findings   and recommendations. VL Extremity Venous Bilateral   Final Result      XR CHEST PORTABLE   Final Result   New infiltrates seen within the left upper lung, compatible with pneumonia   versus aspiration. Increasing pleural effusions. XR ABDOMEN FOR NG/OG/NE TUBE PLACEMENT   Final Result   Tip of the feeding tube is in the expected position, projecting over the   gastric body. Continued pleuroparenchymal disease in the lower lungs bilaterally. XR CHEST PORTABLE   Final Result   Bibasilar effusions with mild congestion. Support tubes as described above. XR CHEST PORTABLE   Final Result   Small bilateral pleural effusions and bibasilar atelectasis or airspace   disease, increased on the left as compared to prior. XR CHEST PORTABLE   Final Result   No significant interval change in right greater than left pleural effusions   and bibasilar atelectasis or airspace disease as compared to prior.       Tip of endotracheal tube is approximately 1.2 cm proximal to the kori and   could be retracted approximately 2 cm. XR CHEST PORTABLE   Final Result   Bilateral pleural effusions and adjacent lung consolidation, similar to prior         XR CHEST PORTABLE   Final Result   1. Stable lines, tubes and support devices. 2. Stable basilar opacities with pleural effusions. XR CHEST PORTABLE   Final Result   Increased pleural-parenchymal disease bilaterally         XR CHEST PORTABLE   Final Result   1. Endotracheal tube terminates in appropriate position above the kori. 2.  The enteric tube courses off the field of view in the upper abdomen. 3.  Right internal jugular line terminates at the level of the mid SVC. CT ABDOMEN PELVIS W IV CONTRAST Additional Contrast? None   Final Result   1. Small volume pneumoperitoneum and extraluminal fecal material appears to   arise from the sigmoid colon, presumably as a complication of diverticulitis. 2.  Small volume abdominopelvic ascites. No loculated fluid collection   identified. Findings were discussed with Sp Wright at 10:25 pm on 1/6/2023.               Discharge Medications     Medication List        START taking these medications      apixaban 2.5 MG Tabs tablet  Commonly known as: ELIQUIS  Take 1 tablet by mouth 2 times daily     furosemide 40 MG tablet  Commonly known as: Lasix  Take 1 tablet by mouth daily     pantoprazole 40 MG tablet  Commonly known as: PROTONIX  Take 1 tablet by mouth every morning (before breakfast)     potassium bicarbonate 25 MEQ disintegrating tablet  Commonly known as: K-LYTE  Take 1 tablet by mouth 2 times daily     QUEtiapine 25 MG tablet  Commonly known as: SEROQUEL  Take 1 tablet by mouth nightly            CHANGE how you take these medications      metoprolol tartrate 25 MG tablet  Commonly known as: LOPRESSOR  Take 0.5 tablets by mouth 2 times daily  What changed:   how much to take  when to take this            CONTINUE taking these medications      citalopram 20 MG tablet  Commonly known as: CELEXA            STOP taking these medications      warfarin 5 MG tablet  Commonly known as: COUMADIN               Where to Get Your Medications        Information about where to get these medications is not yet available    Ask your nurse or doctor about these medications  apixaban 2.5 MG Tabs tablet  furosemide 40 MG tablet  metoprolol tartrate 25 MG tablet  pantoprazole 40 MG tablet  potassium bicarbonate 25 MEQ disintegrating tablet  QUEtiapine 25 MG tablet           Discharged in stable condition to SNF    Follow Up:   Follow up with physician at SNF      More than 30 mts spent      [facility], MD 1/26/2023 12:56 PM

## 2023-01-25 NOTE — DISCHARGE INSTR - COC
Continuity of Care Form    Patient Name: Zac Armijo   :  4/3/1932  MRN:  [id number]    Admit date:  2023  Discharge date:  2023    Code Status Order: Full Code   Advance Directives:     Admitting Physician:  Army Jose MD  PCP: Gely Wallace MD    Discharging Nurse: RUBEN [facility] Unit/Room#: /1153-82  Discharging Unit Phone Number: [phone]    Emergency Contact:   Extended Emergency Contact Information  Primary Emergency Contact: [address] Phone: [phone]  Relation: Child  Secondary Emergency Contact: [facility]  Address: [address] [place] [address], [address] [place] [address] Phone: [phone]  Relation: Child    Past Surgical History:  Past Surgical History:   Procedure Laterality Date    ANKLE FRACTURE SURGERY      LEFT ANKLE    APPENDECTOMY      ARM SURGERY Right 2020    EXCISION OF SKIN CANCER RIGHT ARM performed by Deanna De Leon MD at [address] [place] [address]  2017    cecal polyp; random colon biopsies    FOOT AMPUTATION Right 2022    AMPUTATION OF SECOND TOE RIGHT FOOT performed by Chiquis Vargas DPM at [address] [place] [address] ([address] [place] [address])      LAPAROTOMY N/A 2023    LAPAROTOMY EXPLORATORY, SIGMOID COLECTOMY, SMALL BOWEL RESECTION,OSTOMY performed by Amarjit Villalpando MD at [address] Left 2016    EXCISION LESION LEFT UPPER EXTREMITY LEFT BACK AND RIGHT SHOULDER    UPPER GASTROINTESTINAL ENDOSCOPY  09/15/2017    dilated, biopsies    VULVA SURGERY      x 2        Immunization History: There is no immunization history for the selected administration types on file for this patient.     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 5    Bowel perforation (HCC) K63.1    Septic shock (HCC) A41.9, R65.21    Acute hypoxemic respiratory failure (HCC) J96.01    COVID-19 U07.1    Coagulopathy ([facility]) D68.9    Diverticulitis of colon K57.32    Paroxysmal atrial fibrillation ([facility]) I48.0    E coli bacteremia R78.81, B96.20    Acute respiratory failure with hypoxia ([facility]) J96.01    Hypernatremia E87.0    Aspiration into airway T17.908A    Disorder of electrolytes E87.8    Leg edema R60.0    Acute deep vein thrombosis (DVT) of proximal vein of right lower extremity ([facility]) I82.4Y1       Isolation/Infection:   Isolation            No Isolation          Patient Infection Status       Infection Onset Added Last Indicated Last Indicated By Review Planned Expiration Resolved Resolved By    None active    Resolved    C-diff Rule Out 01/18/23 01/18/23 01/18/23 Clostridium difficile toxin/antigen (Ordered)   01/18/23 Rule-Out Test Canceled    COVID-19 01/06/23 01/06/23 01/06/23 COVID-19, Rapid   01/24/23 Erica Anaya RN    Pt Covid positive on 1-6-23  No longer symptomatic: 01-24-23    COVID-19 (Rule Out) 07/31/20 07/31/20 07/31/20 COVID-19 (Ordered)   08/02/20 Rule-Out Test Resulted            Nurse Assessment:  Last Vital Signs: /67   Pulse 79   Temp 98.6 °F (37 °C) (Axillary)   Resp 18   Ht 5' 7\" (1.702 m)   Wt 175 lb 5.2 oz (79.5 kg)   SpO2 99%   BMI 27.46 kg/m²     Last documented pain score (0-10 scale): Pain Level: 0  Last Weight:   Wt Readings from Last 1 Encounters:   01/25/23 175 lb 5.2 oz (79.5 kg)     Mental Status:  oriented to self only, disoriented to time, place, and situation.      IV Access:  - None    Nursing Mobility/ADLs:  Walking   Dependent  Transfer  Dependent  Bathing  Dependent  Dressing  Dependent  Toileting  Dependent  Feeding  Assisted  Med Admin  Dependent  Med Delivery   crushed and prefers mixed with applesauce/pudding. Wound Care Documentation and Therapy:  Incision 01/07/23 Abdomen Medial;Upper (Active)   Dressing Status New dressing applied 01/25/23 0448   Incision Cleansed Cleansed with saline 01/24/23 2000   Dressing/Treatment Moist to moist;ABD pad 01/24/23 2000   Closure Staples 01/22/23 1501   Incision Assessment Other (Comment) 01/21/23 1539   Drainage Amount Small 01/24/23 2000   Drainage Description Serosanguinous 01/24/23 2000   Odor None 01/24/23 2000   Jania-incision Assessment Intact 01/24/23 2000   Number of days: 18        Elimination:  Continence: Bowel: No  Bladder: No  Urinary Catheter: Removal Date 1/25/23    Colostomy/Ileostomy/Ileal Conduit: Yes  Colostomy LLQ-Stomal Appliance: 1 piece  Colostomy LLQ-Stoma  Assessment: Moist, Red  Colostomy LLQ-Peristomal Assessment: Clean, dry & intact  Colostomy LLQ-Stool Appearance: Watery  Colostomy LLQ-Stool Color: Brown  Colostomy LLQ-Stool Amount: Large  Colostomy LLQ-Output (mL): 200 ml    Date of Last BM: 1/26/23    Intake/Output Summary (Last 24 hours) at 1/25/2023 1353  Last data filed at 1/25/2023 1212  Gross per 24 hour   Intake 1859.89 ml   Output 2809.5 ml   Net -949.61 ml     I/O last 3 completed shifts: In: 2978.4 [P.O.:90; I.V.:180.1; Blood:319.3; NG/GT:2132; IV Piggyback:257]  Out: 3874.5 [Urine:1925; Drains:14.5; WPBJW:0996]    Safety Concerns:     Sundowners Sundrome, History of Falls (last 30 days), At Risk for Falls, and Aspiration Risk    Impairments/Disabilities:      Speech, Vision, and Hearing    Nutrition Therapy:  Current Nutrition Therapy:   - Oral Diet:  Dysphagia 1 pureed    Routes of Feeding: Oral  Liquids: Thin Liquids  Daily Fluid Restriction: no  Last Modified Barium Swallow with Video (Video Swallowing Test): done on 1/24/2023    Treatments at the Time of Hospital Discharge:   Respiratory Treatments: none   Oxygen Therapy:  is not on home oxygen therapy.   Ventilator:    - No ventilator support    Rehab Therapies: Physical Therapy, Occupational Therapy, and Speech/Language Therapy  Weight Bearing Status/Restrictions: No weight bearing restrictions  Other Medical Equipment (for information only, NOT a DME order):  wheelchair, walker, bath bench, bedside commode, and hospital bed  Other Treatments:     Patient's personal belongings (please select all that are sent with patient):  None    RN SIGNATURE:  Electronically signed by Berenice Prasad RN on 1/26/23 at 9:50 AM EST    CASE MANAGEMENT/SOCIAL WORK SECTION    Inpatient Status Date: 01/07/2023    Readmission Risk Assessment Score:  Readmission Risk              Risk of Unplanned Readmission:  24           Discharging to Facility/ Agency   Name: [facility]  [facility] [address] [facility] [address] [facility]    Phone: [phone]  Fax: [phone]     Dialysis Facility (if applicable)   Name:  Address:  Dialysis Schedule:  Phone:  Fax:    / signature: Electronically signed by Nichole Padilla RN on  01/26/2023 09:12AM    PHYSICIAN SECTION    Prognosis: Fair    Condition at Discharge: Stable    Rehab Potential (if transferring to Rehab): Good    Recommended Labs or Other Treatments After Discharge: PT and OT. BMP biweekly for two weeks. Pureed dysphagia diet with thin liquids. Aspiration precautions    Physician Certification: I certify the above information and transfer of Román Zhang  is necessary for the continuing treatment of the diagnosis listed and that she requires [facility] for less 30 days. Update Admission H&P: No change in H&P      Molina Pod  Colostomy Care Instructions    Gather supplies listed below:  Scissors Throw away bag   Washcloths  Adapt Barrier seal, stock #[id number] Coloplast Flex cut to fit wafer, stock #: [id number]   Pattern or measuring guide Coloplast Flex drainable pouch, stock #: [id number]     Using measurement guide, measure stoma. Trace measurement on back of wafer.   Cut out tracing on barrier. Remember to remeasure stoma for the first 4-6 weeks after surgery and adjust size of hole as needed. Remove old appliance and place in disposable bag, throw away. Cleanse skin around stoma using plain water and a washcloth. If you choose to use soap, use Dial or [place] and rinse all residue off skin. Pat dry. Apply barrier ring all the way around stoma and crimp edges with nail to seal down. Remove backing on wafer by pulling the turquoise release tab away from the barrier. Center barrier around stoma. Rub finger around stoma on wafer to help seal.    Remove backing release paper on pouch. Line up adhesive to landing zone (turquoise [place]) on wafer and attach.     Hold hand over wafer x 5 minutes to assist with seal.        Call Ostomy Nurse at [facility] for questions or problems  Veronica Stubbs [phone]    PHYSICIAN SIGNATURE:  Electronically signed by Leora Antonio MD on 1/25/23 at 1:54 PM EST

## 2023-01-25 NOTE — PROGRESS NOTES
[facility] GENERAL SURGERY DAILY PROGRESS NOTE    SUBJECTIVE: Resting    OBJECTIVE: CURRENT VITALS:  /75   Pulse 77   Temp 97.7 °F (36.5 °C) (Axillary)   Resp 16   Ht 5' 7\" (1.702 m)   Wt 176 lb 5.9 oz (80 kg)   SpO2 100%   BMI 27.62 kg/m²          ABD: Soft    LABS:    CBC:   Recent Labs     01/22/23  0442 01/23/23  0453 01/24/23  0400 01/24/23  1438   WBC 8.9 8.0 6.2  --    RBC 2.72* 2.68* 2.53*  --    HGB 7.2* 7.1* 6.7* 7.8*   HCT 22.8* 22.3* 21.3* 23.8*   MCV 83.9 83.4 84.4  --    RDW 16.1* 15.7* 16.3*  --    * 616* 600*  --      BMP:   Recent Labs     01/22/23  0442 01/23/23  0453 01/24/23  0400    145 145   K 2.8* 3.2* 3.6    110 112*   CO2 29 27 27   BUN 24* 25* 27*   CREATININE 0.8 0.8 0.7     Recent Labs     01/22/23 0442 01/23/23  0453   MG 2.00 2.00           ASSESSMENT:   POD 17 Judson's  /  SBR           PLAN:   Continue nasogastric feeds until stronger and able to take PO.  PT/OT   D/C planning  Ok to stop antibiotics from surgery standpoint.          Carmelo Cary MD

## 2023-01-25 NOTE — CARE COORDINATION
INTERDISCIPLINARY PLAN OF CARE CONFERENCE    Date/Time: 1/25/2023 3:09 PM  Completed by: Kristine Traylor RN, Case Management      Patient Name:  Royce Jones  YOB: 1932  Admitting Diagnosis: Diverticulitis of colon [K57.32]  Prolonged Q-T interval on ECG [R94.31]  Perforated viscus [R19.8]  Perforated diverticulum [K57.80]     Admit Date/Time:  1/6/2023  8:46 PM    Chart reviewed. Interdisciplinary team contacted or reviewed plan related to patient progress and discharge plans. Disciplines included Case Management, Nursing, and Dietitian. Current Status: IP 01/07/2023  PT/OT recommendation for discharge plan of care: NA    Expected D/C Disposition: SNF    Discharge Plan Comments: Accepted at [facility] for rehab. Does not meet criteria for [employer]. Will need full pre-cert per [facility] admissions. Call placed byut this CM to [facility]) to check progress on pre-cert approval. Await return call. Transport arranged to 10 am 01/26  today if cert is approved. CM will follow.      Home O2 in place on admit: No  Pt informed of need to bring portable home O2 tank on day of discharge for nursing to connect prior to leaving:  Not Indicated  Verbalized agreement/Understanding:  Not Indicated

## 2023-01-25 NOTE — PROGRESS NOTES
Patient first assessment is complete. VSS, patient is confused but agreeable to take night time medications. Patient is currently resting, call light within reach. Will cont to monitor.

## 2023-01-25 NOTE — PROGRESS NOTES
[facility] GENERAL SURGERY DAILY PROGRESS NOTE    SUBJECTIVE: Awake, alert    OBJECTIVE: CURRENT VITALS:  /67   Pulse 79   Temp 98.6 °F (37 °C) (Axillary)   Resp 18   Ht 5' 7\" (1.702 m)   Wt 175 lb 5.2 oz (79.5 kg)   SpO2 99%   BMI 27.46 kg/m²          ABD: Soft  INCISION:  Clean. Open areas granulating. MICHELLE removed. LABS:    CBC:   Recent Labs     01/23/23  0453 01/24/23  0400 01/24/23  1438 01/25/23  0530   WBC 8.0 6.2  --   --    RBC 2.68* 2.53*  --   --    HGB 7.1* 6.7* 7.8* 7.2*   HCT 22.3* 21.3* 23.8* 22.0*   MCV 83.4 84.4  --   --    RDW 15.7* 16.3*  --   --    * 600*  --   --      BMP:   Recent Labs     01/23/23  0453 01/24/23  0400 01/25/23  0530    145 146*   K 3.2* 3.6 2.8*    112* 111*   CO2 27 27 27   BUN 25* 27* 28*   CREATININE 0.8 0.7 0.8     Recent Labs     01/23/23  0453 01/25/23  0530   MG 2.00 2.10       ASSESSMENT:   POD 18 [facility]  /  SBR           PLAN:   MICHELLE removed. Diet as tolerated. [id number] Janett Cervantes for discharge from my perspective.       Lucy Anderson MD

## 2023-01-25 NOTE — PROGRESS NOTES
Speech Language Pathology    Speech Language Pathology  Dysphagia Treatment/Follow-Up Note  Facility/Department: [facility] PCU TELEMETRY       Recommendations:  Safest and least restrictive diet recommendation of pureed diet with thin liquids/no straws, meds crushed in puree as able, strict aspiration precautions, oral care q4 hours as able, occasional cued purposeful cough during meals, occasional double swallow and/or use of empty tsp to facilitate swallow initiation  Pt continues to be considered at an increased risk of aspiration with all PO intake       NAME:Elisha Sanz  : 4/3/1932 (80 y.o.)   MRN: [id number]  ROOM: [place] [address]  ADMISSION DATE: 2023  PATIENT DIAGNOSIS(ES): Diverticulitis of colon [K57.32]  Prolonged Q-T interval on ECG [R94.31]  Perforated viscus [R19.8]  Perforated diverticulum [K57.80]       Allergies   Allergen Reactions    Morphine Nausea And Vomiting         DATE ONSET: 2023     Pain: The patient does not complain of pain      Current Diet: Pureed diet with thin liquids/no straw, meds crushed in puree  ADULT TUBE FEEDING; Nasogastric; Standard with Fiber; Continuous; 50; No; 100; Q 4 hours     Dysphagia Treatment and Impressions:  Subjective: Pt seen in room at bedside with RN permission Jayme Reyna). Behavior / Cognition: pt alert, cooperative, seen upright in bed. NG in place, however tube feeds currently on hold per RN. RN reported to f/u with MD regarding possible removal of NG.  RN Report/Chart Review: Pt s/p MBSS yesterday. Pt currently on safest and least restrictive diet recommendation of puree and thin liquids/no straws, crushable meds crushed in puree. Per MBSS note, \"Recommend thin liquids despite episode of silent aspiration (via straw) d/t consistent deep penetration to the vocal folds with all consistencies, thin liquids likely easier to expel from the airway vs thickened liquids\".   Continued dry blood-tinged secretions noted along soft palate which SLP attempted to clear with lightly moistened swab (minimal removed). White-tinged secretions successfully cleared with moistened swab from hard palate. SLP to discuss with RD possible set-up of oral suction at bedside to assist with clearance of oral secretions. Baseline Respiratory Status Measures: pt SpO2 96-99% on RA with RR of 24     Liquid PO Trials:   IDDSI 0 Thin:  Assessed via ice chip, tsp, and cup x1: pt had difficulty forming adequate labial seal on cup edge despite cues/assistance from SLP with anterior spillage noted. Suspected premature bolus loss and delayed swallow initiation with all TL trials. No instances of wet vocal quality, no clinical s/s of aspiration. SLP cued occasional purposeful cough with PO. Solid PO Trials  IDDSI 4 Puree:   no anterior bolus loss , suspect functional A-P bolus transit, swallow timing subjectively appears timely, oral clearance grossly WFL, no clinical s/s of aspiration, and vitals stable. SLP cued occasional double swallow with puree d/t possible presence of pharyngeal residue. Pt able to effectively complete double swallow with use of empty tsp to facilitate swallow initiation. Repeat Respiratory Status Measures: pt SpO2 96-99% on RA with RR of 24     Assessment:   Pt continues to be considered at an increased risk of aspiration with PO, significant pharyngeal weakness with reduced hyolaryngeal excursion upon palpation of the anterior neck (and from MBSS completed 1/24). Continue strict aspiration precautions with PO, recommend ongoing discussion regarding pt's POC/wishes. RN aware. ST to continue to follow. Dysphagia Goals:  Timeframe for Long-term Goals: 10 days, 1/27/23  Goal 1: The pt will tolerate safest and least restrictive diet without clinical s/s of aspiration or change in respiratory status. 1/25/2023 : Ongoing, progressing. Pt NPO.      Short-term Goals  Timeframe for Short-term Goals: 7 days, 1/24/23  Goal 1: The patient will tolerate recommended diet without observed clinical signs of aspiration  1/25/2023 : Pt NPO     Goal 2: The patient will tolerate instrumental swallowing procedure  1/25/2023 : FEES attempted 1/24, MBSS completed 1/24    Goal 3: The patient/caregiver will demonstrate understanding of compensatory strategies for improved swallowing safety. 1/25/2023 : Goal addressed, see above. Ongoing, progressing. Goal 4: The patient will tolerate repeat bedside swallowing evaluation when able. 1/25/2023 : Goal met. Speech/Language/Cog Goals: N/A     Recommendations:  Safest and least restrictive diet recommendation of pureed diet with thin liquids/no straws, meds crushed in puree as able, strict aspiration precautions, oral care q4 hours as able, occasional cued purposeful cough during meals, occasional double swallow and/or use of empty tsp to facilitate swallow initiation  Pt continues to be considered at an increased risk of aspiration with PO intake     Education: SLP re: role of ST, rationale for PO trials and compensatory swallow strategies. Pt does not demonstrate evidence of learning, needs ongoing reinforcement. Plan:    Continued Dysphagia treatment with goals per plan of care. Discharge Recommendations: At this time anticipate pt would benefit from continued ST at next level of care     If pt discharges from hospital prior to Speech/Swallowing discharge, this note serves as tx and discharge summary.       Total Treatment Time / Charges     Time in Time out Total Time / units   Cognitive Tx         Speech Tx         Dysphagia Tx 0937 1007 30 min / 1 unit      Signature:  RHYS Rodríguez Arley  Speech-language pathologist  [id number]

## 2023-01-25 NOTE — PROGRESS NOTES
Patient critical potassium of 2.8. Patient started IV Potassium @20. Will receive 3 bags in total. Will cont to monitor.

## 2023-01-25 NOTE — PROGRESS NOTES
Dr Mary Anne Lima reached out to Dr Abhi Gifford about removing NG tube. Per Dr Abhi Gifford okay to remove NG.

## 2023-01-25 NOTE — PROGRESS NOTES
Called and gave daughter Charlene Araujo and update at 1. Returned call to Evon Huynh and gave her an update at [phone]. Both are in agreeance with poc.

## 2023-01-25 NOTE — PROGRESS NOTES
Internal Medicine ICU Progress Note      Events of Last 24 hours:-    perforated diverticular disease. . S.p emergency ex lap with sigmoid colectomy , small bowel resection and ostomy placement     Off vent support, off pressors    Patient was transferred back to the ICU  for worsening oxygenation post SLP eval  Likely secondary to aspiration    Pt seen up in bed, awake, alert  slowly improving strength  Placed on Vapotherm. Currently on 50% FiO2 and 25 L/min    - on room air. ST to see. She is hungry. - discussed with ST. SCHREIBER and MBS done. - doing better. Tolerating diet. Invasive Lines: right IJ CVC    MV: Intubated on 2023, extubated     No results for input(s): PHART, XXD6YBS, PO2ART in the last 72 hours. MV Settings:  Vent Mode: AC/VC Resp Rate (Set): 16 bmp/Vt (Set, mL): 370 mL/ /FiO2 : 55 %    IV:   sodium chloride      dextrose Stopped (23 1048)    sodium chloride Stopped (23 1600)       Vitals:  Temp  Av.9 °F (36.6 °C)  Min: 97.2 °F (36.2 °C)  Max: 98.6 °F (37 °C)  Pulse  Av.5  Min: 75  Max: 85  BP  Min: 104/63  Max: 109/67  SpO2  Av.5 %  Min: 99 %  Max: 100 %  Patient Vitals for the past 4 hrs:   BP Temp Temp src Pulse Resp SpO2   23 1515 105/63 98.4 °F (36.9 °C) Axillary 75 16 99 %         CVP: CVP (Mean): 217 mmHg      Intake/Output Summary (Last 24 hours) at 2023 1750  Last data filed at 2023 1628  Gross per 24 hour   Intake 1859.96 ml   Output 2282 ml   Net -422.04 ml         EXAM:    General: elderly female, remains  fatigued  but slowly improving   Eyes: PERRL. No sclera icterus. No conjunctiva injected. ENT: No discharge. NG in place  Neck: Trachea midline. Normal thyroid. Resp: No accessory muscle use. Diminished in bases with resolving crackles   CV: Regular rate. Regular rhythm. No mumur or rub. Resolving massive peripheral  edema. No JVD. Palpable pedal pulses. GI: Non-tender.  Mid abd surgical dressing and right LQ Bobby drain in place  left sided ostomy in place with liquid stool   Non-distended. No masses. No organmegaly. sluggish bowel sounds. No hernia. Ext - resolving + edema to all ext  Neuro - improving mentation moving ext , able  to follow commands    Medications:  Scheduled Meds:   apixaban  2.5 mg Oral BID    QUEtiapine  25 mg Oral Nightly    Venelex   Topical BID    furosemide  40 mg IntraVENous Daily    citalopram  10 mg Oral Daily    metoprolol tartrate  12.5 mg Oral BID    insulin lispro  0-4 Units SubCUTAneous Q4H    sodium chloride flush  5-40 mL IntraVENous 2 times per day    pantoprazole  40 mg IntraVENous Daily       PRN Meds:  sodium chloride, HYDROmorphone, potassium chloride, magnesium sulfate, albuterol, dextrose, glucose, dextrose bolus **OR** dextrose bolus, glucagon (rDNA), prochlorperazine, sodium chloride flush, sodium chloride, acetaminophen **OR** acetaminophen    Results:  CBC:   Recent Labs     01/23/23  0453 01/24/23  0400 01/24/23  1438 01/25/23  0530   WBC 8.0 6.2  --   --    HGB 7.1* 6.7* 7.8* 7.2*   HCT 22.3* 21.3* 23.8* 22.0*   MCV 83.4 84.4  --   --    * 600*  --   --        BMP:   Recent Labs     01/23/23  0453 01/24/23  0400 01/25/23  0530 01/25/23  1354    145 146*  --    K 3.2* 3.6 2.8* 3.1*    112* 111*  --    CO2 27 27 27  --    BUN 25* 27* 28*  --    CREATININE 0.8 0.7 0.8  --          Cultures:  COVID-19  detected  Blood Culture positive for E. Coli and clostridium       Films:    FL MODIFIED BARIUM SWALLOW W VIDEO   Final Result   1. Positive deep laryngeal penetration to the cords, without onelia tracheal   aspiration, of thin liquids, nectar thickened liquids, and honey thickened   liquids, without spontaneous cough reflex. 2. No evidence of laryngeal penetration or tracheal aspiration of purees.    3. Poor oral motor control, with significant premature spillage of contrast   into the valleculae and piriform sinuses, with large residual boluses   contained within the valleculae and piriform sinuses throughout the exam.   Please see separate speech pathology report for full discussion of findings   and recommendations. VL Extremity Venous Bilateral   Final Result      XR CHEST PORTABLE   Final Result   New infiltrates seen within the left upper lung, compatible with pneumonia   versus aspiration. Increasing pleural effusions. XR ABDOMEN FOR NG/OG/NE TUBE PLACEMENT   Final Result   Tip of the feeding tube is in the expected position, projecting over the   gastric body. Continued pleuroparenchymal disease in the lower lungs bilaterally. XR CHEST PORTABLE   Final Result   Bibasilar effusions with mild congestion. Support tubes as described above. XR CHEST PORTABLE   Final Result   Small bilateral pleural effusions and bibasilar atelectasis or airspace   disease, increased on the left as compared to prior. XR CHEST PORTABLE   Final Result   No significant interval change in right greater than left pleural effusions   and bibasilar atelectasis or airspace disease as compared to prior. Tip of endotracheal tube is approximately 1.2 cm proximal to the kori and   could be retracted approximately 2 cm. XR CHEST PORTABLE   Final Result   Bilateral pleural effusions and adjacent lung consolidation, similar to prior         XR CHEST PORTABLE   Final Result   1. Stable lines, tubes and support devices. 2. Stable basilar opacities with pleural effusions. XR CHEST PORTABLE   Final Result   Increased pleural-parenchymal disease bilaterally         XR CHEST PORTABLE   Final Result   1. Endotracheal tube terminates in appropriate position above the kori. 2.  The enteric tube courses off the field of view in the upper abdomen. 3.  Right internal jugular line terminates at the level of the mid SVC. CT ABDOMEN PELVIS W IV CONTRAST Additional Contrast? None   Final Result   1.   Small volume pneumoperitoneum and extraluminal fecal material appears to   arise from the sigmoid colon, presumably as a complication of diverticulitis. 2.  Small volume abdominopelvic ascites. No loculated fluid collection   identified. Findings were discussed with Akilah Larsen at 10:25 pm on 1/6/2023. Assessment:    Principal Problem: Bowel perforation ([address]  Active Problems:    Septic shock (HCC)    Acute hypoxemic respiratory failure (HCC)    COVID-19    Coagulopathy (HCC)    Diverticulitis of colon    Paroxysmal atrial fibrillation (HCC)    E coli bacteremia    Acute respiratory failure with hypoxia (HCC)    Hypernatremia    Aspiration into airway    Disorder of electrolytes    Leg edema    Acute deep vein thrombosis (DVT) of proximal vein of right lower extremity (HCC)    Perforated viscus  Resolved Problems:    * No resolved hospital problems. *       Plan:    #Perforated diverticulum. Gram neg bacteremia    S.p emergency ex lap with sigmoid colectomy , small bowel resection and ostomy placement   Surgery managing  Stop Zosyn. Fevers resolved   Improved sepsis and off pressors   Recovered bowel function, started TF -tolerating well  - start pureed diet. #Septic shock due to perforated diverticulum. Ecoli  bacteremia. On Zosyn and Diflucan. Severe hypotension , no improvement with IVF boluses  was On IV vasopressors - vaso and levophed- improved Bp  Now off pressors     #Acute respiratory failure on the ventilator. Sec to perforated viscus - remained on vent post surgery   Management per pulmonary improved . Extubated to NC 5 L  Worsened again with aspiration event during SLP  Was on Vapo therm. Now weaned down to room air. Lasix as tolerated     #Paroxysmal A. fib. With RVR, started . Off IV amiodarone. Coumadin on hold, INR reversed perioperatively with vit k , ffp  Currently on heparin drip- stop and start Eliquis   Continue metoprolol  Replace K.     #COVID-19 Incidental finding. No covid related issues    # Anemia  - multifactorial - surgery, iatrogenic, nutritional. Monitor and transfuse less than 7    # Edema - good UOP. Added lasix 40 IV twice daily, tolerating well with resolution of peripheral edema      Hypernatremia  Increase water boluses         for DVT prophylaxis. Remove NG  Out of bed       Discharge planning.      Mathieu Neal MD 5:50 PM 1/25/2023

## 2023-01-25 NOTE — PROGRESS NOTES
Dr Amalia Lauk in to see pt. Made him aware of large amounts of stool and drop of k+ overnight needing IV replacement. Also, made him aware of drop in hgb to 7.2 and change in urine color to meme when yesterday it was clear/yellow. Inquired if he wanted pt to still receive eliquis and he stated yes. TF placed on hold d/t feeling \"full\" to epigastric region. Dr Amalia Aguayo made aware of this as well. Calculated output vs input and pt is taking in what she is putting out.  Encourage oral fluids per MD.

## 2023-01-26 VITALS
BODY MASS INDEX: 26.71 KG/M2 | WEIGHT: 170.2 LBS | OXYGEN SATURATION: 94 % | HEART RATE: 91 BPM | SYSTOLIC BLOOD PRESSURE: 107 MMHG | RESPIRATION RATE: 14 BRPM | DIASTOLIC BLOOD PRESSURE: 47 MMHG | TEMPERATURE: 97.1 F | HEIGHT: 67 IN

## 2023-01-26 LAB
ANION GAP SERPL CALCULATED.3IONS-SCNC: 8 MMOL/L (ref 3–16)
BUN BLDV-MCNC: 27 MG/DL (ref 7–20)
CALCIUM SERPL-MCNC: 8.7 MG/DL (ref 8.3–10.6)
CHLORIDE BLD-SCNC: 111 MMOL/L (ref 99–110)
CO2: 26 MMOL/L (ref 21–32)
CREAT SERPL-MCNC: 0.8 MG/DL (ref 0.6–1.2)
GFR SERPL CREATININE-BSD FRML MDRD: >60 ML/MIN/{1.73_M2}
GLUCOSE BLD-MCNC: 114 MG/DL (ref 70–99)
GLUCOSE BLD-MCNC: 128 MG/DL (ref 70–99)
GLUCOSE BLD-MCNC: 88 MG/DL (ref 70–99)
GLUCOSE BLD-MCNC: 90 MG/DL (ref 70–99)
PERFORMED ON: ABNORMAL
PERFORMED ON: ABNORMAL
PERFORMED ON: NORMAL
POTASSIUM REFLEX MAGNESIUM: 3.6 MMOL/L (ref 3.5–5.1)
SODIUM BLD-SCNC: 145 MMOL/L (ref 136–145)

## 2023-01-26 PROCEDURE — 92526 ORAL FUNCTION THERAPY: CPT

## 2023-01-26 PROCEDURE — 80048 BASIC METABOLIC PNL TOTAL CA: CPT

## 2023-01-26 PROCEDURE — 6370000000 HC RX 637 (ALT 250 FOR IP): Performed by: INTERNAL MEDICINE

## 2023-01-26 PROCEDURE — C9113 INJ PANTOPRAZOLE SODIUM, VIA: HCPCS | Performed by: INTERNAL MEDICINE

## 2023-01-26 PROCEDURE — 2580000003 HC RX 258: Performed by: INTERNAL MEDICINE

## 2023-01-26 PROCEDURE — 6360000002 HC RX W HCPCS: Performed by: INTERNAL MEDICINE

## 2023-01-26 RX ADMIN — METOPROLOL TARTRATE 12.5 MG: 25 TABLET, FILM COATED ORAL at 09:09

## 2023-01-26 RX ADMIN — SODIUM CHLORIDE, PRESERVATIVE FREE 10 ML: 5 INJECTION INTRAVENOUS at 09:09

## 2023-01-26 RX ADMIN — FUROSEMIDE 40 MG: 10 INJECTION, SOLUTION INTRAMUSCULAR; INTRAVENOUS at 09:08

## 2023-01-26 RX ADMIN — CITALOPRAM HYDROBROMIDE 10 MG: 20 TABLET ORAL at 09:09

## 2023-01-26 RX ADMIN — PANTOPRAZOLE SODIUM 40 MG: 40 INJECTION, POWDER, FOR SOLUTION INTRAVENOUS at 09:08

## 2023-01-26 RX ADMIN — APIXABAN 2.5 MG: 5 TABLET, FILM COATED ORAL at 09:09

## 2023-01-26 RX ADMIN — Medication: at 09:16

## 2023-01-26 NOTE — CARE COORDINATION
DISCHARGE ORDER  Date/Time 2023 9:13 AM  Completed by: Etienne Villavicencio RN, Case Management    Patient Name: Beto Moulton    : 4/3/1932      Admit order Date and Status: IP 2023  Noted discharge order. (verify MD's last order for status of admission/Traditional Medicare 3 [place] Inpatient qualifying stay required for SNF)    Confirmed discharge plan with:              Patient:  Marcia Baptiste                     Discharge to Facility: ***   Facility phone number for staff giving report: ***   Pre-certification completed: [facility] Exemption Notification (HENS) completed: ***   Discharge orders and Continuity of Care faxed to facility:  ***      Transportation:               Medical Transport explained with choice list offered to pt/family. Choice:{YES / NO: }(no preference)  Agency used: ***   time:   ***      Pt/family/Nursing/Facility aware of  time: ***  {YES / NO: } Names: ***  Ambulance form completed:  ***:      Date Last IMM Given: ***    Comments:***    Pt is being d/c'd to *** today. Pt's O2 sats are ***% on ***. Discharge timeout done with ***. All discharge needs and concerns addressed. Discharging nurse to complete JAISON, reconcile AVS, and place final copy with patient's discharge packet. Discharging RN to ensure that written prescriptions for  Level II medications are sent with patient to the facility as per protocol.

## 2023-01-26 NOTE — PROGRESS NOTES
Report called to Marilyn a nurse from [facility]. Marilyn denies any questions at this time. PCU desk number provided in case questions was to arise. Telemetry monitor removed and returned to [facility]. Pt left facility in stable condition with [facility] with all of their personal belongings.

## 2023-01-26 NOTE — PROGRESS NOTES
PM assessment completed. Scheduled medications given per MAR. VSS room air, A/O to self, patient does not appear in distress, purewick in place, bath with chg wipes to Right IJ, green thuan wipes, bath wipes for bed bath. Gown and partial linen change. Ostomy brown liquid output. Midline incision dressing changed per wound care orders. Patient denies any needs at this time. Call light in reach, will monitor, bed alarm on.

## 2023-01-26 NOTE — PROGRESS NOTES
Speech Language Pathology    Speech Language Pathology  Dysphagia Treatment/Follow-Up Note  Facility/Department: [facility] PCU TELEMETRY       Recommendations:  Safest and least restrictive diet recommendation of pureed diet with thin liquids/no straws, meds crushed in puree as able, strict aspiration precautions, oral care q4 hours as able, occasional cued purposeful cough during meals, occasional double swallow with PO   Pt continues to be considered at an increased risk of aspiration with all PO intake       Shameka Lea  : 4/3/1932 (80 y.o.)   MRN: [id number]  ROOM: /0302-01  ADMISSION DATE: 2023  PATIENT DIAGNOSIS(ES): Diverticulitis of colon [K57.32]  Prolonged Q-T interval on ECG [R94.31]  Perforated viscus [R19.8]  Perforated diverticulum [K57.80]       Allergies   Allergen Reactions    Morphine Nausea And Vomiting         DATE ONSET: 2023     Pain: The patient does not complain of pain      Current Diet: Pureed diet with thin liquids/no straw, meds crushed in puree  ADULT TUBE FEEDING; Nasogastric; Standard with Fiber; Continuous; 50; No; 100; Q 4 hours     Dysphagia Treatment and Impressions:  Subjective: Pt seen in room at bedside with RN permission (). Behavior / Cognition: pt alert, cooperative, seen upright in bed. Pt pleasantly confused (oriented to self and \"hospital\" only). Pt continues on RA, NG removed . RN Report/Chart Review: Pt s/p MBSS . Pt currently on safest and least restrictive diet recommendation of puree and thin liquids/no straws, crushable meds crushed in puree. Per MBSS note, \"Recommend thin liquids despite episode of silent aspiration (via straw) d/t consistent deep penetration to the vocal folds with all consistencies, thin liquids likely easier to expel from the airway vs thickened liquids\". No significant oral secretions noted upon inspection of oral cavity.      Baseline Respiratory Status Measures: SpO2 96-98% on RA with RR of 18-21/min    Liquid PO Trials:   IDDSI 0 Thin:  Assessed via ice chip, tsp, and cup sip: pt required hand over hand assistance with cup sip trials and was impulsive despite cues for small, single sips. Suspected premature bolus loss and delayed swallow initiation with all TL trials. No instances of wet vocal quality or change in O2 sats/RR throughout. X1 immediate dry cough post-swallow after consecutive small cup sips. SLP cued occasional purposeful cough with TL trials. Intermittent audible belching noted post-swallow. Solid PO Trials  IDDSI 4 Puree: via 1/2 tsp; no anterior bolus loss , suspect functional A-P bolus transit, swallow timing subjectively appears timely, oral clearance grossly WFL, no clinical s/s of aspiration, and vitals stable. SLP cued double swallow every 2-3 trials of puree d/t possible presence of pharyngeal residue. Pt able to effectively complete double swallow at times, benefited from occasional use of empty tsp to facilitate swallow initiation. Repeat Respiratory Status Measures: pt SpO2 96-98% on RA with RR of 18-21/min     Assessment:   Pt continues to be considered at an increased risk of aspiration with PO. Continue strict aspiration precautions with PO. Continued dysphagia tx and diet tolerance monitoring recommended after d/c.  ST to continue to follow. Dysphagia Goals:  Timeframe for Long-term Goals:  addend to 3 days, 1/29/23  Goal 1: The pt will tolerate safest and least restrictive diet without clinical s/s of aspiration or change in respiratory status. 1/26/2023 : Ongoing, progressing. Continue current diet. Short-term Goals  Timeframe for Short-term Goals: addend to 2 days, 1/28/23  Goal 1: The patient will tolerate recommended diet without observed clinical signs of aspiration  1/26/2023 : ongoing, progressing. See above.      Goal met; FEES attempted 1/24, MBSS completed 1/24    Goal 3: The patient/caregiver will demonstrate understanding of compensatory strategies for improved swallowing safety. 1/26/2023 : Goal addressed, see above. Ongoing, progressing. Goal met. Speech/Language/Cog Goals: N/A     Recommendations:  Safest and least restrictive diet recommendation of pureed diet with thin liquids/no straws, meds crushed in puree as able, strict aspiration precautions, oral care q4 hours as able, occasional cued purposeful cough during meals, occasional double swallow with PO  Pt continues to be considered at an increased risk of aspiration with PO intake     Education: SLP re: role of ST, rationale for PO trials and compensatory swallow strategies. Pt does not demonstrate evidence of learning, needs ongoing reinforcement. Plan:    Continued Dysphagia treatment with goals per plan of care. Discharge Recommendations: continued ST for dysphagia tx recommended after d/c    If pt discharges from hospital prior to Speech/Swallowing discharge, this note serves as tx and discharge summary.       Total Treatment Time / Charges     Time in Time out Total Time / units   Cognitive Tx         Speech Tx         Dysphagia Tx 1107 1122 15 min / 1 unit      Signature:  RHYS Dixon  Speech-language pathologist  [id number]

## 2023-01-26 NOTE — PLAN OF CARE
Problem: Discharge Planning  Goal: Discharge to home or other facility with appropriate resources  Outcome: Progressing     Problem: Pain  Goal: Verbalizes/displays adequate comfort level or baseline comfort level  Outcome: Progressing     Problem: Skin/Tissue Integrity  Goal: Absence of new skin breakdown  Description: 1. Monitor for areas of redness and/or skin breakdown  2. Assess vascular access sites hourly  3. Every 4-6 hours minimum:  Change oxygen saturation probe site  4. Every 4-6 hours:  If on nasal continuous positive airway pressure, respiratory therapy assess nares and determine need for appliance change or resting period.   Outcome: Progressing     Problem: Nutrition Deficit:  Goal: Optimize nutritional status  Outcome: Progressing     Problem: Neurosensory - Adult  Goal: Achieves stable or improved neurological status  Outcome: Progressing     Problem: Respiratory - Adult  Goal: Achieves optimal ventilation and oxygenation  Outcome: Progressing     Problem: Cardiovascular - Adult  Goal: Maintains optimal cardiac output and hemodynamic stability  Outcome: Progressing     Problem: Skin/Tissue Integrity - Adult  Goal: Skin integrity remains intact  Outcome: Progressing  Goal: Incisions, wounds, or drain sites healing without S/S of infection  Outcome: Progressing  Goal: Oral mucous membranes remain intact  Outcome: Progressing     Problem: Musculoskeletal - Adult  Goal: Return mobility to safest level of function  Outcome: Progressing     Problem: Gastrointestinal - Adult  Goal: Establish and maintain optimal ostomy function  Outcome: Progressing     Problem: Genitourinary - Adult  Goal: Absence of urinary retention  Outcome: Progressing  Goal: Urinary catheter remains patent  Outcome: Progressing     Problem: Infection - Adult  Goal: Absence of infection at discharge  Outcome: Progressing     Problem: Metabolic/Fluid and Electrolytes - Adult  Goal: Electrolytes maintained within normal limits  Outcome: Progressing  Goal: Glucose maintained within prescribed range  Outcome: Progressing     Problem: Hematologic - Adult  Goal: Maintains hematologic stability  Outcome: Progressing     Problem: Confusion  Goal: Confusion, delirium, dementia, or psychosis is improved or at baseline  Description: INTERVENTIONS:  1. Assess for possible contributors to thought disturbance, including medications, impaired vision or hearing, underlying metabolic abnormalities, dehydration, psychiatric diagnoses, and notify attending LIP  2. Julian high risk fall precautions, as indicated  3. Provide frequent short contacts to provide reality reorientation, refocusing and direction  4. Decrease environmental stimuli, including noise as appropriate  5. Monitor and intervene to maintain adequate nutrition, hydration, elimination, sleep and activity  6. If unable to ensure safety without constant attention obtain sitter and review sitter guidelines with assigned personnel  7.  Initiate Psychosocial CNS and Spiritual Care consult, as indicated  Outcome: Progressing

## 2023-04-06 NOTE — PROGRESS NOTES
[facility] SURGERY DAILY PROGRESS NOTE    SUBJECTIVE: Awake, oriented to person, weak    OBJECTIVE: CURRENT VITALS:  BP (!) 147/85   Pulse 94   Temp 99.4 °F (37.4 °C) (Bladder)   Resp 12   Ht 5' 7\" (1.702 m)   Wt 202 lb (91.6 kg)   SpO2 98%   BMI 31.64 kg/m²          ABD: Soft. Anasarca.   INCISION:  C/D/I  COLOSTOMY:  Functional    LABS:    CBC:   Recent Labs     01/14/23  0552 01/15/23  0510 01/16/23  0406   WBC 16.1* 13.7* 11.7*   RBC 2.94* 2.89* 2.88*   HGB 7.9* 7.8* 7.7*   HCT 24.2* 23.7* 23.6*   MCV 82.3 82.3 82.0   RDW 15.5* 15.5* 15.3    342 412     BMP:   Recent Labs     01/14/23  0552 01/14/23  1838 01/15/23  0510 01/16/23  0406    137 143 144   K 3.5  3.5 5.4* 3.8 3.6    94* 111* 111*   CO2 26 13* 28 30   PHOS 3.3  --  3.5 3.8   BUN 18 44* 19 20   CREATININE 0.6 5.4* 0.7 0.7     Recent Labs     01/14/23  0552 01/15/23  0510 01/16/23  0406   MG 1.70* 2.00 1.90             ASSESSMENT:   POD 9  Judson's  /  SBR      PLAN:   S&LP evaluation for PO trial  Increase nasogastric TF to 40 cc/hr           Sekou Coronado MD [Yes] : Yes [Former] : Former [20 or more] : 20 or more [No] : In the past 12 months have you used drugs other than those required for medical reasons? No [No falls in past year] : Patient reported no falls in the past year [0] : 2) Feeling down, depressed, or hopeless: Not at all (0) [PHQ-2 Negative - No further assessment needed] : PHQ-2 Negative - No further assessment needed [< 15 Years] : < 15 Years [de-identified] : 136 days sober [WSC4Lilus] : 0 [de-identified] : QUIT 2 months ago

## 2023-04-25 ENCOUNTER — HOSPITAL ENCOUNTER (INPATIENT)
Age: 88
LOS: 6 days | Discharge: NURSING, REHAB OR LONG-TERM CARE | DRG: 871 | End: 2023-05-01
Attending: INTERNAL MEDICINE | Admitting: INTERNAL MEDICINE
Payer: MEDICARE

## 2023-04-25 PROBLEM — N17.9 AKI (ACUTE KIDNEY INJURY) (HCC): Status: ACTIVE | Noted: 2023-04-25

## 2023-04-25 PROBLEM — R57.9 SHOCK CIRCULATORY (HCC): Status: ACTIVE | Noted: 2023-01-07

## 2023-04-25 PROBLEM — R55 SYNCOPE AND COLLAPSE: Status: ACTIVE | Noted: 2023-04-25

## 2023-04-25 PROBLEM — E86.0 DEHYDRATION: Status: ACTIVE | Noted: 2023-04-25

## 2023-04-25 LAB
ALBUMIN SERPL-MCNC: 1.8 G/DL (ref 3.4–5)
ALBUMIN SERPL-MCNC: 2 G/DL (ref 3.4–5)
ALBUMIN/GLOB SERPL: 1 {RATIO} (ref 1.1–2.2)
ALP SERPL-CCNC: 103 U/L (ref 40–129)
ALT SERPL-CCNC: 17 U/L (ref 10–40)
ANION GAP SERPL CALCULATED.3IONS-SCNC: 10 MMOL/L (ref 3–16)
ANION GAP SERPL CALCULATED.3IONS-SCNC: 8 MMOL/L (ref 3–16)
AST SERPL-CCNC: 21 U/L (ref 15–37)
BASOPHILS # BLD: 0 K/UL (ref 0–0.2)
BASOPHILS NFR BLD: 0.1 %
BILIRUB SERPL-MCNC: 0.3 MG/DL (ref 0–1)
BILIRUB UR QL STRIP.AUTO: NEGATIVE
BUN SERPL-MCNC: 37 MG/DL (ref 7–20)
BUN SERPL-MCNC: 38 MG/DL (ref 7–20)
C DIFF TOX A+B STL QL IA: NORMAL
CA-I BLD-SCNC: 1 MMOL/L (ref 1.12–1.32)
CALCIUM SERPL-MCNC: 6.8 MG/DL (ref 8.3–10.6)
CALCIUM SERPL-MCNC: 7.1 MG/DL (ref 8.3–10.6)
CHLORIDE SERPL-SCNC: 107 MMOL/L (ref 99–110)
CHLORIDE SERPL-SCNC: 108 MMOL/L (ref 99–110)
CLARITY UR: CLEAR
CO2 SERPL-SCNC: 19 MMOL/L (ref 21–32)
CO2 SERPL-SCNC: 20 MMOL/L (ref 21–32)
COLOR UR: YELLOW
CREAT SERPL-MCNC: 1.4 MG/DL (ref 0.6–1.2)
CREAT SERPL-MCNC: 1.5 MG/DL (ref 0.6–1.2)
DEPRECATED RDW RBC AUTO: 18.8 % (ref 12.4–15.4)
EKG ATRIAL RATE: 277 BPM
EKG DIAGNOSIS: NORMAL
EKG Q-T INTERVAL: 274 MS
EKG QRS DURATION: 82 MS
EKG QTC CALCULATION (BAZETT): 400 MS
EKG R AXIS: -24 DEGREES
EKG T AXIS: 146 DEGREES
EKG VENTRICULAR RATE: 128 BPM
EOSINOPHIL # BLD: 0 K/UL (ref 0–0.6)
EOSINOPHIL NFR BLD: 0.3 %
GFR SERPLBLD CREATININE-BSD FMLA CKD-EPI: 33 ML/MIN/{1.73_M2}
GFR SERPLBLD CREATININE-BSD FMLA CKD-EPI: 36 ML/MIN/{1.73_M2}
GLUCOSE BLD-MCNC: 105 MG/DL (ref 70–99)
GLUCOSE BLD-MCNC: 111 MG/DL (ref 70–99)
GLUCOSE BLD-MCNC: 61 MG/DL (ref 70–99)
GLUCOSE BLD-MCNC: 68 MG/DL (ref 70–99)
GLUCOSE BLD-MCNC: 81 MG/DL (ref 70–99)
GLUCOSE BLD-MCNC: 82 MG/DL (ref 70–99)
GLUCOSE BLD-MCNC: 93 MG/DL (ref 70–99)
GLUCOSE SERPL-MCNC: 113 MG/DL (ref 70–99)
GLUCOSE SERPL-MCNC: 125 MG/DL (ref 70–99)
GLUCOSE UR STRIP.AUTO-MCNC: NEGATIVE MG/DL
HCT VFR BLD AUTO: 29.7 % (ref 36–48)
HGB BLD-MCNC: 9.4 G/DL (ref 12–16)
HGB UR QL STRIP.AUTO: ABNORMAL
INR PPP: 1.4 (ref 0.84–1.16)
KETONES UR STRIP.AUTO-MCNC: NEGATIVE MG/DL
LACTOFERRIN STL QL IA: ABNORMAL
LEUKOCYTE ESTERASE UR QL STRIP.AUTO: NEGATIVE
LV EF: 55 %
LVEF MODALITY: NORMAL
LYMPHOCYTES # BLD: 0.4 K/UL (ref 1–5.1)
LYMPHOCYTES NFR BLD: 8.6 %
MAGNESIUM SERPL-MCNC: 0.8 MG/DL (ref 1.8–2.4)
MCH RBC QN AUTO: 25.8 PG (ref 26–34)
MCHC RBC AUTO-ENTMCNC: 31.8 G/DL (ref 31–36)
MCV RBC AUTO: 81 FL (ref 80–100)
MONOCYTES # BLD: 0.4 K/UL (ref 0–1.3)
MONOCYTES NFR BLD: 7.5 %
NEUTROPHILS # BLD: 4.2 K/UL (ref 1.7–7.7)
NEUTROPHILS NFR BLD: 83.5 %
NITRITE UR QL STRIP.AUTO: NEGATIVE
PERFORMED ON: ABNORMAL
PERFORMED ON: NORMAL
PH BLDV: 7.33 [PH] (ref 7.35–7.45)
PH UR STRIP.AUTO: 5 [PH] (ref 5–8)
PHOSPHATE SERPL-MCNC: 2.6 MG/DL (ref 2.5–4.9)
PHOSPHATE SERPL-MCNC: 2.7 MG/DL (ref 2.5–4.9)
PLATELET # BLD AUTO: 441 K/UL (ref 135–450)
PMV BLD AUTO: 6.8 FL (ref 5–10.5)
POTASSIUM SERPL-SCNC: 3.5 MMOL/L (ref 3.5–5.1)
POTASSIUM SERPL-SCNC: 3.8 MMOL/L (ref 3.5–5.1)
PROT SERPL-MCNC: 4.1 G/DL (ref 6.4–8.2)
PROT UR STRIP.AUTO-MCNC: NEGATIVE MG/DL
PROTHROMBIN TIME: 17.2 SEC (ref 11.5–14.8)
RBC # BLD AUTO: 3.66 M/UL (ref 4–5.2)
RBC #/AREA URNS HPF: ABNORMAL /HPF (ref 0–4)
SODIUM SERPL-SCNC: 135 MMOL/L (ref 136–145)
SODIUM SERPL-SCNC: 137 MMOL/L (ref 136–145)
SP GR UR STRIP.AUTO: >=1.03 (ref 1–1.03)
T4 FREE SERPL-MCNC: 1.1 NG/DL (ref 0.9–1.8)
TROPONIN, HIGH SENSITIVITY: 53 NG/L (ref 0–14)
TROPONIN, HIGH SENSITIVITY: 58 NG/L (ref 0–14)
TSH SERPL DL<=0.005 MIU/L-ACNC: 11.36 UIU/ML (ref 0.27–4.2)
UA DIPSTICK W REFLEX MICRO PNL UR: YES
URN SPEC COLLECT METH UR: ABNORMAL
UROBILINOGEN UR STRIP-ACNC: 0.2 E.U./DL
WBC # BLD AUTO: 5.1 K/UL (ref 4–11)
WBC #/AREA URNS HPF: ABNORMAL /HPF (ref 0–5)
YEAST URNS QL MICRO: PRESENT /HPF

## 2023-04-25 PROCEDURE — 93005 ELECTROCARDIOGRAM TRACING: CPT | Performed by: INTERNAL MEDICINE

## 2023-04-25 PROCEDURE — 99223 1ST HOSP IP/OBS HIGH 75: CPT | Performed by: INTERNAL MEDICINE

## 2023-04-25 PROCEDURE — 81001 URINALYSIS AUTO W/SCOPE: CPT

## 2023-04-25 PROCEDURE — 85025 COMPLETE CBC W/AUTO DIFF WBC: CPT

## 2023-04-25 PROCEDURE — 83735 ASSAY OF MAGNESIUM: CPT

## 2023-04-25 PROCEDURE — 6370000000 HC RX 637 (ALT 250 FOR IP): Performed by: INTERNAL MEDICINE

## 2023-04-25 PROCEDURE — 85610 PROTHROMBIN TIME: CPT

## 2023-04-25 PROCEDURE — 99291 CRITICAL CARE FIRST HOUR: CPT | Performed by: INTERNAL MEDICINE

## 2023-04-25 PROCEDURE — 2500000003 HC RX 250 WO HCPCS: Performed by: INTERNAL MEDICINE

## 2023-04-25 PROCEDURE — 84100 ASSAY OF PHOSPHORUS: CPT

## 2023-04-25 PROCEDURE — 87336 ENTAMOEB HIST DISPR AG IA: CPT

## 2023-04-25 PROCEDURE — 87328 CRYPTOSPORIDIUM AG IA: CPT

## 2023-04-25 PROCEDURE — 83630 LACTOFERRIN FECAL (QUAL): CPT

## 2023-04-25 PROCEDURE — 93306 TTE W/DOPPLER COMPLETE: CPT

## 2023-04-25 PROCEDURE — 80053 COMPREHEN METABOLIC PANEL: CPT

## 2023-04-25 PROCEDURE — 6360000002 HC RX W HCPCS: Performed by: INTERNAL MEDICINE

## 2023-04-25 PROCEDURE — 82330 ASSAY OF CALCIUM: CPT

## 2023-04-25 PROCEDURE — 87324 CLOSTRIDIUM AG IA: CPT

## 2023-04-25 PROCEDURE — 84443 ASSAY THYROID STIM HORMONE: CPT

## 2023-04-25 PROCEDURE — 36415 COLL VENOUS BLD VENIPUNCTURE: CPT

## 2023-04-25 PROCEDURE — 84439 ASSAY OF FREE THYROXINE: CPT

## 2023-04-25 PROCEDURE — 93010 ELECTROCARDIOGRAM REPORT: CPT | Performed by: INTERNAL MEDICINE

## 2023-04-25 PROCEDURE — 87506 IADNA-DNA/RNA PROBE TQ 6-11: CPT

## 2023-04-25 PROCEDURE — 2000000000 HC ICU R&B

## 2023-04-25 PROCEDURE — 87449 NOS EACH ORGANISM AG IA: CPT

## 2023-04-25 PROCEDURE — 2580000003 HC RX 258: Performed by: INTERNAL MEDICINE

## 2023-04-25 PROCEDURE — 84484 ASSAY OF TROPONIN QUANT: CPT

## 2023-04-25 PROCEDURE — 87040 BLOOD CULTURE FOR BACTERIA: CPT

## 2023-04-25 RX ORDER — SODIUM CHLORIDE 9 MG/ML
INJECTION, SOLUTION INTRAVENOUS CONTINUOUS
Status: DISCONTINUED | OUTPATIENT
Start: 2023-04-25 | End: 2023-04-28

## 2023-04-25 RX ORDER — METRONIDAZOLE 500 MG/100ML
500 INJECTION, SOLUTION INTRAVENOUS EVERY 8 HOURS
Status: DISCONTINUED | OUTPATIENT
Start: 2023-04-25 | End: 2023-05-01

## 2023-04-25 RX ORDER — CALCIUM GLUCONATE 20 MG/ML
2000 INJECTION, SOLUTION INTRAVENOUS ONCE
Status: COMPLETED | OUTPATIENT
Start: 2023-04-25 | End: 2023-04-25

## 2023-04-25 RX ORDER — OMEPRAZOLE 20 MG/1
20 CAPSULE, DELAYED RELEASE ORAL DAILY
COMMUNITY

## 2023-04-25 RX ORDER — DIGOXIN 0.25 MG/ML
250 INJECTION INTRAMUSCULAR; INTRAVENOUS ONCE
Status: COMPLETED | OUTPATIENT
Start: 2023-04-25 | End: 2023-04-25

## 2023-04-25 RX ORDER — SODIUM CHLORIDE 0.9 % (FLUSH) 0.9 %
5-40 SYRINGE (ML) INJECTION EVERY 12 HOURS SCHEDULED
Status: DISCONTINUED | OUTPATIENT
Start: 2023-04-25 | End: 2023-05-01 | Stop reason: HOSPADM

## 2023-04-25 RX ORDER — CIPROFLOXACIN 2 MG/ML
400 INJECTION, SOLUTION INTRAVENOUS EVERY 12 HOURS
Status: DISCONTINUED | OUTPATIENT
Start: 2023-04-25 | End: 2023-04-25

## 2023-04-25 RX ORDER — POTASSIUM CHLORIDE 29.8 MG/ML
20 INJECTION INTRAVENOUS
Status: COMPLETED | OUTPATIENT
Start: 2023-04-25 | End: 2023-04-25

## 2023-04-25 RX ORDER — QUETIAPINE FUMARATE 25 MG/1
12.5 TABLET, FILM COATED ORAL NIGHTLY
Status: DISCONTINUED | OUTPATIENT
Start: 2023-04-26 | End: 2023-04-26

## 2023-04-25 RX ORDER — DEXTROSE MONOHYDRATE 100 MG/ML
INJECTION, SOLUTION INTRAVENOUS CONTINUOUS PRN
Status: DISCONTINUED | OUTPATIENT
Start: 2023-04-25 | End: 2023-05-01 | Stop reason: HOSPADM

## 2023-04-25 RX ORDER — MAGNESIUM SULFATE IN WATER 40 MG/ML
4000 INJECTION, SOLUTION INTRAVENOUS ONCE
Status: COMPLETED | OUTPATIENT
Start: 2023-04-25 | End: 2023-04-25

## 2023-04-25 RX ORDER — CITALOPRAM 20 MG/1
10 TABLET ORAL DAILY
Status: DISCONTINUED | OUTPATIENT
Start: 2023-04-26 | End: 2023-05-01 | Stop reason: HOSPADM

## 2023-04-25 RX ORDER — ACETAMINOPHEN 325 MG/1
650 TABLET ORAL EVERY 6 HOURS PRN
Status: DISCONTINUED | OUTPATIENT
Start: 2023-04-25 | End: 2023-05-01 | Stop reason: HOSPADM

## 2023-04-25 RX ORDER — ACETAMINOPHEN 650 MG/1
650 SUPPOSITORY RECTAL EVERY 6 HOURS PRN
Status: DISCONTINUED | OUTPATIENT
Start: 2023-04-25 | End: 2023-05-01 | Stop reason: HOSPADM

## 2023-04-25 RX ORDER — CIPROFLOXACIN 2 MG/ML
400 INJECTION, SOLUTION INTRAVENOUS EVERY 24 HOURS
Status: DISCONTINUED | OUTPATIENT
Start: 2023-04-25 | End: 2023-04-27

## 2023-04-25 RX ORDER — SODIUM CHLORIDE 0.9 % (FLUSH) 0.9 %
5-40 SYRINGE (ML) INJECTION PRN
Status: DISCONTINUED | OUTPATIENT
Start: 2023-04-25 | End: 2023-05-01 | Stop reason: HOSPADM

## 2023-04-25 RX ORDER — SODIUM CHLORIDE 9 MG/ML
INJECTION, SOLUTION INTRAVENOUS PRN
Status: DISCONTINUED | OUTPATIENT
Start: 2023-04-25 | End: 2023-05-01 | Stop reason: HOSPADM

## 2023-04-25 RX ADMIN — METRONIDAZOLE 500 MG: 500 INJECTION, SOLUTION INTRAVENOUS at 23:34

## 2023-04-25 RX ADMIN — NOREPINEPHRINE BITARTRATE 3 MCG/MIN: 1 SOLUTION INTRAVENOUS at 05:41

## 2023-04-25 RX ADMIN — METRONIDAZOLE 500 MG: 500 INJECTION, SOLUTION INTRAVENOUS at 09:50

## 2023-04-25 RX ADMIN — APIXABAN 2.5 MG: 5 TABLET, FILM COATED ORAL at 21:11

## 2023-04-25 RX ADMIN — POTASSIUM CHLORIDE 20 MEQ: 29.8 INJECTION, SOLUTION INTRAVENOUS at 11:01

## 2023-04-25 RX ADMIN — CALCIUM GLUCONATE 2000 MG: 20 INJECTION, SOLUTION INTRAVENOUS at 12:07

## 2023-04-25 RX ADMIN — DIGOXIN 250 MCG: 0.25 INJECTION INTRAMUSCULAR; INTRAVENOUS at 11:00

## 2023-04-25 RX ADMIN — CIPROFLOXACIN 400 MG: 2 INJECTION, SOLUTION INTRAVENOUS at 11:03

## 2023-04-25 RX ADMIN — METRONIDAZOLE 500 MG: 500 INJECTION, SOLUTION INTRAVENOUS at 15:36

## 2023-04-25 RX ADMIN — DEXTROSE MONOHYDRATE 125 ML: 10 INJECTION, SOLUTION INTRAVENOUS at 06:21

## 2023-04-25 RX ADMIN — APIXABAN 2.5 MG: 5 TABLET, FILM COATED ORAL at 11:00

## 2023-04-25 RX ADMIN — MUPIROCIN: 20 OINTMENT TOPICAL at 21:11

## 2023-04-25 RX ADMIN — Medication 10 ML: at 11:00

## 2023-04-25 RX ADMIN — SODIUM CHLORIDE: 9 INJECTION, SOLUTION INTRAVENOUS at 19:19

## 2023-04-25 RX ADMIN — MUPIROCIN: 20 OINTMENT TOPICAL at 10:59

## 2023-04-25 RX ADMIN — POTASSIUM CHLORIDE 20 MEQ: 29.8 INJECTION, SOLUTION INTRAVENOUS at 09:51

## 2023-04-25 RX ADMIN — MAGNESIUM SULFATE HEPTAHYDRATE 4000 MG: 40 INJECTION, SOLUTION INTRAVENOUS at 09:55

## 2023-04-25 RX ADMIN — Medication 10 ML: at 21:11

## 2023-04-25 RX ADMIN — SODIUM CHLORIDE: 9 INJECTION, SOLUTION INTRAVENOUS at 05:38

## 2023-04-25 ASSESSMENT — PAIN SCALES - GENERAL
PAINLEVEL_OUTOF10: 0

## 2023-04-26 PROBLEM — E43 SEVERE PROTEIN-CALORIE MALNUTRITION (HCC): Status: ACTIVE | Noted: 2023-04-26

## 2023-04-26 LAB
ALBUMIN SERPL-MCNC: 1.8 G/DL (ref 3.4–5)
ANION GAP SERPL CALCULATED.3IONS-SCNC: 5 MMOL/L (ref 3–16)
BUN SERPL-MCNC: 32 MG/DL (ref 7–20)
CA-I BLD-SCNC: 1.1 MMOL/L (ref 1.12–1.32)
CALCIUM SERPL-MCNC: 7.6 MG/DL (ref 8.3–10.6)
CHLORIDE SERPL-SCNC: 110 MMOL/L (ref 99–110)
CO2 SERPL-SCNC: 21 MMOL/L (ref 21–32)
CREAT SERPL-MCNC: 1.3 MG/DL (ref 0.6–1.2)
CRYPTOSP AG STL QL IA: NORMAL
E HISTOLYT AG STL QL IA: NORMAL
G LAMBLIA AG STL QL IA: NORMAL
GFR SERPLBLD CREATININE-BSD FMLA CKD-EPI: 39 ML/MIN/{1.73_M2}
GI PATHOGENS PNL STL NAA+PROBE: NORMAL
GLUCOSE BLD-MCNC: 77 MG/DL (ref 70–99)
GLUCOSE SERPL-MCNC: 86 MG/DL (ref 70–99)
MAGNESIUM SERPL-MCNC: 1.9 MG/DL (ref 1.8–2.4)
PERFORMED ON: NORMAL
PH BLDV: 7.36 [PH] (ref 7.35–7.45)
PHOSPHATE SERPL-MCNC: 2.3 MG/DL (ref 2.5–4.9)
POTASSIUM SERPL-SCNC: 3.8 MMOL/L (ref 3.5–5.1)
SODIUM SERPL-SCNC: 136 MMOL/L (ref 136–145)

## 2023-04-26 PROCEDURE — 97165 OT EVAL LOW COMPLEX 30 MIN: CPT

## 2023-04-26 PROCEDURE — 6360000002 HC RX W HCPCS: Performed by: INTERNAL MEDICINE

## 2023-04-26 PROCEDURE — 97162 PT EVAL MOD COMPLEX 30 MIN: CPT

## 2023-04-26 PROCEDURE — 99232 SBSQ HOSP IP/OBS MODERATE 35: CPT | Performed by: INTERNAL MEDICINE

## 2023-04-26 PROCEDURE — 6370000000 HC RX 637 (ALT 250 FOR IP): Performed by: INTERNAL MEDICINE

## 2023-04-26 PROCEDURE — 97530 THERAPEUTIC ACTIVITIES: CPT

## 2023-04-26 PROCEDURE — 2500000003 HC RX 250 WO HCPCS: Performed by: INTERNAL MEDICINE

## 2023-04-26 PROCEDURE — 99291 CRITICAL CARE FIRST HOUR: CPT | Performed by: INTERNAL MEDICINE

## 2023-04-26 PROCEDURE — 2580000003 HC RX 258: Performed by: INTERNAL MEDICINE

## 2023-04-26 PROCEDURE — 82330 ASSAY OF CALCIUM: CPT

## 2023-04-26 PROCEDURE — 80069 RENAL FUNCTION PANEL: CPT

## 2023-04-26 PROCEDURE — 99233 SBSQ HOSP IP/OBS HIGH 50: CPT | Performed by: INTERNAL MEDICINE

## 2023-04-26 PROCEDURE — 2000000000 HC ICU R&B

## 2023-04-26 PROCEDURE — 97112 NEUROMUSCULAR REEDUCATION: CPT

## 2023-04-26 PROCEDURE — 83735 ASSAY OF MAGNESIUM: CPT

## 2023-04-26 RX ORDER — LEVOTHYROXINE SODIUM 0.03 MG/1
25 TABLET ORAL DAILY
Status: DISCONTINUED | OUTPATIENT
Start: 2023-04-26 | End: 2023-04-26

## 2023-04-26 RX ORDER — QUETIAPINE FUMARATE 25 MG/1
12.5 TABLET, FILM COATED ORAL NIGHTLY PRN
Status: DISCONTINUED | OUTPATIENT
Start: 2023-04-26 | End: 2023-04-28

## 2023-04-26 RX ORDER — ONDANSETRON 2 MG/ML
4 INJECTION INTRAMUSCULAR; INTRAVENOUS EVERY 4 HOURS PRN
Status: DISCONTINUED | OUTPATIENT
Start: 2023-04-26 | End: 2023-05-01 | Stop reason: HOSPADM

## 2023-04-26 RX ORDER — MIDODRINE HYDROCHLORIDE 5 MG/1
5 TABLET ORAL
Status: DISCONTINUED | OUTPATIENT
Start: 2023-04-26 | End: 2023-04-28

## 2023-04-26 RX ORDER — DIGOXIN 0.25 MG/ML
250 INJECTION INTRAMUSCULAR; INTRAVENOUS ONCE
Status: COMPLETED | OUTPATIENT
Start: 2023-04-26 | End: 2023-04-26

## 2023-04-26 RX ORDER — LEVOTHYROXINE SODIUM 0.03 MG/1
25 TABLET ORAL DAILY
Status: DISCONTINUED | OUTPATIENT
Start: 2023-04-26 | End: 2023-05-01 | Stop reason: HOSPADM

## 2023-04-26 RX ADMIN — Medication 10 ML: at 09:27

## 2023-04-26 RX ADMIN — Medication 10 ML: at 20:43

## 2023-04-26 RX ADMIN — MUPIROCIN: 20 OINTMENT TOPICAL at 09:21

## 2023-04-26 RX ADMIN — DIGOXIN 250 MCG: 0.25 INJECTION INTRAMUSCULAR; INTRAVENOUS at 11:42

## 2023-04-26 RX ADMIN — MUPIROCIN: 20 OINTMENT TOPICAL at 20:43

## 2023-04-26 RX ADMIN — SODIUM CHLORIDE: 9 INJECTION, SOLUTION INTRAVENOUS at 07:30

## 2023-04-26 RX ADMIN — LEVOTHYROXINE SODIUM 25 MCG: 25 TABLET ORAL at 11:50

## 2023-04-26 RX ADMIN — ONDANSETRON 4 MG: 2 INJECTION INTRAMUSCULAR; INTRAVENOUS at 11:43

## 2023-04-26 RX ADMIN — CIPROFLOXACIN 400 MG: 2 INJECTION, SOLUTION INTRAVENOUS at 09:26

## 2023-04-26 RX ADMIN — METRONIDAZOLE 500 MG: 500 INJECTION, SOLUTION INTRAVENOUS at 08:44

## 2023-04-26 RX ADMIN — APIXABAN 2.5 MG: 5 TABLET, FILM COATED ORAL at 09:20

## 2023-04-26 RX ADMIN — SODIUM CHLORIDE: 9 INJECTION, SOLUTION INTRAVENOUS at 20:43

## 2023-04-26 RX ADMIN — MIDODRINE HYDROCHLORIDE 5 MG: 5 TABLET ORAL at 11:43

## 2023-04-26 RX ADMIN — METRONIDAZOLE 500 MG: 500 INJECTION, SOLUTION INTRAVENOUS at 16:38

## 2023-04-26 RX ADMIN — APIXABAN 2.5 MG: 5 TABLET, FILM COATED ORAL at 20:43

## 2023-04-26 RX ADMIN — CITALOPRAM HYDROBROMIDE 10 MG: 20 TABLET ORAL at 09:20

## 2023-04-26 RX ADMIN — MIDODRINE HYDROCHLORIDE 5 MG: 5 TABLET ORAL at 16:36

## 2023-04-26 ASSESSMENT — PAIN SCALES - GENERAL
PAINLEVEL_OUTOF10: 0

## 2023-04-27 ENCOUNTER — APPOINTMENT (OUTPATIENT)
Dept: CT IMAGING | Age: 88
DRG: 871 | End: 2023-04-27
Attending: INTERNAL MEDICINE
Payer: MEDICARE

## 2023-04-27 PROBLEM — I48.91 ATRIAL FIBRILLATION (HCC): Status: ACTIVE | Noted: 2023-01-16

## 2023-04-27 LAB
ALBUMIN SERPL-MCNC: 1.9 G/DL (ref 3.4–5)
ANION GAP SERPL CALCULATED.3IONS-SCNC: 8 MMOL/L (ref 3–16)
BASOPHILS # BLD: 0 K/UL (ref 0–0.2)
BASOPHILS NFR BLD: 0.1 %
BUN SERPL-MCNC: 27 MG/DL (ref 7–20)
CALCIUM SERPL-MCNC: 7.5 MG/DL (ref 8.3–10.6)
CHLORIDE SERPL-SCNC: 112 MMOL/L (ref 99–110)
CO2 SERPL-SCNC: 20 MMOL/L (ref 21–32)
CREAT SERPL-MCNC: 1.1 MG/DL (ref 0.6–1.2)
DEPRECATED RDW RBC AUTO: 18.5 % (ref 12.4–15.4)
EOSINOPHIL # BLD: 0 K/UL (ref 0–0.6)
EOSINOPHIL NFR BLD: 0.3 %
GFR SERPLBLD CREATININE-BSD FMLA CKD-EPI: 47 ML/MIN/{1.73_M2}
GLUCOSE BLD-MCNC: 79 MG/DL (ref 70–99)
GLUCOSE BLD-MCNC: 80 MG/DL (ref 70–99)
GLUCOSE BLD-MCNC: 85 MG/DL (ref 70–99)
GLUCOSE BLD-MCNC: 88 MG/DL (ref 70–99)
GLUCOSE BLD-MCNC: 91 MG/DL (ref 70–99)
GLUCOSE SERPL-MCNC: 85 MG/DL (ref 70–99)
HCT VFR BLD AUTO: 28.8 % (ref 36–48)
HGB BLD-MCNC: 9.2 G/DL (ref 12–16)
LYMPHOCYTES # BLD: 0.4 K/UL (ref 1–5.1)
LYMPHOCYTES NFR BLD: 7.7 %
MCH RBC QN AUTO: 25.7 PG (ref 26–34)
MCHC RBC AUTO-ENTMCNC: 32 G/DL (ref 31–36)
MCV RBC AUTO: 80.4 FL (ref 80–100)
MONOCYTES # BLD: 0.3 K/UL (ref 0–1.3)
MONOCYTES NFR BLD: 5.6 %
NEUTROPHILS # BLD: 4.5 K/UL (ref 1.7–7.7)
NEUTROPHILS NFR BLD: 86.3 %
PERFORMED ON: NORMAL
PHOSPHATE SERPL-MCNC: 2.2 MG/DL (ref 2.5–4.9)
PLATELET # BLD AUTO: 355 K/UL (ref 135–450)
PMV BLD AUTO: 6.6 FL (ref 5–10.5)
POTASSIUM SERPL-SCNC: 3.8 MMOL/L (ref 3.5–5.1)
RBC # BLD AUTO: 3.58 M/UL (ref 4–5.2)
SODIUM SERPL-SCNC: 140 MMOL/L (ref 136–145)
WBC # BLD AUTO: 5.2 K/UL (ref 4–11)

## 2023-04-27 PROCEDURE — 6370000000 HC RX 637 (ALT 250 FOR IP): Performed by: INTERNAL MEDICINE

## 2023-04-27 PROCEDURE — 2580000003 HC RX 258: Performed by: INTERNAL MEDICINE

## 2023-04-27 PROCEDURE — 74174 CTA ABD&PLVS W/CONTRAST: CPT

## 2023-04-27 PROCEDURE — 2000000000 HC ICU R&B

## 2023-04-27 PROCEDURE — 99291 CRITICAL CARE FIRST HOUR: CPT | Performed by: INTERNAL MEDICINE

## 2023-04-27 PROCEDURE — 85025 COMPLETE CBC W/AUTO DIFF WBC: CPT

## 2023-04-27 PROCEDURE — 6360000002 HC RX W HCPCS: Performed by: INTERNAL MEDICINE

## 2023-04-27 PROCEDURE — 2500000003 HC RX 250 WO HCPCS: Performed by: INTERNAL MEDICINE

## 2023-04-27 PROCEDURE — 92526 ORAL FUNCTION THERAPY: CPT

## 2023-04-27 PROCEDURE — 80069 RENAL FUNCTION PANEL: CPT

## 2023-04-27 PROCEDURE — 92610 EVALUATE SWALLOWING FUNCTION: CPT

## 2023-04-27 PROCEDURE — 99233 SBSQ HOSP IP/OBS HIGH 50: CPT | Performed by: INTERNAL MEDICINE

## 2023-04-27 PROCEDURE — 6360000004 HC RX CONTRAST MEDICATION: Performed by: REGISTERED NURSE

## 2023-04-27 RX ORDER — CIPROFLOXACIN 2 MG/ML
400 INJECTION, SOLUTION INTRAVENOUS EVERY 12 HOURS
Status: DISCONTINUED | OUTPATIENT
Start: 2023-04-27 | End: 2023-05-01

## 2023-04-27 RX ORDER — DIGOXIN 0.25 MG/ML
500 INJECTION INTRAMUSCULAR; INTRAVENOUS ONCE
Status: COMPLETED | OUTPATIENT
Start: 2023-04-27 | End: 2023-04-27

## 2023-04-27 RX ADMIN — MIDODRINE HYDROCHLORIDE 5 MG: 5 TABLET ORAL at 16:59

## 2023-04-27 RX ADMIN — METRONIDAZOLE 500 MG: 500 INJECTION, SOLUTION INTRAVENOUS at 00:12

## 2023-04-27 RX ADMIN — CIPROFLOXACIN 400 MG: 2 INJECTION, SOLUTION INTRAVENOUS at 20:59

## 2023-04-27 RX ADMIN — METRONIDAZOLE 500 MG: 500 INJECTION, SOLUTION INTRAVENOUS at 23:41

## 2023-04-27 RX ADMIN — DIGOXIN 500 MCG: 0.25 INJECTION INTRAMUSCULAR; INTRAVENOUS at 16:16

## 2023-04-27 RX ADMIN — IOPAMIDOL 100 ML: 755 INJECTION, SOLUTION INTRAVENOUS at 22:29

## 2023-04-27 RX ADMIN — Medication 10 ML: at 08:16

## 2023-04-27 RX ADMIN — APIXABAN 2.5 MG: 5 TABLET, FILM COATED ORAL at 08:02

## 2023-04-27 RX ADMIN — MIDODRINE HYDROCHLORIDE 5 MG: 5 TABLET ORAL at 11:50

## 2023-04-27 RX ADMIN — APIXABAN 2.5 MG: 5 TABLET, FILM COATED ORAL at 20:58

## 2023-04-27 RX ADMIN — MIDODRINE HYDROCHLORIDE 5 MG: 5 TABLET ORAL at 08:02

## 2023-04-27 RX ADMIN — CIPROFLOXACIN 400 MG: 2 INJECTION, SOLUTION INTRAVENOUS at 08:15

## 2023-04-27 RX ADMIN — NOREPINEPHRINE BITARTRATE 2 MCG/MIN: 1 SOLUTION INTRAVENOUS at 18:54

## 2023-04-27 RX ADMIN — CITALOPRAM HYDROBROMIDE 10 MG: 20 TABLET ORAL at 08:02

## 2023-04-27 RX ADMIN — MUPIROCIN: 20 OINTMENT TOPICAL at 08:01

## 2023-04-27 RX ADMIN — METRONIDAZOLE 500 MG: 500 INJECTION, SOLUTION INTRAVENOUS at 08:16

## 2023-04-27 RX ADMIN — LEVOTHYROXINE SODIUM 25 MCG: 25 TABLET ORAL at 08:02

## 2023-04-27 RX ADMIN — METRONIDAZOLE 500 MG: 500 INJECTION, SOLUTION INTRAVENOUS at 16:20

## 2023-04-27 RX ADMIN — MUPIROCIN: 20 OINTMENT TOPICAL at 20:58

## 2023-04-27 RX ADMIN — QUETIAPINE FUMARATE 12.5 MG: 25 TABLET ORAL at 23:38

## 2023-04-27 RX ADMIN — SODIUM CHLORIDE: 9 INJECTION, SOLUTION INTRAVENOUS at 08:15

## 2023-04-27 RX ADMIN — Medication 10 ML: at 20:58

## 2023-04-27 ASSESSMENT — PAIN SCALES - GENERAL
PAINLEVEL_OUTOF10: 0
PAINLEVEL_OUTOF10: 0

## 2023-04-28 PROBLEM — K52.9 ENTERITIS: Status: ACTIVE | Noted: 2023-04-28

## 2023-04-28 LAB
ALBUMIN SERPL-MCNC: 1.8 G/DL (ref 3.4–5)
ANION GAP SERPL CALCULATED.3IONS-SCNC: 10 MMOL/L (ref 3–16)
BASE EXCESS BLDA CALC-SCNC: -9 MMOL/L (ref -3–3)
BASOPHILS # BLD: 0 K/UL (ref 0–0.2)
BASOPHILS NFR BLD: 0.1 %
BUN SERPL-MCNC: 30 MG/DL (ref 7–20)
CALCIUM SERPL-MCNC: 7.6 MG/DL (ref 8.3–10.6)
CHLORIDE SERPL-SCNC: 112 MMOL/L (ref 99–110)
CO2 BLDA-SCNC: 15.1 MMOL/L
CO2 SERPL-SCNC: 16 MMOL/L (ref 21–32)
COHGB MFR BLDA: 0.3 % (ref 0–1.5)
CREAT SERPL-MCNC: 1.3 MG/DL (ref 0.6–1.2)
DEPRECATED RDW RBC AUTO: 18.7 % (ref 12.4–15.4)
EOSINOPHIL # BLD: 0 K/UL (ref 0–0.6)
EOSINOPHIL NFR BLD: 0.1 %
GFR SERPLBLD CREATININE-BSD FMLA CKD-EPI: 39 ML/MIN/{1.73_M2}
GLUCOSE BLD-MCNC: 116 MG/DL (ref 70–99)
GLUCOSE BLD-MCNC: 153 MG/DL (ref 70–99)
GLUCOSE BLD-MCNC: 160 MG/DL (ref 70–99)
GLUCOSE BLD-MCNC: 57 MG/DL (ref 70–99)
GLUCOSE BLD-MCNC: 72 MG/DL (ref 70–99)
GLUCOSE SERPL-MCNC: 83 MG/DL (ref 70–99)
HCO3 BLDA-SCNC: 14.4 MMOL/L (ref 21–29)
HCT VFR BLD AUTO: 28.7 % (ref 36–48)
HGB BLD-MCNC: 8.7 G/DL (ref 12–16)
HGB BLDA-MCNC: 9.7 G/DL (ref 12–16)
LYMPHOCYTES # BLD: 0.4 K/UL (ref 1–5.1)
LYMPHOCYTES NFR BLD: 6.5 %
MCH RBC QN AUTO: 24.7 PG (ref 26–34)
MCHC RBC AUTO-ENTMCNC: 30.2 G/DL (ref 31–36)
MCV RBC AUTO: 81.8 FL (ref 80–100)
METHGB MFR BLDA: 0.3 %
MONOCYTES # BLD: 0.3 K/UL (ref 0–1.3)
MONOCYTES NFR BLD: 4.7 %
NEUTROPHILS # BLD: 5.8 K/UL (ref 1.7–7.7)
NEUTROPHILS NFR BLD: 88.6 %
O2 THERAPY: ABNORMAL
PCO2 BLDA: 23.7 MMHG (ref 35–45)
PERFORMED ON: ABNORMAL
PERFORMED ON: NORMAL
PH BLDA: 7.4 [PH] (ref 7.35–7.45)
PHOSPHATE SERPL-MCNC: 2.7 MG/DL (ref 2.5–4.9)
PLATELET # BLD AUTO: 283 K/UL (ref 135–450)
PMV BLD AUTO: 6.8 FL (ref 5–10.5)
PO2 BLDA: 124.2 MMHG (ref 75–108)
POTASSIUM SERPL-SCNC: 4.2 MMOL/L (ref 3.5–5.1)
RBC # BLD AUTO: 3.5 M/UL (ref 4–5.2)
SAO2 % BLDA: 98.5 %
SODIUM SERPL-SCNC: 138 MMOL/L (ref 136–145)
WBC # BLD AUTO: 6.5 K/UL (ref 4–11)

## 2023-04-28 PROCEDURE — 2500000003 HC RX 250 WO HCPCS: Performed by: INTERNAL MEDICINE

## 2023-04-28 PROCEDURE — 82803 BLOOD GASES ANY COMBINATION: CPT

## 2023-04-28 PROCEDURE — 94761 N-INVAS EAR/PLS OXIMETRY MLT: CPT

## 2023-04-28 PROCEDURE — 99232 SBSQ HOSP IP/OBS MODERATE 35: CPT | Performed by: INTERNAL MEDICINE

## 2023-04-28 PROCEDURE — 2000000000 HC ICU R&B

## 2023-04-28 PROCEDURE — P9047 ALBUMIN (HUMAN), 25%, 50ML: HCPCS | Performed by: INTERNAL MEDICINE

## 2023-04-28 PROCEDURE — 6370000000 HC RX 637 (ALT 250 FOR IP): Performed by: INTERNAL MEDICINE

## 2023-04-28 PROCEDURE — 99233 SBSQ HOSP IP/OBS HIGH 50: CPT | Performed by: INTERNAL MEDICINE

## 2023-04-28 PROCEDURE — 2580000003 HC RX 258: Performed by: INTERNAL MEDICINE

## 2023-04-28 PROCEDURE — 6360000002 HC RX W HCPCS: Performed by: INTERNAL MEDICINE

## 2023-04-28 PROCEDURE — 36600 WITHDRAWAL OF ARTERIAL BLOOD: CPT

## 2023-04-28 PROCEDURE — 2700000000 HC OXYGEN THERAPY PER DAY

## 2023-04-28 PROCEDURE — 85025 COMPLETE CBC W/AUTO DIFF WBC: CPT

## 2023-04-28 PROCEDURE — 80069 RENAL FUNCTION PANEL: CPT

## 2023-04-28 RX ORDER — ALBUMIN (HUMAN) 12.5 G/50ML
25 SOLUTION INTRAVENOUS EVERY 6 HOURS
Status: COMPLETED | OUTPATIENT
Start: 2023-04-28 | End: 2023-04-29

## 2023-04-28 RX ORDER — AMIODARONE HYDROCHLORIDE 200 MG/1
200 TABLET ORAL DAILY
Status: DISCONTINUED | OUTPATIENT
Start: 2023-04-28 | End: 2023-05-01 | Stop reason: HOSPADM

## 2023-04-28 RX ADMIN — VASOPRESSIN: 20 INJECTION, SOLUTION INTRAVENOUS at 10:47

## 2023-04-28 RX ADMIN — ALBUMIN (HUMAN) 25 G: 0.25 INJECTION, SOLUTION INTRAVENOUS at 16:41

## 2023-04-28 RX ADMIN — AMIODARONE HYDROCHLORIDE 200 MG: 200 TABLET ORAL at 11:45

## 2023-04-28 RX ADMIN — LEVOTHYROXINE SODIUM 25 MCG: 25 TABLET ORAL at 05:10

## 2023-04-28 RX ADMIN — APIXABAN 2.5 MG: 5 TABLET, FILM COATED ORAL at 21:05

## 2023-04-28 RX ADMIN — VASOPRESSIN: 20 INJECTION, SOLUTION INTRAVENOUS at 23:57

## 2023-04-28 RX ADMIN — CIPROFLOXACIN 400 MG: 2 INJECTION, SOLUTION INTRAVENOUS at 19:51

## 2023-04-28 RX ADMIN — CITALOPRAM HYDROBROMIDE 10 MG: 20 TABLET ORAL at 08:20

## 2023-04-28 RX ADMIN — CIPROFLOXACIN 400 MG: 2 INJECTION, SOLUTION INTRAVENOUS at 08:37

## 2023-04-28 RX ADMIN — DEXTROSE MONOHYDRATE 125 ML: 10 INJECTION, SOLUTION INTRAVENOUS at 08:25

## 2023-04-28 RX ADMIN — METRONIDAZOLE 500 MG: 500 INJECTION, SOLUTION INTRAVENOUS at 08:21

## 2023-04-28 RX ADMIN — MUPIROCIN: 20 OINTMENT TOPICAL at 08:34

## 2023-04-28 RX ADMIN — APIXABAN 2.5 MG: 5 TABLET, FILM COATED ORAL at 08:20

## 2023-04-28 RX ADMIN — MIDODRINE HYDROCHLORIDE 5 MG: 5 TABLET ORAL at 08:20

## 2023-04-28 RX ADMIN — METRONIDAZOLE 500 MG: 500 INJECTION, SOLUTION INTRAVENOUS at 16:37

## 2023-04-28 RX ADMIN — ALBUMIN (HUMAN) 25 G: 0.25 INJECTION, SOLUTION INTRAVENOUS at 10:23

## 2023-04-28 RX ADMIN — ALBUMIN (HUMAN) 25 G: 0.25 INJECTION, SOLUTION INTRAVENOUS at 22:11

## 2023-04-28 RX ADMIN — SODIUM CHLORIDE: 9 INJECTION, SOLUTION INTRAVENOUS at 19:57

## 2023-04-28 RX ADMIN — Medication 10 ML: at 21:06

## 2023-04-28 RX ADMIN — MUPIROCIN: 20 OINTMENT TOPICAL at 21:06

## 2023-04-28 ASSESSMENT — PAIN SCALES - GENERAL: PAINLEVEL_OUTOF10: 0

## 2023-04-29 PROBLEM — D62 ACUTE BLOOD LOSS ANEMIA: Status: ACTIVE | Noted: 2023-04-29

## 2023-04-29 LAB
ABO + RH BLD: NORMAL
ANION GAP SERPL CALCULATED.3IONS-SCNC: 6 MMOL/L (ref 3–16)
BACTERIA BLD CULT ORG #2: NORMAL
BACTERIA BLD CULT: NORMAL
BASOPHILS # BLD: 0.1 K/UL (ref 0–0.2)
BASOPHILS NFR BLD: 1.3 %
BLD GP AB SCN SERPL QL: NORMAL
BLOOD BANK DISPENSE STATUS: NORMAL
BLOOD BANK DISPENSE STATUS: NORMAL
BLOOD BANK PRODUCT CODE: NORMAL
BLOOD BANK PRODUCT CODE: NORMAL
BPU ID: NORMAL
BPU ID: NORMAL
BUN SERPL-MCNC: 26 MG/DL (ref 7–20)
CALCIUM SERPL-MCNC: 7.7 MG/DL (ref 8.3–10.6)
CHLORIDE SERPL-SCNC: 112 MMOL/L (ref 99–110)
CO2 SERPL-SCNC: 19 MMOL/L (ref 21–32)
CREAT SERPL-MCNC: 1.2 MG/DL (ref 0.6–1.2)
DEPRECATED RDW RBC AUTO: 18.3 % (ref 12.4–15.4)
DEPRECATED RDW RBC AUTO: 18.5 % (ref 12.4–15.4)
DESCRIPTION BLOOD BANK: NORMAL
DESCRIPTION BLOOD BANK: NORMAL
EOSINOPHIL # BLD: 0.1 K/UL (ref 0–0.6)
EOSINOPHIL NFR BLD: 1.5 %
GFR SERPLBLD CREATININE-BSD FMLA CKD-EPI: 43 ML/MIN/{1.73_M2}
GLUCOSE BLD-MCNC: 126 MG/DL (ref 70–99)
GLUCOSE BLD-MCNC: 143 MG/DL (ref 70–99)
GLUCOSE SERPL-MCNC: 131 MG/DL (ref 70–99)
HCT VFR BLD AUTO: 20.5 % (ref 36–48)
HCT VFR BLD AUTO: 20.8 % (ref 36–48)
HCT VFR BLD AUTO: 21 % (ref 36–48)
HCT VFR BLD AUTO: 30.5 % (ref 36–48)
HGB BLD-MCNC: 6.5 G/DL (ref 12–16)
HGB BLD-MCNC: 6.7 G/DL (ref 12–16)
HGB BLD-MCNC: 6.7 G/DL (ref 12–16)
HGB BLD-MCNC: 9.7 G/DL (ref 12–16)
INR PPP: 2.17 (ref 0.84–1.16)
LYMPHOCYTES # BLD: 0.4 K/UL (ref 1–5.1)
LYMPHOCYTES NFR BLD: 5.5 %
MAGNESIUM SERPL-MCNC: 1.5 MG/DL (ref 1.8–2.4)
MCH RBC QN AUTO: 25.5 PG (ref 26–34)
MCH RBC QN AUTO: 25.7 PG (ref 26–34)
MCHC RBC AUTO-ENTMCNC: 31.7 G/DL (ref 31–36)
MCHC RBC AUTO-ENTMCNC: 31.7 G/DL (ref 31–36)
MCV RBC AUTO: 80.2 FL (ref 80–100)
MCV RBC AUTO: 81.2 FL (ref 80–100)
MONOCYTES # BLD: 0.4 K/UL (ref 0–1.3)
MONOCYTES NFR BLD: 5.8 %
NEUTROPHILS # BLD: 5.5 K/UL (ref 1.7–7.7)
NEUTROPHILS NFR BLD: 85.9 %
PERFORMED ON: ABNORMAL
PERFORMED ON: ABNORMAL
PLATELET # BLD AUTO: 206 K/UL (ref 135–450)
PLATELET # BLD AUTO: 210 K/UL (ref 135–450)
PMV BLD AUTO: 6.6 FL (ref 5–10.5)
PMV BLD AUTO: 6.9 FL (ref 5–10.5)
POTASSIUM SERPL-SCNC: 3 MMOL/L (ref 3.5–5.1)
PROTHROMBIN TIME: 24.1 SEC (ref 11.5–14.8)
RBC # BLD AUTO: 2.52 M/UL (ref 4–5.2)
RBC # BLD AUTO: 2.61 M/UL (ref 4–5.2)
SODIUM SERPL-SCNC: 137 MMOL/L (ref 136–145)
WBC # BLD AUTO: 6.1 K/UL (ref 4–11)
WBC # BLD AUTO: 6.4 K/UL (ref 4–11)

## 2023-04-29 PROCEDURE — 99291 CRITICAL CARE FIRST HOUR: CPT | Performed by: INTERNAL MEDICINE

## 2023-04-29 PROCEDURE — 2000000000 HC ICU R&B

## 2023-04-29 PROCEDURE — 30233N1 TRANSFUSION OF NONAUTOLOGOUS RED BLOOD CELLS INTO PERIPHERAL VEIN, PERCUTANEOUS APPROACH: ICD-10-PCS | Performed by: INTERNAL MEDICINE

## 2023-04-29 PROCEDURE — 6370000000 HC RX 637 (ALT 250 FOR IP): Performed by: INTERNAL MEDICINE

## 2023-04-29 PROCEDURE — 6360000002 HC RX W HCPCS: Performed by: INTERNAL MEDICINE

## 2023-04-29 PROCEDURE — 80048 BASIC METABOLIC PNL TOTAL CA: CPT

## 2023-04-29 PROCEDURE — C9113 INJ PANTOPRAZOLE SODIUM, VIA: HCPCS | Performed by: INTERNAL MEDICINE

## 2023-04-29 PROCEDURE — 2580000003 HC RX 258: Performed by: INTERNAL MEDICINE

## 2023-04-29 PROCEDURE — 85014 HEMATOCRIT: CPT

## 2023-04-29 PROCEDURE — 2700000000 HC OXYGEN THERAPY PER DAY

## 2023-04-29 PROCEDURE — 99232 SBSQ HOSP IP/OBS MODERATE 35: CPT | Performed by: INTERNAL MEDICINE

## 2023-04-29 PROCEDURE — 94761 N-INVAS EAR/PLS OXIMETRY MLT: CPT

## 2023-04-29 PROCEDURE — 86900 BLOOD TYPING SEROLOGIC ABO: CPT

## 2023-04-29 PROCEDURE — 2580000003 HC RX 258: Performed by: HOSPITALIST

## 2023-04-29 PROCEDURE — 85025 COMPLETE CBC W/AUTO DIFF WBC: CPT

## 2023-04-29 PROCEDURE — 36415 COLL VENOUS BLD VENIPUNCTURE: CPT

## 2023-04-29 PROCEDURE — P9016 RBC LEUKOCYTES REDUCED: HCPCS

## 2023-04-29 PROCEDURE — 86923 COMPATIBILITY TEST ELECTRIC: CPT

## 2023-04-29 PROCEDURE — 83735 ASSAY OF MAGNESIUM: CPT

## 2023-04-29 PROCEDURE — 85027 COMPLETE CBC AUTOMATED: CPT

## 2023-04-29 PROCEDURE — 85018 HEMOGLOBIN: CPT

## 2023-04-29 PROCEDURE — 6360000002 HC RX W HCPCS: Performed by: HOSPITALIST

## 2023-04-29 PROCEDURE — 86901 BLOOD TYPING SEROLOGIC RH(D): CPT

## 2023-04-29 PROCEDURE — 82270 OCCULT BLOOD FECES: CPT

## 2023-04-29 PROCEDURE — 85610 PROTHROMBIN TIME: CPT

## 2023-04-29 PROCEDURE — 86850 RBC ANTIBODY SCREEN: CPT

## 2023-04-29 PROCEDURE — 2500000003 HC RX 250 WO HCPCS: Performed by: INTERNAL MEDICINE

## 2023-04-29 PROCEDURE — 36430 TRANSFUSION BLD/BLD COMPNT: CPT

## 2023-04-29 RX ORDER — PANTOPRAZOLE SODIUM 40 MG/10ML
40 INJECTION, POWDER, LYOPHILIZED, FOR SOLUTION INTRAVENOUS 2 TIMES DAILY
Status: DISCONTINUED | OUTPATIENT
Start: 2023-04-29 | End: 2023-04-30

## 2023-04-29 RX ORDER — POTASSIUM CHLORIDE 7.45 MG/ML
10 INJECTION INTRAVENOUS ONCE
Status: COMPLETED | OUTPATIENT
Start: 2023-04-29 | End: 2023-04-29

## 2023-04-29 RX ORDER — SODIUM CHLORIDE 9 MG/ML
INJECTION, SOLUTION INTRAVENOUS PRN
Status: DISCONTINUED | OUTPATIENT
Start: 2023-04-29 | End: 2023-05-01 | Stop reason: HOSPADM

## 2023-04-29 RX ORDER — SODIUM CHLORIDE, SODIUM LACTATE, POTASSIUM CHLORIDE, AND CALCIUM CHLORIDE .6; .31; .03; .02 G/100ML; G/100ML; G/100ML; G/100ML
250 INJECTION, SOLUTION INTRAVENOUS ONCE
Status: COMPLETED | OUTPATIENT
Start: 2023-04-29 | End: 2023-04-29

## 2023-04-29 RX ORDER — POTASSIUM CHLORIDE 29.8 MG/ML
20 INJECTION INTRAVENOUS
Status: COMPLETED | OUTPATIENT
Start: 2023-04-29 | End: 2023-04-29

## 2023-04-29 RX ORDER — MAGNESIUM SULFATE 1 G/100ML
1000 INJECTION INTRAVENOUS ONCE
Status: COMPLETED | OUTPATIENT
Start: 2023-04-29 | End: 2023-04-29

## 2023-04-29 RX ADMIN — APIXABAN 2.5 MG: 5 TABLET, FILM COATED ORAL at 07:50

## 2023-04-29 RX ADMIN — CIPROFLOXACIN 400 MG: 2 INJECTION, SOLUTION INTRAVENOUS at 19:51

## 2023-04-29 RX ADMIN — POTASSIUM CHLORIDE 20 MEQ: 29.8 INJECTION, SOLUTION INTRAVENOUS at 08:40

## 2023-04-29 RX ADMIN — METRONIDAZOLE 500 MG: 500 INJECTION, SOLUTION INTRAVENOUS at 08:44

## 2023-04-29 RX ADMIN — Medication 10 ML: at 21:04

## 2023-04-29 RX ADMIN — METRONIDAZOLE 500 MG: 500 INJECTION, SOLUTION INTRAVENOUS at 15:43

## 2023-04-29 RX ADMIN — PANTOPRAZOLE SODIUM 40 MG: 40 INJECTION, POWDER, FOR SOLUTION INTRAVENOUS at 21:03

## 2023-04-29 RX ADMIN — METRONIDAZOLE 500 MG: 500 INJECTION, SOLUTION INTRAVENOUS at 00:15

## 2023-04-29 RX ADMIN — SODIUM CHLORIDE, POTASSIUM CHLORIDE, SODIUM LACTATE AND CALCIUM CHLORIDE 250 ML: 600; 310; 30; 20 INJECTION, SOLUTION INTRAVENOUS at 05:20

## 2023-04-29 RX ADMIN — POTASSIUM CHLORIDE 10 MEQ: 7.46 INJECTION, SOLUTION INTRAVENOUS at 06:29

## 2023-04-29 RX ADMIN — AMIODARONE HYDROCHLORIDE 200 MG: 200 TABLET ORAL at 07:50

## 2023-04-29 RX ADMIN — PANTOPRAZOLE SODIUM 40 MG: 40 INJECTION, POWDER, FOR SOLUTION INTRAVENOUS at 09:55

## 2023-04-29 RX ADMIN — MUPIROCIN: 20 OINTMENT TOPICAL at 21:04

## 2023-04-29 RX ADMIN — CIPROFLOXACIN 400 MG: 2 INJECTION, SOLUTION INTRAVENOUS at 09:49

## 2023-04-29 RX ADMIN — VASOPRESSIN: 20 INJECTION, SOLUTION INTRAVENOUS at 16:31

## 2023-04-29 RX ADMIN — METRONIDAZOLE 500 MG: 500 INJECTION, SOLUTION INTRAVENOUS at 23:39

## 2023-04-29 RX ADMIN — MAGNESIUM SULFATE HEPTAHYDRATE 3000 MG: 500 INJECTION, SOLUTION INTRAMUSCULAR; INTRAVENOUS at 08:36

## 2023-04-29 RX ADMIN — LEVOTHYROXINE SODIUM 25 MCG: 25 TABLET ORAL at 06:16

## 2023-04-29 RX ADMIN — MAGNESIUM SULFATE HEPTAHYDRATE 1000 MG: 1 INJECTION, SOLUTION INTRAVENOUS at 06:32

## 2023-04-29 RX ADMIN — POTASSIUM CHLORIDE 20 MEQ: 29.8 INJECTION, SOLUTION INTRAVENOUS at 09:45

## 2023-04-29 ASSESSMENT — PAIN SCALES - GENERAL
PAINLEVEL_OUTOF10: 0

## 2023-04-30 LAB
ANION GAP SERPL CALCULATED.3IONS-SCNC: 5 MMOL/L (ref 3–16)
BASOPHILS # BLD: 0 K/UL (ref 0–0.2)
BASOPHILS NFR BLD: 0 %
BUN SERPL-MCNC: 21 MG/DL (ref 7–20)
CALCIUM SERPL-MCNC: 7.6 MG/DL (ref 8.3–10.6)
CHLORIDE SERPL-SCNC: 113 MMOL/L (ref 99–110)
CO2 SERPL-SCNC: 18 MMOL/L (ref 21–32)
CREAT SERPL-MCNC: 1 MG/DL (ref 0.6–1.2)
DEPRECATED RDW RBC AUTO: 17.6 % (ref 12.4–15.4)
EOSINOPHIL # BLD: 0 K/UL (ref 0–0.6)
EOSINOPHIL NFR BLD: 0 %
GFR SERPLBLD CREATININE-BSD FMLA CKD-EPI: 53 ML/MIN/{1.73_M2}
GLUCOSE SERPL-MCNC: 113 MG/DL (ref 70–99)
HCT VFR BLD AUTO: 29.8 % (ref 36–48)
HEMOCCULT STL QL: ABNORMAL
HGB BLD-MCNC: 9.8 G/DL (ref 12–16)
LYMPHOCYTES # BLD: 0.3 K/UL (ref 1–5.1)
LYMPHOCYTES NFR BLD: 7 %
MAGNESIUM SERPL-MCNC: 2.2 MG/DL (ref 1.8–2.4)
MCH RBC QN AUTO: 26.7 PG (ref 26–34)
MCHC RBC AUTO-ENTMCNC: 32.9 G/DL (ref 31–36)
MCV RBC AUTO: 81.2 FL (ref 80–100)
MONOCYTES # BLD: 0.3 K/UL (ref 0–1.3)
MONOCYTES NFR BLD: 6 %
NEUTROPHILS # BLD: 4.3 K/UL (ref 1.7–7.7)
NEUTROPHILS NFR BLD: 85 %
NEUTS BAND NFR BLD MANUAL: 2 % (ref 0–7)
PLATELET # BLD AUTO: 184 K/UL (ref 135–450)
PLATELET BLD QL SMEAR: ADEQUATE
PMV BLD AUTO: 6.8 FL (ref 5–10.5)
POIKILOCYTOSIS BLD QL SMEAR: ABNORMAL
POTASSIUM SERPL-SCNC: 3.5 MMOL/L (ref 3.5–5.1)
RBC # BLD AUTO: 3.67 M/UL (ref 4–5.2)
SCHISTOCYTES BLD QL SMEAR: ABNORMAL
SLIDE REVIEW: ABNORMAL
SODIUM SERPL-SCNC: 136 MMOL/L (ref 136–145)
WBC # BLD AUTO: 4.9 K/UL (ref 4–11)

## 2023-04-30 PROCEDURE — 2580000003 HC RX 258: Performed by: INTERNAL MEDICINE

## 2023-04-30 PROCEDURE — 94761 N-INVAS EAR/PLS OXIMETRY MLT: CPT

## 2023-04-30 PROCEDURE — 6370000000 HC RX 637 (ALT 250 FOR IP): Performed by: INTERNAL MEDICINE

## 2023-04-30 PROCEDURE — 2060000000 HC ICU INTERMEDIATE R&B

## 2023-04-30 PROCEDURE — 2700000000 HC OXYGEN THERAPY PER DAY

## 2023-04-30 PROCEDURE — 80048 BASIC METABOLIC PNL TOTAL CA: CPT

## 2023-04-30 PROCEDURE — 6360000002 HC RX W HCPCS: Performed by: INTERNAL MEDICINE

## 2023-04-30 PROCEDURE — 2500000003 HC RX 250 WO HCPCS: Performed by: INTERNAL MEDICINE

## 2023-04-30 PROCEDURE — 99233 SBSQ HOSP IP/OBS HIGH 50: CPT | Performed by: INTERNAL MEDICINE

## 2023-04-30 PROCEDURE — 83735 ASSAY OF MAGNESIUM: CPT

## 2023-04-30 PROCEDURE — 85025 COMPLETE CBC W/AUTO DIFF WBC: CPT

## 2023-04-30 RX ORDER — PANTOPRAZOLE SODIUM 40 MG/1
40 TABLET, DELAYED RELEASE ORAL
Status: DISCONTINUED | OUTPATIENT
Start: 2023-05-01 | End: 2023-05-01 | Stop reason: HOSPADM

## 2023-04-30 RX ADMIN — CIPROFLOXACIN 400 MG: 2 INJECTION, SOLUTION INTRAVENOUS at 12:35

## 2023-04-30 RX ADMIN — CIPROFLOXACIN 400 MG: 2 INJECTION, SOLUTION INTRAVENOUS at 19:51

## 2023-04-30 RX ADMIN — VASOPRESSIN: 20 INJECTION, SOLUTION INTRAVENOUS at 06:23

## 2023-04-30 RX ADMIN — VASOPRESSIN: 20 INJECTION, SOLUTION INTRAVENOUS at 18:24

## 2023-04-30 RX ADMIN — METRONIDAZOLE 500 MG: 500 INJECTION, SOLUTION INTRAVENOUS at 23:30

## 2023-04-30 RX ADMIN — Medication 10 ML: at 19:52

## 2023-04-30 RX ADMIN — METRONIDAZOLE 500 MG: 500 INJECTION, SOLUTION INTRAVENOUS at 16:26

## 2023-04-30 RX ADMIN — LEVOTHYROXINE SODIUM 25 MCG: 25 TABLET ORAL at 06:39

## 2023-04-30 RX ADMIN — METRONIDAZOLE 500 MG: 500 INJECTION, SOLUTION INTRAVENOUS at 11:33

## 2023-04-30 RX ADMIN — VASOPRESSIN: 20 INJECTION, SOLUTION INTRAVENOUS at 06:21

## 2023-04-30 ASSESSMENT — PAIN SCALES - GENERAL
PAINLEVEL_OUTOF10: 0

## 2023-05-01 VITALS
HEIGHT: 67 IN | SYSTOLIC BLOOD PRESSURE: 100 MMHG | DIASTOLIC BLOOD PRESSURE: 87 MMHG | OXYGEN SATURATION: 100 % | WEIGHT: 169.4 LBS | RESPIRATION RATE: 16 BRPM | HEART RATE: 58 BPM | TEMPERATURE: 97.3 F | BODY MASS INDEX: 26.59 KG/M2

## 2023-05-01 PROBLEM — K52.9 COLITIS: Status: ACTIVE | Noted: 2023-01-07

## 2023-05-01 LAB
ANION GAP SERPL CALCULATED.3IONS-SCNC: 6 MMOL/L (ref 3–16)
BASOPHILS # BLD: 0 K/UL (ref 0–0.2)
BASOPHILS NFR BLD: 0.1 %
BUN SERPL-MCNC: 16 MG/DL (ref 7–20)
CALCIUM SERPL-MCNC: 7.9 MG/DL (ref 8.3–10.6)
CHLORIDE SERPL-SCNC: 110 MMOL/L (ref 99–110)
CO2 SERPL-SCNC: 18 MMOL/L (ref 21–32)
CREAT SERPL-MCNC: 0.9 MG/DL (ref 0.6–1.2)
DEPRECATED RDW RBC AUTO: 18 % (ref 12.4–15.4)
EOSINOPHIL # BLD: 0.1 K/UL (ref 0–0.6)
EOSINOPHIL NFR BLD: 2.3 %
GFR SERPLBLD CREATININE-BSD FMLA CKD-EPI: >60 ML/MIN/{1.73_M2}
GLUCOSE SERPL-MCNC: 117 MG/DL (ref 70–99)
HCT VFR BLD AUTO: 34.4 % (ref 36–48)
HGB BLD-MCNC: 11.3 G/DL (ref 12–16)
LYMPHOCYTES # BLD: 0.5 K/UL (ref 1–5.1)
LYMPHOCYTES NFR BLD: 8.4 %
MAGNESIUM SERPL-MCNC: 2.1 MG/DL (ref 1.8–2.4)
MCH RBC QN AUTO: 27 PG (ref 26–34)
MCHC RBC AUTO-ENTMCNC: 32.9 G/DL (ref 31–36)
MCV RBC AUTO: 82 FL (ref 80–100)
MONOCYTES # BLD: 0.4 K/UL (ref 0–1.3)
MONOCYTES NFR BLD: 7.4 %
NEUTROPHILS # BLD: 4.5 K/UL (ref 1.7–7.7)
NEUTROPHILS NFR BLD: 81.8 %
PLATELET # BLD AUTO: 182 K/UL (ref 135–450)
PMV BLD AUTO: 6.8 FL (ref 5–10.5)
POTASSIUM SERPL-SCNC: 3.4 MMOL/L (ref 3.5–5.1)
RBC # BLD AUTO: 4.19 M/UL (ref 4–5.2)
SODIUM SERPL-SCNC: 134 MMOL/L (ref 136–145)
WBC # BLD AUTO: 5.5 K/UL (ref 4–11)

## 2023-05-01 PROCEDURE — 92526 ORAL FUNCTION THERAPY: CPT

## 2023-05-01 PROCEDURE — 6370000000 HC RX 637 (ALT 250 FOR IP): Performed by: INTERNAL MEDICINE

## 2023-05-01 PROCEDURE — 2580000003 HC RX 258: Performed by: INTERNAL MEDICINE

## 2023-05-01 PROCEDURE — 6360000002 HC RX W HCPCS: Performed by: INTERNAL MEDICINE

## 2023-05-01 PROCEDURE — 80048 BASIC METABOLIC PNL TOTAL CA: CPT

## 2023-05-01 PROCEDURE — 99233 SBSQ HOSP IP/OBS HIGH 50: CPT | Performed by: INTERNAL MEDICINE

## 2023-05-01 PROCEDURE — 99239 HOSP IP/OBS DSCHRG MGMT >30: CPT | Performed by: INTERNAL MEDICINE

## 2023-05-01 PROCEDURE — 2500000003 HC RX 250 WO HCPCS: Performed by: INTERNAL MEDICINE

## 2023-05-01 PROCEDURE — 51798 US URINE CAPACITY MEASURE: CPT

## 2023-05-01 PROCEDURE — 85025 COMPLETE CBC W/AUTO DIFF WBC: CPT

## 2023-05-01 PROCEDURE — 83735 ASSAY OF MAGNESIUM: CPT

## 2023-05-01 RX ORDER — AMIODARONE HYDROCHLORIDE 200 MG/1
200 TABLET ORAL DAILY
Qty: 30 TABLET | Refills: 0 | DISCHARGE
Start: 2023-05-02

## 2023-05-01 RX ORDER — LEVOTHYROXINE SODIUM 0.03 MG/1
25 TABLET ORAL DAILY
Qty: 30 TABLET | Refills: 0 | DISCHARGE
Start: 2023-05-02

## 2023-05-01 RX ADMIN — LEVOTHYROXINE SODIUM 25 MCG: 25 TABLET ORAL at 05:51

## 2023-05-01 RX ADMIN — PANTOPRAZOLE SODIUM 40 MG: 40 TABLET, DELAYED RELEASE ORAL at 05:51

## 2023-05-01 RX ADMIN — POTASSIUM BICARBONATE 40 MEQ: 782 TABLET, EFFERVESCENT ORAL at 11:08

## 2023-05-01 RX ADMIN — AMIODARONE HYDROCHLORIDE 200 MG: 200 TABLET ORAL at 08:10

## 2023-05-01 RX ADMIN — Medication 10 ML: at 08:10

## 2023-05-01 RX ADMIN — CIPROFLOXACIN 400 MG: 2 INJECTION, SOLUTION INTRAVENOUS at 09:12

## 2023-05-01 RX ADMIN — METRONIDAZOLE 500 MG: 500 INJECTION, SOLUTION INTRAVENOUS at 08:08

## 2023-05-01 NOTE — DISCHARGE INSTR - COC
Continuity of Care Form    Patient Name: Zackary Joiner   :  4/3/1932  MRN:  [id number]    [facility] date:  2023  Discharge date:  2023      Code Status Order: Full Code   Advance Directives:     Admitting Physician:  Brett James MD  PCP: Yaritza Jacobsen MD    Discharging Nurse: [facility] Unit/Room#: 2885/5914-22  Discharging Unit Phone Number: [phone]    Emergency Contact:   Extended Emergency Contact Information  Primary Emergency Contact: [address] Phone: [phone]  Relation: Child  Secondary Emergency Contact: Carine Vincent  Address: [address] [place] [address], [address] [place] [address] Phone: [phone]  Relation: Child    Past Surgical History:  Past Surgical History:   Procedure Laterality Date    ANKLE FRACTURE SURGERY      LEFT ANKLE    APPENDECTOMY      ARM SURGERY Right 2020    EXCISION OF SKIN CANCER RIGHT ARM performed by Adria Pulliam MD at [address] [place] [address]  2017    cecal polyp; random colon biopsies    FOOT AMPUTATION Right 2022    AMPUTATION OF SECOND TOE RIGHT FOOT performed by Luis Leung DPM at [facility] ([address] [place] [address])      LAPAROTOMY N/A 2023    LAPAROTOMY EXPLORATORY, SIGMOID COLECTOMY, SMALL BOWEL RESECTION,OSTOMY performed by Bree Santana MD at [address] [place] [address] 2016    EXCISION LESION LEFT UPPER EXTREMITY LEFT BACK AND RIGHT SHOULDER    UPPER GASTROINTESTINAL ENDOSCOPY  09/15/2017    dilated, biopsies    VULVA SURGERY      x 2        Immunization History: There is no immunization history for the selected administration types on file for this patient.     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

## 2023-05-01 NOTE — PROGRESS NOTES
Both PIVs removed. Pt tolerated well. Colostomy intact, WNL. Pt picked up by transport to be taken to [facility]. Torrey Hung and gave report to [place].
Care rounds completed with Dr. Gael Gottron and multidisciplinary team. Reviewed labs, meds, VS, assessment, & plan of care for today. See dictated note and new orders for details.        DC antibiotics  Give 40 meq KCl
IM Progress Note    Admit Date:  4/25/2023  6    Interval history:  sepsis , hypotension transferred from [facility]    Afibb    Subjective:  Ms. Serge Kim seen up in bed, denies any abd pain   Off levo, off o2   Still with loose stool in ostomy   S/p 2 unit PRBC  Started midodrine    Objective:   /76   Pulse 59   Temp 97.4 °F (36.3 °C) (Temporal)   Resp 16   Ht 5' 7\" (1.702 m)   Wt 169 lb 6.4 oz (76.8 kg)   SpO2 99%   BMI 26.53 kg/m²     Intake/Output Summary (Last 24 hours) at 5/1/2023 0804  Last data filed at 5/1/2023 0413  Gross per 24 hour   Intake 1492.61 ml   Output 315 ml   Net 1177.61 ml         Physical Exam:    General: elderly female , baseline dementia,   Awake, alert and fairly oriented. Appears to be not in any distress  Appears fatigued and weak  Mucous Membranes:  Pink , anicteric  Neck: No JVD, no carotid bruit, no thyromegaly  Chest:  Clear to auscultation bilaterally, no added sounds  Cardiovascular:  irregular,  S1S2 heard, no murmurs or gallops  Abdomen:  Soft, undistended, non tender, no organomegaly, BS present - left lateral ostomy noted   Extremities: No edema or cyanosis.  Distal pulses well felt  Edematous skin in both elbows  Neurological : no focal deficits except for mild dementia  and gen weakness noted        Medications:   Scheduled Medications:    pantoprazole  40 mg Oral QAM AC    amiodarone  200 mg Oral Daily    ciprofloxacin  400 mg IntraVENous Q12H    levothyroxine  25 mcg Oral Daily    sodium chloride flush  5-40 mL IntraVENous 2 times per day    metroNIDAZOLE  500 mg IntraVENous Q8H    [Held by provider] citalopram  10 mg Oral Daily     I   sodium chloride      IV infusion builder 75 mL/hr at 04/30/23 1824    dextrose      sodium chloride Stopped (04/29/23 0632)     sodium chloride, ondansetron, glucose, dextrose bolus **OR** dextrose bolus, glucagon (rDNA), dextrose, sodium chloride flush, sodium chloride, acetaminophen **OR** acetaminophen    Lab Data:  Recent Labs
Patient is resting in bed. She is oriented to person. Disoriented to place and time. Speech is clear. Denied pain. Turned patient to her left side.  Electronically signed by Jigna Lao RN on 4/30/2023 at 9:46 PM
Physical Therapy    Chart reviewed and attempted to see patient for follow up treatment. Spoke with RN, who talked with CM, and per RN pt is scheduled to d/c back to facility around 1600 this afternoon. Will plan to hold therapy at this time and follow up if plans change. No charge.  Thank you,     Sonu Collins, PT, DPT
Pt de jesus leaking. Removed catheter per protocol. Pt tolerated well. Provided pericare, placed external catheter, connected to 40 mmHg suction.
Pt has had low PO intake. Denies the need to urinate. No urine output since de jesus removed. Pt with 136 ml via bladder scanner.
Pulmonary & Critical Care Medicine ICU Progress Note    CC: Transferred from [facility] with septic shock, enterocolitis, A-fib RVR    Events of Last 24 hours:   Confused overnight    Vascular lines: IV: RIJ CVC - 23 pt pulled it out    MV:  none     / / /   Recent Labs     23  1002   PHART 7.400   PVC4VHA 23.7*   PO2ART 124.2*         IV:   sodium chloride      IV infusion builder 75 mL/hr at 23 1824    dextrose      sodium chloride Stopped (23 2188)       Vitals:  Blood pressure 130/81, pulse 62, temperature (!) 96.4 °F (35.8 °C), temperature source Axillary, resp. rate 16, height 5' 7\" (1.702 m), weight 169 lb 6.4 oz (76.8 kg), SpO2 100 %, not currently breastfeeding. on 2 L   Temp  Av.2 °F (36.2 °C)  Min: 96.4 °F (35.8 °C)  Max: 98 °F (36.7 °C)    Intake/Output Summary (Last 24 hours) at 2023 0811  Last data filed at 2023 0413  Gross per 24 hour   Intake 1492.61 ml   Output 315 ml   Net 1177.61 ml       PE:  General: NAD    Resp: No crackles. No wheezing. CV: S1, S2. +edema  GI: NT, ND, +BS ostomy   Skin: Warm and dry. Neuro: PERRL. Alert and oriented to person, hospital but poor insght    Scheduled Meds:   pantoprazole  40 mg Oral QAM AC    amiodarone  200 mg Oral Daily    ciprofloxacin  400 mg IntraVENous Q12H    levothyroxine  25 mcg Oral Daily    sodium chloride flush  5-40 mL IntraVENous 2 times per day    metroNIDAZOLE  500 mg IntraVENous Q8H    [Held by provider] citalopram  10 mg Oral Daily       Data:  CBC:   Recent Labs     23  0500 23  0733 23  1658 23  0435 23  0435   WBC 6.1  --   --  4.9 5.5   HGB 6.7*   < > 9.7* 9.8* 11.3*   HCT 21.0*   < > 30.5* 29.8* 34.4*   MCV 80.2  --   --  81.2 82.0     --   --  184 182    < > = values in this interval not displayed.        BMP:   Recent Labs     23  0422 23  0435 23    136 134*   K 3.0* 3.5 3.4*   * 113* 110   CO2 19* 18* 18*   BUN 26* 21*
Shift assessment, completed, see flow sheet. Pt is alert and oriented x 2. Following commands. Afib 60, /81 (96), SpO2 96% on RA. Respirations are easy, even, and unlabored. Bilateral lung sounds diminished. PIV, WNL with D10 1/2 NS 75 ml/hr infusing. Colostomy LUQ, WNL, with brown water output . Rick in place and patent with STAT lock. Call light within reach. Bed in lowest position. Bed alarm on.
Walked into the patients room to fix her leads. On inspection her central line was pulled out and about 8 CM of the catheter was exposed. Infusions stopped. Blood return noted on all lumens. Spoke with Dr. Altagracia Aparicio. Orders to pull the central line and establish PIV's obtained. CL removed per policy and procedure. Dressing applied. Pressure held for 5 minutes. Patient tolerated well. Catheter is intact. 2 PIV established in the patients right fore arm.  Electronically signed by Rose Singh RN on 4/30/2023 at 11:20 PM
and moist Solids  Liquid Consistency: IDDSI 0 Thin Liquids - NO straws   Medication: Meds PO as tolerated    Plan:    Continued Dysphagia treatment with goals per plan of care. Discharge Recommendations: TBD    If pt discharges from hospital prior to Speech/Swallowing discharge, this note serves as tx and discharge summary.      Total Treatment Time / Charges     Time in Time out Total Time / units   Cognitive Tx         Speech Tx      Dysphagia Tx 1319 1333 14 min/ 1 unit      Signature:  Leobardo DOYLE-SLP  Clinical Fellow  [id number]

## 2023-05-01 NOTE — DISCHARGE SUMMARY
Name:  Omar Devi  Room:  3014/3014-01  MRN:    [id number]    Discharge Summary      This discharge summary is in conjunction with a complete physical exam done on the day of discharge. Discharging Physician: Keon Cahcon MD      Admit: 4/25/2023  Discharge:  5/1/2023    Diagnoses this Admission    Principal Problem:    Septic shock [facility])  Active Problems:    Atrial fibrillation ([facility]    Syncope and collapse    MONY (acute kidney injury) ([facility]    Dehydration    Severe protein-calorie malnutrition (HCC)    Enteritis    Acute blood loss anemia  Resolved Problems:    * No resolved hospital problems. *          Procedures (Please Review Full Report for Details)      Consults    IP CONSULT TO CARDIOLOGY  IP CONSULT TO CRITICAL CARE  IP CONSULT TO NEPHROLOGY  IP CONSULT TO CARDIOLOGY  IP CONSULT TO GI  IP CONSULT TO GENERAL SURGERY  IP CONSULT TO CASE MANAGEMENT      HPI:  Patient is a 80 y.o.  female transferred from [facility] where she presented from SNF w/ a brief unresponsive episode. Pt is a poor historian with hx of dementia and she was living at [facility] since ND in 1/23 after perforated diverticulitis  S.p  ex lap with sigmoid colectomy , small bowel resection and ostomy placement      Daughter at bedside reports she was feeling fine and passed out suddenly for about a minute as per reports . Pt unsure and does not remember about this. Upon arrival to ER , she was wide awake and oriented . She also reported of having  lower abd pain, dizziness of uncertain duration . She was found to have hypotension. She received ~3L IVF there. SBP still in 80's. CVC placed  and levo started. CT showed enterocolitis, given vanc , cefepime and transferred here      Pt today reports ongoing loose stool , needing changing of ostomy bag 3-4 times daily. No fevers. No bleeding .  No headache or chest pain or blurry vison or sob        Hospital Course  Septic shock- sec to colitis - suspect bacterial vs

## 2023-05-01 NOTE — DISCHARGE INSTR - DIET

## 2023-05-01 NOTE — CARE COORDINATION
DISCHARGE ORDER  Date/Time 2023 3:02 PM  Completed by: Barbara Ulloa, RN, Case Management    Patient Name: Conchita Centeno    : 4/3/1932      Admit order Date and Status:21 inpt  Noted discharge order. (verify MD's last order for status of admission/Traditional Medicare 3 [place] Inpatient qualifying stay required for SNF)    Confirmed discharge plan with:              Patient:  Yes              When pt confirms DC plan does any support person need to be contacted by CM Yes if yes who marco                     Discharge to Facility: [address] phone number for staff giving report: [phone]   Pre-certification completed: No   Hospital Exemption Notification (HENS) completed: No pt LTC   Discharge orders and Continuity of Care faxed to facility:  Faxed to [facility]:               Medical Transport explained with choice list offered to pt/family. Choice:(no preference)  Agency used: [address] [place] [address] Rd up time:   16-16:30      Pt/family/Nursing/Facility aware of  time:   Yes Names: Jarad Hackett, pt, pt marco and Sarah Duke at [facility] form completed:  Yes:      Date Last IMM Given: 23    Comments:Order for dc noted. Spoke with pt and marco and plan is to return to [facility]. Spoke with Sarah Duke at [facility] who states pt is self pay and requires no pre cert and can return today. Chart reviewed and no other dc needs identified. Pt is being d/c'd to [facility] (SNF)  today. Pt's O2 sats are 99% on RA. Discharge timeout done with pt, pt marco, nsg CM. All discharge needs and concerns addressed. Discharging nurse to complete JAISON, reconcile AVS, and place final copy with patient's discharge packet.  Discharging RN to ensure that written prescriptions for  Level II medications are sent with patient to the

## 2024-04-03 NOTE — PROGRESS NOTES
1) Bronchomalacia  2) Pneumonia  3) COPD  4) Hypoxemia  5) Dyspnea  6) Abnormal CT Chest  7) HF    60-year-old female with past medical history of HTN, CAD, CHF, a-fib s/p ablation, PAC, chronic UTI, COPD (on home o2 at night time), c.diff, GI Bleed, tracheobronchomalacia (on nocturnal bipap), pulmonary nodule, sleep apnea, ileus, lumbar spinal stenosis, chronic low back pain, OA, IBS, anxiety, depression, schizoaffective disorder, septic embolism, anemia, PCOS, endometriosis, GERD, hypothyroidism, adrenal insufficiency, duodenal ulcer, suprapubic epps presented with temperature.  Patient states that her temperature was 103.1 °F at home.  Patient also reports decreased appetite and decreased urine output.    lobes.  COPD is well controlled with Spiriva- she was assessed on 3/25, no acute issues noted in the office. Sudden pneumonia with a recent clear XR and without sick contacts raises the suspicion of aspiration.  She has a history of bronchomalacia  Overall she is feeling better but still intermittently hypoxemic, currently on 3-4 L NC O2 and has end exp wheezing   Agree with IV Antibiotics  Continue Nocturnal BiPAP  On Stiolto   Albuterol PRN  Discussed plan with Sister , Tiffanie on Friday  She has a history of multiple Aspiration episodes in the past  Recommended to reduced Solumedrol and patient has a history of adrenal insufficiency as well.   Temps have normalized  XR shows improvement  Continue Aerobika with nebulization  needs to get OOB and walk      FiO2 decraesed to 55%         01/07/23 1115   Patient Observation   Heart Rate (!) 113   Resp 18   SpO2 95 %   Breath Sounds   Right Upper Lobe Diminished   Right Middle Lobe Diminished   Right Lower Lobe Diminished   Left Upper Lobe Diminished   Left Lower Lobe Diminished   Ventilator Settings   FiO2  55 %   Vt (Set, mL) 370 mL   Resp Rate (Set) 18 bmp   PEEP/CPAP (cmH2O) 5   Vent Patient Data (Readings)   Vt (Measured) 380 mL   Peak Inspiratory Pressure (cmH2O) 17 cmH2O   Rate Measured 18 br/min   Minute Volume (L/min) 6.7 Liters   Mean Airway Pressure (cmH2O) 8 cmH20   I:E Ratio 1:3   Vent Alarm Settings   High Pressure (cmH2O) 40 cmH2O   Low Minute Volume (lpm) 3 L/min   Low Exhaled Vt (ml) 200 mL   RR High (bpm) 45 br/min   Apnea (secs) 20 secs   Additional Respiratoray Assessments   Humidification Source Heated wire   Humidification Temp 37   ETT    Placement Date/Time: 01/07/23 3662   Placed By: Licensed provider  Placement Verified By: Chest X-ray;Colorimetric ETCO2 device  Preoxygenation: Yes  Airway Tube Size: 8 mm  Location: Oral  Secured At: 24 cm  Measured From: Lips   Secured At 24 cm   Measured From Lips   ETT Placement Center   Secured By Commercial tube arteaga   Site Assessment Dry

## 2024-05-24 NOTE — BRIEF OP NOTE
Brief Postoperative Note      Patient: Joselyn Cortes  YOB: 1932  MRN: [id number]    Date of Procedure: 11/9/2022    Pre-Op Diagnosis: Melanoma of foot, right ([facility] [C43.71]    Post-Op Diagnosis: Same       Procedure(s):  AMPUTATION OF SECOND TOE RIGHT FOOT    Surgeon(s):  Amy Ledesma DPM    Assistant:  * No surgical staff found *    Anesthesia: Monitor Anesthesia Care    Estimated Blood Loss (mL): Minimal    Complications: None    Specimens:   ID Type Source Tests Collected by Time Destination   A : right second toe Tissue Tissue SURGICAL PATHOLOGY Amy Ledesma DPM 11/9/2022 0833        Implants:  * No implants in log *      Drains: * No LDAs found *    Findings: distal 2nd digit skin black discoloration with open ulcer.      Electronically signed by Amy Ledesma DPM on 11/9/2022 at 8:53 AM no

## 2025-02-26 NOTE — PROGRESS NOTES
Laney received from Dr Jamin Blunt to have [address]  call him in regards to deciding about TF and oral nutrition. Spoke with Lamin Rucker with [facility] and gave her his personal cell for them to talk. [facility] MEDICINE PROGRESS NOTE   Patient: Magdalena Mayberry  Today's Date: 2/25/2025    YOB: 1934  Admission Date: 2/22/2025    MRN: [id number]  Inpatient LOS: 1    Room: [address]  Hospital Day: Hospital Day: 4    Subjective   HISTORY AND SUBJECTIVE COMPLAINTS     Chief Complaint    Confusion     Interval Events    Still confused  Less agitated  BP better  Cultures NGTD  Tolerating RRT      Hospital Course:    Magdalena Mayberry is a 89 year old y.o. female with a history of end-stage renal disease on hemodialysis well-known to our service.  She has a history of severe tricuspid valve regurgitation and is being evaluated for TTVR-she completed a cardiac CT scan to 2125.  She has undergone angioplasty and stenting of the ostial SVG to diagonal 2 graft on 12/30/2024.  She has a coronary artery bypass grafting x 3 in 2009 with LIMA to LAD, SVG to D1 and OM 2.  She also has a history of atrial fibrillation and has been maintained on anticoagulation with a history of right-sided breast carcinoma for which she underwent chemoradiation followed by lumpectomy in 1999 with TRAM flap revision.  She was treated for autoimmune encephalitis and generally 2025 with IVIG with without significant improvement and in April 2021 with negative extensive workup.  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     Patient comes into the emergency room for evaluation for hallucinations.  She has been seeing \" rats sitting on her chest\"-she is oriented x 1-2 which is different from her baseline.  She received 1 hour of dialysis treatment and then was sent into the emergency room for agitation and hallucinations.     Workup in the emergency room is unremarkable.  CT head does not show any acute abnormalities.  Twelve-lead EKG shows atrial fibrillation.  Lab workup is underwhelming.  Patient is admitted to the hospital for hemodialysis.         ROS:        Current Facility-Administered  Medications   Medication    albumin human (SPA) 25 % injection 12.5 g    ketorolac (ACULAR) 0.5 % ophthalmic solution 1 drop    haloperidol lactate (HALDOL) 5 MG/ML short-acting injection 2 mg    haloperidol lactate (HALDOL) 5 MG/ML short-acting injection 2 mg    melatonin tablet 3 mg    sodium citrate anticoagulant 4 % flush 3 mL    alteplase (CATHFLO ACTIVASE) injection 2 mg    sodium chloride (NORMAL SALINE) 0.9 % bolus 100-200 mL    apixaBAN (ELIQUIS) tablet 2.5 mg    levothyroxine (SYNTHROID, LEVOTHROID) tablet 75 mcg    metoPROLOL succinate (TOPROL-XL) ER tablet 50 mg    pantoprazole (PROTONIX) EC tablet 40 mg    rosuvastatin (CRESTOR) tablet 10 mg    sodium chloride 0.9 % injection 10 mL    sodium chloride 0.9 % injection 2 mL         Objective   PHYSICAL EXAMINATION     Vital 24 Hour Range Most Recent Value   Temperature Temp  Min: 97.8 °F (36.6 °C)  Max: 98.2 °F (36.8 °C) 97.8 °F (36.6 °C)   Pulse Pulse  Min: 67  Max: 105 95   Respiratory Resp  Min: 15  Max: 18 16   Blood Pressure BP  Min: 73/40  Max: 168/82 92/52   Pulse Oximetry SpO2  Min: 96 %  Max: 98 % 97 %   Arterial BP No data recorded     O2 No data recorded       Recorded Intake and Output:  Intake/Output Summary (Last 24 hours) at 2/25/2025 1848  Last data filed at 2/25/2025 1801  Gross per 24 hour   Intake 1000 ml   Output 460 ml   Net 540 ml      Recorded Last Stool Occurrence: 1 (incont) (02/24/25 1230)     Vital Most Recent Value First Value   Weight 69 kg (152 lb 1.9 oz) Weight: 68.4 kg (150 lb 12.7 oz)   Height 5' 4\" (162.6 cm) Height: 5' 4\" (162.6 cm)   BMI 26.11 N/A        General appearance: NAD, resting comfortably  no fever   Head: Normocephalic,  Atraumatic, no obvious abnormalities   Neck: supple, full ROM, no JVD, trachea midline,   Lungs: normal efforts, symmetrical expansion, no wheezes, rhonchi,rales diminished through out   Heart: RRR and S1, S2 normal, no murmur, gallop or rub   Abdomen: soft, NT/ND, normal BT, no organomegaly     Extremities: no edema/cyanosis, warm, no cyanosis or clubbing  wrisit restraints in place   Pulses: 2+ and symmetric radial, dorsalis pedis   Skin: WDI, no rashes, lesions or wounds   Neurologic: AAOx2/3, Juancarlos, no focal motor weakness,         TEST RESULTS     Recent Labs   Lab 02/24/25  0254 02/22/25  1904 02/22/25  1418   WBC 7.3  --  7.9   HGB 7.8*  --  10.9*   HCT 24.0*  --  34.1*   PLT 76*  --  174   NH3  --  16  --      Recent Labs   Lab 02/23/25  0754 02/22/25  1616 02/22/25  1428   SODIUM  --  138  --    POTASSIUM  --  3.8  --    BUN  --  8  --    CREATININE  --  1.71* 1.60*   GLUCOSE  --  120*  --    CALCIUM  --  9.2  --    ALBUMIN  --  2.9*  --    AST  --  30  --    GPT  --  27  --    ALKPT  --  135*  --    BILIRUBIN  --  1.4*  --    LIPA  --  25  --    MRSASC Not Detected  --   --        No results found for: \"VANCR\", \"VANCT\", \"VANCP\"      Patients's last HBA1C reading was   Hemoglobin A1C (%)   Date Value   02/01/2025 6.2 (H)       No results found   No results found  No results found  Recent Labs   Lab 02/22/25  1616 02/22/25  1418   AST 30  --    GPT 27  --    ALKPT 135*  --    BILIRUBIN 1.4*  --    INR  --  1.4     No results found  Recent Labs   Lab 02/22/25  1353          No results found       Radiology: Imaging studies have been reviewed and pertinent findings discussed in the Assessment and Plan.  No results found for any visits on 02/22/25 (from the past 48 hour(s)).       ANCILLARY ORDERS     Diet:  Daily - Am Snack; Nepro With Carbsteady/Renal Supplement, Berry Oral Nutrition Supplement  Regular; Yes, Medical Nutrition Management by Rd (Registered Dietitian) Diet  Telemetry: On  Consults:    IP CONSULT TO NEPHROLOGY  IP CONSULT TO NEUROLOGY  IP CONSULT TO RN WOUND CARE  IP CONSULT TO NUTRITION SERVICES  PHARMACY TO DOSE AND MONITOR VANCOMYCIN  Therapy Orders:   No orders of the defined types were placed in this encounter.      ADVANCED DIRECTIVES     Code Status: Full Resuscitation        ASSESSMENT AND PLAN     Assessment    Encephalopathy-toxic metabolic:improved  End-stage renal disease on hemodialysis per  Tricuspid valve regurgitation-possible TTVR planning  Hypothyroidism  Coronary artery disease status post coronary artery bypass grafting-angioplasty and stenting of SVG graft to D2 on 12/30/2024.  Atrial fibrillation anticoagulation.  History of autoimmune encephalitis    Plan     Monitor mentation   DC Antibiotics  Monitor BP  Albumin as needed   RRT   Continue anticoagulation.  Continue with antiplatelet agents.  Neurology follow up-Aricept DC'd        Smoking status: non smoker     Nutrition status: Malnutrition chronic; non-severe (moderate): Mild depletion of muscle mass, Weight loss of 7.5%/3 months, Weight loss of 5%/1 month. Intervention:Coordination of Nutrition care by a nutritional Professional, Nutritional Supplemental therapy. See RD Note for Current recommendations.    Body mass index is 26.11 kg/m². - appropriate weight BMI 18.5-24  DVT Prophylaxis: Eliquis       DISCHARGE PLANNING     The patient's treatment plans were discussed with patient.     Recommendations for Discharge   SW Home care, Home therapy, Home   PT     OT     SLP        Discharge Destination:    Home    Anticipated date of discharge:    2/28/25    Barriers to discharge:      Encephalopathy      Readmission risk: high

## (undated) DEVICE — TUBING, SUCTION, 3/16" X 10', STRAIGHT: Brand: MEDLINE

## (undated) DEVICE — GOWN,AURORA,NONREINF,RAGLAN,XXL,STERILE: Brand: MEDLINE

## (undated) DEVICE — SEALER ENDOSCP NANO COAT OPN DIV CRV L JAW LIGASURE IMPACT

## (undated) DEVICE — SUTURE PERMAHAND SZ 3-0 L18IN NONABSORBABLE BLK L26MM SH C013D

## (undated) DEVICE — ELECTRODE PT RET AD L9FT HI MOIST COND ADH HYDRGEL CORDED

## (undated) DEVICE — COTTON UNDERCAST PADDING,CRIMPED FINISH: Brand: WEBRIL

## (undated) DEVICE — SPLINT ORTH W4XL30IN LAYERED FBRGLS FOAM PD BRTH BK MOLD

## (undated) DEVICE — APPLICATOR PREP 26ML 0.7% IOD POVACRYLEX 74% ISO ALC ST

## (undated) DEVICE — PACK,UNIVERSAL,SPLIT,II,AURORA: Brand: MEDLINE

## (undated) DEVICE — YANKAUER,BULB TIP,W/O VENT,RIGID,STERILE: Brand: MEDLINE

## (undated) DEVICE — NEEDLE HYPO 22GA L1.5IN BLK POLYPR HUB S STL REG BVL STR

## (undated) DEVICE — SYRINGE MED 10ML LUERLOCK TIP W/O SFTY DISP

## (undated) DEVICE — CIRCUIT ANES L72IN 3L BACT AND VIR FLTR EL CONN SGL LIMB

## (undated) DEVICE — TIP SUCT DIA12FR W STYL CTRL VENT DISPOSABLE FRAZ

## (undated) DEVICE — GOWN SIRUS NONREIN XL W/TWL: Brand: MEDLINE INDUSTRIES, INC.

## (undated) DEVICE — SUTURE VCRL SZ 3-0 L27IN ABSRB UD L26MM SH 1/2 CIR J416H

## (undated) DEVICE — STANDARD HYPODERMIC NEEDLE,POLYPROPYLENE HUB: Brand: MONOJECT

## (undated) DEVICE — STAPLER INT L75MM CUT LN L73MM STPL LN L77MM BLU B FRM 8

## (undated) DEVICE — SOLUTION IV IRRIG 500ML 0.9% SODIUM CHL 2F7123

## (undated) DEVICE — MAJOR SET UP PK

## (undated) DEVICE — SUTURE ETHLN SZ 3-0 L18IN NONABSORBABLE BLK PS-2 L19MM 3/8 1669H

## (undated) DEVICE — Device: Brand: SENSURA MIO

## (undated) DEVICE — BANDAGE COMPR W4INXL15FT BGE E SGL LAYERED CLP CLSR

## (undated) DEVICE — RELOAD STPL L75MM OPN H3.8MM CLS 1.5MM WIRE DIA0.2MM REG

## (undated) DEVICE — BANDAGE COMPR W3INXL5YD TAN BRTH SELF ADH WRP W/ HND TEAR

## (undated) DEVICE — SPONGE LAP W18XL18IN WHT COT 4 PLY FLD STRUNG RADPQ DISP ST

## (undated) DEVICE — GLOVE ORANGE PI 8 1/2   MSG9085

## (undated) DEVICE — SUTURE PERMA-HAND SZ 2-0 L30IN NONABSORBABLE BLK L26MM SH K833H

## (undated) DEVICE — SUTURE VCRL SZ 3-0 L18IN ABSRB UD L26MM SH 1/2 CIR J864D

## (undated) DEVICE — 3M™ STERI-STRIP™ COMPOUND BENZOIN TINCTURE 40 BAGS/CARTON 4 CARTONS/CASE C1544: Brand: 3M™ STERI-STRIP™

## (undated) DEVICE — CONMED ACCESSORY ELECTRODE, 6 INCH (15.24 CM) FLAT BLADE: Brand: CONMED

## (undated) DEVICE — SUTURE ETHLN SZ 3-0 L30IN NONABSORBABLE BLK FSL L30MM 3/8 1671H

## (undated) DEVICE — BANDAGE ACE 4X5YDNS

## (undated) DEVICE — NEEDLE HYPO 25GA L1.5IN BLU POLYPR HUB S STL REG BVL STR

## (undated) DEVICE — TRAY CATH 16FR F INCLUDE LUB DRNGE BG STATLOK STBL DEV

## (undated) DEVICE — Z CONVERTED USE 2273232 BANDAGE COMPR W6INXL11YD E KNIT DBL SELF CLSR EZE-BAND

## (undated) DEVICE — PADDING UNDERCAST W4INXL4YD COT FBR LO LINTING WYTEX

## (undated) DEVICE — PADDING UNDERCAST W6INXL4YD WYTEX 6 PER BG

## (undated) DEVICE — CANNULA NSL 13FT TUBE AD ETCO2 DIV SAMP M

## (undated) DEVICE — 3M™ STERI-STRIP™ REINFORCED ADHESIVE SKIN CLOSURES, R1540, 1/8 IN X 3 IN (3 MM X 75 MM), 5 STRIPS/ENVELOPE: Brand: 3M™ STERI-STRIP™

## (undated) DEVICE — SUTURE VCRL SZ 2-0 L27IN ABSRB UD L26MM CT-2 1/2 CIR J269H

## (undated) DEVICE — KIT EVAC 100CC W10MMXL20CM SIL FULL PERF HUBLESS FLAT DRN

## (undated) DEVICE — BANDAGE GZ W45INXL4 1 10YD FLUF RL 6 PLY DERMACEA

## (undated) DEVICE — INTRO SHEATH T PEEL 17GAX8IN

## (undated) DEVICE — STAPLER INT L60MM REG TISS BLU B FRM 8 FIRING 2 ROW AUTO

## (undated) DEVICE — RELOAD STPL 40MM H1.5-3.5MM WIRE 0.2MM REG TISS BLU CRV

## (undated) DEVICE — Z INACTIVE USE 2855096 SPONGE GZ W4XL4IN 8 PLY 100% COT

## (undated) DEVICE — STAPLER INT CUT LN 51MM STPL 51MM BLU CRV HD B FRM

## (undated) DEVICE — SMALL OSC. SAW BLADE, 9MM X 24.6MM X 0.38MM: Brand: MICROAIRE®

## (undated) DEVICE — GAUZE,SPONGE,4"X4",8PLY,STRL,LF,10/TRAY: Brand: MEDLINE

## (undated) DEVICE — SUTURE PERMAHAND SZ 2-0 L30IN NONABSORBABLE BLK SILK W/O A305H

## (undated) DEVICE — BANDAGE COMPR ELASTIC 9 FTX4 IN STRL ESMARCH LF

## (undated) DEVICE — 3M™ TEGADERM™ TRANSPARENT FILM DRESSING FRAME STYLE, 1626W, 4 IN X 4-3/4 IN (10 CM X 12 CM), 50/CT 4CT/CASE: Brand: 3M™ TEGADERM™

## (undated) DEVICE — APPLICATOR MEDICATED 26 CC SOLUTION HI LT ORNG CHLORAPREP

## (undated) DEVICE — GLOVE SURG SZ 7 L12IN FNGR THK94MIL STD WHT ISOLEX LTX FREE

## (undated) DEVICE — GLOVE ORANGE PI 7 1/2   MSG9075

## (undated) DEVICE — SUTURE PROL SZ 1 L30IN NONABSORBABLE BLU CTX L48MM 1/2 CIR 8455H

## (undated) DEVICE — STAPLER SKIN H3.9MM WIRE DIA0.58MM CRWN 6.9MM 35 STPL FIX

## (undated) DEVICE — NEEDLE HYPO 18GA L1.5IN PNK POLYPR HUB S STL THN WALL FILL

## (undated) DEVICE — SUTURE VCRL SZ 4-0 L18IN ABSRB UD L19MM PS-2 3/8 CIR PRIM J496H

## (undated) DEVICE — GLOVE SURG SZ 65 L12IN FNGR THK94MIL STD WHT ISOLEX LTX

## (undated) DEVICE — KIT INFUS PMP 270ML 4ML/HR 2ML/SITE SOAK CATH L5IN N NARC